# Patient Record
Sex: FEMALE | Race: BLACK OR AFRICAN AMERICAN | NOT HISPANIC OR LATINO | Employment: OTHER | URBAN - METROPOLITAN AREA
[De-identification: names, ages, dates, MRNs, and addresses within clinical notes are randomized per-mention and may not be internally consistent; named-entity substitution may affect disease eponyms.]

---

## 2024-02-04 ENCOUNTER — HOSPITAL ENCOUNTER (INPATIENT)
Facility: HOSPITAL | Age: 69
LOS: 8 days | Discharge: NURSING, REHAB OR LONG-TERM CARE | DRG: 329 | End: 2024-02-14
Attending: STUDENT IN AN ORGANIZED HEALTH CARE EDUCATION/TRAINING PROGRAM | Admitting: SURGERY
Payer: MEDICARE

## 2024-02-04 DIAGNOSIS — K35.80 ACUTE APPENDICITIS: ICD-10-CM

## 2024-02-04 DIAGNOSIS — K35.80 ACUTE APPENDICITIS, UNSPECIFIED ACUTE APPENDICITIS TYPE: Primary | ICD-10-CM

## 2024-02-04 DIAGNOSIS — N17.9 ACUTE KIDNEY INJURY: ICD-10-CM

## 2024-02-04 DIAGNOSIS — K35.32 PERFORATED APPENDICITIS: ICD-10-CM

## 2024-02-04 LAB
ABORH RETYPE: NORMAL
ABS NEUT CALC (OHS): 6.33 X10(3)/MCL (ref 2.1–9.2)
ALBUMIN SERPL-MCNC: 2 G/DL (ref 3.4–4.8)
ALBUMIN SERPL-MCNC: 2.6 G/DL (ref 3.4–4.8)
ALBUMIN/GLOB SERPL: 0.4 RATIO (ref 1.1–2)
ALBUMIN/GLOB SERPL: 0.4 RATIO (ref 1.1–2)
ALP SERPL-CCNC: 62 UNIT/L (ref 40–150)
ALP SERPL-CCNC: 76 UNIT/L (ref 40–150)
ALT SERPL-CCNC: 13 UNIT/L (ref 0–55)
ALT SERPL-CCNC: 14 UNIT/L (ref 0–55)
ANISOCYTOSIS BLD QL SMEAR: SLIGHT
APPEARANCE UR: ABNORMAL
APTT PPP: 37.4 SECONDS (ref 23.2–33.7)
AST SERPL-CCNC: 18 UNIT/L (ref 5–34)
AST SERPL-CCNC: 19 UNIT/L (ref 5–34)
BACTERIA #/AREA URNS AUTO: ABNORMAL /HPF
BILIRUB SERPL-MCNC: 1.9 MG/DL
BILIRUB SERPL-MCNC: 2 MG/DL
BILIRUB UR QL STRIP.AUTO: NEGATIVE
BNP BLD-MCNC: 167.4 PG/ML
BUN SERPL-MCNC: 37.8 MG/DL (ref 9.8–20.1)
BUN SERPL-MCNC: 37.9 MG/DL (ref 9.8–20.1)
CALCIUM SERPL-MCNC: 8.5 MG/DL (ref 8.4–10.2)
CALCIUM SERPL-MCNC: 9.7 MG/DL (ref 8.4–10.2)
CASTS URNS MICRO: ABNORMAL /LPF
CHLORIDE SERPL-SCNC: 106 MMOL/L (ref 98–107)
CHLORIDE SERPL-SCNC: 109 MMOL/L (ref 98–107)
CO2 SERPL-SCNC: 19 MMOL/L (ref 23–31)
CO2 SERPL-SCNC: 21 MMOL/L (ref 23–31)
COLOR UR AUTO: YELLOW
CREAT SERPL-MCNC: 2 MG/DL (ref 0.55–1.02)
CREAT SERPL-MCNC: 2.47 MG/DL (ref 0.55–1.02)
D DIMER PPP IA.FEU-MCNC: >=20 UG/ML FEU (ref 0–0.5)
ERYTHROCYTE [DISTWIDTH] IN BLOOD BY AUTOMATED COUNT: 14.6 % (ref 11.5–17)
ERYTHROCYTE [DISTWIDTH] IN BLOOD BY AUTOMATED COUNT: 14.7 % (ref 11.5–17)
GFR SERPLBLD CREATININE-BSD FMLA CKD-EPI: 21 MLS/MIN/1.73/M2
GFR SERPLBLD CREATININE-BSD FMLA CKD-EPI: 27 MLS/MIN/1.73/M2
GLOBULIN SER-MCNC: 4.9 GM/DL (ref 2.4–3.5)
GLOBULIN SER-MCNC: 5.8 GM/DL (ref 2.4–3.5)
GLUCOSE SERPL-MCNC: 116 MG/DL (ref 82–115)
GLUCOSE SERPL-MCNC: 96 MG/DL (ref 82–115)
GLUCOSE UR QL STRIP.AUTO: NORMAL
GROUP & RH: NORMAL
HCT VFR BLD AUTO: 35.7 % (ref 37–47)
HCT VFR BLD AUTO: 40.8 % (ref 37–47)
HGB BLD-MCNC: 11.6 G/DL (ref 12–16)
HGB BLD-MCNC: 13.3 G/DL (ref 12–16)
HOLD SPECIMEN: NORMAL
HOLD SPECIMEN: NORMAL
HYALINE CASTS #/AREA URNS LPF: ABNORMAL /LPF
HYPOCHROMIA BLD QL SMEAR: SLIGHT
INDIRECT COOMBS: NORMAL
INR PPP: 1.4
KETONES UR QL STRIP.AUTO: NEGATIVE
LACTATE SERPL-SCNC: 2.5 MMOL/L (ref 0.5–2.2)
LACTATE SERPL-SCNC: 3 MMOL/L (ref 0.5–2.2)
LACTATE SERPL-SCNC: 3.6 MMOL/L (ref 0.5–2.2)
LEUKOCYTE ESTERASE UR QL STRIP.AUTO: 500
LIPASE SERPL-CCNC: 6 U/L
LYMPHOCYTES NFR BLD MANUAL: 0.29 X10(3)/MCL
LYMPHOCYTES NFR BLD MANUAL: 4 % (ref 13–40)
MACROCYTES BLD QL SMEAR: SLIGHT
MCH RBC QN AUTO: 29.3 PG (ref 27–31)
MCH RBC QN AUTO: 29.4 PG (ref 27–31)
MCHC RBC AUTO-ENTMCNC: 32.5 G/DL (ref 33–36)
MCHC RBC AUTO-ENTMCNC: 32.6 G/DL (ref 33–36)
MCV RBC AUTO: 89.9 FL (ref 80–94)
MCV RBC AUTO: 90.4 FL (ref 80–94)
MONOCYTES NFR BLD MANUAL: 0.58 X10(3)/MCL (ref 0.1–1.3)
MONOCYTES NFR BLD MANUAL: 8 % (ref 2–11)
MUCOUS THREADS URNS QL MICRO: ABNORMAL /LPF
NEUTROPHILS NFR BLD MANUAL: 88 % (ref 47–80)
NITRITE UR QL STRIP.AUTO: NEGATIVE
NRBC BLD AUTO-RTO: 0 %
NRBC BLD AUTO-RTO: 0 %
NRBC BLD MANUAL-RTO: 0 %
PH UR STRIP.AUTO: 5.5 [PH]
PLATELET # BLD AUTO: 251 X10(3)/MCL (ref 130–400)
PLATELET # BLD AUTO: 320 X10(3)/MCL (ref 130–400)
PLATELET # BLD EST: ADEQUATE 10*3/UL
PMV BLD AUTO: 11 FL (ref 7.4–10.4)
PMV BLD AUTO: 11.6 FL (ref 7.4–10.4)
POLYCHROMASIA BLD QL SMEAR: SLIGHT
POTASSIUM SERPL-SCNC: 4.9 MMOL/L (ref 3.5–5.1)
POTASSIUM SERPL-SCNC: 5.1 MMOL/L (ref 3.5–5.1)
PROT SERPL-MCNC: 6.9 GM/DL (ref 5.8–7.6)
PROT SERPL-MCNC: 8.4 GM/DL (ref 5.8–7.6)
PROT UR QL STRIP.AUTO: ABNORMAL
PROTHROMBIN TIME: 17.2 SECONDS (ref 11.4–14)
RBC # BLD AUTO: 3.95 X10(6)/MCL (ref 4.2–5.4)
RBC # BLD AUTO: 4.54 X10(6)/MCL (ref 4.2–5.4)
RBC #/AREA URNS AUTO: ABNORMAL /HPF
RBC UR QL AUTO: ABNORMAL
SODIUM SERPL-SCNC: 142 MMOL/L (ref 136–145)
SODIUM SERPL-SCNC: 143 MMOL/L (ref 136–145)
SP GR UR STRIP.AUTO: 1.02 (ref 1–1.03)
SPECIMEN OUTDATE: NORMAL
SQUAMOUS #/AREA URNS LPF: ABNORMAL /HPF
TARGETS BLD QL SMEAR: SLIGHT
TROPONIN I SERPL-MCNC: 0.02 NG/ML (ref 0–0.04)
UNIDENT CRYS #/AREA URNS HPF: ABNORMAL /HPF
UROBILINOGEN UR STRIP-ACNC: NORMAL
WBC # SPEC AUTO: 2.56 X10(3)/MCL (ref 4.5–11.5)
WBC # SPEC AUTO: 7.19 X10(3)/MCL (ref 4.5–11.5)
WBC #/AREA URNS AUTO: ABNORMAL /HPF

## 2024-02-04 PROCEDURE — 83880 ASSAY OF NATRIURETIC PEPTIDE: CPT | Performed by: PHYSICIAN ASSISTANT

## 2024-02-04 PROCEDURE — 96361 HYDRATE IV INFUSION ADD-ON: CPT

## 2024-02-04 PROCEDURE — 86901 BLOOD TYPING SEROLOGIC RH(D): CPT | Performed by: STUDENT IN AN ORGANIZED HEALTH CARE EDUCATION/TRAINING PROGRAM

## 2024-02-04 PROCEDURE — 85027 COMPLETE CBC AUTOMATED: CPT | Mod: 91

## 2024-02-04 PROCEDURE — 63600175 PHARM REV CODE 636 W HCPCS

## 2024-02-04 PROCEDURE — 96372 THER/PROPH/DIAG INJ SC/IM: CPT

## 2024-02-04 PROCEDURE — G0378 HOSPITAL OBSERVATION PER HR: HCPCS

## 2024-02-04 PROCEDURE — 99285 EMERGENCY DEPT VISIT HI MDM: CPT | Mod: 25

## 2024-02-04 PROCEDURE — 83690 ASSAY OF LIPASE: CPT | Performed by: PHYSICIAN ASSISTANT

## 2024-02-04 PROCEDURE — 85379 FIBRIN DEGRADATION QUANT: CPT | Performed by: PHYSICIAN ASSISTANT

## 2024-02-04 PROCEDURE — 87040 BLOOD CULTURE FOR BACTERIA: CPT | Performed by: PHYSICIAN ASSISTANT

## 2024-02-04 PROCEDURE — 80053 COMPREHEN METABOLIC PANEL: CPT | Mod: 91

## 2024-02-04 PROCEDURE — 25000003 PHARM REV CODE 250

## 2024-02-04 PROCEDURE — 96375 TX/PRO/DX INJ NEW DRUG ADDON: CPT

## 2024-02-04 PROCEDURE — 96374 THER/PROPH/DIAG INJ IV PUSH: CPT

## 2024-02-04 PROCEDURE — 93005 ELECTROCARDIOGRAM TRACING: CPT

## 2024-02-04 PROCEDURE — 81001 URINALYSIS AUTO W/SCOPE: CPT | Performed by: PHYSICIAN ASSISTANT

## 2024-02-04 PROCEDURE — 85610 PROTHROMBIN TIME: CPT | Performed by: PHYSICIAN ASSISTANT

## 2024-02-04 PROCEDURE — 85027 COMPLETE CBC AUTOMATED: CPT | Performed by: PHYSICIAN ASSISTANT

## 2024-02-04 PROCEDURE — 96376 TX/PRO/DX INJ SAME DRUG ADON: CPT

## 2024-02-04 PROCEDURE — 87086 URINE CULTURE/COLONY COUNT: CPT | Performed by: PHYSICIAN ASSISTANT

## 2024-02-04 PROCEDURE — 63600175 PHARM REV CODE 636 W HCPCS: Performed by: PHYSICIAN ASSISTANT

## 2024-02-04 PROCEDURE — 25000003 PHARM REV CODE 250: Performed by: PHYSICIAN ASSISTANT

## 2024-02-04 PROCEDURE — 85730 THROMBOPLASTIN TIME PARTIAL: CPT | Performed by: PHYSICIAN ASSISTANT

## 2024-02-04 PROCEDURE — 83605 ASSAY OF LACTIC ACID: CPT | Mod: 91 | Performed by: STUDENT IN AN ORGANIZED HEALTH CARE EDUCATION/TRAINING PROGRAM

## 2024-02-04 PROCEDURE — 83605 ASSAY OF LACTIC ACID: CPT | Performed by: PHYSICIAN ASSISTANT

## 2024-02-04 PROCEDURE — 80053 COMPREHEN METABOLIC PANEL: CPT | Performed by: PHYSICIAN ASSISTANT

## 2024-02-04 PROCEDURE — 84484 ASSAY OF TROPONIN QUANT: CPT | Performed by: PHYSICIAN ASSISTANT

## 2024-02-04 RX ORDER — SODIUM CHLORIDE 9 MG/ML
1000 INJECTION, SOLUTION INTRAVENOUS
Status: COMPLETED | OUTPATIENT
Start: 2024-02-04 | End: 2024-02-04

## 2024-02-04 RX ORDER — MORPHINE SULFATE 2 MG/ML
2 INJECTION, SOLUTION INTRAMUSCULAR; INTRAVENOUS
Status: DISCONTINUED | OUTPATIENT
Start: 2024-02-04 | End: 2024-02-04

## 2024-02-04 RX ORDER — METHOCARBAMOL 500 MG/1
500 TABLET, FILM COATED ORAL 3 TIMES DAILY
Status: DISCONTINUED | OUTPATIENT
Start: 2024-02-04 | End: 2024-02-05

## 2024-02-04 RX ORDER — NALOXONE HCL 0.4 MG/ML
0.4 VIAL (ML) INJECTION
Status: DISCONTINUED | OUTPATIENT
Start: 2024-02-04 | End: 2024-02-14 | Stop reason: HOSPADM

## 2024-02-04 RX ORDER — HYDROMORPHONE HYDROCHLORIDE 1 MG/ML
0.2 INJECTION, SOLUTION INTRAMUSCULAR; INTRAVENOUS; SUBCUTANEOUS EVERY 6 HOURS PRN
Status: DISCONTINUED | OUTPATIENT
Start: 2024-02-04 | End: 2024-02-05

## 2024-02-04 RX ORDER — ACETAMINOPHEN 325 MG/1
650 TABLET ORAL EVERY 4 HOURS PRN
Status: DISCONTINUED | OUTPATIENT
Start: 2024-02-04 | End: 2024-02-05

## 2024-02-04 RX ORDER — ONDANSETRON HYDROCHLORIDE 2 MG/ML
4 INJECTION, SOLUTION INTRAVENOUS
Status: COMPLETED | OUTPATIENT
Start: 2024-02-04 | End: 2024-02-04

## 2024-02-04 RX ORDER — MORPHINE SULFATE 2 MG/ML
4 INJECTION, SOLUTION INTRAMUSCULAR; INTRAVENOUS
Status: COMPLETED | OUTPATIENT
Start: 2024-02-04 | End: 2024-02-04

## 2024-02-04 RX ORDER — TALC
6 POWDER (GRAM) TOPICAL NIGHTLY PRN
Status: DISCONTINUED | OUTPATIENT
Start: 2024-02-04 | End: 2024-02-05

## 2024-02-04 RX ORDER — LIDOCAINE HYDROCHLORIDE 10 MG/ML
1 INJECTION, SOLUTION EPIDURAL; INFILTRATION; INTRACAUDAL; PERINEURAL ONCE AS NEEDED
Status: DISCONTINUED | OUTPATIENT
Start: 2024-02-04 | End: 2024-02-14 | Stop reason: HOSPADM

## 2024-02-04 RX ORDER — ACETAMINOPHEN 325 MG/1
650 TABLET ORAL EVERY 8 HOURS PRN
Status: DISCONTINUED | OUTPATIENT
Start: 2024-02-04 | End: 2024-02-05

## 2024-02-04 RX ORDER — OXYCODONE HYDROCHLORIDE 5 MG/1
10 TABLET ORAL EVERY 4 HOURS PRN
Status: DISCONTINUED | OUTPATIENT
Start: 2024-02-04 | End: 2024-02-05

## 2024-02-04 RX ORDER — OXYCODONE HYDROCHLORIDE 5 MG/1
5 TABLET ORAL EVERY 4 HOURS PRN
Status: DISCONTINUED | OUTPATIENT
Start: 2024-02-04 | End: 2024-02-05

## 2024-02-04 RX ORDER — SODIUM CHLORIDE, SODIUM LACTATE, POTASSIUM CHLORIDE, CALCIUM CHLORIDE 600; 310; 30; 20 MG/100ML; MG/100ML; MG/100ML; MG/100ML
INJECTION, SOLUTION INTRAVENOUS CONTINUOUS
Status: DISCONTINUED | OUTPATIENT
Start: 2024-02-04 | End: 2024-02-06

## 2024-02-04 RX ORDER — SODIUM CHLORIDE 0.9 % (FLUSH) 0.9 %
10 SYRINGE (ML) INJECTION
Status: DISCONTINUED | OUTPATIENT
Start: 2024-02-04 | End: 2024-02-14 | Stop reason: HOSPADM

## 2024-02-04 RX ORDER — HEPARIN SODIUM 5000 [USP'U]/ML
5000 INJECTION, SOLUTION INTRAVENOUS; SUBCUTANEOUS EVERY 8 HOURS
Status: DISCONTINUED | OUTPATIENT
Start: 2024-02-04 | End: 2024-02-14 | Stop reason: HOSPADM

## 2024-02-04 RX ORDER — ONDANSETRON 4 MG/1
8 TABLET, ORALLY DISINTEGRATING ORAL EVERY 8 HOURS PRN
Status: DISCONTINUED | OUTPATIENT
Start: 2024-02-04 | End: 2024-02-08

## 2024-02-04 RX ORDER — HYDROMORPHONE HYDROCHLORIDE 1 MG/ML
0.5 INJECTION, SOLUTION INTRAMUSCULAR; INTRAVENOUS; SUBCUTANEOUS
Status: COMPLETED | OUTPATIENT
Start: 2024-02-04 | End: 2024-02-04

## 2024-02-04 RX ADMIN — HEPARIN SODIUM 5000 UNITS: 5000 INJECTION, SOLUTION INTRAVENOUS; SUBCUTANEOUS at 09:02

## 2024-02-04 RX ADMIN — HYDROMORPHONE HYDROCHLORIDE 0.5 MG: 1 INJECTION, SOLUTION INTRAMUSCULAR; INTRAVENOUS; SUBCUTANEOUS at 01:02

## 2024-02-04 RX ADMIN — ONDANSETRON 4 MG: 2 INJECTION INTRAMUSCULAR; INTRAVENOUS at 11:02

## 2024-02-04 RX ADMIN — SODIUM CHLORIDE, POTASSIUM CHLORIDE, SODIUM LACTATE AND CALCIUM CHLORIDE: 600; 310; 30; 20 INJECTION, SOLUTION INTRAVENOUS at 04:02

## 2024-02-04 RX ADMIN — HYDROMORPHONE HYDROCHLORIDE 0.2 MG: 1 INJECTION, SOLUTION INTRAMUSCULAR; INTRAVENOUS; SUBCUTANEOUS at 05:02

## 2024-02-04 RX ADMIN — OXYCODONE HYDROCHLORIDE 10 MG: 5 TABLET ORAL at 04:02

## 2024-02-04 RX ADMIN — SODIUM CHLORIDE 1000 ML: 9 INJECTION, SOLUTION INTRAVENOUS at 01:02

## 2024-02-04 RX ADMIN — SODIUM CHLORIDE, POTASSIUM CHLORIDE, SODIUM LACTATE AND CALCIUM CHLORIDE 500 ML: 600; 310; 30; 20 INJECTION, SOLUTION INTRAVENOUS at 05:02

## 2024-02-04 RX ADMIN — METHOCARBAMOL 500 MG: 500 TABLET ORAL at 09:02

## 2024-02-04 RX ADMIN — PIPERACILLIN AND TAZOBACTAM 4.5 G: 4; .5 INJECTION, POWDER, LYOPHILIZED, FOR SOLUTION INTRAVENOUS; PARENTERAL at 02:02

## 2024-02-04 RX ADMIN — MORPHINE SULFATE 4 MG: 2 INJECTION, SOLUTION INTRAMUSCULAR; INTRAVENOUS at 12:02

## 2024-02-04 RX ADMIN — SODIUM CHLORIDE 1000 ML: 9 INJECTION, SOLUTION INTRAVENOUS at 11:02

## 2024-02-04 RX ADMIN — METHOCARBAMOL 500 MG: 500 TABLET ORAL at 04:02

## 2024-02-04 RX ADMIN — MORPHINE SULFATE 4 MG: 2 INJECTION, SOLUTION INTRAMUSCULAR; INTRAVENOUS at 11:02

## 2024-02-04 RX ADMIN — HYDROMORPHONE HYDROCHLORIDE 0.2 MG: 1 INJECTION, SOLUTION INTRAMUSCULAR; INTRAVENOUS; SUBCUTANEOUS at 11:02

## 2024-02-04 RX ADMIN — PIPERACILLIN AND TAZOBACTAM 4.5 G: 4; .5 INJECTION, POWDER, LYOPHILIZED, FOR SOLUTION INTRAVENOUS; PARENTERAL at 11:02

## 2024-02-04 NOTE — ED PROVIDER NOTES
Encounter Date: 2/4/2024     History     Chief Complaint   Patient presents with    Abdominal Pain     Upper abd pain that started on Saturday. Also reports generalized weakness and dizziness that started this morning. Denies n/v/d. Does reports a foul odor to urine.      Patient with pmhx of HTN presents today with daughter c/o generalized abdominal pain that started yesterday morning. Pain is constant and non-radiating. She also reports malodorous urine and shortness of breath. She says she feels short of breath due to the pain. Daughter says this morning she started to complain of generalized weakness as well. Denies fever, chills, n/v/d, constipation, dysuria, hematuria, chest pain, syncope.     The history is provided by the patient and a relative. No  was used.     Review of patient's allergies indicates:  No Known Allergies  No past medical history on file.  No past surgical history on file.  No family history on file.     Review of Systems   Constitutional:  Negative for chills and fever.        Generalized weakness    Respiratory:  Positive for shortness of breath. Negative for cough and chest tightness.    Cardiovascular:  Negative for chest pain.   Gastrointestinal:  Positive for abdominal pain. Negative for constipation, diarrhea, nausea and vomiting.   Genitourinary:  Negative for dysuria, flank pain and hematuria.   Musculoskeletal:  Negative for arthralgias and myalgias.   Skin:  Negative for rash.   Neurological:  Negative for syncope, light-headedness and headaches.   All other systems reviewed and are negative.      Physical Exam     Initial Vitals [02/04/24 1033]   BP Pulse Resp Temp SpO2   114/79 (!) 121 20 98 °F (36.7 °C) 99 %      MAP       --         Physical Exam    Nursing note and vitals reviewed.  Constitutional: She appears well-nourished. She is not diaphoretic. No distress.   HENT:   Head: Normocephalic and atraumatic.   Mouth/Throat: Oropharynx is clear and moist.  No oropharyngeal exudate.   Eyes: Conjunctivae and EOM are normal.   Neck: Neck supple.   Normal range of motion.  Cardiovascular:  Normal rate, regular rhythm, normal heart sounds and intact distal pulses.           Pulmonary/Chest: Breath sounds normal. No respiratory distress. She has no wheezes. She has no rhonchi. She has no rales.   Abdominal: Abdomen is soft and flat. Bowel sounds are normal. There is generalized abdominal tenderness.   No right CVA tenderness.  No left CVA tenderness. There is guarding. There is no rebound.   Musculoskeletal:         General: No edema.      Cervical back: Normal range of motion and neck supple.     Neurological: She is alert and oriented to person, place, and time. GCS score is 15. GCS eye subscore is 4. GCS verbal subscore is 5. GCS motor subscore is 6.   Skin: Skin is warm and dry. Capillary refill takes less than 2 seconds. No rash noted.   Psychiatric: She has a normal mood and affect.         ED Course   Procedures  Labs Reviewed   COMPREHENSIVE METABOLIC PANEL - Abnormal; Notable for the following components:       Result Value    Carbon Dioxide 19 (*)     Glucose Level 116 (*)     Blood Urea Nitrogen 37.9 (*)     Creatinine 2.47 (*)     Protein Total 8.4 (*)     Albumin Level 2.6 (*)     Globulin 5.8 (*)     Albumin/Globulin Ratio 0.4 (*)     Bilirubin Total 2.0 (*)     All other components within normal limits   D DIMER, QUANTITATIVE - Abnormal; Notable for the following components:    D-Dimer >=20.00 (*)     All other components within normal limits   B-TYPE NATRIURETIC PEPTIDE - Abnormal; Notable for the following components:    Natriuretic Peptide 167.4 (*)     All other components within normal limits   CBC WITH DIFFERENTIAL - Abnormal; Notable for the following components:    MCHC 32.6 (*)     MPV 11.6 (*)     All other components within normal limits   MANUAL DIFFERENTIAL - Abnormal; Notable for the following components:    Neutrophils % 88 (*)     Lymphs % 4  (*)     Lymphs Abs 0.2876 (*)     Anisocytosis Slight (*)     Macrocytosis Slight (*)     Polychromasia Slight (*)     Hypochromasia Slight (*)     Target Cells Slight (*)     All other components within normal limits   PROTIME-INR - Abnormal; Notable for the following components:    PT 17.2 (*)     INR 1.4 (*)     All other components within normal limits   APTT - Abnormal; Notable for the following components:    PTT 37.4 (*)     All other components within normal limits   LACTIC ACID, PLASMA - Abnormal; Notable for the following components:    Lactic Acid Level 3.6 (*)     All other components within normal limits   LIPASE - Normal   TROPONIN I - Normal   BLOOD CULTURE OLG   BLOOD CULTURE OLG   CBC W/ AUTO DIFFERENTIAL    Narrative:     The following orders were created for panel order CBC auto differential.  Procedure                               Abnormality         Status                     ---------                               -----------         ------                     CBC with Differential[[id number]]       Abnormal            Final result               Manual Differential[[id number]]         Abnormal            Edited Result - FINAL        Please view results for these tests on the individual orders.   EXTRA TUBES    Narrative:     The following orders were created for panel order EXTRA TUBES.  Procedure                               Abnormality         Status                     ---------                               -----------         ------                     Gold Top Hold[[id number]]                                   Final result               Pink Top Hold[[id number]]                                   Final result                 Please view results for these tests on the individual orders.   GOLD TOP HOLD   PINK TOP HOLD   URINALYSIS, REFLEX TO URINE CULTURE   LACTIC ACID, PLASMA   TYPE & SCREEN   ABORH RETYPE     EKG Readings: (Independently Interpreted)   Initial Reading: No STEMI. Rhythm:  Sinus Tachycardia. Heart Rate: 125. Ectopy: No Ectopy. Conduction: Normal. ST Segments: Normal ST Segments. T Waves: Normal. Axis: Normal.       Imaging Results              NM Lung Scan Ventilation Perfusion (Final result)  Result time 02/04/24 16:10:09      Final result by Jesu Guadarrama MD (02/04/24 16:10:09)                   Impression:      Very low probability for pulmonary embolus.      Electronically signed by: Jesu Guadarrama MD  Date:    02/04/2024  Time:    16:10               Narrative:    EXAMINATION:  NM LUNG VENTILATION AND PERFUSION IMAGING    CLINICAL HISTORY:  Pulmonary embolism (PE) suspected, positive D-dimer;    TECHNIQUE:  33.7 mCi of Tc-99m-DTPA were placed in the nebulizer. Following the inhalation Tc-99m-DTPA in aerosol and the subsequent IV administration of 5.5 mCi of Tc-99m-MAA, multiple images of the thorax were obtained in various projections.    COMPARISON:  None.    FINDINGS:  On the perfusion study, normal slightly heterogeneous perfusion without evidence for mismatched defects..  The ventilation study reveals normal appearance.  The CXR demonstrate mild patchy ground-glass opacities in the lung bases.                                       CT Abdomen Pelvis  Without Contrast (Final result)  Result time 02/04/24 13:57:12      Final result by Jesu Guadarrama MD (02/04/24 13:57:12)                   Impression:      Dilated appendix with periappendiceal inflammatory changes,, compatible with non perforated acute appendicitis.    No fluid collection or abscess.  No free air.    Small amount of free fluid the pelvis.    Findings were communicated to Dr. Chavez at 13:56 02/04/2024    Mild atelectatic changes and ground-glass opacities in the lower lungs suspicious for infectious/inflammatory process.      Electronically signed by: Jesu Guadarrama MD  Date:    02/04/2024  Time:    13:57               Narrative:    EXAMINATION:  CT abdomen pelvis without contrast    CLINICAL  HISTORY:  Abdominal pain, acute, nonlocalized    TECHNIQUE:  Routine CT of the abdomen pelvis performed without intravenous contrast    Total DLP: 275 mGy.cm    Automatic exposure control was utilized to reduce the patient's dose    COMPARISON:  None    FINDINGS:  There are linear and patchy ground-glass opacities in the lung bases suggesting atelectasis and infectious/inflammatory process.    There is incompletely characterize hypodense lesion in the anterior right hepatic lobe measuring 1.3 x 1.2 cm    The spleen, pancreas, and gallbladder are unremarkable.  Bilateral adrenal adenomas noted.    There are nonobstructing calculi in the inferior pole of both kidneys.  Calculus in the right kidney measures 6 mm.  Calculus on the left kidney measures 3 mm.  No hydronephrosis.  No ureteral or bladder calculus.    There are atherosclerotic calcifications of the abdominal aorta is branches without aneurysmal dilatation.    No abdominopelvic adenopathy.    There is a moderate amount of retained stool noted in the right colon and portion of the transverse colon.  The descending and sigmoid colon appears decompressed.    The appendix is dilated with periappendiceal inflammatory changes compatible with acute appendicitis.  There is a small amount of free fluid in the pelvis.  No fluid collection or abscess.                                       X-Ray Chest 1 View (Final result)  Result time 02/04/24 11:46:21      Final result by Tata Houston MD (02/04/24 11:46:21)                   Impression:      Small patchy bibasilar airspace opacities, may be infectious or inflammatory      Electronically signed by: Tata Houston  Date:    02/04/2024  Time:    11:46               Narrative:    EXAMINATION:  XR CHEST 1 VIEW    CLINICAL HISTORY:  shortness of breath;    TECHNIQUE:  AP chest    COMPARISON:  None.    FINDINGS:  The heart is normal in size.  There are small patchy bibasilar airspace opacities.  There is no pleural  effusion or visible pneumothorax.                                       Medications   LIDOcaine (PF) 10 mg/ml (1%) injection 10 mg (has no administration in time range)   sodium chloride 0.9% flush 10 mL (has no administration in time range)   ondansetron disintegrating tablet 8 mg (has no administration in time range)   melatonin tablet 6 mg (has no administration in time range)   acetaminophen tablet 650 mg (has no administration in time range)   heparin (porcine) injection 5,000 Units (has no administration in time range)   acetaminophen tablet 650 mg (has no administration in time range)   oxyCODONE immediate release tablet 5 mg (has no administration in time range)   oxyCODONE immediate release tablet 10 mg (10 mg Oral Given 2/4/24 1607)   methocarbamoL tablet 500 mg (500 mg Oral Given 2/4/24 1608)   HYDROmorphone injection 0.2 mg (has no administration in time range)   naloxone 0.4 mg/mL injection 0.4 mg (has no administration in time range)   lactated ringers infusion (has no administration in time range)   piperacillin-tazobactam (ZOSYN) 4.5 g in dextrose 5 % in water (D5W) 100 mL IVPB (MB+) (has no administration in time range)   0.9%  NaCl infusion (0 mLs Intravenous Stopped 2/4/24 1443)   morphine injection 4 mg (4 mg Intravenous Given 2/4/24 1120)   ondansetron injection 4 mg (4 mg Intravenous Given 2/4/24 1120)   morphine injection 4 mg (4 mg Intravenous Given 2/4/24 1250)   0.9%  NaCl infusion (1,000 mLs Intravenous New Bag 2/4/24 1355)   HYDROmorphone injection 0.5 mg (0.5 mg Intravenous Given 2/4/24 1356)   piperacillin-tazobactam (ZOSYN) 4.5 g in dextrose 5 % in water (D5W) 100 mL IVPB (MB+) (4.5 g Intravenous New Bag 2/4/24 1452)     Medical Decision Making  Patient presents today c/o generalized abdominal pain, sob, and malodorous urine. Tachycardia. Generalized abd tenderness with guarding.    Ddx: bowel perforation, bowel obstruction, acute cholecystitis, acute appendicitis, ureteral stone,  pyelonephritis, acute coronary syndrome, pulmonary embolism, amongst others     Amount and/or Complexity of Data Reviewed  Labs: ordered. Decision-making details documented in ED Course.  Radiology: ordered. Decision-making details documented in ED Course.  ECG/medicine tests: ordered. Decision-making details documented in ED Course.               ED Course as of 02/04/24 1621   Sun Feb 04, 2024   1148 BUN(!): 37.9 [SA]   1149 Creatinine(!): 2.47 [SA]   1149 BILIRUBIN TOTAL(!): 2.0 [SA]   1149 ALP: 76 [SA]   1149 ALT: 14 [SA]   1149 AST: 18 [SA]   1149 eGFR: 21 [SA]   1149 Potassium: 5.1 [SA]   1149 Chloride: 106 [SA]   1149 CO2(!): 19 [SA]   1150 Sodium: 143 [SA]   1150 Lipase: 6 [SA]   1155 Lipase: 6 [SA]   1155 Troponin I: 0.018 [SA]   1155 BNP(!): 167.4 [SA]   1155 WBC: 7.19 [SA]   1155 Hemoglobin: 13.3 [SA]   1155 Hematocrit: 40.8 [SA]   1232 D-Dimer(!): >=20.00 [SA]   1325 INR(!): 1.4 [SA]   1325 PTT(!): 37.4 [SA]   1400 CT Abdomen Pelvis  Without Contrast [SA]   1521 Lactic Acid Level(!!): 3.6 [SA]   1620 NM Lung Scan Ventilation Perfusion [SA]      ED Course User Index  [SA] Zina Collier PA                       Clinical Impression:  Final diagnoses:  [N17.9] Acute kidney injury  [K35.80] Acute appendicitis, unspecified acute appendicitis type (Primary)        ED Disposition Condition    Observation                 Zina Collier PA  02/04/24 1621

## 2024-02-04 NOTE — H&P
[facility]   Consult Note  General Surgery    Patient Name: Selene Smallwood  YOB: 1955  Date of Admission: 2/4/2024  Date: 02/04/2024 2:24 PM  Reason for Consult: acute appendicitis   HD#0  POD#* No surgery found *    PRESENTING HISTORY     Chief Complaint/Reason for Admission: Acute appendicitis    History of Present Illness:  Selene Smallwood is a 68 y.o. female that presents with 1 day diffuse abdominal pain worse in the RLQ, associated nausea and 1 x non-bilious vomiting this morning. She states that she is constipated with her last bowel movement being on Saturday. She denies fever, chills, shortness of breath, chest pain, hematochezia, dysuria, headache or diarrhea.     She states that she has no past medical history but does have a family history of heart disease and a brother on dialysis. She is not on any medications at this time. No history of blood thinner use. She denies any personal or family history of ulcerative colitis or Chron's disease. She does not have a primary care provider and has never had a colonoscopy.     Her past surgical history includes a D&C and tubal ligation. She denies any c-sections or abdominal surgeries of any kind.       Review of Systems:  12 point ROS negative except as stated in HPI    PAST HISTORY:   Past medical history:  No past medical history on file.    Past surgical history:  No past surgical history on file.    Family history:  No family history on file.    Social history:  Social History     Socioeconomic History    Marital status:      Social History     Tobacco Use   Smoking Status Not on file   Smokeless Tobacco Not on file         MEDICATIONS & ALLERGIES:   Allergies: Review of patient's allergies indicates:  No Known Allergies    No current facility-administered medications on file prior to encounter.     No current outpatient medications on file prior to encounter.       Scheduled Meds:   heparin (porcine)  5,000 Units Subcutaneous Q8H     methocarbamoL  500 mg Oral TID    piperacillin-tazobactam (Zosyn) IV (PEDS and ADULTS) (extended infusion is not appropriate)  4.5 g Intravenous ED 1 Time    piperacillin-tazobactam (Zosyn) IV (PEDS and ADULTS) (extended infusion is not appropriate)  4.5 g Intravenous Q12H       Continuous Infusions:   lactated ringers         PRN Meds:    OBJECTIVE:     Vital Signs:  Temp:  [98 °F (36.7 °C)] 98 °F (36.7 °C)  Pulse:  [107-121] 110  Resp:  [18-20] 18  SpO2:  [95 %-99 %] 98 %  BP: (114-163)/() 163/73  Body mass index is 25.06 kg/m².     No intake/output data recorded.  No intake/output data recorded.    Physical Exam:  General:  Well developed, well nourished, moderately uncomfortable  HEENT:  Normocephalic, atraumatic, PERRL, EOMI, clear sclera, ears normal, neck supple, throat clear without erythema or exudates  CVS:  RRR  Resp:  Normal work of breathing on RA, equal rise and fall of the chest  GI: Abdomen soft, tender to palpation in umbilical/RLQ, negative psoas's sign  :  Deferred  MSK:  No muscle atrophy, cyanosis, peripheral edema, moving all extremities spontaneously  Vascular: *2+ radial pulses bilaterally*. All extremities WWP  Skin:  No rashes, ulcers, erythema  Neuro:  CNII-XII grossly intact, alert and oriented to person, place, and time    Laboratory:  Recent Labs     02/04/24  1117 02/04/24  1255   WBC 7.19  --    HGB 13.3  --    HCT 40.8  --      --    PTT  --  37.4*   INR  --  1.4*       Recent Labs     02/04/24  1117      K 5.1   CO2 19*   BUN 37.9*   CREATININE 2.47*   CALCIUM 9.7   ALBUMIN 2.6*   BILITOT 2.0*   AST 18   ALKPHOS 76   ALT 14       Troponin:  Recent Labs     02/04/24  1117   TROPONINI 0.018       CBC:  Recent Labs     02/04/24  1117   WBC 7.19   RBC 4.54   HGB 13.3   HCT 40.8      MCV 89.9   MCH 29.3   MCHC 32.6*       CMP:  Recent Labs     02/04/24  1117   CALCIUM 9.7   ALBUMIN 2.6*      K 5.1   CO2 19*   BUN 37.9*   CREATININE 2.47*   ALKPHOS 76  "  ALT 14   AST 18   BILITOT 2.0*       Lactic Acid:  No results for input(s): "LACTATE" in the last 72 hours.  Etoh:  No results for input(s): "ALCOHOLMEDIC" in the last 72 hours.  Drug Screen:  No results for input(s): "PCDSCOMETHA", "COCAINEMETAB", "OPIATESCREEN", "BARBITURATES", "AMPHETAMINES", "MARIJUANATHC", "PCDSOPHENCYN", "CREATRANDUR", "TOXINFO" in the last 72 hours.    ABG:  No results for input(s): "PH", "PCO2", "PO2", "HCO3", "BE", "POCSATURATED" in the last 72 hours.    Diagnostic Results:  CT Abdomen Pelvis  Without Contrast    Result Date: 2/4/2024  Dilated appendix with periappendiceal inflammatory changes,, compatible with non perforated acute appendicitis. No fluid collection or abscess.  No free air. Small amount of free fluid the pelvis. Findings were communicated to Dr. Chavez at 13:56 02/04/2024 Mild atelectatic changes and ground-glass opacities in the lower lungs suspicious for infectious/inflammatory process. Electronically signed by: Jesu Guadarrama MD Date:    02/04/2024 Time:    13:57    X-Ray Chest 1 View    Result Date: 2/4/2024  Small patchy bibasilar airspace opacities, may be infectious or inflammatory Electronically signed by: Tata Houston Date:    02/04/2024 Time:    11:46    CT Abdomen Pelvis  Without Contrast   Final Result      Dilated appendix with periappendiceal inflammatory changes,, compatible with non perforated acute appendicitis.      No fluid collection or abscess.  No free air.      Small amount of free fluid the pelvis.      Findings were communicated to Dr. Chavez at 13:56 02/04/2024      Mild atelectatic changes and ground-glass opacities in the lower lungs suspicious for infectious/inflammatory process.         Electronically signed by: Jesu Guadarrama MD   Date:    02/04/2024   Time:    13:57      X-Ray Chest 1 View   Final Result      Small patchy bibasilar airspace opacities, may be infectious or inflammatory         Electronically signed by: Tata Houston "   Date:    02/04/2024   Time:    11:46      NM Lung Scan Ventilation Perfusion    (Results Pending)       Microbiology:  Microbiology Results (last 7 days)       Procedure Component Value Units Date/Time    Blood Culture #1 **CANNOT BE ORDERED STAT** [[id number]] Collected: 02/04/24 1426    Order Status: Sent Specimen: Blood from Hand, Right Updated: 02/04/24 1427    Blood Culture #1 **CANNOT BE ORDERED STAT** [[id number]] Collected: 02/04/24 1426    Order Status: Sent Specimen: Blood from Antecubital, Left Updated: 02/04/24 1427             ASSESSMENT & PLAN:   Selene Smallwood is a 68 y.o. F with 1 day of abdominal pain, N/V, without fever or leukocytosis. CT shows dilated appendix with periappendiceal inflammatory changes compatible with non perforated acute appendicitis.    Plan:    -OR tomorrow for appendectomy, Patient consented and explained the nature of the surgery, indications, risks and complications  -repeat CBC, lactate this evening  -IV fluids  -PRN pain medication  -heparin drip  -NPO at midnight     Yenny Garcia MS3  [employer] [employer] [facility] [employer]     2/4/2024  2:24 PM      I have personally seen and examined the patient with the medical student, and made changes to the note as appropriate.    68F who presented with abdominal pain and imaging consistent with acute appendicitis. Will plan for laparoscopic appendectomy tomorrow, 2/5. Also has elevated Cr. Pt does not mention personal history of kidney disease, though she has a few relatives on dialysis. She also had some SOB associated with her pain. Currently low suspicion for PE as she is satting well on RA, and not tachypnic or in any respiratory distress.    -OR tomorrow for appendectomy  -follow up labs; trend Cr, lactate  -mIVF  -NPO at midnight    Ppx: subQ hep    Dispo: pending surgery    Roderick La MD  [facility] General Surgery, PGY-1  02/04/2024

## 2024-02-04 NOTE — MEDICAL/APP STUDENT
[facility]   Consult Note  General Surgery    Patient Name: Selene Smallwood  YOB: 1955  Date of Admission: 2/4/2024  Date: 02/04/2024 2:24 PM  Reason for Consult: acute appendicitis   HD#0  POD#* No surgery found *    PRESENTING HISTORY     Chief Complaint/Reason for Admission: <principal problem not specified>    History of Present Illness:  Selene Smallwood is a 68 y.o. female that presents with 1 day diffuse abdominal pain worse in the RLQ, associated nausea and 1 x non-bilious vomiting this morning. She states that she is constipated with her last bowel movement being on Saturday. She denies fever, chills, shortness of breath, chest pain, hematochezia, dysuria, headache or diarrhea.     She states that she has no past medical history but does have a family history of heart disease and a brother on dialysis. She is not on any medications at this time. No history of blood thinner use. She denies any personal or family history of ulcerative colitis or Chron's disease. She does not have a primary care provider and has never had a colonoscopy.     Her past surgical history includes a D&C and tubal ligation. She denies any c-sections or abdominal surgeries of any kind.       Review of Systems:  12 point ROS negative except as stated in HPI    PAST HISTORY:   Past medical history:  No past medical history on file.    Past surgical history:  No past surgical history on file.    Family history:  No family history on file.    Social history:  Social History     Socioeconomic History    Marital status:      Social History     Tobacco Use   Smoking Status Not on file   Smokeless Tobacco Not on file         MEDICATIONS & ALLERGIES:   Allergies: Review of patient's allergies indicates:  No Known Allergies    No current facility-administered medications on file prior to encounter.     No current outpatient medications on file prior to encounter.       Scheduled Meds:   piperacillin-tazobactam (Zosyn)  "IV (PEDS and ADULTS) (extended infusion is not appropriate)  4.5 g Intravenous ED 1 Time       Continuous Infusions:    PRN Meds:    OBJECTIVE:     Vital Signs:  Temp:  [98 °F (36.7 °C)] 98 °F (36.7 °C)  Pulse:  [107-121] 110  Resp:  [18-20] 18  SpO2:  [95 %-99 %] 98 %  BP: (114-163)/() 163/73  Body mass index is 25.06 kg/m².     No intake/output data recorded.  No intake/output data recorded.    Physical Exam:  General:  Well developed, well nourished, no acute distress  HEENT:  Normocephalic, atraumatic, PERRL, EOMI, clear sclera, ears normal, neck supple, throat clear without erythema or exudates  CVS:  RRR  Resp:  Normal work of breathing on RA, equal rise and fall of the chest  GI: Abdomen soft, tender to palpation in the RLQ, negative psoas's sign  :  Deferred  MSK:  No muscle atrophy, cyanosis, peripheral edema, moving all extremities spontaneously  Vascular: *2+ radial pulses bilaterally*. All extremities WWP  Skin:  No rashes, ulcers, erythema  Neuro:  CNII-XII grossly intact, alert and oriented to person, place, and time    Laboratory:  Recent Labs     02/04/24  1117 02/04/24  1255   WBC 7.19  --    HGB 13.3  --    HCT 40.8  --      --    PTT  --  37.4*   INR  --  1.4*     Recent Labs     02/04/24  1117      K 5.1   CO2 19*   BUN 37.9*   CREATININE 2.47*   CALCIUM 9.7   ALBUMIN 2.6*   BILITOT 2.0*   AST 18   ALKPHOS 76   ALT 14     Troponin:  Recent Labs     02/04/24  1117   TROPONINI 0.018     CBC:  Recent Labs     02/04/24  1117   WBC 7.19   RBC 4.54   HGB 13.3   HCT 40.8      MCV 89.9   MCH 29.3   MCHC 32.6*     CMP:  Recent Labs     02/04/24  1117   CALCIUM 9.7   ALBUMIN 2.6*      K 5.1   CO2 19*   BUN 37.9*   CREATININE 2.47*   ALKPHOS 76   ALT 14   AST 18   BILITOT 2.0*     Lactic Acid:  No results for input(s): "LACTATE" in the last 72 hours.  Etoh:  No results for input(s): "ALCOHOLMEDIC" in the last 72 hours.  Drug Screen:  No results for input(s): "PCDSCOMETHA", " ""COCAINEMETAB", "OPIATESCREEN", "BARBITURATES", "AMPHETAMINES", "MARIJUANATHC", "PCDSOPHENCYN", "CREATRANDUR", "TOXINFO" in the last 72 hours.    ABG:  No results for input(s): "PH", "PCO2", "PO2", "HCO3", "BE", "POCSATURATED" in the last 72 hours.    Diagnostic Results:  CT Abdomen Pelvis  Without Contrast    Result Date: 2/4/2024  Dilated appendix with periappendiceal inflammatory changes,, compatible with non perforated acute appendicitis. No fluid collection or abscess.  No free air. Small amount of free fluid the pelvis. Findings were communicated to Dr. Chavez at 13:56 02/04/2024 Mild atelectatic changes and ground-glass opacities in the lower lungs suspicious for infectious/inflammatory process. Electronically signed by: Jesu Guadarrama MD Date:    02/04/2024 Time:    13:57    X-Ray Chest 1 View    Result Date: 2/4/2024  Small patchy bibasilar airspace opacities, may be infectious or inflammatory Electronically signed by: Tata Houston Date:    02/04/2024 Time:    11:46    CT Abdomen Pelvis  Without Contrast   Final Result      Dilated appendix with periappendiceal inflammatory changes,, compatible with non perforated acute appendicitis.      No fluid collection or abscess.  No free air.      Small amount of free fluid the pelvis.      Findings were communicated to Dr. Chavez at 13:56 02/04/2024      Mild atelectatic changes and ground-glass opacities in the lower lungs suspicious for infectious/inflammatory process.         Electronically signed by: Jesu Guadarrama MD   Date:    02/04/2024   Time:    13:57      X-Ray Chest 1 View   Final Result      Small patchy bibasilar airspace opacities, may be infectious or inflammatory         Electronically signed by: Tata Houston   Date:    02/04/2024   Time:    11:46      NM Lung Scan Ventilation Perfusion    (Results Pending)       Microbiology:  Microbiology Results (last 7 days)       Procedure Component Value Units Date/Time    Blood Culture #1 **CANNOT BE " ORDERED STAT** [[id number]]     Order Status: Sent Specimen: Blood     Blood Culture #1 **CANNOT BE ORDERED STAT** [[id number]]     Order Status: Sent Specimen: Blood              ASSESSMENT & PLAN:   Selene Smallwood is a 68 y.o. F with 1 day of abdominal pain, N/V, without fever or leukocytosis. CT shows dilated appendix with periappendiceal inflammatory changes compatible with non perforated acute appendicitis.    Plan:    -OR tomorrow for appendectomy, Patient consented and explained the nature of the surgery, indications, risks and complications  -repeat CBC, lactate this evening  -IV fluids  -PRN pain medication  -heparin drip  -NPO at midnight     Yenny Garcia MS3  [facility] [employer]     2/4/2024  2:24 PM

## 2024-02-04 NOTE — Clinical Note
A pre-sedation assessment was completed by the physician immediately prior to sedation start. Dr. Morenos at bedside.

## 2024-02-05 ENCOUNTER — ANESTHESIA (OUTPATIENT)
Dept: SURGERY | Facility: HOSPITAL | Age: 69
DRG: 329 | End: 2024-02-05
Payer: MEDICARE

## 2024-02-05 ENCOUNTER — ANESTHESIA EVENT (OUTPATIENT)
Dept: SURGERY | Facility: HOSPITAL | Age: 69
DRG: 329 | End: 2024-02-05
Payer: MEDICARE

## 2024-02-05 LAB
ABS NEUT CALC (OHS): 2.72 X10(3)/MCL (ref 2.1–9.2)
ALBUMIN SERPL-MCNC: 1.7 G/DL (ref 3.4–4.8)
ALBUMIN SERPL-MCNC: 1.8 G/DL (ref 3.4–4.8)
ALBUMIN/GLOB SERPL: 0.4 RATIO (ref 1.1–2)
ALBUMIN/GLOB SERPL: 0.4 RATIO (ref 1.1–2)
ALP SERPL-CCNC: 48 UNIT/L (ref 40–150)
ALP SERPL-CCNC: 54 UNIT/L (ref 40–150)
ALT SERPL-CCNC: 18 UNIT/L (ref 0–55)
ALT SERPL-CCNC: 26 UNIT/L (ref 0–55)
AST SERPL-CCNC: 30 UNIT/L (ref 5–34)
AST SERPL-CCNC: 48 UNIT/L (ref 5–34)
BASOPHILS # BLD AUTO: 0.06 X10(3)/MCL
BASOPHILS NFR BLD AUTO: 1.3 %
BILIRUB SERPL-MCNC: 2 MG/DL
BILIRUB SERPL-MCNC: 2.2 MG/DL
BILIRUBIN DIRECT+TOT PNL SERPL-MCNC: 1.8 MG/DL (ref 0–?)
BUN SERPL-MCNC: 31.4 MG/DL (ref 9.8–20.1)
BUN SERPL-MCNC: 37.1 MG/DL (ref 9.8–20.1)
CALCIUM SERPL-MCNC: 7.9 MG/DL (ref 8.4–10.2)
CALCIUM SERPL-MCNC: 8.5 MG/DL (ref 8.4–10.2)
CHLORIDE SERPL-SCNC: 110 MMOL/L (ref 98–107)
CHLORIDE SERPL-SCNC: 112 MMOL/L (ref 98–107)
CO2 SERPL-SCNC: 19 MMOL/L (ref 23–31)
CO2 SERPL-SCNC: 23 MMOL/L (ref 23–31)
CREAT SERPL-MCNC: 1.35 MG/DL (ref 0.55–1.02)
CREAT SERPL-MCNC: 1.52 MG/DL (ref 0.55–1.02)
EOSINOPHIL # BLD AUTO: 0.07 X10(3)/MCL (ref 0–0.9)
EOSINOPHIL NFR BLD AUTO: 1.5 %
ERYTHROCYTE [DISTWIDTH] IN BLOOD BY AUTOMATED COUNT: 14.7 % (ref 11.5–17)
ERYTHROCYTE [DISTWIDTH] IN BLOOD BY AUTOMATED COUNT: 15.1 % (ref 11.5–17)
GFR SERPLBLD CREATININE-BSD FMLA CKD-EPI: 37 MLS/MIN/1.73/M2
GFR SERPLBLD CREATININE-BSD FMLA CKD-EPI: 43 MLS/MIN/1.73/M2
GLOBULIN SER-MCNC: 3.8 GM/DL (ref 2.4–3.5)
GLOBULIN SER-MCNC: 4.5 GM/DL (ref 2.4–3.5)
GLUCOSE SERPL-MCNC: 75 MG/DL (ref 82–115)
GLUCOSE SERPL-MCNC: 95 MG/DL (ref 82–115)
HCT VFR BLD AUTO: 33.9 % (ref 37–47)
HCT VFR BLD AUTO: 34.7 % (ref 37–47)
HGB BLD-MCNC: 11.1 G/DL (ref 12–16)
HGB BLD-MCNC: 11.2 G/DL (ref 12–16)
IMM GRANULOCYTES # BLD AUTO: 0.02 X10(3)/MCL (ref 0–0.04)
IMM GRANULOCYTES NFR BLD AUTO: 0.4 %
INR PPP: 1.6
LACTATE SERPL-SCNC: 2.1 MMOL/L (ref 0.5–2.2)
LYMPHOCYTES # BLD AUTO: 0.38 X10(3)/MCL (ref 0.6–4.6)
LYMPHOCYTES NFR BLD AUTO: 8.4 %
LYMPHOCYTES NFR BLD MANUAL: 0.98 X10(3)/MCL
LYMPHOCYTES NFR BLD MANUAL: 22 % (ref 13–40)
MAGNESIUM SERPL-MCNC: 1.6 MG/DL (ref 1.6–2.6)
MAGNESIUM SERPL-MCNC: 1.7 MG/DL (ref 1.6–2.6)
MCH RBC QN AUTO: 28.8 PG (ref 27–31)
MCH RBC QN AUTO: 29 PG (ref 27–31)
MCHC RBC AUTO-ENTMCNC: 32.3 G/DL (ref 33–36)
MCHC RBC AUTO-ENTMCNC: 32.7 G/DL (ref 33–36)
MCV RBC AUTO: 88.1 FL (ref 80–94)
MCV RBC AUTO: 89.9 FL (ref 80–94)
METAMYELOCYTES NFR BLD MANUAL: 12 %
MONOCYTES # BLD AUTO: 0.21 X10(3)/MCL (ref 0.1–1.3)
MONOCYTES NFR BLD AUTO: 4.6 %
MONOCYTES NFR BLD MANUAL: 0.09 X10(3)/MCL (ref 0.1–1.3)
MONOCYTES NFR BLD MANUAL: 2 % (ref 2–11)
MYELOCYTES NFR BLD MANUAL: 4 %
NEUTROPHILS # BLD AUTO: 3.79 X10(3)/MCL (ref 2.1–9.2)
NEUTROPHILS NFR BLD AUTO: 83.8 %
NEUTROPHILS NFR BLD MANUAL: 61 % (ref 47–80)
NRBC BLD AUTO-RTO: 0 %
NRBC BLD AUTO-RTO: 0 %
PHOSPHATE SERPL-MCNC: 4.1 MG/DL (ref 2.3–4.7)
PHOSPHATE SERPL-MCNC: 4.8 MG/DL (ref 2.3–4.7)
PLATELET # BLD AUTO: 207 X10(3)/MCL (ref 130–400)
PLATELET # BLD AUTO: 230 X10(3)/MCL (ref 130–400)
PMV BLD AUTO: 11.1 FL (ref 7.4–10.4)
PMV BLD AUTO: 11.5 FL (ref 7.4–10.4)
POTASSIUM SERPL-SCNC: 4 MMOL/L (ref 3.5–5.1)
POTASSIUM SERPL-SCNC: 4.3 MMOL/L (ref 3.5–5.1)
PROT SERPL-MCNC: 5.5 GM/DL (ref 5.8–7.6)
PROT SERPL-MCNC: 6.3 GM/DL (ref 5.8–7.6)
PROTHROMBIN TIME: 18.6 SECONDS (ref 11.4–14)
RBC # BLD AUTO: 3.85 X10(6)/MCL (ref 4.2–5.4)
RBC # BLD AUTO: 3.86 X10(6)/MCL (ref 4.2–5.4)
SODIUM SERPL-SCNC: 142 MMOL/L (ref 136–145)
SODIUM SERPL-SCNC: 143 MMOL/L (ref 136–145)
WBC # SPEC AUTO: 4.46 X10(3)/MCL (ref 4.5–11.5)
WBC # SPEC AUTO: 4.53 X10(3)/MCL (ref 4.5–11.5)

## 2024-02-05 PROCEDURE — 63600175 PHARM REV CODE 636 W HCPCS: Performed by: NURSE ANESTHETIST, CERTIFIED REGISTERED

## 2024-02-05 PROCEDURE — 63600175 PHARM REV CODE 636 W HCPCS

## 2024-02-05 PROCEDURE — 25000003 PHARM REV CODE 250

## 2024-02-05 PROCEDURE — P9045 ALBUMIN (HUMAN), 5%, 250 ML: HCPCS | Mod: JZ,JG | Performed by: NURSE ANESTHETIST, CERTIFIED REGISTERED

## 2024-02-05 PROCEDURE — 25000003 PHARM REV CODE 250: Performed by: SPECIALIST

## 2024-02-05 PROCEDURE — G0378 HOSPITAL OBSERVATION PER HR: HCPCS

## 2024-02-05 PROCEDURE — 96372 THER/PROPH/DIAG INJ SC/IM: CPT | Performed by: STUDENT IN AN ORGANIZED HEALTH CARE EDUCATION/TRAINING PROGRAM

## 2024-02-05 PROCEDURE — 83605 ASSAY OF LACTIC ACID: CPT | Performed by: STUDENT IN AN ORGANIZED HEALTH CARE EDUCATION/TRAINING PROGRAM

## 2024-02-05 PROCEDURE — 80053 COMPREHEN METABOLIC PANEL: CPT

## 2024-02-05 PROCEDURE — 0DTJ0ZZ RESECTION OF APPENDIX, OPEN APPROACH: ICD-10-PCS | Performed by: SURGERY

## 2024-02-05 PROCEDURE — 63600175 PHARM REV CODE 636 W HCPCS: Mod: JZ,JG | Performed by: SURGERY

## 2024-02-05 PROCEDURE — 25000003 PHARM REV CODE 250: Performed by: STUDENT IN AN ORGANIZED HEALTH CARE EDUCATION/TRAINING PROGRAM

## 2024-02-05 PROCEDURE — 94761 N-INVAS EAR/PLS OXIMETRY MLT: CPT

## 2024-02-05 PROCEDURE — 27201423 OPTIME MED/SURG SUP & DEVICES STERILE SUPPLY: Performed by: SURGERY

## 2024-02-05 PROCEDURE — 71000033 HC RECOVERY, INTIAL HOUR: Performed by: SURGERY

## 2024-02-05 PROCEDURE — 37000009 HC ANESTHESIA EA ADD 15 MINS: Performed by: SURGERY

## 2024-02-05 PROCEDURE — D9220A PRA ANESTHESIA: Mod: ,,, | Performed by: SPECIALIST

## 2024-02-05 PROCEDURE — 0DNW4ZZ RELEASE PERITONEUM, PERCUTANEOUS ENDOSCOPIC APPROACH: ICD-10-PCS | Performed by: SURGERY

## 2024-02-05 PROCEDURE — 88341 IMHCHEM/IMCYTCHM EA ADD ANTB: CPT

## 2024-02-05 PROCEDURE — 96372 THER/PROPH/DIAG INJ SC/IM: CPT

## 2024-02-05 PROCEDURE — 83735 ASSAY OF MAGNESIUM: CPT | Mod: 91 | Performed by: STUDENT IN AN ORGANIZED HEALTH CARE EDUCATION/TRAINING PROGRAM

## 2024-02-05 PROCEDURE — 63600175 PHARM REV CODE 636 W HCPCS: Performed by: STUDENT IN AN ORGANIZED HEALTH CARE EDUCATION/TRAINING PROGRAM

## 2024-02-05 PROCEDURE — 82248 BILIRUBIN DIRECT: CPT | Performed by: STUDENT IN AN ORGANIZED HEALTH CARE EDUCATION/TRAINING PROGRAM

## 2024-02-05 PROCEDURE — 36000708 HC OR TIME LEV III 1ST 15 MIN: Performed by: SURGERY

## 2024-02-05 PROCEDURE — 88305 TISSUE EXAM BY PATHOLOGIST: CPT | Mod: TC | Performed by: SURGERY

## 2024-02-05 PROCEDURE — 25000242 PHARM REV CODE 250 ALT 637 W/ HCPCS: Performed by: NURSE ANESTHETIST, CERTIFIED REGISTERED

## 2024-02-05 PROCEDURE — 85610 PROTHROMBIN TIME: CPT | Performed by: STUDENT IN AN ORGANIZED HEALTH CARE EDUCATION/TRAINING PROGRAM

## 2024-02-05 PROCEDURE — 63600175 PHARM REV CODE 636 W HCPCS: Performed by: SPECIALIST

## 2024-02-05 PROCEDURE — 0DN84ZZ RELEASE SMALL INTESTINE, PERCUTANEOUS ENDOSCOPIC APPROACH: ICD-10-PCS | Performed by: SURGERY

## 2024-02-05 PROCEDURE — 25000003 PHARM REV CODE 250: Performed by: NURSE ANESTHETIST, CERTIFIED REGISTERED

## 2024-02-05 PROCEDURE — 85027 COMPLETE CBC AUTOMATED: CPT

## 2024-02-05 PROCEDURE — 80053 COMPREHEN METABOLIC PANEL: CPT | Mod: 91 | Performed by: STUDENT IN AN ORGANIZED HEALTH CARE EDUCATION/TRAINING PROGRAM

## 2024-02-05 PROCEDURE — 37000008 HC ANESTHESIA 1ST 15 MINUTES: Performed by: SURGERY

## 2024-02-05 PROCEDURE — 0DTH0ZZ RESECTION OF CECUM, OPEN APPROACH: ICD-10-PCS | Performed by: SURGERY

## 2024-02-05 PROCEDURE — 84100 ASSAY OF PHOSPHORUS: CPT | Mod: 91 | Performed by: STUDENT IN AN ORGANIZED HEALTH CARE EDUCATION/TRAINING PROGRAM

## 2024-02-05 PROCEDURE — 0WJG4ZZ INSPECTION OF PERITONEAL CAVITY, PERCUTANEOUS ENDOSCOPIC APPROACH: ICD-10-PCS | Performed by: SURGERY

## 2024-02-05 PROCEDURE — 85025 COMPLETE CBC W/AUTO DIFF WBC: CPT | Performed by: STUDENT IN AN ORGANIZED HEALTH CARE EDUCATION/TRAINING PROGRAM

## 2024-02-05 PROCEDURE — 83735 ASSAY OF MAGNESIUM: CPT

## 2024-02-05 PROCEDURE — 88304 TISSUE EXAM BY PATHOLOGIST: CPT | Mod: TC | Performed by: SURGERY

## 2024-02-05 PROCEDURE — 84100 ASSAY OF PHOSPHORUS: CPT

## 2024-02-05 PROCEDURE — 88342 IMHCHEM/IMCYTCHM 1ST ANTB: CPT | Performed by: PATHOLOGY

## 2024-02-05 PROCEDURE — 36000709 HC OR TIME LEV III EA ADD 15 MIN: Performed by: SURGERY

## 2024-02-05 RX ORDER — PHENYLEPHRINE HYDROCHLORIDE 10 MG/ML
INJECTION INTRAVENOUS
Status: DISCONTINUED | OUTPATIENT
Start: 2024-02-05 | End: 2024-02-05

## 2024-02-05 RX ORDER — HYDROMORPHONE HYDROCHLORIDE 1 MG/ML
0.5 INJECTION, SOLUTION INTRAMUSCULAR; INTRAVENOUS; SUBCUTANEOUS EVERY 5 MIN PRN
Status: DISCONTINUED | OUTPATIENT
Start: 2024-02-05 | End: 2024-02-05

## 2024-02-05 RX ORDER — PROCHLORPERAZINE EDISYLATE 5 MG/ML
5 INJECTION INTRAMUSCULAR; INTRAVENOUS ONCE AS NEEDED
Status: DISCONTINUED | OUTPATIENT
Start: 2024-02-05 | End: 2024-02-05

## 2024-02-05 RX ORDER — HYDROMORPHONE HYDROCHLORIDE 1 MG/ML
0.2 INJECTION, SOLUTION INTRAMUSCULAR; INTRAVENOUS; SUBCUTANEOUS EVERY 5 MIN PRN
Status: DISCONTINUED | OUTPATIENT
Start: 2024-02-05 | End: 2024-02-05

## 2024-02-05 RX ORDER — ROCURONIUM BROMIDE 10 MG/ML
INJECTION, SOLUTION INTRAVENOUS
Status: DISCONTINUED | OUTPATIENT
Start: 2024-02-05 | End: 2024-02-05

## 2024-02-05 RX ORDER — ALBUMIN HUMAN 50 G/1000ML
SOLUTION INTRAVENOUS
Status: DISCONTINUED | OUTPATIENT
Start: 2024-02-05 | End: 2024-02-05

## 2024-02-05 RX ORDER — ESMOLOL HYDROCHLORIDE 10 MG/ML
INJECTION INTRAVENOUS
Status: DISCONTINUED | OUTPATIENT
Start: 2024-02-05 | End: 2024-02-05

## 2024-02-05 RX ORDER — MAGNESIUM SULFATE HEPTAHYDRATE 40 MG/ML
2 INJECTION, SOLUTION INTRAVENOUS ONCE
Status: COMPLETED | OUTPATIENT
Start: 2024-02-05 | End: 2024-02-05

## 2024-02-05 RX ORDER — FAMOTIDINE 10 MG/ML
20 INJECTION INTRAVENOUS ONCE
Status: COMPLETED | OUTPATIENT
Start: 2024-02-05 | End: 2024-02-05

## 2024-02-05 RX ORDER — SODIUM CHLORIDE, SODIUM LACTATE, POTASSIUM CHLORIDE, CALCIUM CHLORIDE 600; 310; 30; 20 MG/100ML; MG/100ML; MG/100ML; MG/100ML
INJECTION, SOLUTION INTRAVENOUS CONTINUOUS
Status: DISCONTINUED | OUTPATIENT
Start: 2024-02-05 | End: 2024-02-05

## 2024-02-05 RX ORDER — BUPIVACAINE HYDROCHLORIDE 2.5 MG/ML
INJECTION, SOLUTION EPIDURAL; INFILTRATION; INTRACAUDAL
Status: DISCONTINUED | OUTPATIENT
Start: 2024-02-05 | End: 2024-02-05 | Stop reason: HOSPADM

## 2024-02-05 RX ORDER — HYDROMORPHONE HYDROCHLORIDE 1 MG/ML
0.2 INJECTION, SOLUTION INTRAMUSCULAR; INTRAVENOUS; SUBCUTANEOUS
Status: DISCONTINUED | OUTPATIENT
Start: 2024-02-05 | End: 2024-02-06

## 2024-02-05 RX ORDER — FAMOTIDINE 10 MG/ML
20 INJECTION INTRAVENOUS DAILY
Status: DISCONTINUED | OUTPATIENT
Start: 2024-02-05 | End: 2024-02-08

## 2024-02-05 RX ORDER — IPRATROPIUM BROMIDE AND ALBUTEROL SULFATE 2.5; .5 MG/3ML; MG/3ML
3 SOLUTION RESPIRATORY (INHALATION) ONCE AS NEEDED
Status: DISCONTINUED | OUTPATIENT
Start: 2024-02-05 | End: 2024-02-05

## 2024-02-05 RX ORDER — OXYCODONE HYDROCHLORIDE 5 MG/1
5 TABLET ORAL EVERY 4 HOURS PRN
Status: DISCONTINUED | OUTPATIENT
Start: 2024-02-05 | End: 2024-02-14 | Stop reason: HOSPADM

## 2024-02-05 RX ORDER — OXYCODONE HYDROCHLORIDE 5 MG/1
10 TABLET ORAL EVERY 4 HOURS PRN
Status: DISCONTINUED | OUTPATIENT
Start: 2024-02-05 | End: 2024-02-14 | Stop reason: HOSPADM

## 2024-02-05 RX ORDER — VASOPRESSIN 20 [USP'U]/ML
INJECTION, SOLUTION INTRAMUSCULAR; SUBCUTANEOUS
Status: DISCONTINUED | OUTPATIENT
Start: 2024-02-05 | End: 2024-02-05

## 2024-02-05 RX ORDER — ONDANSETRON HYDROCHLORIDE 2 MG/ML
4 INJECTION, SOLUTION INTRAVENOUS ONCE
Status: DISCONTINUED | OUTPATIENT
Start: 2024-02-05 | End: 2024-02-05

## 2024-02-05 RX ORDER — SODIUM CHLORIDE, SODIUM LACTATE, POTASSIUM CHLORIDE, CALCIUM CHLORIDE 600; 310; 30; 20 MG/100ML; MG/100ML; MG/100ML; MG/100ML
INJECTION, SOLUTION INTRAVENOUS CONTINUOUS
Status: DISCONTINUED | OUTPATIENT
Start: 2024-02-05 | End: 2024-02-06

## 2024-02-05 RX ORDER — SUCCINYLCHOLINE CHLORIDE 20 MG/ML
INJECTION INTRAMUSCULAR; INTRAVENOUS
Status: DISCONTINUED | OUTPATIENT
Start: 2024-02-05 | End: 2024-02-05

## 2024-02-05 RX ORDER — LIDOCAINE HYDROCHLORIDE 20 MG/ML
INJECTION INTRAVENOUS
Status: DISCONTINUED | OUTPATIENT
Start: 2024-02-05 | End: 2024-02-05

## 2024-02-05 RX ORDER — ALBUTEROL SULFATE 90 UG/1
AEROSOL, METERED RESPIRATORY (INHALATION)
Status: DISCONTINUED | OUTPATIENT
Start: 2024-02-05 | End: 2024-02-05

## 2024-02-05 RX ORDER — OXYCODONE AND ACETAMINOPHEN 5; 325 MG/1; MG/1
2 TABLET ORAL ONCE
Status: DISCONTINUED | OUTPATIENT
Start: 2024-02-05 | End: 2024-02-05

## 2024-02-05 RX ORDER — MEPERIDINE HYDROCHLORIDE 25 MG/ML
12.5 INJECTION INTRAMUSCULAR; INTRAVENOUS; SUBCUTANEOUS ONCE
Status: DISCONTINUED | OUTPATIENT
Start: 2024-02-05 | End: 2024-02-05

## 2024-02-05 RX ORDER — MIDAZOLAM HYDROCHLORIDE 1 MG/ML
2 INJECTION INTRAMUSCULAR; INTRAVENOUS ONCE AS NEEDED
Status: CANCELLED | OUTPATIENT
Start: 2024-02-05 | End: 2035-07-04

## 2024-02-05 RX ORDER — FENTANYL CITRATE 50 UG/ML
INJECTION, SOLUTION INTRAMUSCULAR; INTRAVENOUS
Status: DISCONTINUED | OUTPATIENT
Start: 2024-02-05 | End: 2024-02-05

## 2024-02-05 RX ORDER — DIPHENHYDRAMINE HYDROCHLORIDE 50 MG/ML
25 INJECTION INTRAMUSCULAR; INTRAVENOUS ONCE AS NEEDED
Status: DISCONTINUED | OUTPATIENT
Start: 2024-02-05 | End: 2024-02-05

## 2024-02-05 RX ORDER — PROPOFOL 10 MG/ML
VIAL (ML) INTRAVENOUS
Status: DISCONTINUED | OUTPATIENT
Start: 2024-02-05 | End: 2024-02-05

## 2024-02-05 RX ORDER — LABETALOL HYDROCHLORIDE 5 MG/ML
INJECTION, SOLUTION INTRAVENOUS
Status: DISCONTINUED | OUTPATIENT
Start: 2024-02-05 | End: 2024-02-05

## 2024-02-05 RX ADMIN — LABETALOL HYDROCHLORIDE 2.5 MG: 5 INJECTION INTRAVENOUS at 01:02

## 2024-02-05 RX ADMIN — VASOPRESSIN 2 UNITS: 20 INJECTION INTRAVENOUS at 09:02

## 2024-02-05 RX ADMIN — PIPERACILLIN SODIUM AND TAZOBACTAM SODIUM 4.5 G: 4; .5 INJECTION, POWDER, LYOPHILIZED, FOR SOLUTION INTRAVENOUS at 10:02

## 2024-02-05 RX ADMIN — ESMOLOL HYDROCHLORIDE 20 MG: 100 INJECTION, SOLUTION INTRAVENOUS at 10:02

## 2024-02-05 RX ADMIN — FAMOTIDINE 20 MG: 10 INJECTION, SOLUTION INTRAVENOUS at 08:02

## 2024-02-05 RX ADMIN — PHENYLEPHRINE HYDROCHLORIDE 1.65 MCG/KG/MIN: 10 INJECTION INTRAVENOUS at 11:02

## 2024-02-05 RX ADMIN — FENTANYL CITRATE 25 MCG: 50 INJECTION INTRAMUSCULAR; INTRAVENOUS at 09:02

## 2024-02-05 RX ADMIN — SUGAMMADEX 200 MG: 100 INJECTION, SOLUTION INTRAVENOUS at 01:02

## 2024-02-05 RX ADMIN — SODIUM CHLORIDE, POTASSIUM CHLORIDE, SODIUM LACTATE AND CALCIUM CHLORIDE 1000 ML: 600; 310; 30; 20 INJECTION, SOLUTION INTRAVENOUS at 06:02

## 2024-02-05 RX ADMIN — FENTANYL CITRATE 25 MCG: 50 INJECTION INTRAMUSCULAR; INTRAVENOUS at 11:02

## 2024-02-05 RX ADMIN — HYDROMORPHONE HYDROCHLORIDE 0.2 MG: 0.5 INJECTION, SOLUTION INTRAMUSCULAR; INTRAVENOUS; SUBCUTANEOUS at 11:02

## 2024-02-05 RX ADMIN — FAMOTIDINE 20 MG: 10 INJECTION, SOLUTION INTRAVENOUS at 04:02

## 2024-02-05 RX ADMIN — FENTANYL CITRATE 50 MCG: 50 INJECTION INTRAMUSCULAR; INTRAVENOUS at 09:02

## 2024-02-05 RX ADMIN — MAGNESIUM SULFATE HEPTAHYDRATE 2 G: 40 INJECTION, SOLUTION INTRAVENOUS at 06:02

## 2024-02-05 RX ADMIN — ROCURONIUM BROMIDE 45 MG: 10 INJECTION INTRAVENOUS at 09:02

## 2024-02-05 RX ADMIN — PROPOFOL 200 MG: 10 INJECTION, EMULSION INTRAVENOUS at 09:02

## 2024-02-05 RX ADMIN — ROCURONIUM BROMIDE 20 MG: 10 INJECTION INTRAVENOUS at 10:02

## 2024-02-05 RX ADMIN — SODIUM CHLORIDE, POTASSIUM CHLORIDE, SODIUM LACTATE AND CALCIUM CHLORIDE: 600; 310; 30; 20 INJECTION, SOLUTION INTRAVENOUS at 08:02

## 2024-02-05 RX ADMIN — PHENYLEPHRINE HYDROCHLORIDE 300 MCG: 10 INJECTION INTRAVENOUS at 09:02

## 2024-02-05 RX ADMIN — SUGAMMADEX 200 MG: 100 INJECTION, SOLUTION INTRAVENOUS at 02:02

## 2024-02-05 RX ADMIN — PHENYLEPHRINE HYDROCHLORIDE 300 MCG: 10 INJECTION INTRAVENOUS at 11:02

## 2024-02-05 RX ADMIN — OXYCODONE HYDROCHLORIDE 5 MG: 5 TABLET ORAL at 04:02

## 2024-02-05 RX ADMIN — HEPARIN SODIUM 5000 UNITS: 5000 INJECTION, SOLUTION INTRAVENOUS; SUBCUTANEOUS at 06:02

## 2024-02-05 RX ADMIN — OXYCODONE HYDROCHLORIDE 10 MG: 5 TABLET ORAL at 09:02

## 2024-02-05 RX ADMIN — SUCCINYLCHOLINE CHLORIDE 100 MG: 20 INJECTION, SOLUTION INTRAMUSCULAR; INTRAVENOUS; PARENTERAL at 09:02

## 2024-02-05 RX ADMIN — ALBUMIN (HUMAN) 250 ML: 12.5 INJECTION, SOLUTION INTRAVENOUS at 09:02

## 2024-02-05 RX ADMIN — SODIUM CHLORIDE, POTASSIUM CHLORIDE, SODIUM LACTATE AND CALCIUM CHLORIDE: 600; 310; 30; 20 INJECTION, SOLUTION INTRAVENOUS at 02:02

## 2024-02-05 RX ADMIN — SODIUM CHLORIDE, POTASSIUM CHLORIDE, SODIUM LACTATE AND CALCIUM CHLORIDE: 600; 310; 30; 20 INJECTION, SOLUTION INTRAVENOUS at 03:02

## 2024-02-05 RX ADMIN — HYDROMORPHONE HYDROCHLORIDE 0.5 MG: 1 INJECTION, SOLUTION INTRAMUSCULAR; INTRAVENOUS; SUBCUTANEOUS at 02:02

## 2024-02-05 RX ADMIN — HEPARIN SODIUM 5000 UNITS: 5000 INJECTION, SOLUTION INTRAVENOUS; SUBCUTANEOUS at 09:02

## 2024-02-05 RX ADMIN — PHENYLEPHRINE HYDROCHLORIDE 200 MCG: 10 INJECTION INTRAVENOUS at 09:02

## 2024-02-05 RX ADMIN — PIPERACILLIN SODIUM AND TAZOBACTAM SODIUM 4.5 G: 4; .5 INJECTION, POWDER, LYOPHILIZED, FOR SOLUTION INTRAVENOUS at 07:02

## 2024-02-05 RX ADMIN — ALBUTEROL SULFATE 3 PUFF: 90 AEROSOL, METERED RESPIRATORY (INHALATION) at 09:02

## 2024-02-05 RX ADMIN — VASOPRESSIN 2 UNITS: 20 INJECTION INTRAVENOUS at 10:02

## 2024-02-05 RX ADMIN — LIDOCAINE HYDROCHLORIDE 80 MG: 20 INJECTION INTRAVENOUS at 09:02

## 2024-02-05 RX ADMIN — ROCURONIUM BROMIDE 5 MG: 10 INJECTION INTRAVENOUS at 09:02

## 2024-02-05 RX ADMIN — VASOPRESSIN 1 UNITS: 20 INJECTION INTRAVENOUS at 11:02

## 2024-02-05 NOTE — TRANSFER OF CARE
"Anesthesia Transfer of Care Note    Patient: Selene Smallwood    Procedure(s) Performed: Procedure(s) (LRB):  APPENDECTOMY (N/A)    Patient location: PACU    Anesthesia Type: general    Transport from OR: Transported from OR on 6-10 L/min O2 by face mask with adequate spontaneous ventilation    Post pain: adequate analgesia    Post assessment: no apparent anesthetic complications    Post vital signs: stable    Level of consciousness: awake    Nausea/Vomiting: no nausea/vomiting    Complications: none    Transfer of care protocol was followed      Last vitals: Visit Vitals  /79   Pulse 90   Temp 36 °C (96.8 °F) (Temporal)   Resp 17   Ht 5' 7" (1.702 m)   Wt 72.8 kg (160 lb 8 oz)   SpO2 97%   BMI 25.14 kg/m²     "

## 2024-02-05 NOTE — PLAN OF CARE
02/05/24 1525   Discharge Assessment   Assessment Type Discharge Planning Assessment   Confirmed/corrected address, phone number and insurance Yes   Confirmed Demographics Correct on Facesheet   Source of Information family   When was your last doctors appointment?   (Sammie Blum)   Reason For Admission Acute kidney injury, Acute appendicitis   People in Home child(tani), adult   Facility Arrived From: Home   Do you expect to return to your current living situation? Yes   Do you have help at home or someone to help you manage your care at home? Yes   Who are your caregiver(s) and their phone number(s)? Mario,April (Daughter) [phone]; Mary Hayes   Prior to hospitilization cognitive status: Alert/Oriented   Current cognitive status: Unable to Assess   Walking or Climbing Stairs Difficulty no   Dressing/Bathing Difficulty no   Equipment Currently Used at Home none   Readmission within 30 days? No   Patient currently being followed by outpatient case management? No   Do you currently have service(s) that help you manage your care at home? No   Do you take prescription medications? Yes  (Cate Matos)   Do you have prescription coverage? Yes   Coverage M/D   Do you have any problems affording any of your prescribed medications? No   Is the patient taking medications as prescribed? yes   Who is going to help you get home at discharge? Family   How do you get to doctors appointments? car, drives self   Are you on dialysis? No   Discharge Plan A Home with family   DME Needed Upon Discharge  none   Discharge Plan discussed with: Adult children   Transition of Care Barriers None   Physical Activity   On average, how many days per week do you engage in moderate to strenuous exercise (like a brisk walk)? Pt Unable   On average, how many minutes do you engage in exercise at this level? Pt Unable   Financial Resource Strain   How hard is it for you to pay for the very basics like food, housing,  medical care, and heating? Not hard   Housing Stability   In the last 12 months, was there a time when you were not able to pay the mortgage or rent on time? N   In the last 12 months, how many places have you lived? 1   In the last 12 months, was there a time when you did not have a steady place to sleep or slept in a shelter (including now)? N   Transportation Needs   In the past 12 months, has lack of transportation kept you from medical appointments or from getting medications? no   In the past 12 months, has lack of transportation kept you from meetings, work, or from getting things needed for daily living? No   Food Insecurity   Within the past 12 months, you worried that your food would run out before you got the money to buy more. Never true   Within the past 12 months, the food you bought just didn't last and you didn't have money to get more. Never true   Social Connections   In a typical week, how many times do you talk on the phone with family, friends, or neighbors? More than 3   How often do you get together with friends or relatives? More than 3   Are you , , , , never , or living with a partner?    Alcohol Use   Q1: How often do you have a drink containing alcohol? Never   Q2: How many drinks containing alcohol do you have on a typical day when you are drinking? None   Q3: How often do you have six or more drinks on one occasion? 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

## 2024-02-05 NOTE — ANESTHESIA POSTPROCEDURE EVALUATION
Anesthesia Post Evaluation    Patient: Selene Smallwood    Procedure(s) Performed: Procedure(s) (LRB):  APPENDECTOMY (N/A)    Final Anesthesia Type: general      Patient location during evaluation: ICU  Patient participation: Yes- Able to Participate  Level of consciousness: awake and responds to stimulation  Post-procedure vital signs: reviewed and stable  Pain management: adequate  Airway patency: patent    PONV status at discharge: No PONV  Anesthetic complications: no      Cardiovascular status: blood pressure returned to baseline  Respiratory status: unassisted  Hydration status: euvolemic  Follow-up not needed.              Vitals Value Taken Time   /81 02/05/24 1601   Temp 37 °C (98.6 °F) 02/05/24 1501   Pulse 102 02/05/24 1611   Resp 17 02/05/24 1611   SpO2 100 % 02/05/24 1600   Vitals shown include unvalidated device data.      Event Time   Out of Recovery 02/05/2024 14:43:00         Pain/Princess Score: Pain Rating Prior to Med Admin: 8 (2/5/2024  2:28 PM)  Pain Rating Post Med Admin: 2 (2/4/2024  5:48 PM)  Princess Score: 8 (2/5/2024  2:22 PM)

## 2024-02-05 NOTE — ANESTHESIA PROCEDURE NOTES
Intubation    Date/Time: 2/5/2024 9:18 AM    Performed by: Kassie Masters CRNA  Authorized by: Macy Simon MD    Intubation:     Induction:  Rapid sequence induction    Intubated:  Postinduction    Mask Ventilation:  Not attempted    Attempts:  1    Attempted By:  CRNA    Method of Intubation:  Direct    Blade:  Kelly 2    Laryngeal View Grade: Grade I - full view of cords      Difficult Airway Encountered?: No      Complications:  None    Airway Device:  Oral endotracheal tube    Airway Device Size:  7.5    Style/Cuff Inflation:  Cuffed (inflated to minimal occlusive pressure)    Inflation Amount (mL):  8    Tube secured:  22    Secured at:  The lips    Placement Verified By:  Capnometry    Complicating Factors:  None    Findings Post-Intubation:  BS equal bilateral and atraumatic/condition of teeth unchanged

## 2024-02-05 NOTE — MEDICAL/APP STUDENT
[facility] General Surgery   Progress Note  Admit Date: 2024  HD#0  POD#* No surgery date entered *    Subjective:   Interval history:  AF. Continued tachycardic to 122. High BP to , . This morning, low BP at 84/61  Patient reports her pain has improved from yesterday, currently at 6/10 and manageable on current regimen. She has had increased flatus.  She reports some SOB, which she attributes to her abdominal pain. She denies chest pain.  No other complaints this morning, she denies fever, chills, nausea, vomiting, or diarrhea.   Patient has been NPO since midnight.      Objective:     VITAL SIGNS: 24 HR MIN & MAX LAST   Temp  Min: 98 °F (36.7 °C)  Max: 98.6 °F (37 °C)  98.6 °F (37 °C)   BP  Min: 84/61  Max: 163/73  (!) 84/61    Pulse  Min: 107  Max: 122  (!) 114    Resp  Min: 18  Max: 20  18    SpO2  Min: 91 %  Max: 99 %  97 %      Intake/output:  IVF: 1748 mL over 24 hours  P.O. 360 mL  Urine output: 100 mL recorded    Lines/drains/airway:  PIV    Physical examination:  Gen: NAD, AAOx3, answering questions appropriately  HEENT: normocephalic, atraumatic  CV: RR  Resp: NWOB on room air, equal rise and fall of chest  Abd: Soft, non-distended, tenderness to palpation in umbilical/RLQ. No masses, guarding, or rebound tenderness.   Ext: moving all extremities spontaneously and purposefully  Neuro: CN II-XII grossly intact   Skin/wounds:     Labs:  WBC: 4.46 (2.56)  H/H: 11.1/33.9 (11.6/25.7)  BUN: 37.1 (37.8)  Creatinine: 1.52 (2.00)  T Bilirubin: 2.00 (1.9)  Lactic Acid: 2.1 (2.5)    Urinalysis (24):  Turbid  1+ Protein  1+ Blood  21-50 WBC  Occasional bacteria  Occasional Squamous epithelial cells  3-5 Hyaline casts  Negative nitrite  500 Leukocyte Esterase    24  PT: 37.4  INR: 1.4    Imagin24  CT Abd/Pelvis: Impression: Dilated appendix with periappendiceal inflammatory changes, compatible with non perforated acute appendicitis    NM LUNG VENTILATION AND PERFUSION IMAGING:  Impression: Very low probability for pulmonary embolus.    Assessment & Plan:   Selene Smallwood is a 68 y.o. female who presented with 1 day of abdominal pain, N/V, without fever or leukocytosis. CT showed dilated appendix with periappendiceal inflammatory changes compatible with non perforated acute appendicitis.     Plan:     -OR today for appendectomy,  -Written consent obtained yesterday  -NPO since midnight  -repeat CBC, CMP, and lactate this morning  -IV fluids  -PRN pain medication  -heparin drip  -F/u micro cultures    Suly Bloom, MS3  [facility] [place]    8:54 PM

## 2024-02-05 NOTE — PROGRESS NOTES
[facility] General Surgery   Progress Note  Admit Date: 2024  HD#0  POD#* No surgery date entered *    Subjective:   Interval history:  AF. Continued tachycardic to 122. High BP to , . This morning, low BP at 84/61  Patient reports her pain has improved from yesterday, currently at 6/10 and manageable on current regimen. She has had increased flatus.  She reports some SOB, which she attributes to her abdominal pain. She denies chest pain.  No other complaints this morning, she denies fever, chills, nausea, vomiting, or diarrhea.   Patient has been NPO since midnight.      Objective:     VITAL SIGNS: 24 HR MIN & MAX LAST   Temp  Min: 98 °F (36.7 °C)  Max: 98.6 °F (37 °C)  98.6 °F (37 °C)   BP  Min: 84/61  Max: 163/73  (!) 84/61    Pulse  Min: 107  Max: 122  (!) 114    Resp  Min: 18  Max: 20  18    SpO2  Min: 91 %  Max: 99 %  97 %      Intake/output:  IVF: 1748 mL over 24 hours  P.O. 360 mL  Urine output: 100 mL recorded    Lines/drains/airway:  PIV    Physical examination:  Gen: NAD, AAOx3, answering questions appropriately  HEENT: normocephalic, atraumatic  CV: RR  Resp: NWOB on room air, equal rise and fall of chest  Abd: Soft, non-distended, tenderness to palpation in umbilical/RLQ. No masses, guarding, or rebound tenderness.   Ext: moving all extremities spontaneously and purposefully  Neuro: CN II-XII grossly intact   Skin/wounds:     Labs:  WBC: 4.4(2.56)  H/H: 11.1 (11.6/25.7)  Creatinine: 1.52 (2.00)  T Bilirubin: 2.00 (1.9)  Lactic Acid: 2.1 (2.5)    Urinalysis (24):  Turbid  1+ Protein  1+ Blood  21-50 WBC  Occasional bacteria  Occasional Squamous epithelial cells  3-5 Hyaline casts  Negative nitrite  500 Leukocyte Esterase    24  PT: 37.4  INR: 1.4    Imagin24  CT Abd/Pelvis: Impression: Dilated appendix with periappendiceal inflammatory changes, compatible with non perforated acute appendicitis    NM LUNG VENTILATION AND PERFUSION IMAGING: Impression: Very low  probability for pulmonary embolus.    Assessment & Plan:   Selene Smallwood is a 68 y.o. female who presented with 1 day of abdominal pain, N/V, without fever or leukocytosis. CT showed dilated appendix with periappendiceal inflammatory changes compatible with non perforated acute appendicitis.     -OR today for diagnostic lap vs. appendectomy  -Written consent obtained yesterday  -NPO since midnight  -repeat CBC, CMP, and lactate this morning  -IV fluids  -PRN pain medication  -PPx: SQH   -F/u micro cultures    Suly Bloom, MS3  [facility] [place]      I have personally seen and examined the patient with the medical student, and made changes to the note as appropriate.  -OR today for appy  -RUQ ultrasound to rule out gallbladder etiology given R sided abdominal pain and elevated INR and bili    Roderick La MD  [facility] General Surgery, PGY-1  02/05/2024      PGY-5 Addendum  The patient has been seen an examined. 69yo F with unknown PMH admitted with R-sided abdominal pain and DIANA. Unknown if she has a component of CKD given little/no medical care in the past. Lactic acid has cleared and Creatinine has improve w/ overnight IVF resuscitation; abdominal exam stable this AM. Given elevated bilirubin, will eval with RUQ U/S and fractionate bilirubin to eval for biliary obstruction vs. Gilbert's. Plan to proceed with diagnostic laparoscopy with appendectomy today, possible cholecystectomy if indicated intra-operatively.     Rei Mendez MD  [facility] General Surgery PGY-5  02/05/2024, 8:24 AM

## 2024-02-05 NOTE — PROGRESS NOTES
Pharmacist Renal Dose Adjustment Note    Selene Smallwood is a 68 y.o. female being treated with the medication zosyn    Patient Data:    Vital Signs (Most Recent):  Temp: 96.8 °F (36 °C) (02/05/24 0845)  Pulse: (!) 118 (02/05/24 0845)  Resp: 20 (02/05/24 0845)  BP: (!) 172/87 (02/05/24 0845)  SpO2: 100 % (02/05/24 0845) Vital Signs (72h Range):  Temp:  [96.8 °F (36 °C)-98.6 °F (37 °C)]   Pulse:  [107-122]   Resp:  [18-20]   BP: ()/()   SpO2:  [91 %-100 %]      Recent Labs   Lab 02/04/24  1117 02/04/24  1757 02/05/24  0442   CREATININE 2.47* 2.00* 1.52*     Serum creatinine: 1.52 mg/dL (H) 02/05/24 0442  Estimated creatinine clearance: 34 mL/min (A)    Medication:zosyn dose: 4.5gm frequency q12h will be changed to medication:zosyn dose:4.5gm frequency:q8h    Pharmacist's Name: Teresa Mcgregor  Pharmacist's Extension: [phone]

## 2024-02-05 NOTE — ANESTHESIA PREPROCEDURE EVALUATION
02/05/2024  eSlene Smallwood is a 68 y.o., female. APPENDECTOMY, LAPAROSCOPIC, possible open (Abdomen)       Vitals:    02/04/24 2326 02/05/24 0000 02/05/24 0034 02/05/24 0342   BP:   (!) 149/84 (!) 84/61   Pulse:   (!) 120 (!) 114   Resp: 18      Temp:   36.7 °C (98 °F) 37 °C (98.6 °F)   TempSrc:   Oral Oral   SpO2:  95% (!) 91% 97%   Weight:       Height:           PMH of HTN, admitted on 2.4.24 with SOB & Abdominal pain     Active Ambulatory Problems     Diagnosis Date Noted    No Active Ambulatory Problems     Resolved Ambulatory Problems     Diagnosis Date Noted    No Resolved Ambulatory Problems     No Additional Past Medical History       No past surgical history on file.      CT showed dilated appendix with periappendiceal inflammatory changes compatible with non perforated acute appendicitis     RUQ ultrasound to rule out gallbladder etiology given R sided abdominal pain and elevated INR and bili       Lab Results   Component Value Date    WBC 4.46 (L) 02/05/2024    HGB 11.1 (L) 02/05/2024    HCT 33.9 (L) 02/05/2024     02/05/2024    ALT 18 02/05/2024    AST 30 02/05/2024     02/05/2024    K 4.3 02/05/2024    CREATININE 1.52 (H) 02/05/2024    BUN 37.1 (H) 02/05/2024    CO2 19 (L) 02/05/2024    INR 1.6 (H) 02/05/2024          Narrative:     EXAMINATION:  NM LUNG VENTILATION AND PERFUSION IMAGING     CLINICAL HISTORY:  Pulmonary embolism (PE) suspected, positive D-dimer;     TECHNIQUE:  33.7 mCi of Tc-99m-DTPA were placed in the nebulizer. Following the inhalation Tc-99m-DTPA in aerosol and the subsequent IV administration of 5.5 mCi of Tc-99m-MAA, multiple images of the thorax were obtained in various projections.     COMPARISON:  None.     FINDINGS:  On the perfusion study, normal slightly heterogeneous perfusion without evidence for mismatched defects..  The ventilation study reveals normal  appearance.  The CXR demonstrate mild patchy ground-glass opacities in the lung ba                Small patchy bibasilar airspace opacities, may be infectious or inflammatory        Electronically signed by:        Tata Houston  Date:                                        02/04/2024  Time:                                                11:46                     Narrative:     EXAMINATION:  XR CHEST 1 VIEW     CLINICAL HISTORY:  shortness of breath;     TECHNIQUE:  AP chest     COMPARISON:  None.     FINDINGS:  The heart is normal in size.  There are small patchy bibasilar airspace opacities.  There is no pleural effusion or visible pneumothorax.                        Narrative  Performed by: GEMSILVERIO  Test Reason : R10.9,    Vent. Rate : 125 BPM     Atrial Rate : 125 BPM     P-R Int : 116 ms          QRS Dur : 070 ms      QT Int : 312 ms       P-R-T Axes : 081 066 057 degrees     QTc Int : 450 ms    Sinus tachycardia  Otherwise normal ECG  No previous ECGs available    Referred By: [email or web address]   SELF           Confirmed By:      Specimen Collected: 02/04/24 11:03 CST             No current facility-administered medications on file prior to encounter.     No current outpatient medications on file prior to encounter.         Pre-op Assessment    I have reviewed the Patient Summary Reports.     I have reviewed the Nursing Notes. I have reviewed the NPO Status.   I have reviewed the Medications.     Review of Systems  Anesthesia Hx:  No problems with previous Anesthesia   History of prior surgery of interest to airway management or planning:          Denies Family Hx of Anesthesia complications.    Denies Personal Hx of Anesthesia complications.                    Hematology/Oncology:  Hematology Normal   Oncology Normal                                   EENT/Dental:  EENT/Dental Normal           Cardiovascular:  Cardiovascular Normal                                            Pulmonary:  Pulmonary Normal                        Renal/:  Renal/ Normal                 Hepatic/GI:  Hepatic/GI Normal                 Musculoskeletal:  Musculoskeletal Normal                Neurological:  Neurology Normal                                      Endocrine:  Endocrine Normal            Dermatological:  Skin Normal    Psych:  Psychiatric Normal                    Physical Exam  General: Well nourished, Cooperative, Alert and Oriented    Airway:  Mallampati: I / I  Mouth Opening: Normal  TM Distance: Normal  Tongue: Normal  Neck ROM: Normal ROM    Dental:  Intact        Anesthesia Plan  Type of Anesthesia, risks & benefits discussed:    Anesthesia Type: Gen ETT  Intra-op Monitoring Plan: Standard ASA Monitors  Post Op Pain Control Plan: multimodal analgesia and IV/PO Opioids PRN  Induction:  IV and rapid sequence  Airway Plan: Direct  Informed Consent: Informed consent signed with the Patient and all parties understand the risks and agree with anesthesia plan.  All questions answered. Patient consented to blood products? No  ASA Score: 3 Emergent  Day of Surgery Review of History & Physical: H&P Update referred to the surgeon/provider.    Ready For Surgery From Anesthesia Perspective.     .

## 2024-02-06 PROBLEM — N17.9 ACUTE KIDNEY INJURY: Status: ACTIVE | Noted: 2024-02-06

## 2024-02-06 PROBLEM — K35.32 PERFORATED APPENDICITIS: Status: ACTIVE | Noted: 2024-02-06

## 2024-02-06 LAB
ANION GAP SERPL CALC-SCNC: 9 MEQ/L
BUN SERPL-MCNC: 25.6 MG/DL (ref 9.8–20.1)
CALCIUM SERPL-MCNC: 8 MG/DL (ref 8.4–10.2)
CHLORIDE SERPL-SCNC: 111 MMOL/L (ref 98–107)
CO2 SERPL-SCNC: 22 MMOL/L (ref 23–31)
CREAT SERPL-MCNC: 1.06 MG/DL (ref 0.55–1.02)
CREAT/UREA NIT SERPL: 24
ERYTHROCYTE [DISTWIDTH] IN BLOOD BY AUTOMATED COUNT: 14.8 % (ref 11.5–17)
GFR SERPLBLD CREATININE-BSD FMLA CKD-EPI: 57 MLS/MIN/1.73/M2
GLUCOSE SERPL-MCNC: 78 MG/DL (ref 82–115)
HCT VFR BLD AUTO: 32.1 % (ref 37–47)
HGB BLD-MCNC: 10.5 G/DL (ref 12–16)
MAGNESIUM SERPL-MCNC: 2.3 MG/DL (ref 1.6–2.6)
MCH RBC QN AUTO: 28.6 PG (ref 27–31)
MCHC RBC AUTO-ENTMCNC: 32.7 G/DL (ref 33–36)
MCV RBC AUTO: 87.5 FL (ref 80–94)
NRBC BLD AUTO-RTO: 0.3 %
OHS QRS DURATION: 70 MS
OHS QTC CALCULATION: 450 MS
PHOSPHATE SERPL-MCNC: 3.3 MG/DL (ref 2.3–4.7)
PLATELET # BLD AUTO: 213 X10(3)/MCL (ref 130–400)
PMV BLD AUTO: 11.8 FL (ref 7.4–10.4)
POCT GLUCOSE: 97 MG/DL (ref 70–110)
POTASSIUM SERPL-SCNC: 3.8 MMOL/L (ref 3.5–5.1)
RBC # BLD AUTO: 3.67 X10(6)/MCL (ref 4.2–5.4)
SODIUM SERPL-SCNC: 142 MMOL/L (ref 136–145)
WBC # SPEC AUTO: 6.41 X10(3)/MCL (ref 4.5–11.5)

## 2024-02-06 PROCEDURE — 85027 COMPLETE CBC AUTOMATED: CPT | Performed by: STUDENT IN AN ORGANIZED HEALTH CARE EDUCATION/TRAINING PROGRAM

## 2024-02-06 PROCEDURE — 97167 OT EVAL HIGH COMPLEX 60 MIN: CPT

## 2024-02-06 PROCEDURE — 0D9670Z DRAINAGE OF STOMACH WITH DRAINAGE DEVICE, VIA NATURAL OR ARTIFICIAL OPENING: ICD-10-PCS | Performed by: SURGERY

## 2024-02-06 PROCEDURE — 27000221 HC OXYGEN, UP TO 24 HOURS

## 2024-02-06 PROCEDURE — 63600175 PHARM REV CODE 636 W HCPCS: Performed by: STUDENT IN AN ORGANIZED HEALTH CARE EDUCATION/TRAINING PROGRAM

## 2024-02-06 PROCEDURE — 25000003 PHARM REV CODE 250: Performed by: STUDENT IN AN ORGANIZED HEALTH CARE EDUCATION/TRAINING PROGRAM

## 2024-02-06 PROCEDURE — 84100 ASSAY OF PHOSPHORUS: CPT | Performed by: STUDENT IN AN ORGANIZED HEALTH CARE EDUCATION/TRAINING PROGRAM

## 2024-02-06 PROCEDURE — 80048 BASIC METABOLIC PNL TOTAL CA: CPT | Performed by: STUDENT IN AN ORGANIZED HEALTH CARE EDUCATION/TRAINING PROGRAM

## 2024-02-06 PROCEDURE — 97162 PT EVAL MOD COMPLEX 30 MIN: CPT

## 2024-02-06 PROCEDURE — 83735 ASSAY OF MAGNESIUM: CPT | Performed by: STUDENT IN AN ORGANIZED HEALTH CARE EDUCATION/TRAINING PROGRAM

## 2024-02-06 PROCEDURE — 99900035 HC TECH TIME PER 15 MIN (STAT)

## 2024-02-06 PROCEDURE — 21400001 HC TELEMETRY ROOM

## 2024-02-06 PROCEDURE — 94761 N-INVAS EAR/PLS OXIMETRY MLT: CPT

## 2024-02-06 PROCEDURE — 25000003 PHARM REV CODE 250: Performed by: SURGERY

## 2024-02-06 RX ORDER — DEXTROSE MONOHYDRATE, SODIUM CHLORIDE, AND POTASSIUM CHLORIDE 50; 1.49; 9 G/1000ML; G/1000ML; G/1000ML
INJECTION, SOLUTION INTRAVENOUS CONTINUOUS
Status: DISCONTINUED | OUTPATIENT
Start: 2024-02-06 | End: 2024-02-07

## 2024-02-06 RX ORDER — MUPIROCIN 20 MG/G
OINTMENT TOPICAL 2 TIMES DAILY
Status: COMPLETED | OUTPATIENT
Start: 2024-02-06 | End: 2024-02-10

## 2024-02-06 RX ORDER — ACETAMINOPHEN 10 MG/ML
1000 INJECTION, SOLUTION INTRAVENOUS ONCE
Status: COMPLETED | OUTPATIENT
Start: 2024-02-06 | End: 2024-02-06

## 2024-02-06 RX ORDER — HYDROMORPHONE HYDROCHLORIDE 1 MG/ML
0.5 INJECTION, SOLUTION INTRAMUSCULAR; INTRAVENOUS; SUBCUTANEOUS
Status: DISCONTINUED | OUTPATIENT
Start: 2024-02-06 | End: 2024-02-14 | Stop reason: HOSPADM

## 2024-02-06 RX ADMIN — HEPARIN SODIUM 5000 UNITS: 5000 INJECTION, SOLUTION INTRAVENOUS; SUBCUTANEOUS at 09:02

## 2024-02-06 RX ADMIN — SODIUM CHLORIDE, POTASSIUM CHLORIDE, SODIUM LACTATE AND CALCIUM CHLORIDE: 600; 310; 30; 20 INJECTION, SOLUTION INTRAVENOUS at 02:02

## 2024-02-06 RX ADMIN — MUPIROCIN: 20 OINTMENT TOPICAL at 08:02

## 2024-02-06 RX ADMIN — PIPERACILLIN SODIUM AND TAZOBACTAM SODIUM 4.5 G: 4; .5 INJECTION, POWDER, LYOPHILIZED, FOR SOLUTION INTRAVENOUS at 02:02

## 2024-02-06 RX ADMIN — POTASSIUM CHLORIDE, DEXTROSE MONOHYDRATE AND SODIUM CHLORIDE: 150; 5; 900 INJECTION, SOLUTION INTRAVENOUS at 06:02

## 2024-02-06 RX ADMIN — OXYCODONE HYDROCHLORIDE 10 MG: 5 TABLET ORAL at 02:02

## 2024-02-06 RX ADMIN — HEPARIN SODIUM 5000 UNITS: 5000 INJECTION, SOLUTION INTRAVENOUS; SUBCUTANEOUS at 05:02

## 2024-02-06 RX ADMIN — OXYCODONE HYDROCHLORIDE 10 MG: 5 TABLET ORAL at 08:02

## 2024-02-06 RX ADMIN — HYDROMORPHONE HYDROCHLORIDE 0.5 MG: 1 INJECTION, SOLUTION INTRAMUSCULAR; INTRAVENOUS; SUBCUTANEOUS at 09:02

## 2024-02-06 RX ADMIN — ACETAMINOPHEN 1000 MG: 10 INJECTION, SOLUTION INTRAVENOUS at 08:02

## 2024-02-06 RX ADMIN — PIPERACILLIN SODIUM AND TAZOBACTAM SODIUM 4.5 G: 4; .5 INJECTION, POWDER, LYOPHILIZED, FOR SOLUTION INTRAVENOUS at 07:02

## 2024-02-06 RX ADMIN — HEPARIN SODIUM 5000 UNITS: 5000 INJECTION, SOLUTION INTRAVENOUS; SUBCUTANEOUS at 02:02

## 2024-02-06 RX ADMIN — PIPERACILLIN SODIUM AND TAZOBACTAM SODIUM 4.5 G: 4; .5 INJECTION, POWDER, LYOPHILIZED, FOR SOLUTION INTRAVENOUS at 10:02

## 2024-02-06 RX ADMIN — POTASSIUM CHLORIDE, DEXTROSE MONOHYDRATE AND SODIUM CHLORIDE: 150; 5; 900 INJECTION, SOLUTION INTRAVENOUS at 09:02

## 2024-02-06 RX ADMIN — FAMOTIDINE 20 MG: 10 INJECTION, SOLUTION INTRAVENOUS at 08:02

## 2024-02-06 RX ADMIN — HYDROMORPHONE HYDROCHLORIDE 0.5 MG: 1 INJECTION, SOLUTION INTRAMUSCULAR; INTRAVENOUS; SUBCUTANEOUS at 10:02

## 2024-02-06 RX ADMIN — OXYCODONE HYDROCHLORIDE 10 MG: 5 TABLET ORAL at 03:02

## 2024-02-06 NOTE — PT/OT/SLP EVAL
Physical Therapy Evaluation    Patient Name:  Selene Smallwood   MRN:  [id number]    Recommendations:     Therapy Intensity Recommendations at Discharge: Low Intensity Therapy  Discharge Equipment Recommendations: walker, rolling   Equipment to be obtained for discharge: rolling walker.  Barriers to discharge: level of skilled assistance required and decreased endurance    Assessment:     Selene Smallwood is a 68 y.o. female admitted with a medical diagnosis of Acute appendicitis.  She presents with the following impairments/functional limitations:  weakness, impaired endurance, impaired functional mobility, gait instability, impaired balance, pain.    Rehab Prognosis: Good.    Patient would benefit from continued skilled acute PT services to: address above listed impairments/functional limitations; receive patient/caregiver education; reduce fall risk; and maximize independency/safety with functional mobility.    Recent Surgery: Procedure(s) (LRB):  APPENDECTOMY (N/A)  APPENDECTOMY, LAPAROSCOPIC (N/A)  EXCISION, CECUM WITH ILEUM (N/A)  WASHOUT (N/A) 1 Day Post-Op    Plan:     During this hospitalization, patient to be seen 3 x/week to address the identified impairments/functional limitations via gait training, therapeutic activities, therapeutic exercises and progress toward the established goals.    Plan of Care Expires:  03/07/24    Subjective     Communicated with patient's nurse  prior to session.    Patient agreeable to participate in evaluation.     Chief Complaint: pain  Patient/Family Comments/goals: to walk  Pain/Comfort:  Pain Rating 1: 9/10  Location 1: abdomen  Pain Addressed 1: Nurse notified  Pain Rating Post-Intervention 1: 9/10    Patients cultural, spiritual, Latter day conflicts given the current situation: no    Social History  Living Environment: Patient lives with their daughter in a mobile home, with 4 steps steps outside, with bilat handrails.  Functional Level: Prior to admission patient ambulated  without assistive device and was independent in ADL's.  Equipment Used at Home: none  Equipment owned (not currently used): none.  Assistance Upon Discharge: family.    Objective:     Patient found supine in bed and with HOB elevated with peripheral IV, oxygen, telemetry, pulse ox (continuous)  upon PT entry to room.    General Precautions: Standard,     Orthopedic Precautions:    Braces: N/A  Respiratory Status: room air    Vitals     BP  127/86   HR  98   O2 Sat %  95          Exams:  Orientation: Patient is oriented to person, place, time, situation  Commands: Patient follows commands consistently  BILAT UE ROM/strength - defer to OT - see OT note for details  RLE ROM: WFL  RLE Strength: WFL  LLE ROM: WFL  LLE Strength: WFL    Functional Mobility:    Bed Mobility:  Supine to Sit: moderate assistance  Sit to Supine: moderate assistance    Transfers:  Sit to Stand: minimum assistance and of 2 persons with hand-held assist    Gait:  Patient ambulated 5ft with hand-held assist and minimum assistance.  Patient demonstrates :       Side steps toward HOB. Limited by pt grogginess .    Other Mobility:  not assessed    Balance:  Sit  Static: NORMAL: No deviations seen in posture held statically  Dynamic: FAIR+: Maintains balance through MINIMAL excursions of active trunk motion  Stand  Static: FAIR: Maintains without assist but unable to take challenges  Dynamic: POOR+: Needs MIN (minimal ) assist during gait    Additional Treatment Session  n/a    Patient left supine in bed and with HOB elevated with all lines intact, call button in reach, tray table at bedside, and patient' nurse notified.    Education     Patient was instructed to utilize staff assistance for mobility/transfers.  White board updated regarding patient's safest level of mobility with staff assistance.    Goals     Multidisciplinary Problems       Physical Therapy Goals          Problem: Physical Therapy    Goal Priority Disciplines Outcome Goal Variances  Interventions   Physical Therapy Goal     PT, PT/OT Ongoing, Progressing     Description: Goals to be met by: 2024     Patient will increase functional independence with mobility by performin. Supine to sit with Stand-by Assistance  2. Sit to supine with Stand-by Assistance  3. Sit to stand transfer with Stand-by Assistance  4. Gait  x 130 feet with Stand-by Assistance using Rolling Walker.   5. Ascend/descend 4 stair with bilateral Handrails Stand-by Assistance using No Assistive Device.                        History:   History reviewed. No pertinent past medical history.  Past Surgical History:   Procedure Laterality Date    APPENDECTOMY N/A 2024    Procedure: APPENDECTOMY;  Surgeon: Anna Murillo MD;  Location: [facility];  Service: General;  Laterality: N/A;    LAPAROSCOPIC APPENDECTOMY N/A 2024    Procedure: APPENDECTOMY, LAPAROSCOPIC;  Surgeon: Anna Murillo MD;  Location: [facility];  Service: General;  Laterality: N/A;    SURGICAL REMOVAL OF ILEUM WITH CECUM N/A 2024    Procedure: EXCISION, CECUM WITH ILEUM;  Surgeon: Anna Murillo MD;  Location: [facility];  Service: General;  Laterality: N/A;    WASHOUT N/A 2024    Procedure: WASHOUT;  Surgeon: Anna Murillo MD;  Location: [facility];  Service: General;  Laterality: N/A;     Time Tracking:     PT Received On: 24  PT Start Time: 1027     PT Stop Time: 1050  PT Total Time (min): 23 min     Billable Minutes: Evaluation , moderate complexity    2024

## 2024-02-06 NOTE — PLAN OF CARE
Problem: Occupational Therapy  Goal: Occupational Therapy Goal  Description: Pt will complete bed<>BSC t/f with RW mod I  Pt will complete grooming standing at sink SBA  Pt will complete toileting tasks SBA  Pt will complete LE dressing SBA  Pt will increase B UE AROM/strength to WNL  Outcome: Ongoing, Progressing

## 2024-02-06 NOTE — PLAN OF CARE
Problem: Physical Therapy  Goal: Physical Therapy Goal  Description: Goals to be met by: 2024     Patient will increase functional independence with mobility by performin. Supine to sit with Stand-by Assistance  2. Sit to supine with Stand-by Assistance  3. Sit to stand transfer with Stand-by Assistance  4. Gait  x 130 feet with Stand-by Assistance using Rolling Walker.   5. Ascend/descend 4 stair with bilateral Handrails Stand-by Assistance using No Assistive Device.     Outcome: Ongoing, Progressing

## 2024-02-06 NOTE — PLAN OF CARE
Problem: Adult Inpatient Plan of Care  Goal: Plan of Care Review  Outcome: Ongoing, Progressing  Goal: Patient-Specific Goal (Individualized)  Outcome: Ongoing, Progressing  Goal: Absence of Hospital-Acquired Illness or Injury  Outcome: Ongoing, Progressing  Goal: Optimal Comfort and Wellbeing  Outcome: Ongoing, Progressing  Goal: Readiness for Transition of Care  Outcome: Ongoing, Progressing     Problem: Pain Acute  Goal: Acceptable Pain Control and Functional Ability  Outcome: Ongoing, Progressing     Problem: Fatigue  Goal: Improved Activity Tolerance  Outcome: Ongoing, Progressing     Problem: Infection  Goal: Absence of Infection Signs and Symptoms  Outcome: Ongoing, Progressing     Problem: Fall Injury Risk  Goal: Absence of Fall and Fall-Related Injury  Outcome: Ongoing, Progressing

## 2024-02-06 NOTE — PROGRESS NOTES
[facility] General Surgery   Progress Note  Admit Date: 2/4/2024  HD#0  POD#1 Day Post-Op     Subjective:   Interval history:  NAEON. AF. Some high BP to 172/87 yesterday, improved this morning.   Patient less interactive today. Answering in 1-2 word sentences. She reports pain and fatigue. She denies nausea, only irritation with NG tube.      Objective:      VITAL SIGNS: 24 HR MIN & MAX LAST   Temp  Min: 96.6 °F (35.9 °C)  Max: 98.6 °F (37 °C)  98 °F (36.7 °C)   BP  Min: 123/79  Max: 172/87  (!) 151/78    Pulse  Min: 90  Max: 118  98    Resp  Min: 13  Max: 23  (!) 22    SpO2  Min: 93 %  Max: 100 %  (!) 94 %       Intake/output:  IVF: 7744 mL in 24 hours  Urine Output: 1185 mL in 24 hours     Lines/drains/airway:  PIV  NG Tube  Benitez     Physical examination:  Gen: Patient answering 1-2 word sentences, in obvious discomfort.  HEENT: normocephalic, atraumatic   CV: RR  Resp: NWOB  Abd: Soft, non-distended, tender to palpation predominantly on right side. Midline incision and laparoscopy incision covered with dressings. Clean and in tact, some strikethrough on dressing of medline incision.  Ext: moving all extremities spontaneously and purposefully  Neuro: CN II-XII grossly intact      Labs:  WBC: 6.41 (4.53)  H/H: 10.5/32.1 (11.2/34.7)  BUN: 25.6 (31.4)  Creatinine: 1.06 (1.35)  Phosphorus: 3.3 (4.8)     Imaging:  Abdominal US 02/05/24  Impression:  -Cyst in the right lobe of the liver.  -Elongated gallbladder with no definite gallstones minimal amount of fluid seen adjacent to the fundus.  -Nephrolithiasis of the right kidney with a cyst of the right kidney minimal fullness of the collecting system.     Assessment & Plan:   Selene Smallwood is a 68 y.o. female who presented with abdominal pain and CT indicated appendicitis. Currently s/p laparoscopic converted to open appendectomy with cecectomy and ileum/colon anastomosis.      Plan:     Neuro:   -PRN Hydromorphone and Oxycodone for pain  -Naloxone PRN for Opioid  reversal     CV:   -currently hemodynamically stable  -monitoring for high BP and tachycardia     Pulm:   -Incentive Spirometry      GI:   -NPO for anastomosis healing   -IV famotidine  Ondansetron PRN for nausea  -strict I/O     Heme:   -H/H at 11.2/34.7  -DVT PPX subcutaneous heparin     ID:  - afebrile, no leukocytosis   - early morning CBC  - Zosyn     Renal:   -Monitor urine output   -Early morning CMP to monitor electrolytes and creatinine      F/U pathology      Suly Bloom, MS3  [facility]      PGY-5 Addendum  I have seen/evaluated the patient. POD1 s/p ex-lap with ileocecectomy for perforated appendicitis with purulent and feculent peritonitis. Admitted to the ICU for close monitoring post-op. Continue NGT decompression. Will increase pain medication regimen. PT/OT consults placed. Anticipate post-op ileus. Pathology pending, will follow-up.     Patient re-examined this afternoon; stable for transfer to the floor.    Rei Mendez MD  [facility] General Surgery PGY-5  02/06/2024

## 2024-02-06 NOTE — PT/OT/SLP EVAL
"Occupational Therapy   Evaluation    Name: Selene Smallwood  MRN: [id number]  ED due to: R side abd pain, n/v  Admitting Diagnosis: Acute appendicitis s/p appendectomy with abdominal washout, open ileocectomy, cecal mass  Recent Surgery: Procedure(s) (LRB):  APPENDECTOMY (N/A)  APPENDECTOMY, LAPAROSCOPIC (N/A)  EXCISION, CECUM WITH ILEUM (N/A)  WASHOUT (N/A) 1 Day Post-Op    Recommendations:     Discharge Recommendations:  (pending progress possible low intensity therapy)  Discharge Equipment Recommendations:   (pending progress)  Barriers to discharge:   (acuity of condition)    Assessment:     Selene Smallwood is a 68 y.o. female with a medical diagnosis of Acute appendicitis.  She presents with decreased activity tolerance due to pain and c/o of fatigue. Performance deficits affecting function: weakness, impaired endurance, impaired self care skills, impaired functional mobility, gait instability, impaired balance, pain, decreased ROM.      Rehab Prognosis: Good; patient would benefit from acute skilled OT services to address these deficits and reach maximum level of function.       Plan:     Patient to be seen 3 x/week to address the above listed problems via self-care/home management, therapeutic exercises, neuromuscular re-education, wheelchair management/training  Plan of Care Expires:    Plan of Care Reviewed with: patient    Subjective     Chief Complaint: "I'm not a slacker, I'm just exhausted"  Patient/Family Comments/goals: to return home PLOF    Occupational Profile:  Living Environment: resides at home, daughter resides with pt, mobile home, 4 steps to enter B railings, walk in shower with seat  Previous level of function: pt was I with ADL's and mobility without AD prior to admit  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  Patient found HOB elevated with NG tube, oxygen, telemetry, pulse ox (continuous), blood pressure cuff upon OT entry to room.    General Precautions: Standard,    Orthopedic Precautions:    Braces:    Respiratory Status: Nasal cannula, flow 3 L/min  Start of session 146/73, HR 99, O2 sat 94%  End of session 168/85, HR 99, O2 sat 95%    Occupational Performance:    Bed Mobility:    Patient completed Rolling/Turning to Right with moderate assistance  Patient completed Scooting/Bridging with moderate assistance  Patient completed Supine to Sit with moderate assistance, with side rail, and HOB elevated  Patient completed Sit to Supine with moderate assistance, with side rail, and HOB elevated    Functional Mobility/Transfers:  Patient completed Sit <> Stand Transfer with minimum assistance and of 2 persons  with  hand-held assist   Functional Mobility: pt able to ambulated x 5 ft side steps along EOB min A with hand held assistance, unable to tolerate OOB t/f due to c/o fatigue    Activities of Daily Living:  Feeding:  NPO NG tube  Upper Body Dressing: maximal assistance hospital gown  Lower Body Dressing: total assistance .  Toileting: total assistance bed level    Cognitive/Visual Perceptual:  Cognitive/Psychosocial Skills:     -       Oriented to: Person, Place, Situation, and mod cueing for time   -       Follows Commands/attention:Follows two-step commands  -       Mood/Affect/Coping skills/emotional control: Cooperative, Withdrawn, and decreased alertness    Physical Exam:  Balance: -       static sitting balance SBA, dynamic sitting balance min A, static standing balance CGA, dynamic standing balance min A  Upper Extremity Range of Motion:     -       Right Upper Extremity/Left Upper Extremity: pt limited effort  due to c/o abdomen pain and c/o fatigue, mod/AROM deficits in shld flexion due to abdominal pain, distal WNL   Upper Extremity Strength: grossly 3+/5 due to c/o pain in abdomen, limited effort    AMPAC 6 Click ADL:  AMPAC Total Score:      Treatment & Education:  Education on OT POC, increasing EOB/OOB activity    Patient left HOB elevated with all lines intact, call button in reach, and nurse notified    GOALS:   Multidisciplinary Problems       Occupational Therapy Goals          Problem: Occupational Therapy    Goal Priority Disciplines Outcome Interventions   Occupational Therapy Goal     OT, PT/OT Ongoing, Progressing    Description: Pt will complete bed<>BSC t/f with RW mod I  Pt will complete grooming standing at sink SBA  Pt will complete toileting tasks SBA  Pt will complete LE dressing SBA  Pt will increase B UE AROM/strength to WNL                       History:     History reviewed. No pertinent past medical history.      Past Surgical History:   Procedure Laterality Date    APPENDECTOMY N/A 2/5/2024    Procedure: APPENDECTOMY;  Surgeon: Anna Murillo MD;  Location: [facility];  Service: General;  Laterality: N/A;    LAPAROSCOPIC APPENDECTOMY N/A 2/5/2024    Procedure: APPENDECTOMY, LAPAROSCOPIC;  Surgeon: Anna Murillo MD;  Location: [facility] OR;  Service: General;  Laterality: N/A;    SURGICAL REMOVAL OF ILEUM WITH CECUM N/A 2/5/2024    Procedure: EXCISION, CECUM WITH ILEUM;  Surgeon: Anna Murillo MD;  Location: [facility] OR;  Service: General;  Laterality: N/A;    WASHOUT N/A 2/5/2024    Procedure: WASHOUT;  Surgeon: Anna Murillo MD;  Location: [facility];  Service: General;  Laterality: N/A;       Time Tracking:     OT Date of Treatment:    OT Start Time: 1028  OT Stop Time: 1050  OT Total Time (min): 22 min    Billable Minutes:Evaluation high    2/6/2024

## 2024-02-06 NOTE — MEDICAL/APP STUDENT
[facility] General Surgery   Progress Note  Admit Date: 2/4/2024  HD#0  POD#1 Day Post-Op    Subjective:   Interval history:  NAEON. AF. Some high BP to 172/87 yesterday, improved this morning.   Patient less interactive today. Answering in 1-2 word sentences. She reports pain and fatigue. She denies nausea, only irritation with NG tube.      Objective:     VITAL SIGNS: 24 HR MIN & MAX LAST   Temp  Min: 96.6 °F (35.9 °C)  Max: 98.6 °F (37 °C)  98 °F (36.7 °C)   BP  Min: 123/79  Max: 172/87  (!) 151/78    Pulse  Min: 90  Max: 118  98    Resp  Min: 13  Max: 23  (!) 22    SpO2  Min: 93 %  Max: 100 %  (!) 94 %      Intake/output:  IVF: 7744 mL in 24 hours  Urine Output: 1185 mL in 24 hours    Lines/drains/airway:  PIV  NG Tube  Benitez    Physical examination:  Gen: Patient answering 1-2 word sentences, in obvious discomfort.  HEENT: normocephalic, atraumatic   CV: RR  Resp: NWOB  Abd: Soft, non-distended, tender to palpation predominantly on right side. Midline incision and laparoscopy incision covered with dressings. Clean and in tact, some strikethrough on dressing of medline incision.  Ext: moving all extremities spontaneously and purposefully  Neuro: CN II-XII grossly intact     Labs:  WBC: 6.41 (4.53)  H/H: 10.5/32.1 (11.2/34.7)  BUN: 25.6 (31.4)  Creatinine: 1.06 (1.35)  Phosphorus: 3.3 (4.8)    Imaging:  Abdominal US 02/05/24  Impression:  -Cyst in the right lobe of the liver.  -Elongated gallbladder with no definite gallstones minimal amount of fluid seen adjacent to the fundus.  -Nephrolithiasis of the right kidney with a cyst of the right kidney minimal fullness of the collecting system.    Assessment & Plan:   Selene Smallwood is a 68 y.o. female who presented with abdominal pain and CT indicated appendicitis. Currently s/p laparoscopic converted to open appendectomy with cecectomy and ileum/colon anastomosis.      Plan:     Neuro:   -PRN Hydromorphone and Oxycodone for pain  -Naloxone PRN for Opioid reversal      CV:   -currently hemodynamically stable  -monitoring for high BP and tachycardia     Pulm:   -Incentive Spirometry      GI:   -NPO for anastomosis healing   -IV famotidine  Ondansetron PRN for nausea  -strict I/O     Heme:   -H/H at 11.2/34.7  -DVT PPX subcutaneous heparin     ID:  - afebrile, no leukocytosis   - early morning CBC  - Zosyn     Renal:   -Monitor urine output   -Early morning CMP to monitor electrolytes and creatinine     F/U pathology      Suly Bloom, MS3  [facility]    10:11 PM

## 2024-02-06 NOTE — OP NOTE
APPENDECTOMY Op Note    Date: 2/4/2024 - 2/5/2024    Surgeon(s) and Role:     * Anna Murillo MD - Primary    Assistant: Chandrika Benitez MD PGY-3; Rei Mendez MD PGY-5    Anesthesia: General    Procedure performed:   Laparoscopic appendectomy  Open ileocectomy  Abdominal washout    Pre-operative Diagnosis:   Acute appendicitis    Post-operative Diagnosis:   Acute appendicitis, perforated with gangrene  Purulent peritonitis  Cecal mass    Indication for Procedure:   This is a 68 y.o. female patient who presented with right-sided abdominal pain, DIANA and lactic acidosis.  A CT revealed findings of acute appendicitis.  The risks and benefits of laparoscopic appendectomy vs open were discussed.  The patient now presents for the aforementioned procedure after discussing therapeutic alternatives.       Findings: purulent peritonitis, perforation with gangrenous appendix, mucinous drainage, cecal mass     Procedure Details   After proper consent was obtained the patient was administered preoperative antibiotics and heparin and bilateral SCDs were placed and functioning. The patient was brought into the operating room and positioned supine on the operating table.  General endotracheal anesthesia was administered per anesthesia team.  The patient was prepped and draped in the standard sterile fashion. A time out was called ensuring correct patient, procedure, and other pertinent information.    Access to peritoneal cavity established with 5mm optiview trochar and the abdomen was noted to be frankly contaminated with pus and extensive fibrinous exudate. Abdomen was insufflated without issue and fibrinous exudate was noted across the visible small bowel. A 12mm trochar was inserted in the left lower quadrant under direct visualization and a grasper was inserted to free up the pelvic adhesions between small bowel and anterior abdominal wall. Next a 5mm trochar was inserted in the suprapubic midline under direct  visualization the cecum was identified and the appendix was traced to the tip which appeared perforated with large mucinous drainage. A window as made and blue load stapler was inserted and fired across the base of the appendix which appeared healthy. A white load was then fired across the mesoappendix. Staple lines appeared intact and hemostatic. We then turned our attention to washing out the abdomen particularly the pelvis where we freed up interloop small bowel adhesions and irrigated as best we could around and underneath the uterus. Prior to closing we turned our attention to take antoher look at the staple line which at this point looked extremely friable with serosal tear and poorly intact staple line, gentle manipulation of which led to purulent drainage expressed from staple line. At this point mobilizaiton laparoscopically was unsafe and we elected to proceed with open procedure.     We made a lower midline incision with a #10 blade scalpel and dissected down through subcutaneous tissue using bovie electrocautery until we entered the peritoneal cavity. Jairo wound protector was inserted. Cecum was easily identified and luminal mass was palpated with full-thickness tear at appendiceal stump. We mobilize the cecum using bovie electrocauery at and noted that the terminal ileum did not appear inflamed and was freely mobile. At this point we elected to perform ileocectomy in order to quickly get the patient out of the operating room due to respiratory and hemodynamic lability. We fired a blue staple load across the TI at a point of good mobility. Next we fired an identical staple load across the proximal cecum. We created an enterotomy in the TI on the antimesenteric side as well as the cecum. We inserted the stapler and fired across the enterotomy without issue. Next we closed the common enterotomy with 2-0 prolene in a cannel fashion and placed lembert sutures with 2-0 vicryl over this layer. The  anastomosis was not on tension. We closed the mesenteric defect using 2-0 vicryl runner.     Abdomen was copiously irrigated using warm saline. Liver was palpated with no obvious masses. There were palpable nodules on the omentum, a large one of which was sent for pathology. Fascia was closed with 1-0 looped PDS and skin at midline and previous port sites were closed with staples. Sterile dressing was applied.    The patient tolerated the procedure well without any immediate complication. The patient was extubated in the operating room and taken to the post anesthesia care unit for further recovery. All lap, sponge, needle, instrument counts were correct x2 at the end of the procedure.    Attending surgeon Dr. Murillo was present and scrubbed for the entirety of the procedure.    Estimated Blood Loss:  50cc      Drains: None           Specimens:   ID Type Source Tests Collected by Time Destination   A : appendix Tissue Appendix SPECIMEN TO PATHOLOGY Anna Murillo MD 2/5/2024 1027    B : cecum Tissue Intestine Large, Cecum SPECIMEN TO Anna Villela MD 2/5/2024 1149    C : omental mass Tissue Omentum SPECIMEN TO Anna Villela MD 2/5/2024 1200    D : small bowel Tissue Intestine SPECIMEN TO Anna Villela MD 2/5/2024 1229        Implants: None    Complications:  None           Disposition: PACU - hemodynamically stable. Will transfer to ICU due to hypotension and desaturation during operation           Condition: stable    Chandrika Benitez MD  [facility]    2/5/2024  10:05 PM

## 2024-02-06 NOTE — PROGRESS NOTES
Inpatient Nutrition Assessment    Admit Date: 2/4/2024   Total duration of encounter: 2 days   Patient Age: 68 y.o.    Nutrition Recommendation/Prescription     Pt has NGT suction; NPO status; if NPO > 1-2 additional days; consider use PPN: Clinimix 4.25/5 @ 90 ml/hr with lipids 20% 250 ml daily to provide 1234 calories, 92 gm protein.   When appropriate--ADAT to Low Residue with boost tid; Boost (provides 240 kcal, 10 g protein per serving)   MVI/fe  Biweekly wt  Will monitor nutrition status     Communication of Recommendations: reviewed with patient    Nutrition Assessment     Malnutrition Assessment/Nutrition-Focused Physical Exam    Malnutrition Context: acute illness or injury (02/06/24 1445)  Malnutrition Level: other (see comments) (pt does not meet malnutrition criteria) (02/06/24 1445)           Orbital Region (Subcutaneous Fat Loss): well nourished           Christian Region (Muscle Loss): well nourished                                A minimum of two characteristics is recommended for diagnosis of either severe or non-severe malnutrition.    Chart Review    Reason Seen: continuous nutrition monitoring    Malnutrition Screening Tool Results   Have you recently lost weight without trying?: No  Have you been eating poorly because of a decreased appetite?: No   MST Score: 0   Diagnosis:  DIANA, acute appendicitis, perforated /gangrene, purulent peritonitis, cecal mass, S/P Lap appendectomy, open ileocectomy, abdominal washout    Relevant Medical History: HTN    Nutrition-Related Medications: IVF dextrose 5%/ 0.9% NaCl/Kcl @ 100ml/hr, famotidine, zosyn   Calorie Containing IV Medications: no significant kcals from medications at this time    Nutrition-Related Labs:(2-5) Alb 1.7(L) Tbili 2.2(H)  (2-6) H/H 10.5/32.1(L) Gluc 78 Bun 25.6(H) Cr 1.0(H) GFR 57 K 3.8     Nutrition Orders:   Diet NPO      Appetite/Oral Intake: NPO/NPO    Factors Affecting Nutritional Intake: abdominal pain and  "NPO    Food/Moravian/Cultural Preferences: none reported    Food Allergies: none reported    Wound(s):   surgical abdominal wound     Last Bowel Movement: 24    Comments    (2/6) Pt NPO/NGT suction; S/p appendectomy/open ileocectomy; pt reported abdominal pain/decreased oral intake 1-2 days PTA; usually eats well; no wt loss. Suggest oral supplement when diet progressed; if pt to remain NPO--suggest PPN.     Anthropometrics    Height: 5' 7" (170.2 cm) Height Method: Stated  Last Weight: 72.8 kg (160 lb 8 oz) (24 1609) Weight Method: Bed Scale  BMI (Calculated): 25.1  BMI Classification: overweight (BMI 25-29.9)     Ideal Body Weight (IBW), Female: 135 lb     % Ideal Body Weight, Female (lb): 118.89 %                    Usual Body Weight (UBW), k.8 kg  % Usual Body Weight: 100.21     Usual Weight Provided By: patient and EMR weight history    Wt Readings from Last 5 Encounters:   24 72.8 kg (160 lb 8 oz)     Weight Change(s) Since Admission:   No wt loss   Admit Weight: 72.6 kg (160 lb) (24 1033), Weight Method: Estimated    Estimated Needs    Weight Used For Calorie Calculations: 72.8 kg (160 lb 7.9 oz)  Energy Calorie Requirements (kcal): 1820 kcal/d; 25 jenni/kg  Energy Need Method: Kcal/kg  Weight Used For Protein Calculations: 72.8 kg (160 lb 7.9 oz)  Protein Requirements: 95 gm protein/d; 1.3 gm/kg  Fluid Requirements (mL): 1820 ml/d; 1ml/jenni  Temp (24hrs), Av °F (36.7 °C), Min:96.6 °F (35.9 °C), Max:98.6 °F (37 °C)       Enteral Nutrition    Patient not receiving enteral nutrition at this time.    Parenteral Nutrition    Patient not receiving parenteral nutrition support at this time.    Evaluation of Received Nutrient Intake    Calories: not meeting estimated needs  Protein: not meeting estimated needs    Patient Education    Not applicable.    Nutrition Diagnosis     PES: Inadequate oral intake related to acute illness as evidenced by npo. (new)    Interventions/Goals "     Intervention(s): general/healthful diet, modified composition of parenteral nutrition, modified rate of parenteral nutrition, commercial beverage, multivitamin/mineral supplement therapy, and collaboration with other providers    Goal: Meet greater than 80% of nutritional needs by follow-up. (new)    Monitoring & Evaluation     Dietitian will monitor food and beverage intake, parenteral nutrition intake, weight, and glucose/endocrine profile.    Nutrition Risk/Follow-Up: moderate (follow-up in 3-5 days)   Please consult if re-assessment needed sooner.

## 2024-02-07 LAB
ALBUMIN SERPL-MCNC: 1.4 G/DL (ref 3.4–4.8)
ALBUMIN/GLOB SERPL: 0.3 RATIO (ref 1.1–2)
ALP SERPL-CCNC: 53 UNIT/L (ref 40–150)
ALT SERPL-CCNC: 26 UNIT/L (ref 0–55)
AST SERPL-CCNC: 41 UNIT/L (ref 5–34)
BILIRUB SERPL-MCNC: 1.9 MG/DL
BUN SERPL-MCNC: 17.7 MG/DL (ref 9.8–20.1)
CALCIUM SERPL-MCNC: 8 MG/DL (ref 8.4–10.2)
CHLORIDE SERPL-SCNC: 118 MMOL/L (ref 98–107)
CO2 SERPL-SCNC: 23 MMOL/L (ref 23–31)
CREAT SERPL-MCNC: 0.88 MG/DL (ref 0.55–1.02)
ERYTHROCYTE [DISTWIDTH] IN BLOOD BY AUTOMATED COUNT: 14.9 % (ref 11.5–17)
GFR SERPLBLD CREATININE-BSD FMLA CKD-EPI: >60 MLS/MIN/1.73/M2
GLOBULIN SER-MCNC: 4.3 GM/DL (ref 2.4–3.5)
GLUCOSE SERPL-MCNC: 116 MG/DL (ref 82–115)
HCT VFR BLD AUTO: 28.8 % (ref 37–47)
HGB BLD-MCNC: 9.7 G/DL (ref 12–16)
MAGNESIUM SERPL-MCNC: 2.3 MG/DL (ref 1.6–2.6)
MCH RBC QN AUTO: 29 PG (ref 27–31)
MCHC RBC AUTO-ENTMCNC: 33.7 G/DL (ref 33–36)
MCV RBC AUTO: 86.2 FL (ref 80–94)
NRBC BLD AUTO-RTO: 0.3 %
PHOSPHATE SERPL-MCNC: 2.4 MG/DL (ref 2.3–4.7)
PLATELET # BLD AUTO: 195 X10(3)/MCL (ref 130–400)
PMV BLD AUTO: 11 FL (ref 7.4–10.4)
POTASSIUM SERPL-SCNC: 3.8 MMOL/L (ref 3.5–5.1)
PROT SERPL-MCNC: 5.7 GM/DL (ref 5.8–7.6)
RBC # BLD AUTO: 3.34 X10(6)/MCL (ref 4.2–5.4)
SODIUM SERPL-SCNC: 146 MMOL/L (ref 136–145)
WBC # SPEC AUTO: 7.62 X10(3)/MCL (ref 4.5–11.5)

## 2024-02-07 PROCEDURE — 25000003 PHARM REV CODE 250: Performed by: STUDENT IN AN ORGANIZED HEALTH CARE EDUCATION/TRAINING PROGRAM

## 2024-02-07 PROCEDURE — 84100 ASSAY OF PHOSPHORUS: CPT

## 2024-02-07 PROCEDURE — 83735 ASSAY OF MAGNESIUM: CPT

## 2024-02-07 PROCEDURE — 63600175 PHARM REV CODE 636 W HCPCS: Performed by: STUDENT IN AN ORGANIZED HEALTH CARE EDUCATION/TRAINING PROGRAM

## 2024-02-07 PROCEDURE — 25000003 PHARM REV CODE 250

## 2024-02-07 PROCEDURE — 94799 UNLISTED PULMONARY SVC/PX: CPT | Mod: XB

## 2024-02-07 PROCEDURE — 27000221 HC OXYGEN, UP TO 24 HOURS

## 2024-02-07 PROCEDURE — 97535 SELF CARE MNGMENT TRAINING: CPT

## 2024-02-07 PROCEDURE — 11000001 HC ACUTE MED/SURG PRIVATE ROOM

## 2024-02-07 PROCEDURE — 97530 THERAPEUTIC ACTIVITIES: CPT

## 2024-02-07 PROCEDURE — 94761 N-INVAS EAR/PLS OXIMETRY MLT: CPT

## 2024-02-07 PROCEDURE — 25000003 PHARM REV CODE 250: Performed by: SURGERY

## 2024-02-07 PROCEDURE — 21400001 HC TELEMETRY ROOM

## 2024-02-07 PROCEDURE — 80053 COMPREHEN METABOLIC PANEL: CPT

## 2024-02-07 PROCEDURE — 85027 COMPLETE CBC AUTOMATED: CPT

## 2024-02-07 RX ORDER — METHOCARBAMOL 500 MG/1
500 TABLET, FILM COATED ORAL 3 TIMES DAILY
Status: DISCONTINUED | OUTPATIENT
Start: 2024-02-07 | End: 2024-02-08

## 2024-02-07 RX ORDER — ACETAMINOPHEN 325 MG/1
650 TABLET ORAL EVERY 6 HOURS
Status: DISCONTINUED | OUTPATIENT
Start: 2024-02-07 | End: 2024-02-14 | Stop reason: HOSPADM

## 2024-02-07 RX ORDER — DEXTROSE MONOHYDRATE, SODIUM CHLORIDE, AND POTASSIUM CHLORIDE 50; 1.49; 4.5 G/1000ML; G/1000ML; G/1000ML
INJECTION, SOLUTION INTRAVENOUS CONTINUOUS
Status: DISCONTINUED | OUTPATIENT
Start: 2024-02-07 | End: 2024-02-10

## 2024-02-07 RX ADMIN — FAMOTIDINE 20 MG: 10 INJECTION, SOLUTION INTRAVENOUS at 08:02

## 2024-02-07 RX ADMIN — PIPERACILLIN SODIUM AND TAZOBACTAM SODIUM 4.5 G: 4; .5 INJECTION, POWDER, LYOPHILIZED, FOR SOLUTION INTRAVENOUS at 02:02

## 2024-02-07 RX ADMIN — ACETAMINOPHEN 650 MG: 325 TABLET, FILM COATED ORAL at 11:02

## 2024-02-07 RX ADMIN — OXYCODONE HYDROCHLORIDE 10 MG: 5 TABLET ORAL at 11:02

## 2024-02-07 RX ADMIN — PIPERACILLIN SODIUM AND TAZOBACTAM SODIUM 4.5 G: 4; .5 INJECTION, POWDER, LYOPHILIZED, FOR SOLUTION INTRAVENOUS at 10:02

## 2024-02-07 RX ADMIN — PIPERACILLIN SODIUM AND TAZOBACTAM SODIUM 4.5 G: 4; .5 INJECTION, POWDER, LYOPHILIZED, FOR SOLUTION INTRAVENOUS at 07:02

## 2024-02-07 RX ADMIN — HEPARIN SODIUM 5000 UNITS: 5000 INJECTION, SOLUTION INTRAVENOUS; SUBCUTANEOUS at 05:02

## 2024-02-07 RX ADMIN — POTASSIUM PHOSPHATE, MONOBASIC POTASSIUM PHOSPHATE, DIBASIC 30 MMOL: 224; 236 INJECTION, SOLUTION, CONCENTRATE INTRAVENOUS at 09:02

## 2024-02-07 RX ADMIN — MUPIROCIN: 20 OINTMENT TOPICAL at 08:02

## 2024-02-07 RX ADMIN — METHOCARBAMOL 500 MG: 500 TABLET ORAL at 09:02

## 2024-02-07 RX ADMIN — HEPARIN SODIUM 5000 UNITS: 5000 INJECTION, SOLUTION INTRAVENOUS; SUBCUTANEOUS at 03:02

## 2024-02-07 RX ADMIN — HEPARIN SODIUM 5000 UNITS: 5000 INJECTION, SOLUTION INTRAVENOUS; SUBCUTANEOUS at 09:02

## 2024-02-07 RX ADMIN — OXYCODONE HYDROCHLORIDE 5 MG: 5 TABLET ORAL at 04:02

## 2024-02-07 RX ADMIN — HYDROMORPHONE HYDROCHLORIDE 0.5 MG: 1 INJECTION, SOLUTION INTRAMUSCULAR; INTRAVENOUS; SUBCUTANEOUS at 10:02

## 2024-02-07 RX ADMIN — POTASSIUM CHLORIDE, DEXTROSE MONOHYDRATE AND SODIUM CHLORIDE: 150; 5; 900 INJECTION, SOLUTION INTRAVENOUS at 05:02

## 2024-02-07 RX ADMIN — POTASSIUM CHLORIDE, DEXTROSE MONOHYDRATE AND SODIUM CHLORIDE: 150; 5; 450 INJECTION, SOLUTION INTRAVENOUS at 05:02

## 2024-02-07 RX ADMIN — METHOCARBAMOL 500 MG: 500 TABLET ORAL at 03:02

## 2024-02-07 RX ADMIN — POTASSIUM CHLORIDE, DEXTROSE MONOHYDRATE AND SODIUM CHLORIDE: 150; 5; 450 INJECTION, SOLUTION INTRAVENOUS at 06:02

## 2024-02-07 RX ADMIN — ACETAMINOPHEN 650 MG: 325 TABLET, FILM COATED ORAL at 06:02

## 2024-02-07 RX ADMIN — MUPIROCIN: 20 OINTMENT TOPICAL at 09:02

## 2024-02-07 NOTE — PT/OT/SLP PROGRESS
"Occupational Therapy   Treatment    Name: Selene Smallwood  MRN: [id number]  Admitting Diagnosis:  Acute appendicitis  2 Days Post-Op s/p appendectomy with abdominal washout, open ileocectomy, cecal mass     Recommendations:     Discharge Recommendations: Moderate Intensity Therapy (due to lingering decreased alertness/mentation)  Discharge Equipment Recommendations:  walker, rolling  Barriers to discharge:  Other (Comment) (acuity of condition, level of assist req'd)    Assessment:     Selene Smallwood is a 68 y.o. female with a medical diagnosis of Acute appendicitis.  She presents with decreased alertness, decreased activity tolerance, decreased safety awareness, generalized weakness. Performance deficits affecting function are weakness, impaired endurance, impaired self care skills, impaired functional mobility, gait instability, impaired balance, pain, decreased ROM.     Rehab Prognosis:  Good; patient would benefit from acute skilled OT services to address these deficits and reach maximum level of function.       Plan:     Patient to be seen 3 x/week to address the above listed problems via self-care/home management, therapeutic exercises, neuromuscular re-education, wheelchair management/training  Plan of Care Expires:    Plan of Care Reviewed with: patient    Subjective     Chief Complaint: "It wasn't supposed to be this hard." Crying during session  Patient/Family Comments/goals: return home PLOF  Pain/Comfort:  Pain Rating 1:  (did not report pain)  Pain Addressed 1: Pre-medicate for activity, Nurse notified    Objective:     Communicated with: nurse prior to session.  Patient found  standing at EOB   with NG tube, oxygen, telemetry, pulse ox (continuous), blood pressure cuff , IV upon OT entry to room.  Upon OT arrival pt standing at EOB, was sitting uic , voided in chair, onto floor, pt standing attempting to return to bed although unsafely with bed railing up, medical lines/wires not properly positioned. " "    General Precautions: Standard,      Orthopedic Precautions:   Braces:       Occupational Performance:     Bed Mobility:    Patient completed Scooting/Bridging with minimum assistance  Patient completed Sit to Supine with moderate assistance     Functional Mobility/Transfers:  Patient completed Sit <> Stand Transfer with contact guard assistance  with  hand-held assist and from EOB   Pt req'd max vc's for motor planning/proper body positioning in relation to transfer surface and attention to medical lines  Standing balance CGA    Activities of Daily Living:  Feeding:  NPO, except ice chips, with SBA pt able to bring utensil to mouth with R UE, vc's to localize cup on table and decrease bit size of ice chips due to spillage     Upper Body Dressing: max A doff/aicha hospital gown due to multiple lines    Lower Body Dressing: total assistance total A doff B socks  Toileting: pt +void while sitting uic; pt reports "pressed nurse button and nobody came", urine on floor; pt crying re: situation, req'd assist to return to bed , completed anterior pericleaning supine set up    Treatment & Education:  Education on use of call bell prior to mobilizing    Patient left supine with all lines intact, call button in reach, and nurse notified of situation with voiding on floor, expressed OT concerns with pt's mentation with confusion and decreased alertness ? Medication induced received IV Dilaudid     GOALS:   Multidisciplinary Problems       Occupational Therapy Goals          Problem: Occupational Therapy    Goal Priority Disciplines Outcome Interventions   Occupational Therapy Goal     OT, PT/OT Ongoing, Progressing    Description: Pt will complete bed<>BSC t/f with RW mod I  Pt will complete grooming standing at sink SBA  Pt will complete toileting tasks SBA  Pt will complete LE dressing SBA  Pt will increase B UE AROM/strength to WNL                       Time Tracking:     OT Date of Treatment: 02/07/24  OT Start Time: " 1146  OT Stop Time: 1210  OT Total Time (min): 24 min    Billable Minutes:Self Care/Home Management 2               2/7/2024

## 2024-02-07 NOTE — PT/OT/SLP PROGRESS
Physical Therapy Treatment    Patient Name:  Selene Smallwood   MRN:  [id number]    Recommendations     Therapy Intensity Recommendations at Discharge: Moderate Intensity Therapy  Discharge Equipment Recommendations: walker, rolling   Barriers to discharge: level of skilled assistance required and decreased endurance    Assessment     Selene Smallwood is a 68 y.o. female admitted with a medical diagnosis of Acute appendicitis.    She presents with the following impairments/functional limitations:  weakness, impaired endurance, impaired functional mobility, gait instability, impaired balance, pain.    Rehab Prognosis: Good.    Patient would benefit from continued skilled acute PT services to: address above listed impairments/functional limitations; receive patient/caregiver education; reduce fall risk; and maximize independency/safety with functional mobility.    Recent Surgery: Procedure(s) (LRB):  APPENDECTOMY (N/A)  APPENDECTOMY, LAPAROSCOPIC (N/A)  EXCISION, CECUM WITH ILEUM (N/A)  WASHOUT (N/A) 2 Days Post-Op    Plan     During this hospitalization, patient to be seen 3 x/week to address the identified impairments/functional limitations via gait training, therapeutic activities, therapeutic exercises and progress toward the established goals.    Plan of Care Expires:  03/07/24    Subjective     Communicated with patient's nurse prior to session.    Patient agreeable to participate in treatment session.    Chief Complaint: pain  Patient/Family Comments/goals: to get stronger  Pain/Comfort:  Pain Rating 1: 7/10  Location 1: abdomen  Pain Addressed 1: Nurse notified  Pain Rating Post-Intervention 1: 7/10    Objective     Patient found supine in bed and with HOB elevated with NG tube, oxygen, peripheral IV, telemetry  upon PT entry to room.    General Precautions: Standard,     Orthopedic Precautions:    Braces: N/A  Respiratory Status: 3 liters/min O2 via nasal cannula    Functional Mobility:    Bed Mobility:  Supine to Sit:  moderate assistance    Transfers:  Sit to Stand: minimum assistance with rolling walker    Gait:  Patient ambulated 15ft with rolling walker and contact guard assistance.  Patient demonstrates :       decreased abigail       decreased step length.    Other Mobility:  Therapeutic Activities performed:        -sitting balance activities:              scooting:                   -forward            repositioning at edge of bed            knee extension (LAQ's)    Patient left supine in bed and with HOB elevated with all lines intact, call button in reach, tray table at bedside, and patient' nurse notified.    Goals     Multidisciplinary Problems       Physical Therapy Goals          Problem: Physical Therapy    Goal Priority Disciplines Outcome Goal Variances Interventions   Physical Therapy Goal     PT, PT/OT Ongoing, Progressing     Description: Goals to be met by: 2024     Patient will increase functional independence with mobility by performin. Supine to sit with Stand-by Assistance  2. Sit to supine with Stand-by Assistance  3. Sit to stand transfer with Stand-by Assistance  4. Gait  x 130 feet with Stand-by Assistance using Rolling Walker.   5. Ascend/descend 4 stair with bilateral Handrails Stand-by Assistance using No Assistive Device.                        Time Tracking     PT Received On: 24  PT Start Time: 0950     PT Stop Time: 1022  PT Total Time (min): 32 min     Billable Minutes: Therapeutic Activity 32    2024

## 2024-02-07 NOTE — PROGRESS NOTES
[facility] General Surgery   Progress Note  Admit Date: 2024  HD#1  POD#2 Days Post-Op    Subjective:   Interval history:  NAEON. AF. Continued high blood pressure, up to 174/99 yesterday. Intermittent tachypnea.    Patient states her pain is unchanged from yesterday. She did not wish for much interaction, as she reports continued exhaustion.  Getting up to urinate. No BM or flatus as of this morning.   She denies nausea/chills.     Objective:     VITAL SIGNS: 24 HR MIN & MAX LAST   Temp  Min: 97.6 °F (36.4 °C)  Max: 98.4 °F (36.9 °C)  98 °F (36.7 °C)   BP  Min: 127/86  Max: 174/99  (!) 159/96    Pulse  Min: 86  Max: 100  90    Resp  Min: 0  Max: 24  18    SpO2  Min: 95 %  Max: 97 %  97 %      Intake/output:  IVF: 2244 mL  Urine Output:  200 mL recorded   NG Tube: 100 mL    Lines/drains/airway:  PIV  NG Tube    Physical examination:  Gen: NAD, AAOx3, answering questions appropriately  HEENT: atraumatic  CV: RR  Resp: NWOB  Abd: Soft, non-distended. Diffusely tender to palpation, predominantly on right side. Incision sites with dressings, clean, dry, and in tact.   Ext: moving all extremities spontaneously and purposefully  Neuro: CN II-XII grossly intact    Labs:  WBC: 7.62 (6.41)  H/H: 9.7/28.8 (10.5/32.1)  Na: 146 (142)  K: 3.8 (3.8)  BUN: 17.7 (25.6)  Creatinine: 0.88 (1.06)  Phosphorus: 2.4 (3.3)  M.3 (2.3)  Bilirubin: 1.9 (2.2)  AST: 41 (48)  ALT: 26 (26)    Imaging:  None New    Assessment & Plan:   Selene Smallwood is a 68 y.o. female with who presented with abdominal pain and CT indicated appendicitis. Currently s/p laparoscopic converted to open appendectomy with cecectomy and ileum/colon anastomosis.      Plan:     Neuro:   -PRN Hydromorphone and Oxycodone for pain  -Naloxone PRN for Opioid reversal     CV:   -continuous telemetry monitoring     Pulm:   -Incentive Spirometry  -Continuous low flow oxygen, SpO2 goal >90.      GI:   -Continue NPO with NG suctioning, monitor for return of bowel  function  -IV famotidine  -Ondansetron PRN for nausea  -strict I/O  -Plan to dress down midline incision today     Heme:   -DVT PPX subcutaneous heparin     ID:  - afebrile, no leukocytosis   - Continue Zosyn     Renal:   -Monitor urine output   -Early morning CMP to monitor electrolytes and creatinine      F/U pathology      Suly Bloom, MS3  [facility]    I have personally seen and examined the patient with the medical student, and made changes to the note as appropriate.    -Continue to monitor NG output, will consider pulling once return of bowel function  -Monitor WBC, cont abx  -fu path    Roderick La MD  [facility] General Surgery, PGY-1  02/07/2024

## 2024-02-08 LAB
ALBUMIN SERPL-MCNC: 1.3 G/DL (ref 3.4–4.8)
ALBUMIN/GLOB SERPL: 0.3 RATIO (ref 1.1–2)
ALP SERPL-CCNC: 70 UNIT/L (ref 40–150)
ALT SERPL-CCNC: 25 UNIT/L (ref 0–55)
AST SERPL-CCNC: 35 UNIT/L (ref 5–34)
BACTERIA UR CULT: ABNORMAL
BACTERIA UR CULT: ABNORMAL
BILIRUB SERPL-MCNC: 1.6 MG/DL
BUN SERPL-MCNC: 14.2 MG/DL (ref 9.8–20.1)
CALCIUM SERPL-MCNC: 8 MG/DL (ref 8.4–10.2)
CHLORIDE SERPL-SCNC: 117 MMOL/L (ref 98–107)
CO2 SERPL-SCNC: 23 MMOL/L (ref 23–31)
CREAT SERPL-MCNC: 0.77 MG/DL (ref 0.55–1.02)
ERYTHROCYTE [DISTWIDTH] IN BLOOD BY AUTOMATED COUNT: 15.7 % (ref 11.5–17)
GFR SERPLBLD CREATININE-BSD FMLA CKD-EPI: >60 MLS/MIN/1.73/M2
GLOBULIN SER-MCNC: 4.4 GM/DL (ref 2.4–3.5)
GLUCOSE SERPL-MCNC: 98 MG/DL (ref 82–115)
HCT VFR BLD AUTO: 27.9 % (ref 37–47)
HGB BLD-MCNC: 9.4 G/DL (ref 12–16)
MCH RBC QN AUTO: 29.3 PG (ref 27–31)
MCHC RBC AUTO-ENTMCNC: 33.7 G/DL (ref 33–36)
MCV RBC AUTO: 86.9 FL (ref 80–94)
NRBC BLD AUTO-RTO: 0 %
PHOSPHATE SERPL-MCNC: 3.3 MG/DL (ref 2.3–4.7)
PLATELET # BLD AUTO: 191 X10(3)/MCL (ref 130–400)
PMV BLD AUTO: 11.8 FL (ref 7.4–10.4)
POTASSIUM SERPL-SCNC: 4.1 MMOL/L (ref 3.5–5.1)
PROT SERPL-MCNC: 5.7 GM/DL (ref 5.8–7.6)
RBC # BLD AUTO: 3.21 X10(6)/MCL (ref 4.2–5.4)
SODIUM SERPL-SCNC: 145 MMOL/L (ref 136–145)
WBC # SPEC AUTO: 8.12 X10(3)/MCL (ref 4.5–11.5)

## 2024-02-08 PROCEDURE — 63600175 PHARM REV CODE 636 W HCPCS: Performed by: STUDENT IN AN ORGANIZED HEALTH CARE EDUCATION/TRAINING PROGRAM

## 2024-02-08 PROCEDURE — 97535 SELF CARE MNGMENT TRAINING: CPT

## 2024-02-08 PROCEDURE — 63600175 PHARM REV CODE 636 W HCPCS

## 2024-02-08 PROCEDURE — 80053 COMPREHEN METABOLIC PANEL: CPT | Performed by: STUDENT IN AN ORGANIZED HEALTH CARE EDUCATION/TRAINING PROGRAM

## 2024-02-08 PROCEDURE — 85027 COMPLETE CBC AUTOMATED: CPT | Performed by: STUDENT IN AN ORGANIZED HEALTH CARE EDUCATION/TRAINING PROGRAM

## 2024-02-08 PROCEDURE — 27000221 HC OXYGEN, UP TO 24 HOURS

## 2024-02-08 PROCEDURE — 25000003 PHARM REV CODE 250: Performed by: SURGERY

## 2024-02-08 PROCEDURE — 94761 N-INVAS EAR/PLS OXIMETRY MLT: CPT

## 2024-02-08 PROCEDURE — 21400001 HC TELEMETRY ROOM

## 2024-02-08 PROCEDURE — 97530 THERAPEUTIC ACTIVITIES: CPT

## 2024-02-08 PROCEDURE — 25000003 PHARM REV CODE 250: Performed by: STUDENT IN AN ORGANIZED HEALTH CARE EDUCATION/TRAINING PROGRAM

## 2024-02-08 PROCEDURE — 25000003 PHARM REV CODE 250

## 2024-02-08 PROCEDURE — 84100 ASSAY OF PHOSPHORUS: CPT | Performed by: STUDENT IN AN ORGANIZED HEALTH CARE EDUCATION/TRAINING PROGRAM

## 2024-02-08 RX ORDER — ONDANSETRON 4 MG/1
4 TABLET, ORALLY DISINTEGRATING ORAL EVERY 8 HOURS PRN
Status: DISCONTINUED | OUTPATIENT
Start: 2024-02-08 | End: 2024-02-14 | Stop reason: HOSPADM

## 2024-02-08 RX ORDER — PANTOPRAZOLE SODIUM 40 MG/10ML
40 INJECTION, POWDER, LYOPHILIZED, FOR SOLUTION INTRAVENOUS DAILY
Status: DISCONTINUED | OUTPATIENT
Start: 2024-02-09 | End: 2024-02-13

## 2024-02-08 RX ADMIN — HEPARIN SODIUM 5000 UNITS: 5000 INJECTION, SOLUTION INTRAVENOUS; SUBCUTANEOUS at 05:02

## 2024-02-08 RX ADMIN — MUPIROCIN: 20 OINTMENT TOPICAL at 08:02

## 2024-02-08 RX ADMIN — PIPERACILLIN SODIUM AND TAZOBACTAM SODIUM 4.5 G: 4; .5 INJECTION, POWDER, LYOPHILIZED, FOR SOLUTION INTRAVENOUS at 06:02

## 2024-02-08 RX ADMIN — METHOCARBAMOL 500 MG: 500 TABLET ORAL at 09:02

## 2024-02-08 RX ADMIN — HEPARIN SODIUM 5000 UNITS: 5000 INJECTION, SOLUTION INTRAVENOUS; SUBCUTANEOUS at 09:02

## 2024-02-08 RX ADMIN — OXYCODONE HYDROCHLORIDE 5 MG: 5 TABLET ORAL at 12:02

## 2024-02-08 RX ADMIN — ACETAMINOPHEN 650 MG: 325 TABLET, FILM COATED ORAL at 05:02

## 2024-02-08 RX ADMIN — HYDROMORPHONE HYDROCHLORIDE 0.5 MG: 1 INJECTION, SOLUTION INTRAMUSCULAR; INTRAVENOUS; SUBCUTANEOUS at 08:02

## 2024-02-08 RX ADMIN — POTASSIUM CHLORIDE, DEXTROSE MONOHYDRATE AND SODIUM CHLORIDE: 150; 5; 450 INJECTION, SOLUTION INTRAVENOUS at 02:02

## 2024-02-08 RX ADMIN — MUPIROCIN: 20 OINTMENT TOPICAL at 09:02

## 2024-02-08 RX ADMIN — PIPERACILLIN SODIUM AND TAZOBACTAM SODIUM 4.5 G: 4; .5 INJECTION, POWDER, LYOPHILIZED, FOR SOLUTION INTRAVENOUS at 12:02

## 2024-02-08 RX ADMIN — METHOCARBAMOL 500 MG: 500 TABLET ORAL at 02:02

## 2024-02-08 RX ADMIN — FAMOTIDINE 20 MG: 10 INJECTION, SOLUTION INTRAVENOUS at 09:02

## 2024-02-08 RX ADMIN — POTASSIUM CHLORIDE, DEXTROSE MONOHYDRATE AND SODIUM CHLORIDE: 150; 5; 450 INJECTION, SOLUTION INTRAVENOUS at 04:02

## 2024-02-08 RX ADMIN — ACETAMINOPHEN 650 MG: 325 TABLET, FILM COATED ORAL at 12:02

## 2024-02-08 RX ADMIN — HEPARIN SODIUM 5000 UNITS: 5000 INJECTION, SOLUTION INTRAVENOUS; SUBCUTANEOUS at 02:02

## 2024-02-08 RX ADMIN — PIPERACILLIN SODIUM AND TAZOBACTAM SODIUM 4.5 G: 4; .5 INJECTION, POWDER, LYOPHILIZED, FOR SOLUTION INTRAVENOUS at 03:02

## 2024-02-08 NOTE — PT/OT/SLP PROGRESS
Physical Therapy Treatment    Patient Name:  Selene Smallwood   MRN:  [id number]    Recommendations     Therapy Intensity Recommendations at Discharge: Moderate Intensity Therapy  Discharge Equipment Recommendations:  (TBD - currently - rolling walker)   Barriers to discharge: fall risk, severity of deficits, level of skilled assistance required, decreased endurance, impaired cognitive status, decreased safety awareness, insight into deficits, and decreased communication    Assessment     Selene Smallwood is a 68 y.o. female admitted with a medical diagnosis of Acute appendicitis.  1. Acute appendicitis, unspecified acute appendicitis type    2. Acute appendicitis    3. Acute kidney injury       Patient Active Problem List   Diagnosis    Acute appendicitis    Acute kidney injury    Perforated appendicitis      She presents with the following impairments/functional limitations:  weakness, impaired endurance, impaired functional mobility, impaired self care skills, decreased safety awareness, pain, impaired balance.    Rehab Prognosis: Good.    Patient would benefit from continued skilled acute PT services to: address above listed impairments/functional limitations; receive patient/caregiver education; reduce fall risk; and maximize independency/safety with functional mobility.    -continued: cardiac chair positioning in bed, sitting edge of bed, up-to-chair, standing edge of bed, side steps at edge of bed, ambulation, as tolerated/appropriate, with assistance and supervision    Recent Surgery: Procedure(s) (LRB):  APPENDECTOMY (N/A)  APPENDECTOMY, LAPAROSCOPIC (N/A)  EXCISION, CECUM WITH ILEUM (N/A)  WASHOUT (N/A) 3 Days Post-Op    Plan     During this hospitalization, patient to be seen 5 x/week to address the identified impairments/functional limitations via gait training, therapeutic activities, therapeutic exercises and progress toward the established goals.    Plan of Care Expires:  03/07/24    Subjective     Communicated  with patient's nurse Sabina prior to session.    Patient agreeable to participate in treatment session.    Chief Complaint: I'm comfortable sitting, being out of bed  Patient/Family Comments/goals: none verbalized  Pain/Comfort:  Pain Rating 1:  (patient would not rate as pain - per patient - 'discomfort')  Location 1:  (surgical site)  Pain Addressed 1: Nurse notified  Pain Rating Post-Intervention 1:  (patient would not rate as pain - per patient - 'discomfort')    Objective     Patient found sitting in chair with oxygen, peripheral IV, telemetry  upon PT entry to room.    General Precautions: Standard, fall (post-op)   Orthopedic Precautions:N/A   Braces: N/A  Respiratory Status: 2 liters/min O2 via nasal cannula    Functional Mobility:    Bed Mobility:  Seated in chair at start of session and returned to chair at end of session    Transfers:  N/A    Gait:  N/A    Other Mobility:  Therapeutic Exercises performed:   1 set times 5 repetitions   active range of motion  bilat lower extremity       -seated exercises:              heel slides  Therapeutic Activities performed:        -sitting balance activities:    *patient donned 2nd 'back' gown  *bilat  socks adjusted            weight shifting:                   -forward                 -backward                 -laterally (left)                 -laterally (right)            scooting:                   -forward                 -backward            repositioning at edge of bed    Balance:  Sit  Patient demonstrated static balance on level surface with modified independence with no verbal cues.  Patient demonstrated dynamic balance on level surface with supervision with no verbal cues during minimal excursions.  Stand  static balance - N/A    *after agreeing to participate in therapy session and after rolling walker placed in front of patient, patient declined:  -sit to stand  -ambulation to door side of bed for transition to 'high back chair'  -and transitioning  from chair to bed  -cues/coaxing from therapist unsuccessful  -at this point - patient leaned to Rt side of bedside chair and placed Rt side of head into palm of Rt hand w/ elbow resting on chair arm rest AND declined any activities  -per patient - I'm comfortable being out of bed    Patient left sitting in chair with oxygen, peripheral IV, telemetry with all lines intact, call button in reach, tray table at bedside, and patient' nurse notified.    Goals     Multidisciplinary Problems       Physical Therapy Goals       Problem: Physical Therapy    Goal Priority Disciplines Outcome Goal Variances Interventions   Physical Therapy Goal     PT, PT/OT Ongoing, Not Progressing     Description: REVIEWED 2024    Goals to be met by: DISCHARGE     Patient will increase functional independence with mobility by performin. Supine to sit with Stand-by Assistance - ONGOING  2. Sit to supine with Stand-by Assistance - ONGOING  3. Sit to stand transfer with Stand-by Assistance - ONGOING  4. Gait  x 130 feet with Stand-by Assistance using Rolling Walker - ONGOING  5. Ascend/descend 4 stair with bilateral Handrails Stand-by Assistance using No Assistive Device - ONGOING           Time Tracking     PT Received On: 24  PT Start Time: 1220     PT Stop Time: 1240  PT Total Time (min): 20 min     Billable Minutes: Therapeutic Activity 20  Non-Billable Minutes: n/a  2024

## 2024-02-08 NOTE — PROGRESS NOTES
[facility] General Surgery   Progress Note  Admit Date: 2/4/2024  HD#2  POD#3 Days Post-Op    Subjective:   Interval history:  NAEON. AF. Continued high blood pressure, up to 157/110 yesterday.    Patient states her pain is unchanged from yesterday. She did not wish for much interaction, as she reports continued exhaustion.  Getting up to urinate. No BM or flatus as of this morning.   She denies nausea/chills.  Stepped down to floor yesterday.     Objective:     VITAL SIGNS: 24 HR MIN & MAX LAST   Temp  Min: 97.5 °F (36.4 °C)  Max: 99.6 °F (37.6 °C)  97.9 °F (36.6 °C)   BP  Min: 112/76  Max: 157/110  (!) 137/93    Pulse  Min: 46  Max: 93  (!) 46    Resp  Min: 17  Max: 31  18    SpO2  Min: 98 %  Max: 100 %  98 %      Intake/output:  IVF: 1590.7 mL  Urine output: 400 mL recorded  NG Tube: 100 mL    Lines/drains/airway:  PIV  NG Tube    Physical examination:  Gen: NAD, AAOx3, answering questions appropriately  HEENT: atraumatic  CV: RR  Resp: NWOB  Abd: Soft, non-distended. Diffusely tender to palpation, predominantly on right side. Incision sites with dressings, clean, dry, and in tact.  Ext: moving all extremities spontaneously and purposefully  Neuro: CN II-XII grossly intact     Labs:  WBC: 8.18 (7.62)  H/H: 9.4/27.9 (9.7/28.8)  Na: 145 (146)  K: 4.1 (3.8)  BUN: 14.2 (17.7)  Creatinine: 0.77 (0.88 )  Phosphorus: 3.3 (2.4 )  Bilirubin: 1.6 (1.9)   AST: 35 (41)  ALT: 25 (26)    Imaging:  None New    Pathology:  1. Appendix, appendectomy:  - Acute appendicitis with perforation.  - Adenocarcinoma involves lymphatic spaces of the appendiceal wall and peritoneal surface.     2. Cecum, ileocecectomy:  - Colonic adenocarcinoma.  - See synoptic checklist for CAP cancer protocol.     3. Omental mass, biopsy:  - Metastatic adenocarcinoma, consistent with a colon primary.     4. Small bowel, segmental resection:  - Acute peritonitis.  - No evidence of malignancy.      Assessment & Plan:   Selene Smallwood is a 68 y.o. female with  who presented with abdominal pain and CT indicated appendicitis. Currently s/p laparoscopic converted to open appendectomy with cecectomy and ileum/colon anastomosis. Pathology report reveals metastatic adenocarcinoma of the colon.       Plan:     Neuro:   -MMPC  -PRN Hydromorphone and Oxycodone for pain  -Naloxone PRN for Opioid reversal     CV:   -continuous telemetry monitoring     Pulm:   -Incentive Spirometry  -Continuous low flow oxygen, SpO2 goal >90.      GI:   -Continue NPO with NG suctioning, monitor for return of bowel function  -IV famotidine  -Ondansetron PRN for nausea  -strict I/O       Heme:   -DVT PPX subcutaneous heparin     ID:  - afebrile, no leukocytosis   - Continue Zosyn     Renal:   -Monitor urine output   -Early morning CMP to monitor electrolytes and creatinine     PT/OT following    Plan to discuss results of pathology with patient at most appropriate time.     Suly Bloom, MS3  [facility]      9:39 PM       I have personally seen and examined the patient with the medical student, and made changes to the note as appropriate.    Pt currently doing well, though feeling depressed due to feeling isolated. Unfortunately, pathology reports colon cancer. Will schedule appropriate referrals. Otherwise, NG output decreasing, passing flatus. Will plan to pull NG today and trial clears.     Roderick La MD  [facility] General Surgery, PGY-1  02/08/2024

## 2024-02-08 NOTE — PT/OT/SLP PROGRESS
"Occupational Therapy   Treatment    Name: Selene Smallwood  MRN: [id number]  Admitting Diagnosis:  Acute appendicitis  3 Days Post-Op s/p appendectomy with abdominal washout, open ileocectomy, cecal mass      Recommendations:     Discharge Recommendations: Moderate Intensity Therapy  Discharge Equipment Recommendations:  walker, rolling  Barriers to discharge:  Other (Comment) (severity of impairment)    Assessment:     Selene Smallwood is a 68 y.o. female with a medical diagnosis of Acute appendicitis.  She presents with increased alertness, continued pain hindering mobility and ADL tasks. Performance deficits affecting function are weakness, impaired endurance, impaired self care skills, impaired functional mobility, gait instability, impaired balance, pain, decreased ROM.     Rehab Prognosis:  Good; patient would benefit from acute skilled OT services to address these deficits and reach maximum level of function.       Plan:     Patient to be seen 3 x/week to address the above listed problems via self-care/home management, therapeutic exercises, neuromuscular re-education, wheelchair management/training  Plan of Care Expires:    Plan of Care Reviewed with: patient    Subjective     Chief Complaint: "I've never been through something like this before. It's hard"  Patient/Family Comments/goals: return to PLOF  Pain/Comfort:  Pain Rating 1:  ("I just feel discomfort")  Pain Rating Post-Intervention 1:  (pt c/o pain in abdomen with mobility, requested pain meds, nursing notified)  Pain Addressed 2: Nurse notified    Objective:     Communicated with: nurse prior to session.  Patient found HOB elevated with NG tube, oxygen, telemetry, upon OT entry to room.    General Precautions: Standard,      Orthopedic Precautions:   Braces:    Respiratory Status: Nasal cannula, flow 2 L/min     Occupational Performance:     Bed Mobility:    Patient completed Rolling/Turning to Left with  minimum assistance and with side rail  Patient " completed Scooting/Bridging with minimum assistance  Patient completed Supine to Sit with moderate assistance, with side rail, and HOB elevated      Functional Mobility/Transfers:  Patient completed Sit <> Stand Transfer with minimum assistance  with  hand-held assist   Patient completed Bed <> Chair Transfer using Step Transfer technique with minimum assistance with rolling walker  Patient completed Toilet Transfer Step Transfer technique with minimum assistance with  rolling walker and bedside commode  Functional Mobility: pt declined to ambulate due to c/o pain and fatigue  Extended restbreak req'd between trials of mobility  Tolerated sitting EOB x 30 minutes    Activities of Daily Living:  Feeding:  able to consume ice chips from cup with spoon mod I  Grooming: brushed teeth and mouth wash in sitting EOB with set up supplies, min A for NG tube management    Lower Body Dressing: max A aicha brief    Toileting: + void on BSC, anterior pericleaning SBA clothing management max A      AMPAC 6 Click ADL:      Treatment & Education:  Education throughout session on importance of OOB activity for proper healing and strengthening, pt req'd encouragement and motivation to participate    Patient left up in chair with all lines intact, call button in reach, and nurse notified    GOALS:   Multidisciplinary Problems       Occupational Therapy Goals          Problem: Occupational Therapy    Goal Priority Disciplines Outcome Interventions   Occupational Therapy Goal     OT, PT/OT Ongoing, Progressing    Description: Pt will complete bed<>BSC t/f with RW mod I  Pt will complete grooming standing at sink SBA  Pt will complete toileting tasks SBA  Pt will complete LE dressing SBA  Pt will increase B UE AROM/strength to WNL                       Time Tracking:     OT Date of Treatment: 02/08/24  OT Start Time: 1105  OT Stop Time: 1205  OT Total Time (min): 60 min    Billable Minutes:Self Care/Home Management 2  Therapeutic Activity  2               2/8/2024

## 2024-02-08 NOTE — PLAN OF CARE
Problem: Fall Injury Risk  Goal: Absence of Fall and Fall-Related Injury  Outcome: Ongoing, Progressing     Problem: Fluid and Electrolyte Imbalance (Acute Kidney Injury/Impairment)  Goal: Fluid and Electrolyte Balance  Outcome: Ongoing, Progressing

## 2024-02-08 NOTE — MEDICAL/APP STUDENT
[facility] General Surgery   Progress Note  Admit Date: 2/4/2024  HD#2  POD#3 Days Post-Op    Subjective:   Interval history:  NAEON. AF. Continued high blood pressure, up to 157/110 yesterday.    Patient more interactive today, reporting improved pain. However, she reports feeling depressed and isolated. She also endorses continued exhaustion.  Getting up to urinate. No BM, but has passed some flatus.   She denies nausea/fever/chills.  Stepped down to floor yesterday.     Objective:     VITAL SIGNS: 24 HR MIN & MAX LAST   Temp  Min: 97.5 °F (36.4 °C)  Max: 99.6 °F (37.6 °C)  97.9 °F (36.6 °C)   BP  Min: 112/76  Max: 157/110  (!) 137/93    Pulse  Min: 46  Max: 93  (!) 46    Resp  Min: 17  Max: 31  18    SpO2  Min: 98 %  Max: 100 %  98 %      Intake/output:  IVF: 1590.7 mL  Urine output: 400 mL recorded  NG Tube: 100 mL    Lines/drains/airway:  PIV  NG Tube    Physical examination:  Gen: NAD, AAOx3, answering questions appropriately  HEENT: atraumatic  CV: RR  Resp: NWOB  Abd: Soft, non-distended. Less tender to palpation compared to yesterday. Incision site open, staples clean and in tact, no signs of swelling or erythema.   Ext: moving all extremities spontaneously and purposefully  Neuro: CN II-XII grossly intact     Labs:  WBC: 8.18 (7.62)  H/H: 9.4/27.9 (9.7/28.8)  Na: 145 (146)  K: 4.1 (3.8)  BUN: 14.2 (17.7)  Creatinine: 0.77 (0.88 )  Phosphorus: 3.3 (2.4 )  Bilirubin: 1.6 (1.9)   AST: 35 (41)  ALT: 25 (26)    Imaging:  None New    Pathology:  1. Appendix, appendectomy:  - Acute appendicitis with perforation.  - Adenocarcinoma involves lymphatic spaces of the appendiceal wall and peritoneal surface.     2. Cecum, ileocecectomy:  - Colonic adenocarcinoma.  - See synoptic checklist for CAP cancer protocol.     3. Omental mass, biopsy:  - Metastatic adenocarcinoma, consistent with a colon primary.     4. Small bowel, segmental resection:  - Acute peritonitis.  - No evidence of malignancy.      Assessment & Plan:    Selene Smallwood is a 68 y.o. female with who presented with abdominal pain and CT indicated appendicitis. Currently s/p laparoscopic converted to open appendectomy with cecectomy and ileum/colon anastomosis. Pathology report reveals metastatic adenocarcinoma of the colon.       Plan:     Neuro:   -MMPC  -PRN Hydromorphone and Oxycodone for pain  -Naloxone PRN for Opioid reversal     CV:   -continuous telemetry monitoring     Pulm:   -Incentive Spirometry  -Continuous low flow oxygen, SpO2 goal >90.      GI:   -Consider removing NG tube today  -IV famotidine  -Ondansetron PRN for nausea  -strict I/O     Heme:   -DVT PPX subcutaneous heparin     ID:  - afebrile, no leukocytosis   - Continue Zosyn     Renal:   -Monitor urine output   -Early morning CMP to monitor electrolytes and creatinine     PT/OT following    Plan to discuss results of pathology with patient at most appropriate time.     Suly Bloom, MS3  [facility]      9:39 PM

## 2024-02-09 LAB
ALBUMIN SERPL-MCNC: 1.5 G/DL (ref 3.4–4.8)
ALBUMIN/GLOB SERPL: 0.3 RATIO (ref 1.1–2)
ALP SERPL-CCNC: 107 UNIT/L (ref 40–150)
ALT SERPL-CCNC: 25 UNIT/L (ref 0–55)
AST SERPL-CCNC: 34 UNIT/L (ref 5–34)
BACTERIA BLD CULT: NORMAL
BACTERIA BLD CULT: NORMAL
BILIRUB SERPL-MCNC: 1.5 MG/DL
BUN SERPL-MCNC: 10.5 MG/DL (ref 9.8–20.1)
CALCIUM SERPL-MCNC: 8.1 MG/DL (ref 8.4–10.2)
CANCER AG19-9 SERPL-ACNC: <2.06 UNIT/ML (ref 0–37)
CEA SERPL-MCNC: 44.99 NG/ML (ref 0–3)
CHLORIDE SERPL-SCNC: 115 MMOL/L (ref 98–107)
CO2 SERPL-SCNC: 24 MMOL/L (ref 23–31)
CREAT SERPL-MCNC: 0.8 MG/DL (ref 0.55–1.02)
ERYTHROCYTE [DISTWIDTH] IN BLOOD BY AUTOMATED COUNT: 15.8 % (ref 11.5–17)
GFR SERPLBLD CREATININE-BSD FMLA CKD-EPI: >60 MLS/MIN/1.73/M2
GLOBULIN SER-MCNC: 4.6 GM/DL (ref 2.4–3.5)
GLUCOSE SERPL-MCNC: 77 MG/DL (ref 82–115)
HCT VFR BLD AUTO: 29.1 % (ref 37–47)
HGB BLD-MCNC: 9.7 G/DL (ref 12–16)
MAGNESIUM SERPL-MCNC: 1.7 MG/DL (ref 1.6–2.6)
MCH RBC QN AUTO: 28.9 PG (ref 27–31)
MCHC RBC AUTO-ENTMCNC: 33.3 G/DL (ref 33–36)
MCV RBC AUTO: 86.6 FL (ref 80–94)
NRBC BLD AUTO-RTO: 0 %
PHOSPHATE SERPL-MCNC: 3 MG/DL (ref 2.3–4.7)
PLATELET # BLD AUTO: 204 X10(3)/MCL (ref 130–400)
PMV BLD AUTO: 12.2 FL (ref 7.4–10.4)
POTASSIUM SERPL-SCNC: 4 MMOL/L (ref 3.5–5.1)
PROT SERPL-MCNC: 6.1 GM/DL (ref 5.8–7.6)
RBC # BLD AUTO: 3.36 X10(6)/MCL (ref 4.2–5.4)
SODIUM SERPL-SCNC: 143 MMOL/L (ref 136–145)
WBC # SPEC AUTO: 11.15 X10(3)/MCL (ref 4.5–11.5)

## 2024-02-09 PROCEDURE — 25000003 PHARM REV CODE 250

## 2024-02-09 PROCEDURE — 84100 ASSAY OF PHOSPHORUS: CPT | Performed by: STUDENT IN AN ORGANIZED HEALTH CARE EDUCATION/TRAINING PROGRAM

## 2024-02-09 PROCEDURE — 80053 COMPREHEN METABOLIC PANEL: CPT | Performed by: STUDENT IN AN ORGANIZED HEALTH CARE EDUCATION/TRAINING PROGRAM

## 2024-02-09 PROCEDURE — 94640 AIRWAY INHALATION TREATMENT: CPT

## 2024-02-09 PROCEDURE — C9113 INJ PANTOPRAZOLE SODIUM, VIA: HCPCS | Performed by: STUDENT IN AN ORGANIZED HEALTH CARE EDUCATION/TRAINING PROGRAM

## 2024-02-09 PROCEDURE — 82378 CARCINOEMBRYONIC ANTIGEN: CPT

## 2024-02-09 PROCEDURE — 97535 SELF CARE MNGMENT TRAINING: CPT

## 2024-02-09 PROCEDURE — 63600175 PHARM REV CODE 636 W HCPCS: Performed by: STUDENT IN AN ORGANIZED HEALTH CARE EDUCATION/TRAINING PROGRAM

## 2024-02-09 PROCEDURE — 63600175 PHARM REV CODE 636 W HCPCS

## 2024-02-09 PROCEDURE — 97116 GAIT TRAINING THERAPY: CPT

## 2024-02-09 PROCEDURE — 21400001 HC TELEMETRY ROOM

## 2024-02-09 PROCEDURE — 25000003 PHARM REV CODE 250: Performed by: STUDENT IN AN ORGANIZED HEALTH CARE EDUCATION/TRAINING PROGRAM

## 2024-02-09 PROCEDURE — 94761 N-INVAS EAR/PLS OXIMETRY MLT: CPT

## 2024-02-09 PROCEDURE — 25000242 PHARM REV CODE 250 ALT 637 W/ HCPCS: Performed by: STUDENT IN AN ORGANIZED HEALTH CARE EDUCATION/TRAINING PROGRAM

## 2024-02-09 PROCEDURE — 83735 ASSAY OF MAGNESIUM: CPT | Performed by: STUDENT IN AN ORGANIZED HEALTH CARE EDUCATION/TRAINING PROGRAM

## 2024-02-09 PROCEDURE — 86301 IMMUNOASSAY TUMOR CA 19-9: CPT

## 2024-02-09 PROCEDURE — 85027 COMPLETE CBC AUTOMATED: CPT | Performed by: STUDENT IN AN ORGANIZED HEALTH CARE EDUCATION/TRAINING PROGRAM

## 2024-02-09 PROCEDURE — 27000221 HC OXYGEN, UP TO 24 HOURS

## 2024-02-09 RX ORDER — SODIUM CHLORIDE FOR INHALATION 3 %
4 VIAL, NEBULIZER (ML) INHALATION EVERY 6 HOURS
Status: DISCONTINUED | OUTPATIENT
Start: 2024-02-09 | End: 2024-02-14 | Stop reason: HOSPADM

## 2024-02-09 RX ORDER — MAGNESIUM SULFATE HEPTAHYDRATE 40 MG/ML
4 INJECTION, SOLUTION INTRAVENOUS ONCE
Status: COMPLETED | OUTPATIENT
Start: 2024-02-09 | End: 2024-02-09

## 2024-02-09 RX ORDER — VALSARTAN 320 MG/1
300 TABLET ORAL DAILY
Status: ON HOLD | COMMUNITY
End: 2024-02-27 | Stop reason: HOSPADM

## 2024-02-09 RX ADMIN — MUPIROCIN: 20 OINTMENT TOPICAL at 09:02

## 2024-02-09 RX ADMIN — MAGNESIUM SULFATE HEPTAHYDRATE 4 G: 40 INJECTION, SOLUTION INTRAVENOUS at 11:02

## 2024-02-09 RX ADMIN — HEPARIN SODIUM 5000 UNITS: 5000 INJECTION, SOLUTION INTRAVENOUS; SUBCUTANEOUS at 08:02

## 2024-02-09 RX ADMIN — MUPIROCIN: 20 OINTMENT TOPICAL at 08:02

## 2024-02-09 RX ADMIN — OXYCODONE HYDROCHLORIDE 10 MG: 5 TABLET ORAL at 02:02

## 2024-02-09 RX ADMIN — SODIUM CHLORIDE 30 MG/ML INHALATION SOLUTION 4 ML: 30 SOLUTION INHALANT at 07:02

## 2024-02-09 RX ADMIN — ACETAMINOPHEN 650 MG: 325 TABLET, FILM COATED ORAL at 05:02

## 2024-02-09 RX ADMIN — PIPERACILLIN SODIUM AND TAZOBACTAM SODIUM 4.5 G: 4; .5 INJECTION, POWDER, LYOPHILIZED, FOR SOLUTION INTRAVENOUS at 11:02

## 2024-02-09 RX ADMIN — POTASSIUM CHLORIDE, DEXTROSE MONOHYDRATE AND SODIUM CHLORIDE: 150; 5; 450 INJECTION, SOLUTION INTRAVENOUS at 10:02

## 2024-02-09 RX ADMIN — SODIUM CHLORIDE 30 MG/ML INHALATION SOLUTION 4 ML: 30 SOLUTION INHALANT at 12:02

## 2024-02-09 RX ADMIN — PANTOPRAZOLE SODIUM 40 MG: 40 INJECTION, POWDER, FOR SOLUTION INTRAVENOUS at 10:02

## 2024-02-09 RX ADMIN — HEPARIN SODIUM 5000 UNITS: 5000 INJECTION, SOLUTION INTRAVENOUS; SUBCUTANEOUS at 01:02

## 2024-02-09 RX ADMIN — OXYCODONE HYDROCHLORIDE 10 MG: 5 TABLET ORAL at 11:02

## 2024-02-09 RX ADMIN — HEPARIN SODIUM 5000 UNITS: 5000 INJECTION, SOLUTION INTRAVENOUS; SUBCUTANEOUS at 05:02

## 2024-02-09 RX ADMIN — ACETAMINOPHEN 650 MG: 325 TABLET, FILM COATED ORAL at 11:02

## 2024-02-09 RX ADMIN — PIPERACILLIN SODIUM AND TAZOBACTAM SODIUM 4.5 G: 4; .5 INJECTION, POWDER, LYOPHILIZED, FOR SOLUTION INTRAVENOUS at 03:02

## 2024-02-09 RX ADMIN — PIPERACILLIN SODIUM AND TAZOBACTAM SODIUM 4.5 G: 4; .5 INJECTION, POWDER, LYOPHILIZED, FOR SOLUTION INTRAVENOUS at 06:02

## 2024-02-09 NOTE — MEDICAL/APP STUDENT
[facility] General Surgery   Progress Note  Admit Date: 2/4/2024  HD#2  POD#3 Days Post-Op    Subjective:   Interval history:  ***     Objective:     VITAL SIGNS: 24 HR MIN & MAX LAST   Temp  Min: 97.5 °F (36.4 °C)  Max: 98.3 °F (36.8 °C)  98.1 °F (36.7 °C)   BP  Min: 117/81  Max: 145/95  124/81    Pulse  Min: 46  Max: 96  96    Resp  Min: 17  Max: 18  18    SpO2  Min: 98 %  Max: 100 %  98 %      Intake/output:  IVF:     Lines/drains/airway:  PIV    Physical examination:  Gen: NAD, AAOx3, answering questions appropriately  HEENT: atraumatic  CV: RR  Resp: NWOB  Abd: Soft, non-distended. Less tender to palpation compared to yesterday. Incision site open, staples clean and in tact, no signs of swelling or erythema.  ***  Ext: moving all extremities spontaneously and purposefully  Neuro: CN II-XII grossly intact     Labs:  WBC: (8.12)  H/H: (9.4/27.9)  T Bilirubin: (1.6)  AST: (35)  ALT: (25)    Imaging:  None New    Pathology:  1. Appendix, appendectomy:  - Acute appendicitis with perforation.  - Adenocarcinoma involves lymphatic spaces of the appendiceal wall and peritoneal surface.     2. Cecum, ileocecectomy:  - Colonic adenocarcinoma.  - See synoptic checklist for CAP cancer protocol.     3. Omental mass, biopsy:  - Metastatic adenocarcinoma, consistent with a colon primary.     4. Small bowel, segmental resection:  - Acute peritonitis.  - No evidence of malignancy.      Assessment & Plan:   Selene Smallwood is a 68 y.o. female  who presented with abdominal pain and CT indicated appendicitis. Currently s/p laparoscopic converted to open appendectomy with cecectomy and ileum/colon anastomosis. Pathology report reveals metastatic adenocarcinoma of the colon.       Plan:     -Continue clear liquid diet, monitor for BM  -MMPC  -Incentive Spirometry, continuous low flow oxygen, SpO2 goal >90.   -DVT PPX subcutaneous heparin  -Discontinue Zosyn after today, POD#4  -PT/OT following     Suly Bloom, MS3  [facility]  [place]    8:14 PM

## 2024-02-09 NOTE — PROGRESS NOTES
[facility] General Surgery   Progress Note  Admit Date: 2/4/2024  HD#3  POD#4 Days Post-Op    Subjective:   Interval history:  Discussed pathology with the patient yesterday afternoon  Discussion with family member over the phone yesterday evening  NAEON per patient; AF HDS  Denies nausea/vomiting, but doesn't have a big appetite  Passing copious flatus, no BMs yet  Main complaint this AM is mild SOB on 2L O2 NC  Overall more talkative this morning      Objective:     VITAL SIGNS: 24 HR MIN & MAX LAST   Temp  Min: 97.4 °F (36.3 °C)  Max: 99.9 °F (37.7 °C)  99.9 °F (37.7 °C)   BP  Min: 121/77  Max: 137/93  134/84    Pulse  Min: 46  Max: 98  96    Resp  Min: 17  Max: 18  18    SpO2  Min: 97 %  Max: 100 %  100 %      Intake/output:  Voiding spontaneously  No BMs yet     Lines/drains/airway:  PIV, O2 NC    Physical examination:  Gen: NAD, AAOx3, answering questions appropriately  HEENT: atraumatic, EOMI, MMM   CV: RR  Resp: mildly increased WOB on 2L NC O2  Abd: soft,  ND, no rebound, appropriately TTP in the R devon-abdomen; incision C/D/I  Ext: moving all extremities spontaneously and purposefully  Neuro: CN II-XII grossly intact     Labs:  WBC 11.15  Hgb 9.7  Plt 204  Lytes WNL     Pathology:  1. Appendix, appendectomy:  - Acute appendicitis with perforation.  - Adenocarcinoma involves lymphatic spaces of the appendiceal wall and peritoneal surface.     2. Cecum, ileocecectomy:  - Colonic adenocarcinoma.  - See synoptic checklist for CAP cancer protocol.     3. Omental mass, biopsy:  - Metastatic adenocarcinoma, consistent with a colon primary.     4. Small bowel, segmental resection:  - Acute peritonitis.  - No evidence of malignancy.      pT4a pN2 M1 colon adenocarcinoma    Assessment & Plan:   Selene Smallwood is a 68 y.o. female  who presented with abdominal pain and CT indicated appendicitis. Currently s/p laparoscopic converted to open appendectomy with cecectomy and ileum/colon anastomosis. Pathology report reveals  metastatic adenocarcinoma of the colon.       Plan:  -advance to soft diet as tolerated  -wean NC O2 as tolerated, will add breathing treatments today  -encourage IS use, ambulation  -MMPC regimen; mental clarity improved since stopping robaxin  -4d zosyn regimen ends today, 2/9  -PT/OT consults, appreciate assistance  -DVT PPx: SQH   -will refer to oncology on an outpatient basis    This note was written with the assistance of Suly Bloom, MS3.    Rei Mendez MD  [facility] General Surgery PGY-5  02/09/2024, 6:36 AM

## 2024-02-09 NOTE — PT/OT/SLP PROGRESS
Physical Therapy Treatment    Patient Name:  Selene Smallwood   MRN:  [id number]    Recommendations     Therapy Intensity Recommendations at Discharge: Moderate Intensity Therapy  Discharge Equipment Recommendations:  (TBD - currently - rolling walker)   Barriers to discharge: level of skilled assistance required and decreased endurance    Assessment     Selene Smallwood is a 68 y.o. female admitted with a medical diagnosis of Acute appendicitis.    She presents with the following impairments/functional limitations:  weakness, impaired endurance, impaired functional mobility, gait instability, impaired balance, pain.    Rehab Prognosis: Good.    Patient would benefit from continued skilled acute PT services to: address above listed impairments/functional limitations; receive patient/caregiver education; reduce fall risk; and maximize independency/safety with functional mobility.    Recent Surgery: Procedure(s) (LRB):  APPENDECTOMY (N/A)  APPENDECTOMY, LAPAROSCOPIC (N/A)  EXCISION, CECUM WITH ILEUM (N/A)  WASHOUT (N/A) 4 Days Post-Op    Plan     During this hospitalization, patient to be seen 5 x/week to address the identified impairments/functional limitations via gait training, therapeutic activities, therapeutic exercises and progress toward the established goals.    Plan of Care Expires:  03/07/24    Subjective     Communicated with patient's nurse  prior to session.    Patient agreeable to participate in treatment session.    Chief Complaint: pain  Patient/Family Comments/goals: to be independent  Pain/Comfort:  Pain Rating 1: 7/10  Location 1: abdomen  Pain Addressed 1: Nurse notified  Pain Rating Post-Intervention 1: 5/10    Objective     Patient found supine in bed and with HOB elevated with peripheral IV, oxygen  upon PT entry to room.    General Precautions: Standard, fall (post-op)   Orthopedic Precautions:N/A   Braces: N/A  Respiratory Status: 2 liters/min O2 via nasal cannula; SpO2 of 97-99%   BP of 116/78 and  105/63mmHg    Functional Mobility:    Bed Mobility:  Supine to Sit: contact guard assistance    Transfers:  Sit to Stand: contact guard assistance with rolling walker    Gait:  Patient ambulated 130ft with rolling walker and contact guard assistance.  Patient demonstrates :       steady gait       decreased abigail       decreased step length       flexed posture.    Other Mobility:  not assessed    Patient left sitting in chair with all lines intact, call button in reach, tray table at bedside, patient' nurse notified, and family present.    Goals     Multidisciplinary Problems       Physical Therapy Goals          Problem: Physical Therapy    Goal Priority Disciplines Outcome Goal Variances Interventions   Physical Therapy Goal     PT, PT/OT Ongoing, Not Progressing     Description: REVIEWED 2024    Goals to be met by: DISCHARGE     Patient will increase functional independence with mobility by performin. Supine to sit with Stand-by Assistance - ONGOING  2. Sit to supine with Stand-by Assistance - goal met 24  3. Sit to stand transfer with Stand-by Assistance - ONGOING  4. Gait  x 260 feet with Stand-by Assistance using Rolling Walker - updated 24  5. Ascend/descend 4 stair with bilateral Handrails Stand-by Assistance using No Assistive Device - ONGOING                     Time Tracking     PT Received On: 24  PT Start Time: 912     PT Stop Time: 945  PT Total Time (min): 33 min     Billable Minutes: Gait Training -1, OT assist- 1    2024

## 2024-02-09 NOTE — MEDICAL/APP STUDENT
[facility] General Surgery   Progress Note  Admit Date: 2/4/2024  HD#3  POD#4 Days Post-Op    Subjective:   Interval history:  Discussed pathology with the patient yesterday afternoon  Discussion with family member over the phone yesterday evening  NAEON per patient; AF HDS  Denies nausea/vomiting, but doesn't have a big appetite  Passing copious flatus, no BMs yet  Main complaint this AM is mild SOB on 2L O2 NC  Overall more talkative this morning      Objective:     VITAL SIGNS: 24 HR MIN & MAX LAST   Temp  Min: 97.4 °F (36.3 °C)  Max: 99.9 °F (37.7 °C)  99.9 °F (37.7 °C)   BP  Min: 121/77  Max: 137/93  134/84    Pulse  Min: 46  Max: 98  96    Resp  Min: 17  Max: 18  18    SpO2  Min: 97 %  Max: 100 %  100 %      Intake/output:  Voiding spontaneously  No BMs yet     Lines/drains/airway:  PIV, O2 NC    Physical examination:  Gen: NAD, AAOx3, answering questions appropriately  HEENT: atraumatic, EOMI, MMM   CV: RR  Resp: mildly increased WOB on 2L NC O2  Abd: soft,  ND, no rebound, appropriately TTP in the R devon-abdomen; incision C/D/I  Ext: moving all extremities spontaneously and purposefully  Neuro: CN II-XII grossly intact     Labs:       Pathology:  1. Appendix, appendectomy:  - Acute appendicitis with perforation.  - Adenocarcinoma involves lymphatic spaces of the appendiceal wall and peritoneal surface.     2. Cecum, ileocecectomy:  - Colonic adenocarcinoma.  - See synoptic checklist for CAP cancer protocol.     3. Omental mass, biopsy:  - Metastatic adenocarcinoma, consistent with a colon primary.     4. Small bowel, segmental resection:  - Acute peritonitis.  - No evidence of malignancy.      pT4a pN2 M1 colon adenocarcinoma    Assessment & Plan:   Selene Smallwood is a 68 y.o. female  who presented with abdominal pain and CT indicated appendicitis. Currently s/p laparoscopic converted to open appendectomy with cecectomy and ileum/colon anastomosis. Pathology report reveals metastatic adenocarcinoma of the  colon.       Plan:  -advance to soft diet as tolerated  -wean NC O2 as tolerated, will add breathing treatments today  -encourage IS use, ambulation  -MMPC regimen; mental clarity improved since stopping robaxin  -4d zosyn regimen ends today, 2/9  -PT/OT consults, appreciate assistance  -DVT PPx: SQH   -will refer to oncology on an outpatient basis    This note was written with the assistance of Suly Bloom, MS3.    Rei Mendze MD  [facility] Surgery PGY-5  02/09/2024, 6:36 AM

## 2024-02-09 NOTE — PT/OT/SLP PROGRESS
Occupational Therapy   Treatment    Name: Selene Smallwood  MRN: [id number]  Admitting Diagnosis:  Acute appendicitis  4 Days Post-Op s/p appendectomy with abdominal washout, open ileocectomy, cecal mass       Recommendations:     Discharge Recommendations: Moderate Intensity Therapy, if pt continues to progress may be able to dc low intensity with family assist  Discharge Equipment Recommendations:  walker, rolling  Barriers to discharge:  Other (Comment) (severity of impairment)    Assessment:     Selene Smallwood is a 68 y.o. female with a medical diagnosis of Acute appendicitis.  She presents with improved alertness and activity tolerance today. Performance deficits affecting function are weakness, impaired endurance, impaired self care skills, impaired functional mobility, gait instability, impaired balance, pain, decreased ROM.     Rehab Prognosis:  Good; patient would benefit from acute skilled OT services to address these deficits and reach maximum level of function.       Plan:     Patient to be seen 3 x/week to address the above listed problems via self-care/home management, therapeutic exercises, neuromuscular re-education, wheelchair management/training  Plan of Care Expires:    Plan of Care Reviewed with: patient    Subjective     Chief Complaint: no new c/o    Patient/Family Comments/goals: return home PLOF  Pain/Comfort:  Pain Rating 1: 7/10  Location 1:  (stomach)    Objective:     Communicated with: nurse prior to session.  Patient found HOB elevated with oxygen upon OT entry to room.    General Precautions: Standard,      Orthopedic Precautions:   Braces:    Respiratory Status: Nasal cannula, flow 2 L/min   /78, 99%,   Occupational Performance:     Bed Mobility:    Patient completed Rolling/Turning to Right with stand by assistance and with side rail  Patient completed Scooting/Bridging with stand by assistance  Patient completed Supine to Sit with stand by assistance and with side rail     Functional  Mobility/Transfers:  Patient completed Sit <> Stand Transfer with contact guard assistance  with  rolling walker   Patient completed Bed <> Chair Transfer using Step Transfer technique with contact guard assistance with rolling walker  Functional Mobility: pt able to ambulate x 130ft with RW CGA    Activities of Daily Living:  Grooming: pt completed grooming: brushed teeth in standing at sink CGA for balance .  Upper Body Dressing: minimum assistance doff/aicha hospital gown in standing   Lower Body Dressing: moderate assistance aicha brief, doffed CGA  Toileting: +void in brief, able to complete pericleaning in standing CGA    Standing endurance for ADL tasks x 10 minutes without seated restbreak, intermittent UE support.      [facility] 6 Click ADL:      Treatment & Education:  Education on OT POC, increasing OOB activity   OT assisted PT with gait with medical line management and for chair in tow    Patient left up in chair with all lines intact, call button in reach, and nurse notified    GOALS:   Multidisciplinary Problems       Occupational Therapy Goals          Problem: Occupational Therapy    Goal Priority Disciplines Outcome Interventions   Occupational Therapy Goal     OT, PT/OT Ongoing, Progressing    Description: Pt will complete bed<>BSC t/f with RW mod I  Pt will complete grooming standing at sink SBA  Pt will complete toileting tasks SBA  Pt will complete LE dressing SBA  Pt will increase B UE AROM/strength to WNL                       Time Tracking:     OT Date of Treatment: 02/09/24  OT Start Time: 0913  OT Stop Time: 0945  OT Total Time (min): 32 min    Billable Minutes:Self Care/Home Management 1, PT assist x 1               2/9/2024

## 2024-02-09 NOTE — PROGRESS NOTES
Inpatient Nutrition Assessment    Admit Date: 2/4/2024   Total duration of encounter: 5 days   Patient Age: 68 y.o.    Nutrition Recommendation/Prescription     Low Residue with Boost (provides 240 kcal, 10 g protein per serving) TID  Suggest MVI/fe  Biweekly wt    Communication of Recommendations: reviewed with patient    Nutrition Assessment     Malnutrition Assessment/Nutrition-Focused Physical Exam    Malnutrition Context: acute illness or injury (02/06/24 1445)  Malnutrition Level: other (see comments) (pt does not meet malnutrition criteria) (02/06/24 1445)  Energy Intake (Malnutrition): less than or equal to 50% for greater than or equal to 5 days (02/09/24 1007)        Orbital Region (Subcutaneous Fat Loss): well nourished           Six[place] Region (Muscle Loss): well nourished                                A minimum of two characteristics is recommended for diagnosis of either severe or non-severe malnutrition.    Chart Review    Reason Seen: follow-up    Malnutrition Screening Tool Results   Have you recently lost weight without trying?: No  Have you been eating poorly because of a decreased appetite?: No   MST Score: 0   Diagnosis:  DIANA, acute appendicitis, perforated /gangrene, purulent peritonitis, cecal mass, S/P Lap appendectomy, open ileocectomy, abdominal washout    Relevant Medical History: HTN    Nutrition-Related Medications:    Scheduled Medications:  acetaminophen, 650 mg, Q6H  heparin (porcine), 5,000 Units, Q8H  mupirocin, , BID  pantoprazole, 40 mg, Daily  piperacillin-tazobactam (Zosyn) IV (PEDS and ADULTS) (extended infusion is not appropriate), 4.5 g, Q8H  sodium chloride 3%, 4 mL, Q6H    Continuous Infusions:  dextrose 5 % and 0.45 % NaCl with KCl 20 mEq, Last Rate: 50 mL/hr at 02/08/24 1742    PRN Medications: HYDROmorphone, LIDOcaine (PF) 10 mg/ml (1%), naloxone, ondansetron, oxyCODONE, oxyCODONE, sodium chloride 0.9%   Calorie Containing IV Medications: no significant kcals from  "medications at this time    Nutrition-Related Labs:  Recent Labs   Lab 02/04/24  1117 02/04/24  1757 02/05/24  0442 02/05/24  1642 02/06/24  0325 02/07/24  0538 02/08/24  0504 02/09/24  0351    142 143 142 142 146* 145 143   K 5.1 4.9 4.3 4.0 3.8 3.8 4.1 4.0   CALCIUM 9.7 8.5 8.5 7.9* 8.0* 8.0* 8.0* 8.1*   PHOS  --   --  4.1 4.8* 3.3 2.4 3.3 3.0   MG  --   --  1.60 1.70 2.30 2.30  --  1.70   CHLORIDE 106 109* 112* 110* 111* 118* 117* 115*   CO2 19* 21* 19* 23 22* 23 23 24   BUN 37.9* 37.8* 37.1* 31.4* 25.6* 17.7 14.2 10.5   CREATININE 2.47* 2.00* 1.52* 1.35* 1.06* 0.88 0.77 0.80   EGFRNORACEVR 21 27 37 43 57 >60 >60 >60   GLUCOSE 116* 96 75* 95 78* 116* 98 77*   BILITOT 2.0* 1.9* 2.0* 2.2*  --  1.9* 1.6* 1.5   ALKPHOS 76 62 54 48  --  53 70 107   ALT 14 13 18 26  --  26 25 25   AST 18 19 30 48*  --  41* 35* 34   ALBUMIN 2.6* 2.0* 1.8* 1.7*  --  1.4* 1.3* 1.5*   LIPASE 6  --   --   --   --   --   --   --    WBC 7.19 2.56* 4.46* 4.53 6.41 7.62 8.12 11.15   HGB 13.3 11.6* 11.1* 11.2* 10.5* 9.7* 9.4* 9.7*   HCT 40.8 35.7* 33.9* 34.7* 32.1* 28.8* 27.9* 29.1*        Nutrition Orders:   Diet low fiber/residue  Dietary nutrition supplements Boost Original; TID     Appetite/Oral Intake: poor/0-25% of meals    Factors Affecting Nutritional Intake: abdominal pain and decreased appetite    Food/Church/Cultural Preferences: none reported    Food Allergies: none reported    Wound(s):   surgical abdominal wound     Last Bowel Movement: 02/01/24    Comments    2/9/24 -- Diet initiated this am, reports poor appetite, 0% of breakfast consumed, pt denies n/v reporting no appetite; ONS offered, pt states "Boost sounds good"; New weight obtained via bed 197 lb elevated, ? Accuracy, will continue to monitor    (2/6) Pt NPO/NGT suction; S/p appendectomy/open ileocectomy; pt reported abdominal pain/decreased oral intake 1-2 days PTA; usually eats well; no wt loss. Suggest oral supplement when diet progressed; if pt to remain " "NPO--suggest PPN.     Anthropometrics    Height: 5' 7" (170.2 cm) Height Method: Stated  Last Weight: 81.6 kg (180 lb) (24 1931) Weight Method: Bed Scale  BMI (Calculated): 28.2  BMI Classification: overweight (BMI 25-29.9)     Ideal Body Weight (IBW), Female: 135 lb     % Ideal Body Weight, Female (lb): 118.89 %                    Usual Body Weight (UBW), k.8 kg  % Usual Body Weight: 100.21     Usual Weight Provided By: patient and EMR weight history    Wt Readings from Last 5 Encounters:   24 81.6 kg (180 lb)     Weight Change(s) Since Admission:   No wt loss   Admit Weight: 72.6 kg (160 lb) (24 1033), Weight Method: Estimated    Estimated Needs    Weight Used For Calorie Calculations: 72.8 kg (160 lb 7.9 oz)  Energy Calorie Requirements (kcal): 1820 kcal/d; 25 jenni/kg  Energy Need Method: Kcal/kg  Weight Used For Protein Calculations: 72.8 kg (160 lb 7.9 oz)  Protein Requirements: 95 gm protein/d; 1.3 gm/kg  Fluid Requirements (mL): 1820 ml/d; 1ml/jenni  Temp (24hrs), Av.2 °F (36.8 °C), Min:97.4 °F (36.3 °C), Max:99.9 °F (37.7 °C)       Enteral Nutrition    Patient not receiving enteral nutrition at this time.    Parenteral Nutrition    Patient not receiving parenteral nutrition support at this time.    Evaluation of Received Nutrient Intake    Calories: not meeting estimated needs  Protein: not meeting estimated needs    Patient Education    Not applicable.    Nutrition Diagnosis     PES: Inadequate oral intake related to acute illness as evidenced by npo. (active)    Interventions/Goals     Intervention(s): general/healthful diet, commercial beverage, multivitamin/mineral supplement therapy, and collaboration with other providers    Goal: Meet greater than 80% of nutritional needs by follow-up. (goal progressing)    Monitoring & Evaluation     Dietitian will monitor food and beverage intake, weight, and gastrointestinal profile.    Nutrition Risk/Follow-Up: moderate (follow-up in 3-5 " days)   Please consult if re-assessment needed sooner.

## 2024-02-10 LAB
ALBUMIN SERPL-MCNC: 1.4 G/DL (ref 3.4–4.8)
ALBUMIN/GLOB SERPL: 0.3 RATIO (ref 1.1–2)
ALP SERPL-CCNC: 102 UNIT/L (ref 40–150)
ALT SERPL-CCNC: 22 UNIT/L (ref 0–55)
AST SERPL-CCNC: 28 UNIT/L (ref 5–34)
BILIRUB SERPL-MCNC: 1.1 MG/DL
BUN SERPL-MCNC: 9.2 MG/DL (ref 9.8–20.1)
CALCIUM SERPL-MCNC: 7.8 MG/DL (ref 8.4–10.2)
CHLORIDE SERPL-SCNC: 111 MMOL/L (ref 98–107)
CO2 SERPL-SCNC: 24 MMOL/L (ref 23–31)
CREAT SERPL-MCNC: 0.8 MG/DL (ref 0.55–1.02)
ERYTHROCYTE [DISTWIDTH] IN BLOOD BY AUTOMATED COUNT: 15.4 % (ref 11.5–17)
GFR SERPLBLD CREATININE-BSD FMLA CKD-EPI: >60 MLS/MIN/1.73/M2
GLOBULIN SER-MCNC: 4.4 GM/DL (ref 2.4–3.5)
GLUCOSE SERPL-MCNC: 84 MG/DL (ref 82–115)
HCT VFR BLD AUTO: 25.3 % (ref 37–47)
HGB BLD-MCNC: 8.5 G/DL (ref 12–16)
MAGNESIUM SERPL-MCNC: 1.8 MG/DL (ref 1.6–2.6)
MCH RBC QN AUTO: 29 PG (ref 27–31)
MCHC RBC AUTO-ENTMCNC: 33.6 G/DL (ref 33–36)
MCV RBC AUTO: 86.3 FL (ref 80–94)
NRBC BLD AUTO-RTO: 0 %
PHOSPHATE SERPL-MCNC: 3.4 MG/DL (ref 2.3–4.7)
PLATELET # BLD AUTO: 221 X10(3)/MCL (ref 130–400)
PMV BLD AUTO: 11.9 FL (ref 7.4–10.4)
POTASSIUM SERPL-SCNC: 3.6 MMOL/L (ref 3.5–5.1)
PROT SERPL-MCNC: 5.8 GM/DL (ref 5.8–7.6)
RBC # BLD AUTO: 2.93 X10(6)/MCL (ref 4.2–5.4)
SODIUM SERPL-SCNC: 141 MMOL/L (ref 136–145)
WBC # SPEC AUTO: 12.96 X10(3)/MCL (ref 4.5–11.5)

## 2024-02-10 PROCEDURE — 80053 COMPREHEN METABOLIC PANEL: CPT

## 2024-02-10 PROCEDURE — 63600175 PHARM REV CODE 636 W HCPCS

## 2024-02-10 PROCEDURE — 63600175 PHARM REV CODE 636 W HCPCS: Performed by: STUDENT IN AN ORGANIZED HEALTH CARE EDUCATION/TRAINING PROGRAM

## 2024-02-10 PROCEDURE — 25000003 PHARM REV CODE 250: Performed by: STUDENT IN AN ORGANIZED HEALTH CARE EDUCATION/TRAINING PROGRAM

## 2024-02-10 PROCEDURE — 84100 ASSAY OF PHOSPHORUS: CPT

## 2024-02-10 PROCEDURE — 94640 AIRWAY INHALATION TREATMENT: CPT

## 2024-02-10 PROCEDURE — 94761 N-INVAS EAR/PLS OXIMETRY MLT: CPT

## 2024-02-10 PROCEDURE — 25000003 PHARM REV CODE 250

## 2024-02-10 PROCEDURE — 85027 COMPLETE CBC AUTOMATED: CPT

## 2024-02-10 PROCEDURE — 83735 ASSAY OF MAGNESIUM: CPT

## 2024-02-10 PROCEDURE — 25000242 PHARM REV CODE 250 ALT 637 W/ HCPCS: Performed by: STUDENT IN AN ORGANIZED HEALTH CARE EDUCATION/TRAINING PROGRAM

## 2024-02-10 PROCEDURE — C9113 INJ PANTOPRAZOLE SODIUM, VIA: HCPCS | Performed by: STUDENT IN AN ORGANIZED HEALTH CARE EDUCATION/TRAINING PROGRAM

## 2024-02-10 PROCEDURE — 21400001 HC TELEMETRY ROOM

## 2024-02-10 RX ADMIN — OXYCODONE HYDROCHLORIDE 10 MG: 5 TABLET ORAL at 05:02

## 2024-02-10 RX ADMIN — HEPARIN SODIUM 5000 UNITS: 5000 INJECTION, SOLUTION INTRAVENOUS; SUBCUTANEOUS at 02:02

## 2024-02-10 RX ADMIN — HEPARIN SODIUM 5000 UNITS: 5000 INJECTION, SOLUTION INTRAVENOUS; SUBCUTANEOUS at 05:02

## 2024-02-10 RX ADMIN — ACETAMINOPHEN 650 MG: 325 TABLET, FILM COATED ORAL at 01:02

## 2024-02-10 RX ADMIN — SODIUM CHLORIDE 30 MG/ML INHALATION SOLUTION 4 ML: 30 SOLUTION INHALANT at 01:02

## 2024-02-10 RX ADMIN — PANTOPRAZOLE SODIUM 40 MG: 40 INJECTION, POWDER, FOR SOLUTION INTRAVENOUS at 08:02

## 2024-02-10 RX ADMIN — PIPERACILLIN SODIUM AND TAZOBACTAM SODIUM 4.5 G: 4; .5 INJECTION, POWDER, LYOPHILIZED, FOR SOLUTION INTRAVENOUS at 07:02

## 2024-02-10 RX ADMIN — POTASSIUM CHLORIDE, DEXTROSE MONOHYDRATE AND SODIUM CHLORIDE: 150; 5; 450 INJECTION, SOLUTION INTRAVENOUS at 05:02

## 2024-02-10 RX ADMIN — PIPERACILLIN SODIUM AND TAZOBACTAM SODIUM 4.5 G: 4; .5 INJECTION, POWDER, LYOPHILIZED, FOR SOLUTION INTRAVENOUS at 03:02

## 2024-02-10 RX ADMIN — ACETAMINOPHEN 650 MG: 325 TABLET, FILM COATED ORAL at 05:02

## 2024-02-10 RX ADMIN — OXYCODONE HYDROCHLORIDE 10 MG: 5 TABLET ORAL at 10:02

## 2024-02-10 RX ADMIN — HEPARIN SODIUM 5000 UNITS: 5000 INJECTION, SOLUTION INTRAVENOUS; SUBCUTANEOUS at 09:02

## 2024-02-10 RX ADMIN — MUPIROCIN: 20 OINTMENT TOPICAL at 08:02

## 2024-02-10 RX ADMIN — OXYCODONE HYDROCHLORIDE 10 MG: 5 TABLET ORAL at 09:02

## 2024-02-10 RX ADMIN — HYDROMORPHONE HYDROCHLORIDE 0.5 MG: 1 INJECTION, SOLUTION INTRAMUSCULAR; INTRAVENOUS; SUBCUTANEOUS at 07:02

## 2024-02-10 RX ADMIN — SODIUM CHLORIDE 30 MG/ML INHALATION SOLUTION 4 ML: 30 SOLUTION INHALANT at 07:02

## 2024-02-10 RX ADMIN — PIPERACILLIN SODIUM AND TAZOBACTAM SODIUM 4.5 G: 4; .5 INJECTION, POWDER, LYOPHILIZED, FOR SOLUTION INTRAVENOUS at 11:02

## 2024-02-10 RX ADMIN — MUPIROCIN: 20 OINTMENT TOPICAL at 09:02

## 2024-02-10 RX ADMIN — OXYCODONE HYDROCHLORIDE 10 MG: 5 TABLET ORAL at 03:02

## 2024-02-10 NOTE — PLAN OF CARE
Problem: Adult Inpatient Plan of Care  Goal: Plan of Care Review  Outcome: Ongoing, Progressing  Goal: Patient-Specific Goal (Individualized)  Outcome: Ongoing, Progressing  Goal: Absence of Hospital-Acquired Illness or Injury  Outcome: Ongoing, Progressing  Goal: Optimal Comfort and Wellbeing  Outcome: Ongoing, Progressing     Problem: Pain Acute  Goal: Acceptable Pain Control and Functional Ability  Outcome: Ongoing, Progressing     Problem: Fatigue  Goal: Improved Activity Tolerance  Outcome: Ongoing, Progressing     Problem: Infection  Goal: Absence of Infection Signs and Symptoms  Outcome: Ongoing, Progressing     Problem: Fall Injury Risk  Goal: Absence of Fall and Fall-Related Injury  Outcome: Ongoing, Progressing     Problem: Fluid and Electrolyte Imbalance (Acute Kidney Injury/Impairment)  Goal: Fluid and Electrolyte Balance  Outcome: Ongoing, Progressing     Problem: Oral Intake Inadequate (Acute Kidney Injury/Impairment)  Goal: Optimal Nutrition Intake  Outcome: Ongoing, Progressing     Problem: Renal Function Impairment (Acute Kidney Injury/Impairment)  Goal: Effective Renal Function  Outcome: Ongoing, Progressing     Problem: Skin Injury Risk Increased  Goal: Skin Health and Integrity  Outcome: Ongoing, Progressing

## 2024-02-10 NOTE — PROGRESS NOTES
GENERAL SURGERY  PROGRESS NOTE    Admit Date: 2/4/2024  Hospital Day: 4  Procedure: 5 Days Post-Op     Subjective:  Patient is doing well this morning and she denies any abdominal pain.  She has been passing gas but has not yet had a bowel movement since surgery.  She has tolerating a diet without nausea or vomiting. She is comfortable on room air.        Objective:  Inpatient Data      Vitals:   Temp:  [98.2 °F (36.8 °C)-99.4 °F (37.4 °C)]   Pulse:  [89-96]   Resp:  [16-20]   BP: (126-145)/(74-85)   SpO2:  [92 %-100 %]     Diet low fiber/residue   Intake/Output Summary (Last 24 hours) at 2/10/2024 0852  Last data filed at 2/10/2024 0633  Gross per 24 hour   Intake 744.5 ml   Output --   Net 744.5 ml          Recent Labs     02/08/24  0504 02/09/24  0351 02/10/24  0438   WBC 8.12 11.15 12.96*   HGB 9.4* 9.7* 8.5*   HCT 27.9* 29.1* 25.3*    204 221    143 141   K 4.1 4.0 3.6   CO2 23 24 24   BUN 14.2 10.5 9.2*   CREATININE 0.77 0.80 0.80   BILITOT 1.6* 1.5 1.1   AST 35* 34 28   ALT 25 25 22   ALKPHOS 70 107 102   CALCIUM 8.0* 8.1* 7.8*   ALBUMIN 1.3* 1.5* 1.4*   MG  --  1.70 1.80   PHOS 3.3 3.0 3.4     Scheduled Meds: acetaminophen, 650 mg, Q6H  heparin (porcine), 5,000 Units, Q8H  mupirocin, , BID  pantoprazole, 40 mg, Daily  piperacillin-tazobactam (Zosyn) IV (PEDS and ADULTS) (extended infusion is not appropriate), 4.5 g, Q8H  sodium chloride 3%, 4 mL, Q6H     dextrose 5 % and 0.45 % NaCl with KCl 20 mEq 50 mL/hr at 02/10/24 0633         Physical Exam:  General: no acute distress, resting in bed  CV: RRR  Pulm: normal wob on room air  Abd: abdomen soft, nd, nttp, midline incision clean dry and intact  Ext: moves all spontaneously  Skin: warm and dry      Assessment:  Selene Smallwood is a 68 y.o. female  who presented with appendicitis s/p laparoscopic converted to open ileocecectomy. Pathology report reveals metastatic adenocarcinoma of the colon.      Plan:  - continue diet as tolerated  - ARBF  -  PT/OT  - DVT ppx, likely needs to continue in the outpatient setting for 3 weeks  - will need outpatient follow up with oncology upon discharge    Rajni Phan MD  [employer] General Surgery, PGY6

## 2024-02-11 LAB
ALBUMIN SERPL-MCNC: 1.4 G/DL (ref 3.4–4.8)
ALBUMIN/GLOB SERPL: 0.3 RATIO (ref 1.1–2)
ALP SERPL-CCNC: 102 UNIT/L (ref 40–150)
ALT SERPL-CCNC: 19 UNIT/L (ref 0–55)
AST SERPL-CCNC: 24 UNIT/L (ref 5–34)
BILIRUB SERPL-MCNC: 0.9 MG/DL
BUN SERPL-MCNC: 9.2 MG/DL (ref 9.8–20.1)
CALCIUM SERPL-MCNC: 7.7 MG/DL (ref 8.4–10.2)
CHLORIDE SERPL-SCNC: 110 MMOL/L (ref 98–107)
CO2 SERPL-SCNC: 24 MMOL/L (ref 23–31)
CREAT SERPL-MCNC: 0.73 MG/DL (ref 0.55–1.02)
ERYTHROCYTE [DISTWIDTH] IN BLOOD BY AUTOMATED COUNT: 15.4 % (ref 11.5–17)
GFR SERPLBLD CREATININE-BSD FMLA CKD-EPI: >60 MLS/MIN/1.73/M2
GLOBULIN SER-MCNC: 4.6 GM/DL (ref 2.4–3.5)
GLUCOSE SERPL-MCNC: 56 MG/DL (ref 82–115)
HCT VFR BLD AUTO: 26.1 % (ref 37–47)
HGB BLD-MCNC: 8.6 G/DL (ref 12–16)
MAGNESIUM SERPL-MCNC: 1.7 MG/DL (ref 1.6–2.6)
MCH RBC QN AUTO: 28.6 PG (ref 27–31)
MCHC RBC AUTO-ENTMCNC: 33 G/DL (ref 33–36)
MCV RBC AUTO: 86.7 FL (ref 80–94)
NRBC BLD AUTO-RTO: 0 %
PHOSPHATE SERPL-MCNC: 3.5 MG/DL (ref 2.3–4.7)
PLATELET # BLD AUTO: 252 X10(3)/MCL (ref 130–400)
PMV BLD AUTO: 11.7 FL (ref 7.4–10.4)
POTASSIUM SERPL-SCNC: 3.7 MMOL/L (ref 3.5–5.1)
PROT SERPL-MCNC: 6 GM/DL (ref 5.8–7.6)
RBC # BLD AUTO: 3.01 X10(6)/MCL (ref 4.2–5.4)
SODIUM SERPL-SCNC: 140 MMOL/L (ref 136–145)
WBC # SPEC AUTO: 14.69 X10(3)/MCL (ref 4.5–11.5)

## 2024-02-11 PROCEDURE — 63600175 PHARM REV CODE 636 W HCPCS: Performed by: STUDENT IN AN ORGANIZED HEALTH CARE EDUCATION/TRAINING PROGRAM

## 2024-02-11 PROCEDURE — 25000003 PHARM REV CODE 250: Performed by: STUDENT IN AN ORGANIZED HEALTH CARE EDUCATION/TRAINING PROGRAM

## 2024-02-11 PROCEDURE — 80053 COMPREHEN METABOLIC PANEL: CPT

## 2024-02-11 PROCEDURE — S5010 5% DEXTROSE AND 0.45% SALINE: HCPCS | Performed by: STUDENT IN AN ORGANIZED HEALTH CARE EDUCATION/TRAINING PROGRAM

## 2024-02-11 PROCEDURE — 25000242 PHARM REV CODE 250 ALT 637 W/ HCPCS: Performed by: STUDENT IN AN ORGANIZED HEALTH CARE EDUCATION/TRAINING PROGRAM

## 2024-02-11 PROCEDURE — 94761 N-INVAS EAR/PLS OXIMETRY MLT: CPT

## 2024-02-11 PROCEDURE — C9113 INJ PANTOPRAZOLE SODIUM, VIA: HCPCS | Performed by: STUDENT IN AN ORGANIZED HEALTH CARE EDUCATION/TRAINING PROGRAM

## 2024-02-11 PROCEDURE — 99900035 HC TECH TIME PER 15 MIN (STAT)

## 2024-02-11 PROCEDURE — 85027 COMPLETE CBC AUTOMATED: CPT

## 2024-02-11 PROCEDURE — 84100 ASSAY OF PHOSPHORUS: CPT

## 2024-02-11 PROCEDURE — 94640 AIRWAY INHALATION TREATMENT: CPT

## 2024-02-11 PROCEDURE — 27000221 HC OXYGEN, UP TO 24 HOURS

## 2024-02-11 PROCEDURE — 25000003 PHARM REV CODE 250

## 2024-02-11 PROCEDURE — 83735 ASSAY OF MAGNESIUM: CPT

## 2024-02-11 PROCEDURE — 21400001 HC TELEMETRY ROOM

## 2024-02-11 PROCEDURE — 25500020 PHARM REV CODE 255

## 2024-02-11 RX ORDER — DEXTROSE MONOHYDRATE AND SODIUM CHLORIDE 5; .45 G/100ML; G/100ML
INJECTION, SOLUTION INTRAVENOUS CONTINUOUS
Status: DISCONTINUED | OUTPATIENT
Start: 2024-02-11 | End: 2024-02-13

## 2024-02-11 RX ADMIN — OXYCODONE HYDROCHLORIDE 10 MG: 5 TABLET ORAL at 07:02

## 2024-02-11 RX ADMIN — OXYCODONE HYDROCHLORIDE 10 MG: 5 TABLET ORAL at 06:02

## 2024-02-11 RX ADMIN — ACETAMINOPHEN 650 MG: 325 TABLET, FILM COATED ORAL at 01:02

## 2024-02-11 RX ADMIN — HEPARIN SODIUM 5000 UNITS: 5000 INJECTION, SOLUTION INTRAVENOUS; SUBCUTANEOUS at 05:02

## 2024-02-11 RX ADMIN — IOHEXOL 100 ML: 350 INJECTION, SOLUTION INTRAVENOUS at 08:02

## 2024-02-11 RX ADMIN — PIPERACILLIN SODIUM AND TAZOBACTAM SODIUM 4.5 G: 4; .5 INJECTION, POWDER, LYOPHILIZED, FOR SOLUTION INTRAVENOUS at 12:02

## 2024-02-11 RX ADMIN — PIPERACILLIN SODIUM AND TAZOBACTAM SODIUM 4.5 G: 4; .5 INJECTION, POWDER, LYOPHILIZED, FOR SOLUTION INTRAVENOUS at 07:02

## 2024-02-11 RX ADMIN — PANTOPRAZOLE SODIUM 40 MG: 40 INJECTION, POWDER, FOR SOLUTION INTRAVENOUS at 09:02

## 2024-02-11 RX ADMIN — PIPERACILLIN SODIUM AND TAZOBACTAM SODIUM 4.5 G: 4; .5 INJECTION, POWDER, LYOPHILIZED, FOR SOLUTION INTRAVENOUS at 02:02

## 2024-02-11 RX ADMIN — OXYCODONE HYDROCHLORIDE 10 MG: 5 TABLET ORAL at 01:02

## 2024-02-11 RX ADMIN — SODIUM CHLORIDE 30 MG/ML INHALATION SOLUTION 4 ML: 30 SOLUTION INHALANT at 07:02

## 2024-02-11 RX ADMIN — ACETAMINOPHEN 650 MG: 325 TABLET, FILM COATED ORAL at 12:02

## 2024-02-11 RX ADMIN — SODIUM CHLORIDE 30 MG/ML INHALATION SOLUTION 4 ML: 30 SOLUTION INHALANT at 01:02

## 2024-02-11 RX ADMIN — ACETAMINOPHEN 650 MG: 325 TABLET, FILM COATED ORAL at 05:02

## 2024-02-11 RX ADMIN — DEXTROSE AND SODIUM CHLORIDE: 5; 450 INJECTION, SOLUTION INTRAVENOUS at 09:02

## 2024-02-11 RX ADMIN — HEPARIN SODIUM 5000 UNITS: 5000 INJECTION, SOLUTION INTRAVENOUS; SUBCUTANEOUS at 02:02

## 2024-02-11 RX ADMIN — HEPARIN SODIUM 5000 UNITS: 5000 INJECTION, SOLUTION INTRAVENOUS; SUBCUTANEOUS at 09:02

## 2024-02-11 NOTE — PT/OT/SLP PROGRESS
Physical Therapy    Missed Treatment Session    Patient Name:  Selene Smallwood   MRN:  [id number]      -patients nurse Ibis stated patient will likely decline 2/2 having fingernails painted  -per Ibis - patient ambulated 3 laps this a.m. w/ family present  -patient not seen at this time secondary to patient not available to participate  -patient in room, in bed w/ family assisting w/ painting patients fingernails  -patient agreed to participate tomorrow  -will follow-up as patient is appropriate/available/agreeable to participate and as therapists' schedule allows.

## 2024-02-11 NOTE — PROGRESS NOTES
GENERAL SURGERY  PROGRESS NOTE    Admit Date: 2/4/2024  Hospital Day: 5  Procedure: 6 Days Post-Op     Subjective:  Patient reports abdominal and back pain this morning  Her worst pain is in the RLQ  She denies nausea or vomiting  She continues to pass flatus but no bowel movement        Objective:  Inpatient Data      Vitals:   Temp:  [97.6 °F (36.4 °C)-99 °F (37.2 °C)]   Pulse:  [89-99]   Resp:  [16-18]   BP: (101-173)/(70-85)   SpO2:  [91 %-100 %]     Diet low fiber/residue   Intake/Output Summary (Last 24 hours) at 2/11/2024 0747  Last data filed at 2/10/2024 1904  Gross per 24 hour   Intake 796.14 ml   Output --   Net 796.14 ml          Recent Labs     02/09/24  0351 02/10/24  0438 02/11/24  0426   WBC 11.15 12.96* 14.69*   HGB 9.7* 8.5* 8.6*   HCT 29.1* 25.3* 26.1*    221 252    141 140   K 4.0 3.6 3.7   CO2 24 24 24   BUN 10.5 9.2* 9.2*   CREATININE 0.80 0.80 0.73   BILITOT 1.5 1.1 0.9   AST 34 28 24   ALT 25 22 19   ALKPHOS 107 102 102   CALCIUM 8.1* 7.8* 7.7*   ALBUMIN 1.5* 1.4* 1.4*   MG 1.70 1.80 1.70   PHOS 3.0 3.4 3.5     Scheduled Meds: acetaminophen, 650 mg, Q6H  heparin (porcine), 5,000 Units, Q8H  pantoprazole, 40 mg, Daily  piperacillin-tazobactam (Zosyn) IV (PEDS and ADULTS) (extended infusion is not appropriate), 4.5 g, Q8H  sodium chloride 3%, 4 mL, Q6H          Physical Exam:  General: no acute distress but appears fatigued, resting in chair  CV: RRR  Pulm: normal wob on room air  Abd: abdomen soft, mildly distended, non focal mild ttp, no rebound or guarding  Ext: moves all spontaneously  Skin: warm and dry      Assessment:  Selene Smallwood is a 68 y.o. female  who presented with appendicitis s/p laparoscopic converted to open ileocecectomy on 2/5/24. Pathology report reveals metastatic adenocarcinoma of the colon.       Plan:  - given increasing leukocytosis and abdominal pain will obtain CT A/P with IV and PO contrast today to assess for abscess vs anastomotic leak. Will get CT  chest as well for staging  - NPO pending results of scan  - MMPC  - continue to mobilize  - DVT ppx  - monitor abdominal exam, ARBF       Rajni Phan MD  [facility] General Surgery, PGY6

## 2024-02-12 LAB
ALBUMIN SERPL-MCNC: 1.4 G/DL (ref 3.4–4.8)
ALBUMIN/GLOB SERPL: 0.3 RATIO (ref 1.1–2)
ALP SERPL-CCNC: 103 UNIT/L (ref 40–150)
ALT SERPL-CCNC: 17 UNIT/L (ref 0–55)
AST SERPL-CCNC: 19 UNIT/L (ref 5–34)
BILIRUB SERPL-MCNC: 0.7 MG/DL
BUN SERPL-MCNC: 6.1 MG/DL (ref 9.8–20.1)
CALCIUM SERPL-MCNC: 7.7 MG/DL (ref 8.4–10.2)
CHLORIDE SERPL-SCNC: 108 MMOL/L (ref 98–107)
CO2 SERPL-SCNC: 25 MMOL/L (ref 23–31)
CREAT SERPL-MCNC: 0.67 MG/DL (ref 0.55–1.02)
ERYTHROCYTE [DISTWIDTH] IN BLOOD BY AUTOMATED COUNT: 15.2 % (ref 11.5–17)
GFR SERPLBLD CREATININE-BSD FMLA CKD-EPI: >60 MLS/MIN/1.73/M2
GLOBULIN SER-MCNC: 4.6 GM/DL (ref 2.4–3.5)
GLUCOSE SERPL-MCNC: 79 MG/DL (ref 82–115)
GRAM STN SPEC: NORMAL
GRAM STN SPEC: NORMAL
HCT VFR BLD AUTO: 25.2 % (ref 37–47)
HGB BLD-MCNC: 8.5 G/DL (ref 12–16)
HOLD SPECIMEN: NORMAL
INR PPP: 1.1
KOH PREP SPEC: NORMAL
MAGNESIUM SERPL-MCNC: 1.7 MG/DL (ref 1.6–2.6)
MCH RBC QN AUTO: 29.3 PG (ref 27–31)
MCHC RBC AUTO-ENTMCNC: 33.7 G/DL (ref 33–36)
MCV RBC AUTO: 86.9 FL (ref 80–94)
NRBC BLD AUTO-RTO: 0 %
PHOSPHATE SERPL-MCNC: 3.1 MG/DL (ref 2.3–4.7)
PLATELET # BLD AUTO: 296 X10(3)/MCL (ref 130–400)
PMV BLD AUTO: 11.4 FL (ref 7.4–10.4)
POTASSIUM SERPL-SCNC: 3.5 MMOL/L (ref 3.5–5.1)
PROT SERPL-MCNC: 6 GM/DL (ref 5.8–7.6)
PROTHROMBIN TIME: 14.2 SECONDS (ref 11.4–14)
RBC # BLD AUTO: 2.9 X10(6)/MCL (ref 4.2–5.4)
SODIUM SERPL-SCNC: 139 MMOL/L (ref 136–145)
WBC # SPEC AUTO: 11.57 X10(3)/MCL (ref 4.5–11.5)

## 2024-02-12 PROCEDURE — C9113 INJ PANTOPRAZOLE SODIUM, VIA: HCPCS | Performed by: STUDENT IN AN ORGANIZED HEALTH CARE EDUCATION/TRAINING PROGRAM

## 2024-02-12 PROCEDURE — 99900035 HC TECH TIME PER 15 MIN (STAT)

## 2024-02-12 PROCEDURE — 21400001 HC TELEMETRY ROOM

## 2024-02-12 PROCEDURE — 88108 CYTOPATH CONCENTRATE TECH: CPT | Mod: TC

## 2024-02-12 PROCEDURE — 25000003 PHARM REV CODE 250: Performed by: PREVENTIVE MEDICINE

## 2024-02-12 PROCEDURE — 85610 PROTHROMBIN TIME: CPT | Performed by: STUDENT IN AN ORGANIZED HEALTH CARE EDUCATION/TRAINING PROGRAM

## 2024-02-12 PROCEDURE — 25000242 PHARM REV CODE 250 ALT 637 W/ HCPCS: Performed by: STUDENT IN AN ORGANIZED HEALTH CARE EDUCATION/TRAINING PROGRAM

## 2024-02-12 PROCEDURE — 63600175 PHARM REV CODE 636 W HCPCS: Performed by: STUDENT IN AN ORGANIZED HEALTH CARE EDUCATION/TRAINING PROGRAM

## 2024-02-12 PROCEDURE — 27000221 HC OXYGEN, UP TO 24 HOURS

## 2024-02-12 PROCEDURE — 25000003 PHARM REV CODE 250: Performed by: STUDENT IN AN ORGANIZED HEALTH CARE EDUCATION/TRAINING PROGRAM

## 2024-02-12 PROCEDURE — 80053 COMPREHEN METABOLIC PANEL: CPT

## 2024-02-12 PROCEDURE — 87220 TISSUE EXAM FOR FUNGI: CPT

## 2024-02-12 PROCEDURE — 85027 COMPLETE CBC AUTOMATED: CPT

## 2024-02-12 PROCEDURE — 25500020 PHARM REV CODE 255

## 2024-02-12 PROCEDURE — 87102 FUNGUS ISOLATION CULTURE: CPT

## 2024-02-12 PROCEDURE — 87205 SMEAR GRAM STAIN: CPT

## 2024-02-12 PROCEDURE — 63600175 PHARM REV CODE 636 W HCPCS: Performed by: PREVENTIVE MEDICINE

## 2024-02-12 PROCEDURE — 94640 AIRWAY INHALATION TREATMENT: CPT

## 2024-02-12 PROCEDURE — 97110 THERAPEUTIC EXERCISES: CPT

## 2024-02-12 PROCEDURE — 0W9G30Z DRAINAGE OF PERITONEAL CAVITY WITH DRAINAGE DEVICE, PERCUTANEOUS APPROACH: ICD-10-PCS | Performed by: PREVENTIVE MEDICINE

## 2024-02-12 PROCEDURE — 25000003 PHARM REV CODE 250

## 2024-02-12 PROCEDURE — 87116 MYCOBACTERIA CULTURE: CPT

## 2024-02-12 PROCEDURE — 94761 N-INVAS EAR/PLS OXIMETRY MLT: CPT

## 2024-02-12 PROCEDURE — 84100 ASSAY OF PHOSPHORUS: CPT

## 2024-02-12 PROCEDURE — 83735 ASSAY OF MAGNESIUM: CPT

## 2024-02-12 PROCEDURE — 87070 CULTURE OTHR SPECIMN AEROBIC: CPT

## 2024-02-12 RX ORDER — MIDAZOLAM HYDROCHLORIDE 1 MG/ML
INJECTION INTRAMUSCULAR; INTRAVENOUS
Status: COMPLETED | OUTPATIENT
Start: 2024-02-12 | End: 2024-02-12

## 2024-02-12 RX ORDER — FENTANYL CITRATE 50 UG/ML
INJECTION, SOLUTION INTRAMUSCULAR; INTRAVENOUS
Status: COMPLETED | OUTPATIENT
Start: 2024-02-12 | End: 2024-02-12

## 2024-02-12 RX ORDER — LIDOCAINE HYDROCHLORIDE 10 MG/ML
INJECTION INFILTRATION; PERINEURAL
Status: COMPLETED | OUTPATIENT
Start: 2024-02-12 | End: 2024-02-12

## 2024-02-12 RX ADMIN — POTASSIUM PHOSPHATE, MONOBASIC POTASSIUM PHOSPHATE, DIBASIC 30 MMOL: 224; 236 INJECTION, SOLUTION, CONCENTRATE INTRAVENOUS at 08:02

## 2024-02-12 RX ADMIN — LIDOCAINE HYDROCHLORIDE 10 ML: 10 INJECTION, SOLUTION INFILTRATION; PERINEURAL at 02:02

## 2024-02-12 RX ADMIN — HEPARIN SODIUM 5000 UNITS: 5000 INJECTION, SOLUTION INTRAVENOUS; SUBCUTANEOUS at 05:02

## 2024-02-12 RX ADMIN — ACETAMINOPHEN 650 MG: 325 TABLET, FILM COATED ORAL at 05:02

## 2024-02-12 RX ADMIN — OXYCODONE HYDROCHLORIDE 10 MG: 5 TABLET ORAL at 08:02

## 2024-02-12 RX ADMIN — MIDAZOLAM HYDROCHLORIDE 1 MG: 1 INJECTION, SOLUTION INTRAMUSCULAR; INTRAVENOUS at 02:02

## 2024-02-12 RX ADMIN — HEPARIN SODIUM 5000 UNITS: 5000 INJECTION, SOLUTION INTRAVENOUS; SUBCUTANEOUS at 10:02

## 2024-02-12 RX ADMIN — OXYCODONE HYDROCHLORIDE 10 MG: 5 TABLET ORAL at 03:02

## 2024-02-12 RX ADMIN — FENTANYL CITRATE 25 MCG: 50 INJECTION INTRAMUSCULAR; INTRAVENOUS at 02:02

## 2024-02-12 RX ADMIN — IOHEXOL 100 ML: 350 INJECTION, SOLUTION INTRAVENOUS at 02:02

## 2024-02-12 RX ADMIN — PANTOPRAZOLE SODIUM 40 MG: 40 INJECTION, POWDER, FOR SOLUTION INTRAVENOUS at 08:02

## 2024-02-12 RX ADMIN — ACETAMINOPHEN 650 MG: 325 TABLET, FILM COATED ORAL at 06:02

## 2024-02-12 RX ADMIN — FENTANYL CITRATE 50 MCG: 50 INJECTION INTRAMUSCULAR; INTRAVENOUS at 02:02

## 2024-02-12 RX ADMIN — PIPERACILLIN SODIUM AND TAZOBACTAM SODIUM 4.5 G: 4; .5 INJECTION, POWDER, LYOPHILIZED, FOR SOLUTION INTRAVENOUS at 11:02

## 2024-02-12 RX ADMIN — SODIUM CHLORIDE 30 MG/ML INHALATION SOLUTION 4 ML: 30 SOLUTION INHALANT at 07:02

## 2024-02-12 RX ADMIN — SODIUM CHLORIDE 30 MG/ML INHALATION SOLUTION 4 ML: 30 SOLUTION INHALANT at 12:02

## 2024-02-12 RX ADMIN — ACETAMINOPHEN 650 MG: 325 TABLET, FILM COATED ORAL at 11:02

## 2024-02-12 RX ADMIN — PIPERACILLIN SODIUM AND TAZOBACTAM SODIUM 4.5 G: 4; .5 INJECTION, POWDER, LYOPHILIZED, FOR SOLUTION INTRAVENOUS at 03:02

## 2024-02-12 RX ADMIN — PIPERACILLIN SODIUM AND TAZOBACTAM SODIUM 4.5 G: 4; .5 INJECTION, POWDER, LYOPHILIZED, FOR SOLUTION INTRAVENOUS at 08:02

## 2024-02-12 RX ADMIN — SODIUM CHLORIDE 30 MG/ML INHALATION SOLUTION 4 ML: 30 SOLUTION INHALANT at 01:02

## 2024-02-12 NOTE — BRIEF OP NOTE
[facility] Telemetry  Radiology - Brief Procedure Note    Procedure Performed by: Chris Mcbride MD  Date: 02/12/2024 Time: 3:46 PM    Procedure: CT-guided drainage    Pre-Op Diagnosis: post appendectomy collection  Post-Op Diagnosis: post appendectomy collection    Procedure Findings: Right abdominal wall prepped/draped in usual sterile technique. CT then utilized in typical fashion to gain needle access to fluid collection along right gutter, followed by wire placement, serial dilation, and placement of 10F locking pigtail catheter.    Estimated Blood Loss: Minimal    Specimen/Tissue Removed: Yes - approx 12mL purulent, blood-tinged fluid    Complications: No immediate post-procedure complications identified.    Please refer to dedicated guidance imaging report for additional details.      CHRIS MCBRIDE MD  Radiology  [phone] // .[phone] // .[phone]

## 2024-02-12 NOTE — PLAN OF CARE
Problem: Adult Inpatient Plan of Care  Goal: Patient-Specific Goal (Individualized)  Outcome: Ongoing, Progressing  Goal: Absence of Hospital-Acquired Illness or Injury  Outcome: Ongoing, Progressing  Goal: Optimal Comfort and Wellbeing  Outcome: Ongoing, Progressing     Problem: Fatigue  Goal: Improved Activity Tolerance  Outcome: Ongoing, Progressing     Problem: Infection  Goal: Absence of Infection Signs and Symptoms  Outcome: Ongoing, Progressing     Problem: Fall Injury Risk  Goal: Absence of Fall and Fall-Related Injury  Outcome: Ongoing, Progressing

## 2024-02-12 NOTE — CONSULTS
Consult for SNF noted. Spoke to patient's daughter, April Mario, P: [phone], who is in agreement and prefers [facility]. Referral has been sent to [facility] via [facility]. Will follow.

## 2024-02-12 NOTE — PLAN OF CARE
Problem: Adult Inpatient Plan of Care  Goal: Plan of Care Review  Outcome: Ongoing, Progressing  Flowsheets (Taken 2/12/2024 1755)  Plan of Care Reviewed With:   patient   family  Goal: Absence of Hospital-Acquired Illness or Injury  Outcome: Ongoing, Progressing  Intervention: Identify and Manage Fall Risk  Flowsheets (Taken 2/12/2024 1755)  Safety Promotion/Fall Prevention:   assistive device/personal item within reach   Fall Risk signage in place   high risk medications identified   lighting adjusted   medications reviewed   side rails raised x 2   instructed to call staff for mobility  Intervention: Prevent Skin Injury  Flowsheets (Taken 2/12/2024 1755)  Body Position: position changed independently  Skin Protection:   adhesive use limited   tubing/devices free from skin contact  Intervention: Prevent and Manage VTE (Venous Thromboembolism) Risk  Flowsheets (Taken 2/12/2024 1755)  Activity Management: Up to bedside commode - L3  VTE Prevention/Management:   remove, assess skin, and reapply sequential compression device   ambulation promoted   bleeding risk assessed  Range of Motion: active ROM (range of motion) encouraged  Intervention: Prevent Infection  Flowsheets (Taken 2/12/2024 1755)  Infection Prevention: hand hygiene promoted

## 2024-02-12 NOTE — CONSULTS
[facility] - [facility] Surg Telemetry  Interventional Radiology - Consultation Note    SUBJECTIVE   History of Present Illness: Selene Smlalwood is a 68 y.o. female with recent appendectomy, found to have post op RLQ fluid collection.    IR service consulted for consideration of image-guided drainage.    Scheduled Meds:    acetaminophen  650 mg Oral Q6H    heparin (porcine)  5,000 Units Subcutaneous Q8H    pantoprazole  40 mg Intravenous Daily    piperacillin-tazobactam (Zosyn) IV (PEDS and ADULTS) (extended infusion is not appropriate)  4.5 g Intravenous Q8H    sodium chloride 3%  4 mL Nebulization Q6H       Continuous Infusions:    dextrose 5 % and 0.45 % NaCl Stopped (24 1203)       PRN Meds:  HYDROmorphone, LIDOcaine (PF) 10 mg/ml (1%), naloxone, ondansetron, oxyCODONE, oxyCODONE, sodium chloride 0.9%    Review of patient's allergies indicates:  No Known Allergies    History reviewed. No pertinent past medical history.    Review of Systems: Intake ROS reviewed; no significant interval changes.    OBJECTIVE   Vital Signs (Most Recent):  Temp: 98.8 °F (37.1 °C) (24 1523)  Pulse: 91 (24 1523)  Resp: 20 (24 1530)  BP: (!) 179/75 (24 1523)  SpO2: 96 % (24 1523)    Laboratory:  Lab Results   Component Value Date/Time    WBC 11.57 (H) 2024 05:12 AM    HGB 8.5 (L) 2024 05:12 AM    HCT 25.2 (L) 2024 05:12 AM     Lab Results   Component Value Date/Time     2024 05:12 AM    INR 1.1 2024 09:07 AM       Imagin abd/pelv CT reviewed - developing right abdominal collection with peripheral enhancement adjacent to cecum and extending cranially along gutter    ASSESSMENT/PLAN   68 y.o. female with post appendectomy collection, amenable to drainage by CT appearance.      IR plan/recs:  Confirmed pt is NPO and no recent anticoagulation that would preclude drainage.  Will plan for CT-guided drain placement later today when sedation nurse  available.    Pre-sedation Assessment:    1. ASA Score: ASA 2 - Patient with mild systemic disease with no functional limitations  2. Mallampati Class: II (hard and soft palate, upper portion of tonsils anduvula visible)  3. Patient or family history of any reaction to anesthesia or sedation: No  4. Plan for Sedation: Local and Moderate  5. H&P within 30 days of the procedure and updated within 24 hrs of admission or registration: Yes        Thank you for including us in the care of this patient. Please call with any questions.    Chris Mcbride MD  Radiology  [phone] // [phone] // [phone]

## 2024-02-12 NOTE — PROGRESS NOTES
GENERAL SURGERY  PROGRESS NOTE    Admit Date: 2/4/2024  Hospital Day: 6  Procedure: 7 Days Post-Op     Subjective:  Af, vss  No N/V  Having diarrhea x3 overnight  Abdominal pain well controlled  +ambulating  -eating well  -no f/c    Objective:  Inpatient Data      Vitals:   Temp:  [98 °F (36.7 °C)-99.6 °F (37.6 °C)]   Pulse:  [85-94]   Resp:  [16-20]   BP: (114-173)/(74-84)   SpO2:  [95 %-100 %]     Diet NPO Except for: Sips with Medication, Ice Chips, Medication   Intake/Output Summary (Last 24 hours) at 2/12/2024 0646  Last data filed at 2/11/2024 1855  Gross per 24 hour   Intake 1317.03 ml   Output --   Net 1317.03 ml            Recent Labs     02/10/24  0438 02/11/24  0426 02/12/24  0512   WBC 12.96* 14.69* 11.57*   HGB 8.5* 8.6* 8.5*   HCT 25.3* 26.1* 25.2*    252 296    140 139   K 3.6 3.7 3.5   CO2 24 24 25   BUN 9.2* 9.2* 6.1*   CREATININE 0.80 0.73 0.67   BILITOT 1.1 0.9 0.7   AST 28 24 19   ALT 22 19 17   ALKPHOS 102 102 103   CALCIUM 7.8* 7.7* 7.7*   ALBUMIN 1.4* 1.4* 1.4*   MG 1.80 1.70 1.70   PHOS 3.4 3.5 3.1       Scheduled Meds: acetaminophen, 650 mg, Q6H  heparin (porcine), 5,000 Units, Q8H  pantoprazole, 40 mg, Daily  piperacillin-tazobactam (Zosyn) IV (PEDS and ADULTS) (extended infusion is not appropriate), 4.5 g, Q8H  sodium chloride 3%, 4 mL, Q6H     dextrose 5 % and 0.45 % NaCl Stopped (02/11/24 1203)         Physical Exam:  General: no acute distress but appears fatigued  CV: RRR  Pulm: normal wob on room air  Abd: abdomen soft, mildly distended, non focal mild ttp, no rebound or guarding. Incision healing well with minimal drainage.  Ext: moves all spontaneously  Skin: warm and dry      CT C/A/P 2/11:    1. Small to moderate bilateral pleural effusions.  2. Right upper lobe ground-glass could be infectious/inflammatory or relate to asymmetric edema.  3. Interval appendectomy with fluid collections along the surgical bed suspicious for developing abscesses.        Assessment:  Selene Smallwood is a 68 y.o. female  who presented with appendicitis s/p laparoscopic converted to open ileocecectomy on 2/5/24. Pathology report reveals metastatic adenocarcinoma of the colon.       Plan:    -MMPC  -IS  -NPO  -bowel reg  -CT A/P showing abscess in RLQ, will consult IR for possible drainage  -PT/OT    Ppx: subq hep    Dispo: pending clinical improvement          Roderick La MD  [employer] General Surgery, PGY-1  02/12/2024

## 2024-02-12 NOTE — PT/OT/SLP PROGRESS
"Physical Therapy Treatment    Patient Name:  Selene Smallwood   MRN:  [id number]    Recommendations     Therapy Intensity Recommendations at Discharge: Moderate Intensity Therapy  Discharge Equipment Recommendations:  (TBD - currently - rolling walker)   Barriers to discharge: decreased endurance    Assessment     Selene Smallwood is a 68 y.o. female admitted with a medical diagnosis of Acute appendicitis.    She presents with the following impairments/functional limitations:  weakness, impaired endurance, impaired functional mobility, gait instability, impaired balance, pain.    Rehab Prognosis: Good.    Patient would benefit from continued skilled acute PT services to: address above listed impairments/functional limitations; receive patient/caregiver education; reduce fall risk; and maximize independency/safety with functional mobility.    Recent Surgery: Procedure(s) (LRB):  APPENDECTOMY (N/A)  APPENDECTOMY, LAPAROSCOPIC (N/A)  EXCISION, CECUM WITH ILEUM (N/A)  WASHOUT (N/A) 7 Days Post-Op    Plan     During this hospitalization, patient to be seen 5 x/week to address the identified impairments/functional limitations via gait training, therapeutic activities, therapeutic exercises and progress toward the established goals.    Plan of Care Expires:  03/07/24    Subjective     Communicated with patient's nurse  prior to session.    Patient agreeable to participate in treatment session.    Chief Complaint: pain; "just so much going on"  Patient/Family Comments/goals: to get stronger  Pain/Comfort:  Pain Rating 1: 7/10  Location 1: abdomen  Pain Addressed 1: Nurse notified, Cessation of Activity, Reposition  Pain Rating Post-Intervention 1: 7/10    Objective     Patient found supine in bed and with HOB elevated with peripheral IV  upon PT entry to room.    General Precautions: Standard, fall (post-op)   Orthopedic Precautions:N/A   Braces:    Respiratory Status: room air    Functional Mobility:    Bed Mobility:  Pt attempted " to mobilize toward EOB; but in the process became very emotional and decided she did not want to get OOB.    Transfers:  NT    Gait:  NT    Other Mobility:  Therapeutic Exercises performed:   1 set times 10 repetitions  bilat lower extremity       -bed level exercises:              hip abduction            hip adduction            heel slides            ankle pumps            quad sets            glut sets    Patient left supine in bed and with HOB elevated with all lines intact, call button in reach, bedside commode at bedside, and patient' nurse notified.    Goals     Multidisciplinary Problems       Physical Therapy Goals          Problem: Physical Therapy    Goal Priority Disciplines Outcome Goal Variances Interventions   Physical Therapy Goal     PT, PT/OT Ongoing, Progressing     Description: REVIEWED 2024    Goals to be met by: DISCHARGE     Patient will increase functional independence with mobility by performin. Supine to sit with Stand-by Assistance - ONGOING  2. Sit to supine with Stand-by Assistance - goal met 24  3. Sit to stand transfer with Stand-by Assistance - ONGOING  4. Gait  x 260 feet with Stand-by Assistance using Rolling Walker - updated 24  5. Ascend/descend 4 stair with bilateral Handrails Stand-by Assistance using No Assistive Device - ONGOING                     Time Tracking     PT Received On: 24  PT Start Time: 820     PT Stop Time: 836  PT Total Time (min): 16 min     Billable Minutes: Therapeutic Exercise 16 mins    2024

## 2024-02-13 LAB
ALBUMIN SERPL-MCNC: 1.5 G/DL (ref 3.4–4.8)
ALBUMIN/GLOB SERPL: 0.3 RATIO (ref 1.1–2)
ALP SERPL-CCNC: 99 UNIT/L (ref 40–150)
ALT SERPL-CCNC: 18 UNIT/L (ref 0–55)
AST SERPL-CCNC: 23 UNIT/L (ref 5–34)
BILIRUB SERPL-MCNC: 0.6 MG/DL
BUN SERPL-MCNC: 5 MG/DL (ref 9.8–20.1)
CALCIUM SERPL-MCNC: 7.7 MG/DL (ref 8.4–10.2)
CHLORIDE SERPL-SCNC: 107 MMOL/L (ref 98–107)
CO2 SERPL-SCNC: 27 MMOL/L (ref 23–31)
CREAT SERPL-MCNC: 0.8 MG/DL (ref 0.55–1.02)
ERYTHROCYTE [DISTWIDTH] IN BLOOD BY AUTOMATED COUNT: 15.5 % (ref 11.5–17)
GFR SERPLBLD CREATININE-BSD FMLA CKD-EPI: >60 MLS/MIN/1.73/M2
GLOBULIN SER-MCNC: 4.9 GM/DL (ref 2.4–3.5)
GLUCOSE SERPL-MCNC: 86 MG/DL (ref 82–115)
HCT VFR BLD AUTO: 27.2 % (ref 37–47)
HGB BLD-MCNC: 8.8 G/DL (ref 12–16)
MAGNESIUM SERPL-MCNC: 1.8 MG/DL (ref 1.6–2.6)
MCH RBC QN AUTO: 28.3 PG (ref 27–31)
MCHC RBC AUTO-ENTMCNC: 32.4 G/DL (ref 33–36)
MCV RBC AUTO: 87.5 FL (ref 80–94)
NRBC BLD AUTO-RTO: 0 %
PHOSPHATE SERPL-MCNC: 3.4 MG/DL (ref 2.3–4.7)
PLATELET # BLD AUTO: 386 X10(3)/MCL (ref 130–400)
PMV BLD AUTO: 11.8 FL (ref 7.4–10.4)
POTASSIUM SERPL-SCNC: 3.8 MMOL/L (ref 3.5–5.1)
PROT SERPL-MCNC: 6.4 GM/DL (ref 5.8–7.6)
RBC # BLD AUTO: 3.11 X10(6)/MCL (ref 4.2–5.4)
SODIUM SERPL-SCNC: 141 MMOL/L (ref 136–145)
WBC # SPEC AUTO: 8.75 X10(3)/MCL (ref 4.5–11.5)

## 2024-02-13 PROCEDURE — 25000003 PHARM REV CODE 250: Performed by: STUDENT IN AN ORGANIZED HEALTH CARE EDUCATION/TRAINING PROGRAM

## 2024-02-13 PROCEDURE — 21400001 HC TELEMETRY ROOM

## 2024-02-13 PROCEDURE — 25000003 PHARM REV CODE 250: Performed by: SURGERY

## 2024-02-13 PROCEDURE — 25000003 PHARM REV CODE 250

## 2024-02-13 PROCEDURE — 99900035 HC TECH TIME PER 15 MIN (STAT)

## 2024-02-13 PROCEDURE — 85027 COMPLETE CBC AUTOMATED: CPT

## 2024-02-13 PROCEDURE — 94640 AIRWAY INHALATION TREATMENT: CPT

## 2024-02-13 PROCEDURE — 94761 N-INVAS EAR/PLS OXIMETRY MLT: CPT

## 2024-02-13 PROCEDURE — 63600175 PHARM REV CODE 636 W HCPCS

## 2024-02-13 PROCEDURE — 63600175 PHARM REV CODE 636 W HCPCS: Performed by: STUDENT IN AN ORGANIZED HEALTH CARE EDUCATION/TRAINING PROGRAM

## 2024-02-13 PROCEDURE — 83735 ASSAY OF MAGNESIUM: CPT

## 2024-02-13 PROCEDURE — 25000242 PHARM REV CODE 250 ALT 637 W/ HCPCS: Performed by: STUDENT IN AN ORGANIZED HEALTH CARE EDUCATION/TRAINING PROGRAM

## 2024-02-13 PROCEDURE — C9113 INJ PANTOPRAZOLE SODIUM, VIA: HCPCS | Performed by: STUDENT IN AN ORGANIZED HEALTH CARE EDUCATION/TRAINING PROGRAM

## 2024-02-13 PROCEDURE — 84100 ASSAY OF PHOSPHORUS: CPT

## 2024-02-13 PROCEDURE — 80053 COMPREHEN METABOLIC PANEL: CPT

## 2024-02-13 RX ORDER — POTASSIUM CHLORIDE 750 MG/1
30 CAPSULE, EXTENDED RELEASE ORAL ONCE
Status: COMPLETED | OUTPATIENT
Start: 2024-02-13 | End: 2024-02-13

## 2024-02-13 RX ORDER — SODIUM CHLORIDE 9 MG/ML
INJECTION, SOLUTION INTRAVENOUS
Status: DISCONTINUED | OUTPATIENT
Start: 2024-02-13 | End: 2024-02-14 | Stop reason: HOSPADM

## 2024-02-13 RX ORDER — HYDRALAZINE HYDROCHLORIDE 20 MG/ML
10 INJECTION INTRAMUSCULAR; INTRAVENOUS EVERY 6 HOURS PRN
Status: DISCONTINUED | OUTPATIENT
Start: 2024-02-13 | End: 2024-02-14 | Stop reason: HOSPADM

## 2024-02-13 RX ORDER — MAGNESIUM SULFATE HEPTAHYDRATE 40 MG/ML
2 INJECTION, SOLUTION INTRAVENOUS ONCE
Status: COMPLETED | OUTPATIENT
Start: 2024-02-13 | End: 2024-02-13

## 2024-02-13 RX ORDER — AMLODIPINE BESYLATE 10 MG/1
10 TABLET ORAL DAILY
Status: DISCONTINUED | OUTPATIENT
Start: 2024-02-13 | End: 2024-02-14 | Stop reason: HOSPADM

## 2024-02-13 RX ADMIN — PIPERACILLIN SODIUM AND TAZOBACTAM SODIUM 4.5 G: 4; .5 INJECTION, POWDER, LYOPHILIZED, FOR SOLUTION INTRAVENOUS at 08:02

## 2024-02-13 RX ADMIN — ACETAMINOPHEN 650 MG: 325 TABLET, FILM COATED ORAL at 06:02

## 2024-02-13 RX ADMIN — HEPARIN SODIUM 5000 UNITS: 5000 INJECTION, SOLUTION INTRAVENOUS; SUBCUTANEOUS at 02:02

## 2024-02-13 RX ADMIN — ACETAMINOPHEN 650 MG: 325 TABLET, FILM COATED ORAL at 12:02

## 2024-02-13 RX ADMIN — OXYCODONE HYDROCHLORIDE 10 MG: 5 TABLET ORAL at 05:02

## 2024-02-13 RX ADMIN — PANTOPRAZOLE SODIUM 40 MG: 40 INJECTION, POWDER, FOR SOLUTION INTRAVENOUS at 08:02

## 2024-02-13 RX ADMIN — POTASSIUM CHLORIDE 30 MEQ: 10 CAPSULE, COATED, EXTENDED RELEASE ORAL at 08:02

## 2024-02-13 RX ADMIN — ACETAMINOPHEN 650 MG: 325 TABLET, FILM COATED ORAL at 11:02

## 2024-02-13 RX ADMIN — SODIUM CHLORIDE 30 MG/ML INHALATION SOLUTION 4 ML: 30 SOLUTION INHALANT at 01:02

## 2024-02-13 RX ADMIN — SODIUM CHLORIDE 30 MG/ML INHALATION SOLUTION 4 ML: 30 SOLUTION INHALANT at 07:02

## 2024-02-13 RX ADMIN — HEPARIN SODIUM 5000 UNITS: 5000 INJECTION, SOLUTION INTRAVENOUS; SUBCUTANEOUS at 10:02

## 2024-02-13 RX ADMIN — PIPERACILLIN SODIUM AND TAZOBACTAM SODIUM 4.5 G: 4; .5 INJECTION, POWDER, LYOPHILIZED, FOR SOLUTION INTRAVENOUS at 02:02

## 2024-02-13 RX ADMIN — SODIUM CHLORIDE: 9 INJECTION, SOLUTION INTRAVENOUS at 08:02

## 2024-02-13 RX ADMIN — OXYCODONE HYDROCHLORIDE 5 MG: 5 TABLET ORAL at 11:02

## 2024-02-13 RX ADMIN — MAGNESIUM SULFATE HEPTAHYDRATE 2 G: 40 INJECTION, SOLUTION INTRAVENOUS at 08:02

## 2024-02-13 RX ADMIN — AMLODIPINE BESYLATE 10 MG: 10 TABLET ORAL at 12:02

## 2024-02-13 RX ADMIN — PIPERACILLIN SODIUM AND TAZOBACTAM SODIUM 4.5 G: 4; .5 INJECTION, POWDER, LYOPHILIZED, FOR SOLUTION INTRAVENOUS at 06:02

## 2024-02-13 RX ADMIN — HEPARIN SODIUM 5000 UNITS: 5000 INJECTION, SOLUTION INTRAVENOUS; SUBCUTANEOUS at 06:02

## 2024-02-13 NOTE — NURSING
Wound care done to midline incision , mod amt of serosanguinous  drainage noted staples intact seepage noted from lower portion of incision , patient is agitated wanting not to be disturb as much and wants to rest. Daughter at bedside

## 2024-02-13 NOTE — PROGRESS NOTES
GENERAL SURGERY  PROGRESS NOTE    Admit Date: 2/4/2024  Hospital Day: 7  Procedure: 8 Days Post-Op     Subjective:  Af, vss  IR placed drain yesterday; removed 12 cc seropurulent fluid  No N/V  Bowel movements more regular  Abdominal pain well controlled  +ambulating  eating well  no f/c  MANNY drain output: 20 cc serous fluid in 24 hr    Objective:  Inpatient Data      Vitals:   Temp:  [97.6 °F (36.4 °C)-98.8 °F (37.1 °C)]   Pulse:  [87-97]   Resp:  [16-22]   BP: (123-191)/()   SpO2:  [93 %-99 %]     Diet Adult Regular   Intake/Output Summary (Last 24 hours) at 2/13/2024 0655  Last data filed at 2/13/2024 0603  Gross per 24 hour   Intake 841.94 ml   Output 11 ml   Net 830.94 ml            Recent Labs     02/11/24  0426 02/12/24  0512 02/13/24  0432   WBC 14.69* 11.57* 8.75   HGB 8.6* 8.5* 8.8*   HCT 26.1* 25.2* 27.2*    296 386    139 141   K 3.7 3.5 3.8   CO2 24 25 27   BUN 9.2* 6.1* 5.0*   CREATININE 0.73 0.67 0.80   BILITOT 0.9 0.7 0.6   AST 24 19 23   ALT 19 17 18   ALKPHOS 102 103 99   CALCIUM 7.7* 7.7* 7.7*   ALBUMIN 1.4* 1.4* 1.5*   MG 1.70 1.70 1.80   PHOS 3.5 3.1 3.4       Scheduled Meds: acetaminophen, 650 mg, Q6H  heparin (porcine), 5,000 Units, Q8H  pantoprazole, 40 mg, Daily  piperacillin-tazobactam (Zosyn) IV (PEDS and ADULTS) (extended infusion is not appropriate), 4.5 g, Q8H  sodium chloride 3%, 4 mL, Q6H     dextrose 5 % and 0.45 % NaCl Stopped (02/11/24 1203)         Physical Exam:  General: no acute distress but appears fatigued  CV: RRR  Pulm: normal wob on room air  Abd: abdomen soft, mildly distended, non focal mild ttp, no rebound or guarding. Incision healing well with minimal drainage. MANNY in place with serous output  Ext: moves all spontaneously  Skin: warm and dry      CT C/A/P 2/11:    1. Small to moderate bilateral pleural effusions.  2. Right upper lobe ground-glass could be infectious/inflammatory or relate to asymmetric edema.  3. Interval appendectomy with fluid  collections along the surgical bed suspicious for developing abscesses.       Assessment:  Selene Smallwood is a 68 y.o. female  who presented with appendicitis s/p laparoscopic converted to open ileocecectomy on 2/5/24. Pathology report reveals metastatic adenocarcinoma of the colon.       Plan:    -MMPC  -IS  -regular diet  -bowel reg  -Continue to monitor MANNY drainage; follow up fluid studies  -zosyn (2/5-2/9); (2/11-)  -PT/OT    Ppx: subq hep    Dispo: pending clinical improvement          Roderick La MD  [employer] General Surgery, PGY-1  02/13/2024

## 2024-02-13 NOTE — PLAN OF CARE
Problem: Adult Inpatient Plan of Care  Goal: Plan of Care Review  Outcome: Ongoing, Progressing  Flowsheets (Taken 2/13/2024 1505)  Plan of Care Reviewed With: patient  Goal: Absence of Hospital-Acquired Illness or Injury  Outcome: Ongoing, Progressing  Intervention: Identify and Manage Fall Risk  Flowsheets (Taken 2/13/2024 1505)  Safety Promotion/Fall Prevention:   assistive device/personal item within reach   high risk medications identified   medications reviewed   nonskid shoes/socks when out of bed   lighting adjusted   side rails raised x 2   instructed to call staff for mobility  Intervention: Prevent Skin Injury  Flowsheets (Taken 2/13/2024 1505)  Body Position: position changed independently  Skin Protection:   adhesive use limited   tubing/devices free from skin contact  Intervention: Prevent and Manage VTE (Venous Thromboembolism) Risk  Flowsheets (Taken 2/13/2024 1505)  Activity Management: Ambulated -L4  VTE Prevention/Management:   remove, assess skin, and reapply sequential compression device   ambulation promoted   bleeding risk assessed  Range of Motion: active ROM (range of motion) encouraged  Intervention: Prevent Infection  Flowsheets (Taken 2/13/2024 1505)  Infection Prevention:   cohorting utilized   rest/sleep promoted   hand hygiene promoted

## 2024-02-14 VITALS
HEART RATE: 82 BPM | WEIGHT: 180 LBS | OXYGEN SATURATION: 96 % | RESPIRATION RATE: 17 BRPM | TEMPERATURE: 97 F | DIASTOLIC BLOOD PRESSURE: 85 MMHG | BODY MASS INDEX: 28.25 KG/M2 | SYSTOLIC BLOOD PRESSURE: 141 MMHG | HEIGHT: 67 IN

## 2024-02-14 PROBLEM — K35.33 ACUTE APPENDICITIS WITH PERFORATION, LOCALIZED PERITONITIS, ABSCESS, AND GANGRENE: Status: ACTIVE | Noted: 2024-02-04

## 2024-02-14 LAB
ALBUMIN SERPL-MCNC: 1.7 G/DL (ref 3.4–4.8)
ALBUMIN/GLOB SERPL: 0.3 RATIO (ref 1.1–2)
ALP SERPL-CCNC: 119 UNIT/L (ref 40–150)
ALT SERPL-CCNC: 18 UNIT/L (ref 0–55)
AST SERPL-CCNC: 22 UNIT/L (ref 5–34)
BILIRUB SERPL-MCNC: 0.6 MG/DL
BUN SERPL-MCNC: 5.4 MG/DL (ref 9.8–20.1)
CALCIUM SERPL-MCNC: 8 MG/DL (ref 8.4–10.2)
CHLORIDE SERPL-SCNC: 109 MMOL/L (ref 98–107)
CO2 SERPL-SCNC: 24 MMOL/L (ref 23–31)
CREAT SERPL-MCNC: 0.78 MG/DL (ref 0.55–1.02)
ERYTHROCYTE [DISTWIDTH] IN BLOOD BY AUTOMATED COUNT: 15.8 % (ref 11.5–17)
GFR SERPLBLD CREATININE-BSD FMLA CKD-EPI: >60 MLS/MIN/1.73/M2
GLOBULIN SER-MCNC: 5.1 GM/DL (ref 2.4–3.5)
GLUCOSE SERPL-MCNC: 86 MG/DL (ref 82–115)
HCT VFR BLD AUTO: 27 % (ref 37–47)
HGB BLD-MCNC: 8.7 G/DL (ref 12–16)
HOLD SPECIMEN: NORMAL
MAGNESIUM SERPL-MCNC: 2.2 MG/DL (ref 1.6–2.6)
MCH RBC QN AUTO: 28.4 PG (ref 27–31)
MCHC RBC AUTO-ENTMCNC: 32.2 G/DL (ref 33–36)
MCV RBC AUTO: 88.2 FL (ref 80–94)
NRBC BLD AUTO-RTO: 0 %
PHOSPHATE SERPL-MCNC: 3.2 MG/DL (ref 2.3–4.7)
PLATELET # BLD AUTO: 490 X10(3)/MCL (ref 130–400)
PMV BLD AUTO: 11.5 FL (ref 7.4–10.4)
POTASSIUM SERPL-SCNC: 3.9 MMOL/L (ref 3.5–5.1)
PROT SERPL-MCNC: 6.8 GM/DL (ref 5.8–7.6)
RBC # BLD AUTO: 3.06 X10(6)/MCL (ref 4.2–5.4)
SODIUM SERPL-SCNC: 139 MMOL/L (ref 136–145)
WBC # SPEC AUTO: 9.62 X10(3)/MCL (ref 4.5–11.5)

## 2024-02-14 PROCEDURE — 94761 N-INVAS EAR/PLS OXIMETRY MLT: CPT

## 2024-02-14 PROCEDURE — 25000242 PHARM REV CODE 250 ALT 637 W/ HCPCS: Performed by: STUDENT IN AN ORGANIZED HEALTH CARE EDUCATION/TRAINING PROGRAM

## 2024-02-14 PROCEDURE — 25000003 PHARM REV CODE 250

## 2024-02-14 PROCEDURE — 99900035 HC TECH TIME PER 15 MIN (STAT)

## 2024-02-14 PROCEDURE — 97110 THERAPEUTIC EXERCISES: CPT

## 2024-02-14 PROCEDURE — 97164 PT RE-EVAL EST PLAN CARE: CPT

## 2024-02-14 PROCEDURE — 63600175 PHARM REV CODE 636 W HCPCS: Performed by: STUDENT IN AN ORGANIZED HEALTH CARE EDUCATION/TRAINING PROGRAM

## 2024-02-14 PROCEDURE — 94640 AIRWAY INHALATION TREATMENT: CPT

## 2024-02-14 PROCEDURE — 85027 COMPLETE CBC AUTOMATED: CPT

## 2024-02-14 PROCEDURE — 25000003 PHARM REV CODE 250: Performed by: STUDENT IN AN ORGANIZED HEALTH CARE EDUCATION/TRAINING PROGRAM

## 2024-02-14 PROCEDURE — 83735 ASSAY OF MAGNESIUM: CPT

## 2024-02-14 PROCEDURE — 80053 COMPREHEN METABOLIC PANEL: CPT

## 2024-02-14 PROCEDURE — 84100 ASSAY OF PHOSPHORUS: CPT

## 2024-02-14 PROCEDURE — 63600175 PHARM REV CODE 636 W HCPCS

## 2024-02-14 RX ORDER — POLYETHYLENE GLYCOL 3350 17 G/17G
17 POWDER, FOR SOLUTION ORAL DAILY
Status: DISCONTINUED | OUTPATIENT
Start: 2024-02-14 | End: 2024-02-14 | Stop reason: HOSPADM

## 2024-02-14 RX ORDER — ONDANSETRON 4 MG/1
4 TABLET, FILM COATED ORAL EVERY 8 HOURS PRN
Qty: 10 TABLET | Refills: 0 | Status: ON HOLD | OUTPATIENT
Start: 2024-02-14 | End: 2024-02-27 | Stop reason: HOSPADM

## 2024-02-14 RX ORDER — OXYCODONE AND ACETAMINOPHEN 10; 325 MG/1; MG/1
1 TABLET ORAL EVERY 4 HOURS PRN
Qty: 15 TABLET | Refills: 0 | Status: ON HOLD | OUTPATIENT
Start: 2024-02-14 | End: 2024-02-27 | Stop reason: HOSPADM

## 2024-02-14 RX ORDER — CIPROFLOXACIN 500 MG/1
500 TABLET ORAL EVERY 12 HOURS
Qty: 6 TABLET | Refills: 0 | Status: SHIPPED | OUTPATIENT
Start: 2024-02-14 | End: 2024-02-14 | Stop reason: HOSPADM

## 2024-02-14 RX ORDER — AMOXICILLIN AND CLAVULANATE POTASSIUM 875; 125 MG/1; MG/1
1 TABLET, FILM COATED ORAL EVERY 12 HOURS
Qty: 4 TABLET | Refills: 0 | Status: ON HOLD | OUTPATIENT
Start: 2024-02-14 | End: 2024-02-27 | Stop reason: HOSPADM

## 2024-02-14 RX ORDER — METRONIDAZOLE 500 MG/1
500 TABLET ORAL EVERY 8 HOURS
Qty: 9 TABLET | Refills: 0 | Status: SHIPPED | OUTPATIENT
Start: 2024-02-14 | End: 2024-02-14 | Stop reason: HOSPADM

## 2024-02-14 RX ORDER — AMOXICILLIN AND CLAVULANATE POTASSIUM 875; 125 MG/1; MG/1
1 TABLET, FILM COATED ORAL EVERY 12 HOURS
Status: DISCONTINUED | OUTPATIENT
Start: 2024-02-14 | End: 2024-02-14 | Stop reason: HOSPADM

## 2024-02-14 RX ADMIN — AMOXICILLIN AND CLAVULANATE POTASSIUM 1 TABLET: 875; 125 TABLET, FILM COATED ORAL at 10:02

## 2024-02-14 RX ADMIN — OXYCODONE HYDROCHLORIDE 5 MG: 5 TABLET ORAL at 09:02

## 2024-02-14 RX ADMIN — SODIUM CHLORIDE 30 MG/ML INHALATION SOLUTION 4 ML: 30 SOLUTION INHALANT at 06:02

## 2024-02-14 RX ADMIN — POLYETHYLENE GLYCOL 3350 17 G: 17 POWDER, FOR SOLUTION ORAL at 10:02

## 2024-02-14 RX ADMIN — AMLODIPINE BESYLATE 10 MG: 10 TABLET ORAL at 08:02

## 2024-02-14 RX ADMIN — PIPERACILLIN SODIUM AND TAZOBACTAM SODIUM 4.5 G: 4; .5 INJECTION, POWDER, LYOPHILIZED, FOR SOLUTION INTRAVENOUS at 04:02

## 2024-02-14 RX ADMIN — HEPARIN SODIUM 5000 UNITS: 5000 INJECTION, SOLUTION INTRAVENOUS; SUBCUTANEOUS at 05:02

## 2024-02-14 RX ADMIN — ACETAMINOPHEN 650 MG: 325 TABLET, FILM COATED ORAL at 05:02

## 2024-02-14 NOTE — PT/OT/SLP PROGRESS
"Occupational Therapy   Treatment    Name: Selene Smallwood  MRN: [id number]  Admitting Diagnosis:  Acute appendicitis  9 Days Post-Op    Recommendations:     Discharge Recommendations: Moderate Intensity Therapy  Discharge Equipment Recommendations:  walker, rolling  Barriers to discharge:       Assessment:     Selene Smallwood is a 68 y.o. female with a medical diagnosis of Acute appendicitis.  She presents with decreased motivation to participate in therapy on this date. Performance deficits affecting function are weakness, impaired endurance, impaired self care skills, impaired functional mobility, gait instability, impaired balance, pain, decreased ROM.     Rehab Prognosis:  Good; patient would benefit from acute skilled OT services to address these deficits and reach maximum level of function.       Plan:     Patient to be seen 3 x/week to address the above listed problems via self-care/home management, therapeutic exercises, neuromuscular re-education, wheelchair management/training  Plan of Care Expires:    Plan of Care Reviewed with: patient    Subjective     Chief Complaint: "I don't feel like doing therapy today, but I'll try."  Patient/Family Comments/goals: "I want to go home."  Pain/Comfort:  Pain Rating 1: 5/10  Location - Side 1: Right  Location - Orientation 1: lower  Location 1: abdomen  Pain Addressed 1: Reposition, Distraction  Pain Rating Post-Intervention 1: 5/10    Objective:     Communicated with: RN prior to session.  Patient found HOB elevated with peripheral IV upon OT entry to room.    General Precautions: Standard, fall    Orthopedic Precautions:N/A  Braces: N/A  Respiratory Status: Room air     Treatment & Education:  Pt refused to attempt to stand d/t increased fatigue and feeling lethargic. Pt agreed to attempt therex while seated at edge of bed with trunk unsupported for improved UE and core strengthening. Pt was only able to tolerate 1x10 front raises with 2# dowel while seated at EOB prior to " c/o increased back pain and having to finish therex while seated in bed with HOB elevated. Pt completed 2x10 chest press with yellow theraband and int/ext rotation with yellow theraband with extra time to rest. Pt requested to be done with therapy after therex.     Patient left HOB elevated with all lines intact, call button in reach, and RN notified    GOALS:   Multidisciplinary Problems       Occupational Therapy Goals          Problem: Occupational Therapy    Goal Priority Disciplines Outcome Interventions   Occupational Therapy Goal     OT, PT/OT Ongoing, Progressing    Description: Pt will complete bed<>BSC t/f with RW mod I  Pt will complete grooming standing at sink SBA  Pt will complete toileting tasks SBA  Pt will complete LE dressing SBA  Pt will increase B UE AROM/strength to WNL                       Time Tracking:     OT Date of Treatment: 02/14/24  OT Start Time: 1024  OT Stop Time: 1048  OT Total Time (min): 24 min    Billable Minutes:Therapeutic Exercise 24    OT/ZEE: OT          2/14/2024

## 2024-02-14 NOTE — PLAN OF CARE
02/14/24 1251   Final Note   Assessment Type Final Discharge Note   Anticipated Discharge Disposition SNF   Post-Acute Status   Post-Acute Authorization Placement   Post-Acute Placement Status Set-up Complete/Auth obtained   Discharge Delays None known at this time

## 2024-02-14 NOTE — PROGRESS NOTES
GENERAL SURGERY  PROGRESS NOTE    Admit Date: 2/4/2024  Hospital Day: 8  Procedure: 9 Days Post-Op     Subjective:  Af, vss  No N/V  Having BM this morning  Abdominal pain well controlled  +ambulating  eating well  no f/c  MANNY drain output: 20 cc serous fluids in 24 hr  Fluid cultures: no growth at 24 hrs    Objective:  Inpatient Data      Vitals:   Temp:  [98.4 °F (36.9 °C)-99.4 °F (37.4 °C)]   Pulse:  [78-99]   Resp:  [18-20]   BP: (111-148)/(77-88)   SpO2:  [93 %-100 %]     Diet Adult Regular   Intake/Output Summary (Last 24 hours) at 2/14/2024 0637  Last data filed at 2/14/2024 0608  Gross per 24 hour   Intake 509.03 ml   Output 20 ml   Net 489.03 ml            Recent Labs     02/12/24  0512 02/13/24  0432 02/14/24  0431   WBC 11.57* 8.75 9.62   HGB 8.5* 8.8* 8.7*   HCT 25.2* 27.2* 27.0*    386 490*    141 139   K 3.5 3.8 3.9   CO2 25 27 24   BUN 6.1* 5.0* 5.4*   CREATININE 0.67 0.80 0.78   BILITOT 0.7 0.6 0.6   AST 19 23 22   ALT 17 18 18   ALKPHOS 103 99 119   CALCIUM 7.7* 7.7* 8.0*   ALBUMIN 1.4* 1.5* 1.7*   MG 1.70 1.80 2.20   PHOS 3.1 3.4 3.2       Scheduled Meds: acetaminophen, 650 mg, Q6H  amLODIPine, 10 mg, Daily  heparin (porcine), 5,000 Units, Q8H  piperacillin-tazobactam (Zosyn) IV (PEDS and ADULTS) (extended infusion is not appropriate), 4.5 g, Q8H  sodium chloride 3%, 4 mL, Q6H            Physical Exam:  General: no acute distress but appears fatigued  CV: RRR  Pulm: normal wob on room air  Abd: abdomen soft, mildly distended, non focal mild ttp, no rebound or guarding. Incision healing well with minimal drainage. MANNY in place with serous output  Ext: moves all spontaneously  Skin: warm and dry      CT C/A/P 2/11:    1. Small to moderate bilateral pleural effusions.  2. Right upper lobe ground-glass could be infectious/inflammatory or relate to asymmetric edema.  3. Interval appendectomy with fluid collections along the surgical bed suspicious for developing abscesses.        Assessment:  Selene Smallwood is a 68 y.o. female  who presented with appendicitis s/p laparoscopic converted to open ileocecectomy on 2/5/24. Pathology report reveals metastatic adenocarcinoma of the colon.       Plan:    -MMPC  -IS  -regular diet  -bowel reg  -Continue to monitor MANNY drainage; follow up fluid studies  -zosyn (2/5-2/9); (2/11-2/16)  -PT/OT  -case management for SNF placement    Ppx: subq hep    Dispo: pending clinical improvement, placement    Roderick La MD  [employer] General Surgery, PGY-1  02/14/2024

## 2024-02-14 NOTE — PLAN OF CARE
02/14/24 1251   Medicare Message   Important Message from Medicare regarding Discharge Appeal Rights Given to patient/caregiver;Explained to patient/caregiver;Signed/date by patient/caregiver   Date IMM was signed 02/14/24   Time IMM was signed 1338

## 2024-02-14 NOTE — NURSING
Spoke with LACHO Nguyen from [facility] about patient current care, meds, labs. [facility] called to transport patient to [facility].

## 2024-02-14 NOTE — PROGRESS NOTES
Inpatient Nutrition Assessment    Admit Date: 2/4/2024   Total duration of encounter: 10 days   Patient Age: 68 y.o.    Nutrition Recommendation/Prescription     Low Residue with Boost (provides 240 kcal, 10 g protein per serving) TID  Suggest MVI with Fe  Biweekly wt    Communication of Recommendations: reviewed with patient    Nutrition Assessment     Malnutrition Assessment/Nutrition-Focused Physical Exam    Malnutrition Context: acute illness or injury (02/06/24 1445)  Malnutrition Level: other (see comments) (pt does not meet malnutrition criteria) (02/06/24 1445)  Energy Intake (Malnutrition): less than or equal to 50% for greater than or equal to 5 days (02/09/24 1007)        Orbital Region (Subcutaneous Fat Loss): well nourished           [place] Region (Muscle Loss): well nourished                                A minimum of two characteristics is recommended for diagnosis of either severe or non-severe malnutrition.    Chart Review    Reason Seen: follow-up    Malnutrition Screening Tool Results   Have you recently lost weight without trying?: No  Have you been eating poorly because of a decreased appetite?: No   MST Score: 0   Diagnosis:  DIANA, acute appendicitis, perforated /gangrene, purulent peritonitis, cecal mass, S/P Lap appendectomy, open ileocectomy, abdominal washout    Relevant Medical History: HTN    Nutrition-Related Medications:    Scheduled Medications:  acetaminophen, 650 mg, Q6H  amLODIPine, 10 mg, Daily  amoxicillin-clavulanate 875-125mg, 1 tablet, Q12H  heparin (porcine), 5,000 Units, Q8H  polyethylene glycol, 17 g, Daily  sodium chloride 3%, 4 mL, Q6H    Continuous Infusions:     PRN Medications: sodium chloride 0.9%, hydrALAZINE, HYDROmorphone, LIDOcaine (PF) 10 mg/ml (1%), naloxone, ondansetron, oxyCODONE, oxyCODONE, sodium chloride 0.9%   Calorie Containing IV Medications: no significant kcals from medications at this time    Nutrition-Related Labs:  Recent Labs   Lab 02/08/24  0307  "02/09/24  0351 02/10/24  0438 02/11/24  0426 02/12/24  0512 02/13/24 0432 02/14/24 0431    143 141 140 139 141 139   K 4.1 4.0 3.6 3.7 3.5 3.8 3.9   CALCIUM 8.0* 8.1* 7.8* 7.7* 7.7* 7.7* 8.0*   PHOS 3.3 3.0 3.4 3.5 3.1 3.4 3.2   MG  --  1.70 1.80 1.70 1.70 1.80 2.20   CHLORIDE 117* 115* 111* 110* 108* 107 109*   CO2 23 24 24 24 25 27 24   BUN 14.2 10.5 9.2* 9.2* 6.1* 5.0* 5.4*   CREATININE 0.77 0.80 0.80 0.73 0.67 0.80 0.78   EGFRNORACEVR >60 >60 >60 >60 >60 >60 >60   GLUCOSE 98 77* 84 56* 79* 86 86   BILITOT 1.6* 1.5 1.1 0.9 0.7 0.6 0.6   ALKPHOS 70 107 102 102 103 99 119   ALT 25 25 22 19 17 18 18   AST 35* 34 28 24 19 23 22   ALBUMIN 1.3* 1.5* 1.4* 1.4* 1.4* 1.5* 1.7*   WBC 8.12 11.15 12.96* 14.69* 11.57* 8.75 9.62   HGB 9.4* 9.7* 8.5* 8.6* 8.5* 8.8* 8.7*   HCT 27.9* 29.1* 25.3* 26.1* 25.2* 27.2* 27.0*          Nutrition Orders:   Diet Adult Regular  Diet Adult Regular  Dietary nutrition supplements Boost Original; TID     Appetite/Oral Intake: fair/50-75% of meals    Factors Affecting Nutritional Intake: decreased appetite    Food/Zoroastrian/Cultural Preferences: none reported    Food Allergies: none reported    Wound(s):   surgical abdominal wound     Last Bowel Movement: 02/14/24    Comments    2/14/24 -- Pt with improved po intake, reports eating 75% of grits, few bites of egg & drinking Boost this am, 50-75% meal intake per EMR documented since 2/11; denies n/v; plans for SNF transfer today noted    2/9/24 -- Diet initiated this am, reports poor appetite, 0% of breakfast consumed, pt denies n/v reporting no appetite; ONS offered, pt states "Boost sounds good"; New weight obtained via bed 197 lb elevated, ? Accuracy, will continue to monitor    (2/6) Pt NPO/NGT suction; S/p appendectomy/open ileocectomy; pt reported abdominal pain/decreased oral intake 1-2 days PTA; usually eats well; no wt loss. Suggest oral supplement when diet progressed; if pt to remain NPO--suggest PPN. " "    Anthropometrics    Height: 5' 7" (170.2 cm) Height Method: Stated  Last Weight: 81.6 kg (180 lb) (24 1931) Weight Method: Bed Scale  BMI (Calculated): 28.2  BMI Classification: overweight (BMI 25-29.9)     Ideal Body Weight (IBW), Female: 135 lb     % Ideal Body Weight, Female (lb): 118.89 %                    Usual Body Weight (UBW), k.8 kg  % Usual Body Weight: 100.21     Usual Weight Provided By: patient and EMR weight history    Wt Readings from Last 5 Encounters:   24 81.6 kg (180 lb)     Weight Change(s) Since Admission:   No wt loss   Admit Weight: 72.6 kg (160 lb) (24 1033), Weight Method: Estimated    Estimated Needs    Weight Used For Calorie Calculations: 72.8 kg (160 lb 7.9 oz)  Energy Calorie Requirements (kcal): 1820 kcal/d; 25 jenni/kg  Energy Need Method: Kcal/kg  Weight Used For Protein Calculations: 72.8 kg (160 lb 7.9 oz)  Protein Requirements: 95 gm protein/d; 1.3 gm/kg  Fluid Requirements (mL): 1820 ml/d; 1ml/jenni  Temp (24hrs), Av.5 °F (36.9 °C), Min:97.4 °F (36.3 °C), Max:99.4 °F (37.4 °C)       Enteral Nutrition    Patient not receiving enteral nutrition at this time.    Parenteral Nutrition    Patient not receiving parenteral nutrition support at this time.    Evaluation of Received Nutrient Intake    Calories: not meeting estimated needs  Protein: not meeting estimated needs    Patient Education    Not applicable.    Nutrition Diagnosis     PES: Inadequate oral intake related to acute illness as evidenced by <75% nutrition intake. (active)    Interventions/Goals     Intervention(s): general/healthful diet, commercial beverage, multivitamin/mineral supplement therapy, and collaboration with other providers    Goal: Meet greater than 80% of nutritional needs by follow-up. (goal progressing)    Monitoring & Evaluation     Dietitian will monitor food and beverage intake, weight, and gastrointestinal profile.    Nutrition Risk/Follow-Up: moderate (follow-up in 3-5 " days)   Please consult if re-assessment needed sooner.

## 2024-02-14 NOTE — PT/OT/SLP DISCHARGE
POST DISCHARGE DOCUMENTATION - 2024 2:25 PM    Physical Therapy Discharge Summary    Name: Selene Smallwood  MRN: [id number]   Principal Problem: Acute appendicitis   1. Acute appendicitis, unspecified acute appendicitis type    2. Perforated appendicitis    3. Acute appendicitis    4. Acute kidney injury       Patient Active Problem List   Diagnosis    Acute appendicitis    Acute kidney injury    Perforated appendicitis      Recommendations - per last treatment session     Therapy Intensity Recommendations at Discharge: Moderate Intensity Therapy  Discharge Equipment Recommendations:  (TBD - currently - rolling walker)     Assessment:     Refer to prior Physical Therapy note for last known functional status of patient.    Patient was transferred to alternate level of care.    Objective     GOALS:  Multidisciplinary Problems       Physical Therapy Goals          Problem: Physical Therapy    Goal Priority Disciplines Outcome Goal Variances Interventions   Physical Therapy Goal     PT, PT/OT Ongoing, Progressing     Description: REVIEWED 2024    Goals to be met by: DISCHARGE     Patient will increase functional independence with mobility by performin. Supine to sit with Stand-by Assistance - ONGOING  2. Sit to supine with Stand-by Assistance - goal met 24  3. Sit to stand transfer with Stand-by Assistance - ONGOING  4. Gait  x 260 feet with Stand-by Assistance using Rolling Walker - updated 24  5. Ascend/descend 4 stair with bilateral Handrails Stand-by Assistance using No Assistive Device - ONGOING                     Plan     Patient Discharged to: skilled nursing facility per chart.    2024

## 2024-02-14 NOTE — PLAN OF CARE
Received message from Chelita with [facility] stating that patient has been accepted and they can take her today. Accepting MD is Dr. Bowen. [facility] nurse will call patient's nurse for report. Patient will transport via [employer] and has been placed in will call, P: [phone]. Dr. La has been updated via secure chat.

## 2024-02-14 NOTE — DISCHARGE SUMMARY
[facility] Telemetry  General Surgery  Discharge Summary      Patient Name: Selene Smallwood  MRN: [id number]  Admission Date: 2/4/2024  Hospital Length of Stay: 8 days  Discharge Date and Time:  02/14/2024 10:45 AM  Attending Physician: Anna Murillo MD   Discharging Provider: Roderick La MD  Primary Care Provider: Oly, Primary Doctor     HPI: Selene Smallwood is a 68 y.o. female that presents with 1 day diffuse abdominal pain worse in the RLQ, associated nausea and 1 x non-bilious vomiting this morning. She states that she is constipated with her last bowel movement being on Saturday. She denies fever, chills, shortness of breath, chest pain, hematochezia, dysuria, headache or diarrhea.      She states that she has no past medical history but does have a family history of heart disease and a brother on dialysis. She is not on any medications at this time. No history of blood thinner use. She denies any personal or family history of ulcerative colitis or Chron's disease. She does not have a primary care provider and has never had a colonoscopy.      Her past surgical history includes a D&C and tubal ligation. She denies any c-sections or abdominal surgeries of any kind.     Procedure(s) (LRB):  APPENDECTOMY (N/A)  APPENDECTOMY, LAPAROSCOPIC (N/A)  EXCISION, CECUM WITH ILEUM (N/A)  WASHOUT (N/A)     Hospital Course: Pt admitted for acute appendicitis. She underwent laparoscopic appendectomy which was converted to open ileocecectomy due to extensive friable tissue. A luminal mass was palpated during the procedure, which pathology returned as colonic adenocarcinoma. Post-op, she was recovering well. However, she developed an intra-abdominal abscess that required IR drainage and drain placement. She otherwise recovered well; she was eating, moving, and having regular bowel movements. Her pain was also well controlled. She was fit to be discharged to SNF by 2/14. She has a referral to oncology and  follow up in surgery clinic for drain removal.    Consults:   Consults (From admission, onward)          Status Ordering Provider     Inpatient consult to Social Work/Case Management  Once        Provider:  (Not yet assigned)    Completed RODERICK SHAH     Inpatient consult to Interventional Radiology  Once        Provider:  Ralph Kaufman MD    Completed FRANCINE BROWN     Inpatient consult to General surgery  Once        Provider:  Roderick Shah MD    Acknowledged MIREYA GUILLEN            Significant Diagnostic Studies: N/A  CT abdomen 2/4:    Dilated appendix with periappendiceal inflammatory changes,, compatible with non perforated acute appendicitis.     No fluid collection or abscess.  No free air.     Small amount of free fluid the pelvis.    Pending Diagnostic Studies:       Procedure Component Value Units Date/Time    IR Abscess Drainage Peritoneal with Imag [[id number]] Resulted: 02/12/24 1421    Order Status: Sent Lab Status: In process Updated: 02/12/24 1707          Final Active Diagnoses:    Diagnosis Date Noted POA    PRINCIPAL PROBLEM:  Acute appendicitis [K35.80] 02/04/2024 Yes    Acute kidney injury [N17.9] 02/06/2024 Yes    Perforated appendicitis [K35.32] 02/06/2024 Unknown      Problems Resolved During this Admission:      Discharged Condition: good    Disposition: Home or Self Care    Follow Up: Surgery clinic in 1-2 weeks    Patient Instructions:      Ambulatory referral/consult to Oncology   Standing Status: Future   Referral Priority: Routine Referral Type: Consultation   Referral Reason: Specialty Services Required   Requested Specialty: Oncology   Number of Visits Requested: 1     Diet Adult Regular     Lifting restrictions   Order Comments: No lifting more than 10 lbs for 6-8 weeks post op     Notify your health care provider if you experience any of the following:  temperature >100.4     Notify your health care provider if you experience any of the following:  persistent nausea  and vomiting or diarrhea     Notify your health care provider if you experience any of the following:  severe uncontrolled pain     Notify your health care provider if you experience any of the following:  redness, tenderness, or signs of infection (pain, swelling, redness, odor or green/yellow discharge around incision site)     Notify your health care provider if you experience any of the following:  difficulty breathing or increased cough     Notify your health care provider if you experience any of the following:  severe persistent headache     Notify your health care provider if you experience any of the following:  worsening rash     Notify your health care provider if you experience any of the following:  persistent dizziness, light-headedness, or visual disturbances     Notify your health care provider if you experience any of the following:  increased confusion or weakness     Other Wound Care Instructions   Order Comments: Please record output from the drain daily.     Medications:  Reconciled Home Medications:      Medication List        START taking these medications      amoxicillin-clavulanate 875-125mg 875-125 mg per tablet  Commonly known as: AUGMENTIN  Take 1 tablet by mouth every 12 (twelve) hours. for 2 days     ondansetron 4 MG tablet  Commonly known as: ZOFRAN  Take 1 tablet (4 mg total) by mouth every 8 (eight) hours as needed for Nausea.     oxyCODONE-acetaminophen  mg per tablet  Commonly known as: PERCOCET  Take 1 tablet by mouth every 4 (four) hours as needed for Pain.            CONTINUE taking these medications      valsartan 320 MG tablet  Commonly known as: DIOVAN  Take 300 mg by mouth once daily.              Roderick La MD  General Surgery  [facility] Surg Telemetry

## 2024-02-14 NOTE — PT/OT/SLP RE-EVAL
Physical Therapy Re-Evaluation    Patient Name:  Selene Smallwood   MRN:  [id number]    Recommendations     Therapy Intensity Recommendations at Discharge: Moderate Intensity Therapy  Discharge Equipment Recommendations:  (TBD - currently - rolling walker)   Equipment to be obtained for discharge: rolling walker.  Barriers to discharge: level of skilled assistance required and decreased endurance    Assessment     Selene Smallwood is a 68 y.o. female admitted with a medical diagnosis of Acute appendicitis.  She presents with the following impairments/functional limitations:  weakness, impaired endurance, impaired functional mobility, gait instability, impaired balance, pain.    Rehab Prognosis: Good.    Patient would benefit from continued skilled acute PT services to: address above listed impairments/functional limitations; receive patient/caregiver education; reduce fall risk; and maximize independency/safety with functional mobility.    Recent Surgery: Procedure(s) (LRB):  APPENDECTOMY (N/A)  APPENDECTOMY, LAPAROSCOPIC (N/A)  EXCISION, CECUM WITH ILEUM (N/A)  WASHOUT (N/A) 9 Days Post-Op    Plan     During this hospitalization, patient to be seen 5 x/week to address the identified impairments/functional limitations via gait training, therapeutic activities, therapeutic exercises and progress toward the established goals.    Plan of Care Expires:  03/07/24    Plan of Care reviewed with: patient    Subjective     Communicated with patient's nurse  prior to session.    Patient agreeable to participate in re-evaluation.    Chief Complaint: pain  Patient/Family Comments/goals: to walk more  Pain/Comfort:  Pain Rating 1: 2/10  Location - Side 1: Right  Location - Orientation 1: lower  Location 1: abdomen  Pain Addressed 1: Nurse notified, Cessation of Activity  Pain Rating Post-Intervention 1: 6/10    Patients cultural, spiritual, Spiritism conflicts given the current situation: no    Objective     Patient found supine in bed  and with HOB elevated with MANNY drain, peripheral IV  upon PT entry to room.    General Precautions: Standard, fall (post-op)   Orthopedic Precautions:N/A   Braces:    Respiratory Status: room air    Exams:  Orientation: Patient is oriented to person, place, time, situation  Commands: Patient follows commands consistently  BILAT UE ROM/strength - defer to OT - see OT note for details  RLE ROM: WFL  RLE Strength: WFL  LLE ROM: WFL  LLE Strength: WFL    Functional Mobility:    Bed Mobility:  Supine to Sit: stand by assistance  Sit to Supine: minimum assistance    Transfers:  Sit to Stand: contact guard assistance with rolling walker  Toilet Transfer: contact guard assistance with rolling walker using Step Transfer    Gait:  Patient ambulated 8ft, x 15 feet with rolling walker and contact guard assistance.  Patient demonstrates :       Limited ambulation 2/2 pain       decreased abigail       decreased step length.    Other Mobility:  not assessed    Balance:  Sit  Static: NORMAL: No deviations seen in posture held statically  Dynamic: FAIR+: Maintains balance through MINIMAL excursions of active trunk motion  Stand  Static: NORMAL: No deviations seen in posture held statically  Dynamic: FAIR: Needs CONTACT GUARD during gait    Additional Treatment Session  n/a    Patient left supine in bed and with HOB elevated with all lines intact, call button in reach, tray table at bedside, and patient' nurse notified.    Goals     Multidisciplinary Problems       Physical Therapy Goals          Problem: Physical Therapy    Goal Priority Disciplines Outcome Goal Variances Interventions   Physical Therapy Goal     PT, PT/OT Ongoing, Progressing     Description: REVIEWED 2024    Goals to be met by: DISCHARGE     Patient will increase functional independence with mobility by performin. Supine to sit with Stand-by Assistance - ONGOING  2. Sit to supine with Stand-by Assistance - goal met 24  3. Sit to stand transfer  with Stand-by Assistance - ONGOING  4. Gait  x 260 feet with Stand-by Assistance using Rolling Walker - updated 2/9/24  5. Ascend/descend 4 stair with bilateral Handrails Stand-by Assistance using No Assistive Device - ONGOING                     History   History reviewed. No pertinent past medical history.  Past Surgical History:   Procedure Laterality Date    APPENDECTOMY N/A 2/5/2024    Procedure: APPENDECTOMY;  Surgeon: Anna Murillo MD;  Location: [facility];  Service: General;  Laterality: N/A;    LAPAROSCOPIC APPENDECTOMY N/A 2/5/2024    Procedure: APPENDECTOMY, LAPAROSCOPIC;  Surgeon: Anna Murillo MD;  Location: [facility] OR;  Service: General;  Laterality: N/A;    SURGICAL REMOVAL OF ILEUM WITH CECUM N/A 2/5/2024    Procedure: EXCISION, CECUM WITH ILEUM;  Surgeon: Anna Murillo MD;  Location: [facility] OR;  Service: General;  Laterality: N/A;    WASHOUT N/A 2/5/2024    Procedure: WASHOUT;  Surgeon: Anna Murillo MD;  Location: [facility];  Service: General;  Laterality: N/A;     Time Tracking     PT Received On: 02/14/24  PT Start Time: 0935     PT Stop Time: 0953  PT Total Time (min): 18 min     Billable Minutes: Re-eval 18 mins      02/14/2024

## 2024-02-14 NOTE — PRE ADMISSION SCREENING
[facility]    Pre-Admission Patient Screening                    Pre-Screen type:  SNF:  Reason for Admission:    IV abx  PT/OT    SNF Admission Criteria:    Primary: IV Therapy   And rehab    Actively treated hospital diagnosis/diagnoses: Selene Smallwood is a 68 y.o. female  who presented with appendicitis s/p laparoscopic converted to open ileocecectomy on 2/5/24. Pathology report reveals metastatic adenocarcinoma of the colon.         Facility Status: Accept     Referring Physician:  Lidia    Admitting Physician:  Anna Murillo MD    Primary Care Physician:  No, Primary Doctor    History         Patient Active Problem List    Diagnosis Date Noted    Acute kidney injury 02/06/2024    Perforated appendicitis 02/06/2024    Acute appendicitis 02/04/2024         Previous Specialties/Consulted physicians:      N/A:       Past and Current Medical History    History reviewed. No pertinent past medical history.        History of Present Illness     Assessment:  Selene Smallwood is a 68 y.o. female  who presented with appendicitis s/p laparoscopic converted to open ileocecectomy on 2/5/24. Pathology report reveals metastatic adenocarcinoma of the colon.       Plan:     -MMPC  -IS  -regular diet  -bowel reg  -Continue to monitor MANNY drainage; follow up fluid studies  -zosyn (2/5-2/9); (2/11-2/16)  -PT/OT  -case management for SNF placement     Ppx: subq hep     Dispo: pending clinical improvement, placement         Patient Traveled outside of the [place] in the last 3 months? not applicable     Patient discharged from this LTAC to SNF within the last 45 days? no    Patient discharged from this LTAC to Rehab within the last 27 days? no    Prior residence: home    Prior Post-Acute Services: N/A     Allergies: Review of patient's allergies indicates:  No Known Allergies    Has patient received the current influenza vaccine (Oct 1 - March 31)? Unknown     Has patient received PPD skin test prior to admit? N/A      Code Status: Full Code    Orientation: Time, Place, Person, and Events    Speech: normal     Vital Signs:     Vitals:   Temp:  [98.4 °F (36.9 °C)-99.4 °F (37.4 °C)]   Pulse:  [78-99]   Resp:  [18-20]   BP: (111-148)/(77-88)   SpO2:  [93 %-100 %]      Diet Adult Regular    Intake/Output Summary (Last 24 hours) at 2/14/2024 0637  Last data filed at 2/14/2024 0608      Gross per 24 hour   Intake 509.03 ml   Output 20 ml   Net 489.03 ml                      Recent Labs     02/12/24  0512 02/13/24  0432 02/14/24  0431   WBC 11.57* 8.75 9.62   HGB 8.5* 8.8* 8.7*   HCT 25.2* 27.2* 27.0*    386 490*    141 139   K 3.5 3.8 3.9   CO2 25 27 24   BUN 6.1* 5.0* 5.4*   CREATININE 0.67 0.80 0.78   BILITOT 0.7 0.6 0.6   AST 19 23 22   ALT 17 18 18   ALKPHOS 103 99 119   CALCIUM 7.7* 7.7* 8.0*   ALBUMIN 1.4* 1.5* 1.7*   MG 1.70 1.80 2.20   PHOS 3.1 3.4 3.2         Scheduled Meds: acetaminophen, 650 mg, Q6H  amLODIPine, 10 mg, Daily  heparin (porcine), 5,000 Units, Q8H  piperacillin-tazobactam (Zosyn) IV (PEDS and ADULTS) (extended infusion is not appropriate), 4.5 g, Q8H  sodium chloride 3%, 4 mL, Q6H                 Physical Exam:  General: no acute distress but appears fatigued  CV: RRR  Pulm: normal wob on room air  Abd: abdomen soft, mildly distended, non focal mild ttp, no rebound or guarding. Incision healing well with minimal drainage. MANNY in place with serous output  Ext: moves all spontaneously  Skin: warm and dry        CT C/A/P 2/11:     1. Small to moderate bilateral pleural effusions.  2. Right upper lobe ground-glass could be infectious/inflammatory or relate to asymmetric edema.  3. Interval appendectomy with fluid collections along the surgical bed suspicious for developing abscesses.        Assessment:  Selene Smallwood is a 68 y.o. female  who presented with appendicitis s/p laparoscopic converted to open ileocecectomy on 2/5/24. Pathology report reveals metastatic adenocarcinoma of the colon.        Plan:     -MMPC  -IS  -regular diet  -bowel reg  -Continue to monitor MANNY drainage; follow up fluid studies  -zosyn (2/5-2/9); (2/11-2/16)  -PT/OT  -case management for SNF placement     Ppx: subq hep     Dispo: pending clinical improvement, placement     Roderick La MD  [employer] General Surgery, PGY-1  02/14/2024             Date     Blood Pressure     Pulse     Respiratory Rate     O2 Saturation     Temperature         Bowel/Bladder: continent of bladder and continent of bowel  Bowel/Bladder Appliance: N/A    Dialysis: N/A         Peripheral IV - Single Lumen 02/06/24 2230 20 G Left;Posterior Forearm (Active)   Site Assessment Clean;Dry;Intact;No redness;No swelling;No warmth;No drainage 02/14/24 0400   Extremity Assessment Distal to IV Dry;Warm 02/13/24 1951   Line Status Infusing 02/14/24 0400   Dressing Status Clean;Dry;Intact 02/14/24 0400   Dressing Intervention Integrity maintained 02/14/24 0400   Number of days: 7            Closed/Suction Drain 02/12/24 1455 Right RLQ 10 Fr. (Active)   Dressing Type Transparent (Tegaderm) 02/13/24 1951   Dressing Status Clean;Dry;Intact 02/13/24 1951   Dressing Intervention Integrity maintained 02/13/24 1951   Drainage Serosanguineous 02/13/24 1951   Status To bulb suction 02/13/24 1951   Output (mL) 15 mL 02/14/24 0608   Number of days: 1       CBGs/Accuchecks: No     Precautions: Fall    Restraints: No     Isolation Precautions: N/A       Facility-Administered Medications as of 2/14/2024   Medication Dose Route Frequency Provider Last Rate Last Admin    [COMPLETED] 0.9%  NaCl infusion  1,000 mL Intravenous ED 1 Time Zina Collier PA   Stopped at 02/04/24 1443    [COMPLETED] 0.9%  NaCl infusion  1,000 mL Intravenous ED 1 Time Zina Collier PA 1,000 mL/hr at 02/04/24 1355 1,000 mL at 02/04/24 1355    0.9%  NaCl infusion   Intravenous PRN Anna Murillo MD 20 mL/hr at 02/14/24 0608 Rate Verify at 02/14/24 0608    [COMPLETED] acetaminophen 1,000 mg/100 mL (10  mg/mL) injection 1,000 mg  1,000 mg Intravenous Once Chandrika Benitez MD   Stopped at 24 0905    acetaminophen tablet 650 mg  650 mg Oral Q6H Roderick La MD   650 mg at 24 0526    amLODIPine tablet 10 mg  10 mg Oral Daily Chandrika Benitez MD   10 mg at 24 1222    [] diatrizoate meglumineand-diatrizoate sodium (GASTROVIEW) 66-10 % solution             [COMPLETED] famotidine (PF) injection 20 mg  20 mg Intravenous Once Macy Simon MD   20 mg at 24 0857    [COMPLETED] fentaNYL 50 mcg/mL injection   Intravenous PRN Chris Mcbride MD   50 mcg at 24 1450    heparin (porcine) injection 5,000 Units  5,000 Units Subcutaneous Q8H Chandrika Benitez MD   5,000 Units at 24 0527    hydrALAZINE injection 10 mg  10 mg Intravenous Q6H PRN Chandrika Benitez MD        [COMPLETED] HYDROmorphone injection 0.5 mg  0.5 mg Intravenous ED 1 Time Zina Collier PA   0.5 mg at 24 1356    HYDROmorphone injection 0.5 mg  0.5 mg Intravenous Q3H PRN Chandrika Benitez MD   0.5 mg at 02/10/24 1932    [COMPLETED] iohexoL (OMNIPAQUE 350) 350 mg iodine/mL injection        100 mL at 24 0830    [] iohexoL (OMNIPAQUE 350) 350 mg iodine/mL injection             [COMPLETED] iohexoL (OMNIPAQUE 350) 350 mg iodine/mL injection        100 mL at 24 1415    [COMPLETED] lactated ringers bolus 1,000 mL  1,000 mL Intravenous Once Chandrika Benitez MD   Stopped at 24 2034    [COMPLETED] lactated ringers bolus 500 mL  500 mL Intravenous Once Roderick La MD   Stopped at 24 1856    LIDOcaine (PF) 10 mg/ml (1%) injection 10 mg  1 mL Intradermal Once PRN Chandrika Benitez MD        [COMPLETED] LIDOcaine HCL 10 mg/ml (1%) injection   Other PRN Chris Mcbride MD   10 mL at 24 1441    [COMPLETED] magnesium sulfate 2g in water 50mL IVPB (premix)  2 g Intravenous Once Rei Mendez MD   Stopped at 24 1832    [COMPLETED] magnesium sulfate 2g in water 50mL IVPB  (premix)  4 g Intravenous Once Roderick La MD   Stopped at 02/09/24 1330    [COMPLETED] magnesium sulfate 2g in water 50mL IVPB (premix)  2 g Intravenous Once Rei Mendez MD   Stopped at 02/13/24 1018    [COMPLETED] midazolam (VERSED) 1 mg/mL injection    PRN Chris Mcbride MD   1 mg at 02/12/24 1433    [COMPLETED] morphine injection 4 mg  4 mg Intravenous ED 1 Time Zina Collier PA   4 mg at 02/04/24 1120    [COMPLETED] morphine injection 4 mg  4 mg Intravenous ED 1 Time Zina Collier PA   4 mg at 02/04/24 1250    [COMPLETED] mupirocin 2 % ointment   Nasal BID Anna Murillo MD   Given at 02/10/24 2057    naloxone 0.4 mg/mL injection 0.4 mg  0.4 mg Intravenous PRN Chandrika Benitez MD        ondansetron disintegrating tablet 4 mg  4 mg Oral Q8H PRN Rei Mendez MD        [COMPLETED] ondansetron injection 4 mg  4 mg Intravenous ED 1 Time Zina Collier PA   4 mg at 02/04/24 1120    oxyCODONE immediate release tablet 10 mg  10 mg Oral Q4H PRN Rei Mendez MD   10 mg at 02/13/24 1727    oxyCODONE immediate release tablet 5 mg  5 mg Oral Q4H PRN Rei Mendez MD   5 mg at 02/13/24 2317    [COMPLETED] piperacillin-tazobactam (ZOSYN) 4.5 g in dextrose 5 % in water (D5W) 100 mL IVPB (MB+)  4.5 g Intravenous ED 1 Time Zina Collier PA   Stopped at 02/04/24 1459    piperacillin-tazobactam (ZOSYN) 4.5 g in dextrose 5 % in water (D5W) 100 mL IVPB (MB+)  4.5 g Intravenous Q8H Roderick La MD   Stopped at 02/14/24 0804    [COMPLETED] potassium chloride CR capsule 30 mEq  30 mEq Oral Once Roderick La MD   30 mEq at 02/13/24 0816    [COMPLETED] potassium phosphate 30 mmol in dextrose 5 % (D5W) 500 mL infusion  30 mmol Intravenous Once Roderick La MD   Stopped at 02/07/24 1533    [COMPLETED] potassium phosphate 30 mmol in dextrose 5 % (D5W) 500 mL infusion  30 mmol Intravenous Once Roderick La MD   Stopped at 02/12/24 1401    sodium chloride 0.9% flush 10 mL  10 mL  "Intravenous PRN Chandrika Benitez MD        sodium chloride 3% nebulizer solution 4 mL  4 mL Nebulization Q6H Rei Mendez MD   4 mL at 02/14/24 0650     Outpatient Medications as of 2/14/2024   Medication Sig Dispense Refill    valsartan (DIOVAN) 320 MG tablet Take 300 mg by mouth once daily.          Cardiovascular:    Cardiovascular Review: Within definable limits (WDL)    Telemetry: Yes    Rhythm: N/A    AICD: No      Respiratory:    Oxygen:  RA    CPT/Frequency: N/A    Incentive Spirometer/Frequency:  Q1 w/a    CPAP/Settings: N/A    BiPAP/Settings: N/A    Oxygen Saturation:  90's    Suction Frequency: N/A      Vent Settings:   Mode:   Rate:   TV:   FIO2:   PEEP:   PS:   iTime:    Trial/HS Settings:   Mode:   Rate:   TV:   FIO2:   PEEP:   PS:       Nutrition:      Ht Readings from Last 3 Encounters:   02/04/24 5' 7" (1.702 m)     Wt Readings from Last 1 Encounters:   02/07/24 1931 81.6 kg (180 lb)   02/04/24 1609 72.8 kg (160 lb 8 oz)   02/04/24 1033 72.6 kg (160 lb)       Feeding Status:   Current Diet: regular   Supplements:N/A   Tube Feeding: N/A   Flushes: N/A      Integumentary:    Integumentary: Within definable limits (WDL)              Incision/Site 02/05/24 1031 Abdomen midline vertical;Laparoscopic Puncture (Active)   02/05/24 1031   Present Prior to Hospital Arrival?: No   Side:    Location: Abdomen   Orientation: midline   Incision Type: vertical;Laparoscopic Puncture   Closure Method: Staples   Additional Comments: telfa, medipore tape to midline incision and 1 trocar site   Removal Indication and Assessment:    Wound Outcome:    Removal Indications:    Incision WDL WDL 02/13/24 0800   Dressing Appearance Dry;Intact;Clean 02/13/24 1951   Drainage Amount Small 02/13/24 1951   Drainage Characteristics/Odor Serosanguineous 02/13/24 1951   Appearance Staples intact 02/13/24 1951   Periwound Area Intact;Dry 02/08/24 1950   Wound Edges Approximated 02/13/24 0330   Care Cleansed with:;Sterile normal " saline 02/13/24 0800   Dressing Changed;Absorptive Pad 02/14/24 0410   Number of days: 9         Musculoskeletal:    Transfer assist: Contact Guard Assistance    Weight Bearing Status: Full    Comments: Charo Martinez, PT   Physical Therapist  Specialty: Physical Therapy     PT/OT/SLP Progress     Signed     Creation Time: 2/9/2024  2:40 PM       Physical Therapy Treatment     Patient Name:  Selene Smallwood   MRN:  [id number]     Recommendations      Therapy Intensity Recommendations at Discharge: Moderate Intensity Therapy  Discharge Equipment Recommendations:  (TBD - currently - rolling walker)   Barriers to discharge: level of skilled assistance required and decreased endurance     Assessment      Selene Smallwood is a 68 y.o. female admitted with a medical diagnosis of Acute appendicitis.     She presents with the following impairments/functional limitations:  weakness, impaired endurance, impaired functional mobility, gait instability, impaired balance, pain.     Rehab Prognosis: Good.     Patient would benefit from continued skilled acute PT services to: address above listed impairments/functional limitations; receive patient/caregiver education; reduce fall risk; and maximize independency/safety with functional mobility.     Recent Surgery: Procedure(s) (LRB):  APPENDECTOMY (N/A)  APPENDECTOMY, LAPAROSCOPIC (N/A)  EXCISION, CECUM WITH ILEUM (N/A)  WASHOUT (N/A) 4 Days Post-Op     Plan      During this hospitalization, patient to be seen 5 x/week to address the identified impairments/functional limitations via gait training, therapeutic activities, therapeutic exercises and progress toward the established goals.     Plan of Care Expires:  03/07/24     Subjective      Communicated with patient's nurse  prior to session.     Patient agreeable to participate in treatment session.     Chief Complaint: pain  Patient/Family Comments/goals: to be independent  Pain/Comfort:  Pain Rating 1: 7/10  Location 1: abdomen  Pain  Addressed 1: Nurse notified  Pain Rating Post-Intervention 1: 5/10     Objective      Patient found supine in bed and with HOB elevated with peripheral IV, oxygen  upon PT entry to room.     General Precautions: Standard, fall (post-op)   Orthopedic Precautions:N/A   Braces: N/A  Respiratory Status: 2 liters/min O2 via nasal cannula; SpO2 of 97-99%   BP of 116/78 and 105/63mmHg     Functional Mobility:     Bed Mobility:  Supine to Sit: contact guard assistance     Transfers:  Sit to Stand: contact guard assistance with rolling walker     Gait:  Patient ambulated 130ft with rolling walker and contact guard assistance.  Patient demonstrates :       steady gait       decreased abigail       decreased step length       flexed posture.     Other Mobility:  not assessed     Patient left sitting in chair with all lines intact, call button in reach, tray table at bedside, patient' nurse notified, and family present.             ADL Assist: Savanna Arthur OT   Occupational Therapist  Occupational Therapy     PT/OT/SLP Progress     Addendum     Creation Time: 2/9/2024  1:31 PM       Occupational Therapy   Treatment     Name: Seleen Smallwood  MRN: [id number]  Admitting Diagnosis:  Acute appendicitis  4 Days Post-Op s/p appendectomy with abdominal washout, open ileocectomy, cecal mass        Recommendations:      Discharge Recommendations: Moderate Intensity Therapy, if pt continues to progress may be able to dc low intensity with family assist  Discharge Equipment Recommendations:  walker, rolling  Barriers to discharge:  Other (Comment) (severity of impairment)     Assessment:      Selene Smallwood is a 68 y.o. female with a medical diagnosis of Acute appendicitis.  She presents with improved alertness and activity tolerance today. Performance deficits affecting function are weakness, impaired endurance, impaired self care skills, impaired functional mobility, gait instability, impaired balance, pain, decreased ROM.      Rehab  Prognosis:  Good; patient would benefit from acute skilled OT services to address these deficits and reach maximum level of function.        Plan:      Patient to be seen 3 x/week to address the above listed problems via self-care/home management, therapeutic exercises, neuromuscular re-education, wheelchair management/training  Plan of Care Expires:    Plan of Care Reviewed with: patient     Subjective      Chief Complaint: no new c/o                          Patient/Family Comments/goals: return home PLOF  Pain/Comfort:  Pain Rating 1: 7/10  Location 1:  (stomach)     Objective:      Communicated with: nurse prior to session.  Patient found HOB elevated with oxygen upon OT entry to room.     General Precautions: Standard,      Orthopedic Precautions:   Braces:    Respiratory Status: Nasal cannula, flow 2 L/min   /78, 99%,   Occupational Performance:      Bed Mobility:    Patient completed Rolling/Turning to Right with stand by assistance and with side rail  Patient completed Scooting/Bridging with stand by assistance  Patient completed Supine to Sit with stand by assistance and with side rail      Functional Mobility/Transfers:  Patient completed Sit <> Stand Transfer with contact guard assistance  with  rolling walker   Patient completed Bed <> Chair Transfer using Step Transfer technique with contact guard assistance with rolling walker  Functional Mobility: pt able to ambulate x 130ft with RW CGA     Activities of Daily Living:  Grooming: pt completed grooming: brushed teeth in standing at sink CGA for balance .  Upper Body Dressing: minimum assistance doff/aicha [facility] gown in standing   Lower Body Dressing: moderate assistance aicha brief, doffed CGA  Toileting: +void in brief, able to complete pericleaning in standing CGA    Standing endurance for ADL tasks x 10 minutes without seated restbreak, intermittent UE support.          Special Equipment: walker    Radiology:    Radiology (Last 168 hours)                02/12 1706 IR Abscess Drainage Peritoneal with Imag       02/11 1417 CT Chest Abdomen Pelvis With IV Contrast (XPD) Routine Oral Contrast       02/04 0000 CARDIAC MONITORING STRIPS              CT Chest Abdomen Pelvis With IV Contrast (XPD) Routine Oral Contrast  Narrative: EXAMINATION:  CT CHEST ABDOMEN PELVIS WITH IV CONTRAST (XPD)    CLINICAL HISTORY:  Unspecified acute appendicitisassess for anastomotic leak;    TECHNIQUE:  Helical acquisition from the thoracic inlet through the ischia with  IV contrast. Three plane reconstructions made available for review.  mGycm. Automatic exposure control, adjustment of mA/kV or iterative reconstruction technique was used to reduce radiation.    COMPARISON:  4 February 2024    FINDINGS:  Chest.    Heart is not enlarged.  There are coronary artery calcifications.    No significantly enlarged thoracic lymph nodes.    There are small to moderate bilateral pleural effusions with compressive atelectasis.  There is an area of ground-glass in the right upper lobe.  There is also septal thickening.    Abdomen and pelvis.    A hepatic hypodensity is stable.  No significant abnormality of the gallbladder, spleen, pancreas.  Similar adrenal thickening.  No hydronephrosis.  Small nonobstructing calculi in the kidneys.    No bowel obstruction.  Interval appendectomy with a fluid collection posterior to the cecum on image 84 series 2 measuring approximately 7 x 2 cm.  There is a smaller fluid collection anteriorly on image 93 series 2.  These may communicate.  No enteric contrast is seen within these collections.  No free air.    Urinary bladder unremarkable.  Abdominal aorta normal in caliber.  Mild atherosclerotic disease.    No acute osseous findings.  Impression: 1. Small to moderate bilateral pleural effusions.  2. Right upper lobe ground-glass could be infectious/inflammatory or relate to asymmetric edema.  3. Interval appendectomy with fluid collections along the  "surgical bed suspicious for developing abscesses.    Electronically signed by: Vahid Romero  Date:    02/11/2024  Time:    16:39      Lab/Cultures:    Blood Urea Nitrogen   Date Value Ref Range Status   02/14/2024 5.4 (L) 9.8 - 20.1 mg/dL Final   02/13/2024 5.0 (L) 9.8 - 20.1 mg/dL Final     Creatinine   Date Value Ref Range Status   02/14/2024 0.78 0.55 - 1.02 mg/dL Final   02/13/2024 0.80 0.55 - 1.02 mg/dL Final     WBC   Date Value Ref Range Status   02/14/2024 9.62 4.50 - 11.50 x10(3)/mcL Final   02/13/2024 8.75 4.50 - 11.50 x10(3)/mcL Final      CULTURE, BLOOD ([facility])   Date Value Ref Range Status   02/04/2024 No Growth at 5 days  Final   02/04/2024 No Growth at 5 days  Final     Urine Culture   Date Value Ref Range Status   02/04/2024 >/= 100,000 colonies/ml Escherichia coli (A)  Final   02/04/2024 >/= 100,000 colonies/ml Proteus mirabilis (A)  Final     No results for input(s): "PH", "PCO2", "PO2", "HCO3", "POCSATURATED", "BE" in the last 72 hours.       "

## 2024-02-15 DIAGNOSIS — C18.9 MALIGNANT NEOPLASM OF COLON, UNSPECIFIED PART OF COLON: Primary | ICD-10-CM

## 2024-02-15 LAB
BEAKER SEE SCANNED REPORT: NORMAL
ESTRIOL SERPL-MCNC: NORMAL NG/ML
ESTROGEN SERPL-MCNC: NORMAL PG/ML
INSULIN SERPL-ACNC: NORMAL U[IU]/ML
LAB AP CLINICAL INFORMATION: NORMAL
LAB AP GROSS DESCRIPTION: NORMAL
LAB AP REPORT FOOTNOTES: NORMAL
LAB AP SYNOPTIC CHECKLIST: NORMAL
T3RU NFR SERPL: NORMAL %

## 2024-02-15 NOTE — PT/OT/SLP DISCHARGE
Occupational Therapy Discharge Summary    Selene Smallwood  MRN: [id number]   Principal Problem: Acute appendicitis with perforation, localized peritonitis, abscess, and gangrene      Patient Discharged from acute Occupational Therapy on 02/14/2024.  Please refer to prior OT note dated 02/07/2024 for functional status.    Assessment:      Goals partially met. Patient appropriate for care in another setting.    Objective:     GOALS:   Multidisciplinary Problems       Occupational Therapy Goals          Problem: Occupational Therapy    Goal Priority Disciplines Outcome Interventions   Occupational Therapy Goal     OT, PT/OT Ongoing, Progressing    Description: Pt will complete bed<>BSC t/f with RW mod I  Pt will complete grooming standing at sink SBA  Pt will complete toileting tasks SBA  Pt will complete LE dressing SBA  Pt will increase B UE AROM/strength to WNL                       Reasons for Discontinuation of Therapy Services  Transfer to alternate level of care.      Plan:     Patient Discharged to: Skilled Nursing Facility    2/15/2024

## 2024-02-16 ENCOUNTER — TELEPHONE (OUTPATIENT)
Dept: SURGERY | Facility: CLINIC | Age: 69
End: 2024-02-16

## 2024-02-16 LAB
ESTROGEN SERPL-MCNC: NORMAL PG/ML
INSULIN SERPL-ACNC: NORMAL U[IU]/ML
LAB AP CLINICAL INFORMATION: NORMAL
LAB AP NON-GYN INTERPRETATION SPECIMEN 1 (MULTI CAT): NORMAL
LAB AP NON-GYN SPECIMEN 1 ADEQUACY: NORMAL

## 2024-02-16 NOTE — TELEPHONE ENCOUNTER
Good morning,    I faxed the information you requested be sent to this [facility] and received fax confirmation.    Thanks and if I can be of further assistance, let me know!  Gricel            ----- Message from Rei Mendez MD sent at 2/15/2024  3:02 PM CST -----  Regarding: Referral to [facility]  Hello!    We have a referral that needs to be faxed to the [facility] in [place] for evaluation by Dr. Hayder Mclaughlin (reason: [facility] evaluation).    The fax number for their clinic is [phone]    Items to include:  - CT Abdomen/Pelvis Report 2/4/24  - Admission H&P Note 2/4/24  - Pathology Report 2/5/24  - Operative Note 2/5/24  - CT Chest/Abdomen/Pelvis Report 2/11/24    The patient is currently in outpatient rehab but hopefully will be released soon. Her daugher (April, who is a nurse practitioner), is her primary contact (her phone number is listed on the patient's chart).    Please let me know if you need any additional information -- and thank you for helping us make this happen!    Rei

## 2024-02-17 LAB — BACTERIA FLD CULT: ABNORMAL

## 2024-02-27 DIAGNOSIS — C18.9 ADENOCARCINOMA OF COLON: Primary | ICD-10-CM

## 2024-02-27 PROBLEM — I10 HTN (HYPERTENSION): Status: ACTIVE | Noted: 2024-02-27

## 2024-02-27 PROBLEM — N17.9 ACUTE KIDNEY INJURY: Status: RESOLVED | Noted: 2024-02-06 | Resolved: 2024-02-27

## 2024-02-27 PROBLEM — K35.33 ACUTE APPENDICITIS WITH PERFORATION, LOCALIZED PERITONITIS, ABSCESS, AND GANGRENE: Status: RESOLVED | Noted: 2024-02-04 | Resolved: 2024-02-27

## 2024-03-01 NOTE — PROGRESS NOTES
Subjective:       Patient ID: Selene Smallwood is a 68 y.o. female.    Chief Complaint: New Patient    Diagnosis: Adenocarcinoma of Colon    Current Treatment: None    Treatment History: N/A    Interval History:   67 yo F presents in March '24 for evaluation of metastatic colon cancer. She initially presented to OSH in early February '24 with 2 days of upper abdominal pain. Imaging on 2/4 in the ER revealed evidence of non perforated acute appendicitis. She underwent Ex lap where a cecal mass was incidentally discovered and resected, as well as omental studding was noted and 1 small mass was taken for biopsy. Her final pathology revealed a large exophytic mass in the cecum measuring 4 x 2 cm extending to the appendiceal orifice and extends into pericolonic adipose tissue. G2, LVI+, PNI negative. 14 LN were resected, with 10 being positive for metastatic adenocarcinoma. She also had an omental mass measuring 2.5 x 1.5cm which was positive for metastatic adenocarcinoma, consistent with a colon primary. Her final pathology staging is consistent with [id number]. Her postop course was complicated by abdominal abscess formation s/p IR drain placement and subsequent removal and antibiotic treatment. She presents to medical oncology for further evaluation.     She has been home for approximately 1 week after 2 weeks at SNF. She has little to no abdominal pain. Notes a decreased appetite that she is currently on megace for with improvement. She denies any fatigue, abdominal pain, bowel or appetite changes, unintentional weight loss, or any other symptoms leading up to her acute presentation.     I discussed her diagnosis, staging, prognosis, goals of therapy, and referenced NCCN guidelines when discussing her standard of care treatment plan moving forward. It was discussed that the goals of treatment are palliative to improve the quality and hopefully quantity of life.       History reviewed. No pertinent past medical history.    Past Surgical History:   Procedure Laterality Date    APPENDECTOMY N/A 2/5/2024    Procedure: APPENDECTOMY;  Surgeon: Anna Murillo MD;  Location: [facility] OR;  Service: General;  Laterality: N/A;    LAPAROSCOPIC APPENDECTOMY N/A 2/5/2024    Procedure: APPENDECTOMY, LAPAROSCOPIC;  Surgeon: Anna Murillo MD;  Location: [facility] OR;  Service: General;  Laterality: N/A;    SURGICAL REMOVAL OF ILEUM WITH CECUM N/A 2/5/2024    Procedure: EXCISION, CECUM WITH ILEUM;  Surgeon: Anna Murillo MD;  Location: [facility] OR;  Service: General;  Laterality: N/A;    WASHOUT N/A 2/5/2024    Procedure: WASHOUT;  Surgeon: Anna Murillo MD;  Location: [facility] OR;  Service: General;  Laterality: N/A;     Social History     Socioeconomic History    Marital status:    Tobacco Use    Smoking status: Former     Types: Cigarettes    Smokeless tobacco: Never     Social Determinants of Health     Financial Resource Strain: Low Risk  (2/15/2024)    Overall Financial Resource Strain (CARDIA)     Difficulty of Paying Living Expenses: Not hard at all   Food Insecurity: No Food Insecurity (2/15/2024)    Hunger Vital Sign     Worried About Running Out of Food in the Last Year: Never true     Ran Out of Food in the Last Year: Never true   Transportation Needs: No Transportation Needs (2/15/2024)    PRAPARE - Transportation     Lack of Transportation (Medical): No     Lack of Transportation (Non-Medical): No   Physical Activity: Unknown (2/15/2024)    Exercise Vital Sign     Days of Exercise per Week: 0 days     Minutes of Exercise per Session: Patient unable to answer   Stress: No Stress Concern Present (2/15/2024)    [employer] - Occupational Stress Questionnaire     Feeling of Stress : Not at all   Social Connections: Unknown (2/15/2024)    Social Connection and Isolation Panel [NHANES]     Frequency of Communication with Friends and Family: More than three times a week     Frequency of Social Gatherings with  Friends and Family: More than three times a week     Marital Status:    Housing Stability: Low Risk  (2/15/2024)    Housing Stability Vital Sign     Unable to Pay for Housing in the Last Year: No     Number of Places Lived in the Last Year: 1     Unstable Housing in the Last Year: No      History reviewed. No pertinent family history.   Review of patient's allergies indicates:   Allergen Reactions    Robaxin [methocarbamol] Other (See Comments)     Altered mental status       Review of Systems   Constitutional:  Negative for activity change, fever and unexpected weight change.   HENT:  Negative for sore throat.    Eyes:  Negative for visual disturbance.   Respiratory:  Negative for cough and shortness of breath.    Cardiovascular:  Negative for chest pain.   Gastrointestinal:  Negative for abdominal pain, diarrhea, nausea and vomiting.   Endocrine: Negative for polyuria.   Genitourinary:  Negative for dysuria and hot flashes.   Integumentary:  Negative for rash.   Neurological:  Negative for weakness and headaches.   Hematological:  Negative for adenopathy.   Psychiatric/Behavioral:  Negative for confusion.          Objective:      Vitals:    03/04/24 1340   BP: 137/84   Pulse: 102   Resp: 14   Temp: 97.8 °F (36.6 °C)       Physical Exam  Constitutional:       General: She is not in acute distress.     Appearance: Normal appearance. She is not ill-appearing.   HENT:      Head: Normocephalic and atraumatic.      Nose: Nose normal.      Mouth/Throat:      Mouth: Mucous membranes are moist.      Pharynx: Oropharynx is clear.   Eyes:      Extraocular Movements: Extraocular movements intact.      Conjunctiva/sclera: Conjunctivae normal.      Pupils: Pupils are equal, round, and reactive to light.   Cardiovascular:      Rate and Rhythm: Normal rate and regular rhythm.      Pulses: Normal pulses.      Heart sounds: Normal heart sounds. No murmur heard.  Pulmonary:      Effort: Pulmonary effort is normal. No  respiratory distress.      Breath sounds: Normal breath sounds.   Abdominal:      General: There is no distension.      Palpations: Abdomen is soft.      Tenderness: There is no abdominal tenderness.   Musculoskeletal:         General: Normal range of motion.      Cervical back: Normal range of motion and neck supple.      Right lower leg: No edema.      Left lower leg: No edema.   Lymphadenopathy:      Cervical: No cervical adenopathy.   Skin:     General: Skin is warm and dry.   Neurological:      General: No focal deficit present.      Mental Status: She is alert and oriented to person, place, and time.         LABS AND IMAGING REVIEWED IN EPIC    IMAGIN24 CT C/A/P:  Impression:  1. Small to moderate bilateral pleural effusions.  2. Right upper lobe ground-glass could be infectious/inflammatory or relate to asymmetric edema.  3. Interval appendectomy with fluid collections along the surgical bed suspicious for developing abscesses.    24 Abdominal US:  Impression:  Cyst in the right lobe of the liver.  Elongated gallbladder with no definite gallstones minimal amount of fluid seen adjacent to the fundus.  Nephrolithiasis of the right kidney with a cyst of the right kidney minimal fullness of the collecting system.  Free fluid as described above    24 CT Abd/Pelvis:  Impression:  Dilated appendix with periappendiceal inflammatory changes,, compatible with non perforated acute appendicitis.  No fluid collection or abscess.  No free air.  Small amount of free fluid the pelvis.  Findings were communicated to Dr. Chavez at 13:56 2024  Mild atelectatic changes and ground-glass opacities in the lower lungs suspicious for infectious/inflammatory process.       PATHOLOGY:  24 Biopsy:  Final Diagnosis  1. Appendix, appendectomy:  - Acute appendicitis with perforation.  - Adenocarcinoma involves lymphatic spaces of the appendiceal wall and peritoneal surface.  2. Cecum, ileocecectomy:  - Colonic  adenocarcinoma.  - See synoptic checklist for CAP cancer protocol.  3. Omental mass, biopsy:  - Metastatic adenocarcinoma, consistent with a colon primary.  4. Small bowel, segmental resection:  - Acute peritonitis.  - No evidence of malignancy.      Assessment:   Stage IV Metastatic Colon Adenocarcinoma with mets to peritoneum - Cecal mass, nonobstructive, no perforation. [id number]. Plan for 1st line chemotherapy with FOLFOX + 3rd agent pending NGS results.         Plan:   - PET/CT now for extent of disease assessment  - Request NGS testing on pathology today  - Referral sent to Vascular Surgery, Dr. Olegario Tran for mediport placement  - Schedule chemo education/nutrition prior to start  - Zofran 8mg, Compazine 10mg, zyprexa for nausea sent to pharmacy today  - RTC on 3/18 in [place] with NP for OV, labs, C1 Folfox same day with pump removal on 3/20    I spent a total of 60 minutes on the day of the visit.This includes face to face time and non-face to face time preparing to see the patient (eg, review of tests), obtaining and/or reviewing separately obtained history, documenting clinical information in the electronic or other health record, independently interpreting results and communicating results to the patient/family/caregiver, or care coordinator.    Elizabeth Kennedy LeJeune, MD  Hematology/Oncology   [facility]        Professional Services   I, Gail Boyce LPN, acted solely as a scribe for and in the presence of Dr. Elizabeth Kennedy LeJeune, who performed these services.

## 2024-03-04 ENCOUNTER — OFFICE VISIT (OUTPATIENT)
Dept: HEMATOLOGY/ONCOLOGY | Facility: CLINIC | Age: 69
End: 2024-03-04
Payer: MEDICARE

## 2024-03-04 VITALS
TEMPERATURE: 98 F | DIASTOLIC BLOOD PRESSURE: 84 MMHG | HEART RATE: 102 BPM | RESPIRATION RATE: 14 BRPM | HEIGHT: 67 IN | WEIGHT: 151.13 LBS | BODY MASS INDEX: 23.72 KG/M2 | OXYGEN SATURATION: 98 % | SYSTOLIC BLOOD PRESSURE: 137 MMHG

## 2024-03-04 DIAGNOSIS — C78.89 SECONDARY MALIGNANT NEOPLASM OF OTHER DIGESTIVE ORGANS: ICD-10-CM

## 2024-03-04 DIAGNOSIS — C18.2 MALIGNANT NEOPLASM OF ASCENDING COLON: ICD-10-CM

## 2024-03-04 DIAGNOSIS — T45.1X5A CHEMOTHERAPY INDUCED NAUSEA AND VOMITING: ICD-10-CM

## 2024-03-04 DIAGNOSIS — K35.80 ACUTE APPENDICITIS, UNSPECIFIED ACUTE APPENDICITIS TYPE: ICD-10-CM

## 2024-03-04 DIAGNOSIS — C18.9 ADENOCARCINOMA OF COLON: Primary | ICD-10-CM

## 2024-03-04 DIAGNOSIS — R11.2 CHEMOTHERAPY INDUCED NAUSEA AND VOMITING: ICD-10-CM

## 2024-03-04 LAB
ALBUMIN SERPL-MCNC: 3 G/DL (ref 3.4–4.8)
ALBUMIN/GLOB SERPL: 0.5 RATIO (ref 1.1–2)
ALP SERPL-CCNC: 97 UNIT/L (ref 40–150)
ALT SERPL-CCNC: 12 UNIT/L (ref 0–55)
AST SERPL-CCNC: 14 UNIT/L (ref 5–34)
BASOPHILS # BLD AUTO: 0.03 X10(3)/MCL
BASOPHILS NFR BLD AUTO: 0.4 %
BILIRUB SERPL-MCNC: 0.4 MG/DL
BUN SERPL-MCNC: 12.8 MG/DL (ref 9.8–20.1)
CALCIUM SERPL-MCNC: 9.2 MG/DL (ref 8.4–10.2)
CANCER AG19-9 SERPL-ACNC: <2.06 UNIT/ML (ref 0–37)
CEA SERPL-MCNC: 26.03 NG/ML (ref 0–3)
CHLORIDE SERPL-SCNC: 109 MMOL/L (ref 98–107)
CO2 SERPL-SCNC: 21 MMOL/L (ref 23–31)
CREAT SERPL-MCNC: 0.83 MG/DL (ref 0.55–1.02)
EOSINOPHIL # BLD AUTO: 0.16 X10(3)/MCL (ref 0–0.9)
EOSINOPHIL NFR BLD AUTO: 2.1 %
ERYTHROCYTE [DISTWIDTH] IN BLOOD BY AUTOMATED COUNT: 15.5 % (ref 11.5–17)
GFR SERPLBLD CREATININE-BSD FMLA CKD-EPI: >60 MLS/MIN/1.73/M2
GLOBULIN SER-MCNC: 5.7 GM/DL (ref 2.4–3.5)
GLUCOSE SERPL-MCNC: 81 MG/DL (ref 82–115)
HCT VFR BLD AUTO: 34.4 % (ref 37–47)
HGB BLD-MCNC: 10.9 G/DL (ref 12–16)
IMM GRANULOCYTES # BLD AUTO: 0.01 X10(3)/MCL (ref 0–0.04)
IMM GRANULOCYTES NFR BLD AUTO: 0.1 %
LYMPHOCYTES # BLD AUTO: 2.02 X10(3)/MCL (ref 0.6–4.6)
LYMPHOCYTES NFR BLD AUTO: 26.4 %
MCH RBC QN AUTO: 28.6 PG (ref 27–31)
MCHC RBC AUTO-ENTMCNC: 31.7 G/DL (ref 33–36)
MCV RBC AUTO: 90.3 FL (ref 80–94)
MONOCYTES # BLD AUTO: 0.71 X10(3)/MCL (ref 0.1–1.3)
MONOCYTES NFR BLD AUTO: 9.3 %
NEUTROPHILS # BLD AUTO: 4.71 X10(3)/MCL (ref 2.1–9.2)
NEUTROPHILS NFR BLD AUTO: 61.7 %
PLATELET # BLD AUTO: 261 X10(3)/MCL (ref 130–400)
PMV BLD AUTO: 10.7 FL (ref 7.4–10.4)
POTASSIUM SERPL-SCNC: 4 MMOL/L (ref 3.5–5.1)
PROT SERPL-MCNC: 8.7 GM/DL (ref 5.8–7.6)
RBC # BLD AUTO: 3.81 X10(6)/MCL (ref 4.2–5.4)
SODIUM SERPL-SCNC: 139 MMOL/L (ref 136–145)
WBC # SPEC AUTO: 7.64 X10(3)/MCL (ref 4.5–11.5)

## 2024-03-04 PROCEDURE — 36415 COLL VENOUS BLD VENIPUNCTURE: CPT | Performed by: STUDENT IN AN ORGANIZED HEALTH CARE EDUCATION/TRAINING PROGRAM

## 2024-03-04 PROCEDURE — 99999 PR PBB SHADOW E&M-EST. PATIENT-LVL V: CPT | Mod: PBBFAC,,, | Performed by: STUDENT IN AN ORGANIZED HEALTH CARE EDUCATION/TRAINING PROGRAM

## 2024-03-04 PROCEDURE — 99215 OFFICE O/P EST HI 40 MIN: CPT | Mod: PBBFAC | Performed by: STUDENT IN AN ORGANIZED HEALTH CARE EDUCATION/TRAINING PROGRAM

## 2024-03-04 PROCEDURE — 99205 OFFICE O/P NEW HI 60 MIN: CPT | Mod: S$PBB,,, | Performed by: STUDENT IN AN ORGANIZED HEALTH CARE EDUCATION/TRAINING PROGRAM

## 2024-03-04 PROCEDURE — 86301 IMMUNOASSAY TUMOR CA 19-9: CPT | Performed by: STUDENT IN AN ORGANIZED HEALTH CARE EDUCATION/TRAINING PROGRAM

## 2024-03-04 PROCEDURE — 80053 COMPREHEN METABOLIC PANEL: CPT | Performed by: STUDENT IN AN ORGANIZED HEALTH CARE EDUCATION/TRAINING PROGRAM

## 2024-03-04 PROCEDURE — 82378 CARCINOEMBRYONIC ANTIGEN: CPT | Performed by: STUDENT IN AN ORGANIZED HEALTH CARE EDUCATION/TRAINING PROGRAM

## 2024-03-04 PROCEDURE — 85025 COMPLETE CBC W/AUTO DIFF WBC: CPT | Performed by: STUDENT IN AN ORGANIZED HEALTH CARE EDUCATION/TRAINING PROGRAM

## 2024-03-04 RX ORDER — OLANZAPINE 5 MG/1
5 TABLET ORAL NIGHTLY
Qty: 6 TABLET | Refills: 11 | Status: SHIPPED | OUTPATIENT
Start: 2024-03-04

## 2024-03-04 RX ORDER — ONDANSETRON HYDROCHLORIDE 8 MG/1
8 TABLET, FILM COATED ORAL EVERY 8 HOURS PRN
Qty: 60 TABLET | Refills: 2 | Status: SHIPPED | OUTPATIENT
Start: 2024-03-04 | End: 2024-04-03 | Stop reason: SDUPTHER

## 2024-03-04 RX ORDER — PROCHLORPERAZINE MALEATE 10 MG
10 TABLET ORAL EVERY 6 HOURS PRN
Qty: 30 TABLET | Refills: 1 | Status: SHIPPED | OUTPATIENT
Start: 2024-03-04 | End: 2024-06-01 | Stop reason: SDUPTHER

## 2024-03-06 ENCOUNTER — PATIENT MESSAGE (OUTPATIENT)
Dept: HEMATOLOGY/ONCOLOGY | Facility: CLINIC | Age: 69
End: 2024-03-06
Payer: MEDICARE

## 2024-03-06 ENCOUNTER — TELEPHONE (OUTPATIENT)
Dept: VASCULAR SURGERY | Facility: CLINIC | Age: 69
End: 2024-03-06
Payer: MEDICARE

## 2024-03-06 NOTE — TELEPHONE ENCOUNTER
Attempted to schedule mediport insertion with Dr. Tran. Patients daughter April states that she will call our office back once ready to schedule procedure. Call back number provided.

## 2024-03-11 ENCOUNTER — PATIENT MESSAGE (OUTPATIENT)
Dept: HEMATOLOGY/ONCOLOGY | Facility: CLINIC | Age: 69
End: 2024-03-11
Payer: MEDICARE

## 2024-03-11 ENCOUNTER — PATIENT MESSAGE (OUTPATIENT)
Dept: SURGERY | Facility: CLINIC | Age: 69
End: 2024-03-11
Payer: MEDICARE

## 2024-03-15 ENCOUNTER — HOSPITAL ENCOUNTER (OUTPATIENT)
Dept: RADIOLOGY | Facility: HOSPITAL | Age: 69
Discharge: HOME | End: 2024-03-15
Attending: STUDENT IN AN ORGANIZED HEALTH CARE EDUCATION/TRAINING PROGRAM
Payer: MEDICARE

## 2024-03-15 DIAGNOSIS — C18.9 ADENOCARCINOMA OF COLON: ICD-10-CM

## 2024-03-15 DIAGNOSIS — C78.89 SECONDARY MALIGNANT NEOPLASM OF OTHER DIGESTIVE ORGANS: ICD-10-CM

## 2024-03-15 PROCEDURE — 78815 PET IMAGE W/CT SKULL-THIGH: CPT | Mod: TC,PS

## 2024-03-15 PROCEDURE — A9552 F18 FDG: HCPCS | Performed by: STUDENT IN AN ORGANIZED HEALTH CARE EDUCATION/TRAINING PROGRAM

## 2024-03-15 RX ORDER — FLUDEOXYGLUCOSE F18 500 MCI/ML
10 INJECTION INTRAVENOUS
Status: COMPLETED | OUTPATIENT
Start: 2024-03-15 | End: 2024-03-15

## 2024-03-15 RX ADMIN — FLUDEOXYGLUCOSE F-18 11 MILLICURIE: 500 INJECTION INTRAVENOUS at 01:03

## 2024-03-18 ENCOUNTER — PATIENT MESSAGE (OUTPATIENT)
Dept: HEMATOLOGY/ONCOLOGY | Facility: CLINIC | Age: 69
End: 2024-03-18
Payer: MEDICARE

## 2024-03-18 LAB — FUNGUS SPEC CULT: NORMAL

## 2024-03-19 ENCOUNTER — OFFICE VISIT (OUTPATIENT)
Dept: SURGERY | Facility: CLINIC | Age: 69
End: 2024-03-19
Payer: MEDICARE

## 2024-03-19 VITALS
HEART RATE: 110 BPM | TEMPERATURE: 98 F | SYSTOLIC BLOOD PRESSURE: 151 MMHG | DIASTOLIC BLOOD PRESSURE: 87 MMHG | OXYGEN SATURATION: 97 % | BODY MASS INDEX: 23.07 KG/M2 | HEIGHT: 67 IN | RESPIRATION RATE: 20 BRPM | WEIGHT: 147 LBS

## 2024-03-19 DIAGNOSIS — C18.9 MALIGNANT NEOPLASM OF COLON, UNSPECIFIED PART OF COLON: ICD-10-CM

## 2024-03-19 DIAGNOSIS — C18.2 MALIGNANT NEOPLASM OF ASCENDING COLON: ICD-10-CM

## 2024-03-19 DIAGNOSIS — C78.6 PERITONEAL CARCINOMATOSIS: Primary | ICD-10-CM

## 2024-03-19 DIAGNOSIS — C18.9 MALIGNANT NEOPLASM OF COLON, UNSPECIFIED PART OF COLON: Primary | ICD-10-CM

## 2024-03-19 DIAGNOSIS — Z01.818 PREOP EXAMINATION: ICD-10-CM

## 2024-03-19 PROCEDURE — 99213 OFFICE O/P EST LOW 20 MIN: CPT | Mod: PBBFAC

## 2024-03-19 RX ORDER — SODIUM CHLORIDE 9 MG/ML
INJECTION, SOLUTION INTRAVENOUS CONTINUOUS
Status: CANCELLED | OUTPATIENT
Start: 2024-03-19

## 2024-03-19 NOTE — PROGRESS NOTES
[facility] Follow-Up Note    Subjective:  69yo F s/p open ileocecectomy 2/14/24 for a non-perforating, non-obstructing ileocecal mass that had led to acute appendicitis. She recovered expectantly and was discharged to a rehab facility, where she continued to recover well. She is in great spirits today and reports feeling well in the interim. She denies abdominal pain, nausea, vomiting, weight loss, night sweats. She is scheduled to undergo Mediport placement 3/26 with Dr. Tran and will subsequently start chemotherapy in the beginning of April. She additional reports she had an e-visit with Dr. Mclaughlin (Surgical Oncology at [facility]) for HIPEC evaluation and she has an upcoming in-person appointment with him in the coming weeks.     Objective:  Vitals:    03/19/24 1233   BP: (!) 151/87   Pulse: 110   Resp: 20   Temp: 98.2 °F (36.8 °C)     Physical Exam:  GEN: NAD, awake/alert, interactive  HEENT: NCAT, EOMI, MMM   CV: regular rate  PULM: normal WOB on RA   ABD: soft, NT, ND  WOUNDS: lower midline abdominal incision well-healed without erythema, induration, fluctuance, drainage.   MSK/EXT: moves all ext spontaneously    Results:  SURGICAL PATHOLOGY 2/5/24  1. Appendix, appendectomy:  - Acute appendicitis with perforation.  - Adenocarcinoma involves lymphatic spaces of the appendiceal wall and peritoneal surface.  2. Cecum, ileocecectomy:  - Colonic adenocarcinoma.  - See synoptic checklist for CAP cancer protocol.  3. Omental mass, biopsy:  - Metastatic adenocarcinoma, consistent with a colon primary.  4. Small bowel, segmental resection:  - Acute peritonitis.  - No evidence of malignancy.    Pathologic Stage: pT4a pN2b pM1c     Assessment/Plan:   69yo F with pT4a pN2b pM1c metastatic colon adenocarcinoma s/p open ileocecectomy.  Patient has seen hematology/oncology; planning to proceed with chemotherapy in early April.  Encouraged her to continue with follow-up with Dr. Mclaughlin for HIPEC eval.    Return to [facility] General Surgery clinic ELIZABETH Mendez MD  [employer] General Surgery PGY-5  03/19/2024, 12:36 PM

## 2024-03-19 NOTE — PROGRESS NOTES
[place] scheduled [place] [facility] [place] to [place]frame of initiation of chemotherapy. Preoperative instructions given to patients daughter April. She verbalized understanding of all instructions.

## 2024-03-19 NOTE — PROGRESS NOTES
I have reviewed the notes, assessments, and/or procedures performed by the resident, I concur with her/his documentation of Selene Smallwood.     Anna Murillo MD

## 2024-03-20 ENCOUNTER — ANESTHESIA EVENT (OUTPATIENT)
Dept: SURGERY | Facility: HOSPITAL | Age: 69
End: 2024-03-20
Payer: MEDICARE

## 2024-03-22 ENCOUNTER — HOSPITAL ENCOUNTER (OUTPATIENT)
Dept: RADIOLOGY | Facility: HOSPITAL | Age: 69
Discharge: HOME | End: 2024-03-22
Attending: SURGERY
Payer: MEDICARE

## 2024-03-22 ENCOUNTER — CLINICAL SUPPORT (OUTPATIENT)
Dept: RESPIRATORY THERAPY | Facility: HOSPITAL | Age: 69
End: 2024-03-22
Attending: SURGERY
Payer: MEDICARE

## 2024-03-22 DIAGNOSIS — C18.9 MALIGNANT NEOPLASM OF COLON, UNSPECIFIED PART OF COLON: ICD-10-CM

## 2024-03-22 DIAGNOSIS — Z01.818 PREOP EXAMINATION: ICD-10-CM

## 2024-03-22 PROCEDURE — 71046 X-RAY EXAM CHEST 2 VIEWS: CPT | Mod: TC

## 2024-03-22 PROCEDURE — 93005 ELECTROCARDIOGRAM TRACING: CPT

## 2024-03-22 PROCEDURE — 93010 ELECTROCARDIOGRAM REPORT: CPT | Mod: ,,, | Performed by: STUDENT IN AN ORGANIZED HEALTH CARE EDUCATION/TRAINING PROGRAM

## 2024-03-25 ENCOUNTER — CLINICAL SUPPORT (OUTPATIENT)
Dept: HEMATOLOGY/ONCOLOGY | Facility: CLINIC | Age: 69
End: 2024-03-25
Payer: MEDICARE

## 2024-03-25 ENCOUNTER — OFFICE VISIT (OUTPATIENT)
Dept: HEMATOLOGY/ONCOLOGY | Facility: CLINIC | Age: 69
End: 2024-03-25
Payer: MEDICARE

## 2024-03-25 ENCOUNTER — TELEPHONE (OUTPATIENT)
Dept: VASCULAR SURGERY | Facility: CLINIC | Age: 69
End: 2024-03-25
Payer: MEDICARE

## 2024-03-25 VITALS
BODY MASS INDEX: 23.07 KG/M2 | TEMPERATURE: 98 F | DIASTOLIC BLOOD PRESSURE: 95 MMHG | WEIGHT: 147 LBS | HEART RATE: 85 BPM | OXYGEN SATURATION: 98 % | HEIGHT: 67 IN | SYSTOLIC BLOOD PRESSURE: 150 MMHG

## 2024-03-25 DIAGNOSIS — C18.9 ADENOCARCINOMA OF COLON: Primary | ICD-10-CM

## 2024-03-25 LAB
OHS QRS DURATION: 74 MS
OHS QTC CALCULATION: 424 MS

## 2024-03-25 PROCEDURE — 99213 OFFICE O/P EST LOW 20 MIN: CPT | Mod: PBBFAC

## 2024-03-25 PROCEDURE — 99999 PR PBB SHADOW E&M-EST. PATIENT-LVL III: CPT | Mod: PBBFAC,,,

## 2024-03-25 PROCEDURE — 99215 OFFICE O/P EST HI 40 MIN: CPT | Mod: S$PBB,,,

## 2024-03-25 RX ORDER — SERTRALINE HYDROCHLORIDE 25 MG/1
25 TABLET, FILM COATED ORAL DAILY
COMMUNITY

## 2024-03-25 NOTE — PROGRESS NOTES
Oncology Nutrition Assessment for Medical Nutrition Therapy      Selene Smallwood   1955    Oncology Provider:   Elizabeth Lejeune, MD    Reason for Visit:  New Treatment Education    Oncology/Hematology Diagnosis:    Adenocarcinoma of Colon s/p ileocecectomy 2/5/24    Treatment Plan:  mFOLFOX    Nutrition Recommendations:  1. Regular diet as tolerated. Avoid ingestion of cold foods/beverages during oxaliplatin infusion and for 4-5 days following.   2. Ensure Plus  prn  (350 kcal, 13 g protein/svg). Refer to [facility] 2 cases 2 months  3. Refer to Alex AC    Nutrition Assessment    3/25/24: This is a 68 y.o.female with a medical diagnosis of colon CA s/p ileocectomy in February. She reports fair appetite and po intake which is improving over the past few weeks. She is on Megace which is helping. Usual wt ~170 lb; she has lost ~ 25# int he past 2 months. No c/o n/v/c/d and she tolerates a regular diet, just smaller portions. She does drink Ensure on occasion at home and tolerates well.    Nutrition Factors Affecting Intake  decreased appetite and early satiety    PMHx: unremarkable    Allergies: Robaxin [methocarbamol]    Current Medications:    Current Outpatient Medications:     amLODIPine (NORVASC) 5 MG tablet, Take 1 tablet (5 mg total) by mouth once daily., Disp: 30 tablet, Rfl: 11    megestroL (MEGACE) 20 MG Tab, Take 1 tablet (20 mg total) by mouth 2 (two) times daily., Disp: 60 tablet, Rfl: 0    OLANZapine (ZYPREXA) 5 MG tablet, Take 1 tablet (5 mg total) by mouth every evening. Take nightly on days 1-3 of each chemotherapy cycle., Disp: 6 tablet, Rfl: 11    ondansetron (ZOFRAN) 8 MG tablet, Take 1 tablet (8 mg total) by mouth every 8 (eight) hours as needed for Nausea., Disp: 60 tablet, Rfl: 2    prochlorperazine (COMPAZINE) 10 MG tablet, Take 1 tablet (10 mg total) by mouth every 6 (six) hours as needed (nausea)., Disp: 30 tablet, Rfl: 1    sertraline (ZOLOFT) 25 MG tablet, Take 25 mg by mouth once  "daily., Disp: , Rfl:     valsartan (DIOVAN) 320 MG tablet, Take 1 tablet (320 mg total) by mouth once daily., Disp: 30 tablet, Rfl: 0    Labs: no new    Anthropometrics    Height:   Ht Readings from Last 1 Encounters:   03/25/24 5' 7" (1.702 m)      Weight:   Wt Readings from Last 5 Encounters:   03/25/24 66.7 kg (147 lb)   03/19/24 66.7 kg (147 lb)   03/04/24 68.5 kg (151 lb 1.6 oz)   02/25/24 70.1 kg (154 lb 9.6 oz)   02/07/24 81.6 kg (180 lb)        Usual Body Weight: 77.2 kg (170 lb)  % Weight Change: -13.5% in 2 months to lowest wt 147 lb    BMI: 23 (normal)    Ideal Weight: 61.3 kg (135 lb)  % Ideal Weight: 109%    Malnutrition in the context of chronic illness  Degree of Malnutrition: non-severe (moderate) malnutrition  Energy Intake: < 75% of estimated energy requirement for >/= 1 month  Interpretation of Weight Loss: >7.5% in 3 months  Body Fat: mild depletion  Area of Body Fat Loss: orbital region , upper arm region - triceps / biceps, and thoracic and lumbar region - ribs, lower back, midaxillary line  Muscle Mass Loss: mild depletion  Area of Muscle Mass Loss: temple region - temporalis muscle, clavicle bone region - pectoralis major, deltoid, trapezius muscles, clavicle and acromion bone region - deltoid muscle, scapular bone region - trapezius, supraspinus, infraspinus muscles, dorsal hand - interosseous musle, patellar region - quadricep muscle, and anterior thigh region - quadriceps muscles  Fluid Accumulation: does not meet criteria  Edema: no edema present  Reduced  Strength: unable to obtain  A minimum of two characteristics is recommended for diagnosis of either severe or non-severe malnutrition.    Estimated Needs (using CBW 66.7 kg)  1751 kcal/day  [place] St. Jeor x 1.1 activity factor x 1.3 stress factor  87 g protein/day 1.3 g/kg CBW  1751 mL fluid/day 1 mL/kcal    Nutrition Diagnosis    PES: Malnutrition related to chronic illness as evidenced by <75% intake est needs >/=1 month, >7.5% " wt loss 3 months, mild muscle/fat depletion. (new)     Nutrition Risk  moderate    Nutrition Intervention    Interventions(treatment strategy):  modified composition of meals/snacks, commercial beverage, and purpose of nutrition education      Nutrition Monitoring and Evaluation    Monitor: food and beverage intake, weight change, and electrolyte/renal panel    Follow up --will refer to Alex Workman, MS, RD, , LDN

## 2024-03-25 NOTE — PROGRESS NOTES
THERAPY EDUCATION: FOLFOX    Subjective:      Patient ID: Selene Smallwood is a 68 y.o. female.    Chief Complaint: Therapy Education     Diagnosis: Adenocarcinoma of Colon    Current Treatment: FOLFOX to start on 4/2/24    Treatment History: N/A    Interval History:     3/25/24: Mrs. Smallwood presents to the clinic accompanied by her daughter for therapy education. Patient reports no major complaints at today's visit. Denies fever, chills, SOB, N/V/D, constipation, recent infection, chest pain or unexplained bleeding or bruising.      3/19/24:67 yo F presents in March '24 for evaluation of metastatic colon cancer. She initially presented to OSH in early February '24 with 2 days of upper abdominal pain. Imaging on 2/4 in the ER revealed evidence of non perforated acute appendicitis. She underwent Ex lap where a cecal mass was incidentally discovered and resected, as well as omental studding was noted and 1 small mass was taken for biopsy. Her final pathology revealed a large exophytic mass in the cecum measuring 4 x 2 cm extending to the appendiceal orifice and extends into pericolonic adipose tissue. G2, LVI+, PNI negative. 14 LN were resected, with 10 being positive for metastatic adenocarcinoma. She also had an omental mass measuring 2.5 x 1.5cm which was positive for metastatic adenocarcinoma, consistent with a colon primary. Her final pathology staging is consistent with [id number]. Her postop course was complicated by abdominal abscess formation s/p IR drain placement and subsequent removal and antibiotic treatment. She presents to medical oncology for further evaluation.     She has been home for approximately 1 week after 2 weeks at SNF. She has little to no abdominal pain. Notes a decreased appetite that she is currently on megace for with improvement. She denies any fatigue, abdominal pain, bowel or appetite changes, unintentional weight loss, or any other symptoms leading up to her acute presentation.     I  discussed her diagnosis, staging, prognosis, goals of therapy, and referenced NCCN guidelines when discussing her standard of care treatment plan moving forward. It was discussed that the goals of treatment are palliative to improve the quality and hopefully quantity of life.       No past medical history on file.   Past Surgical History:   Procedure Laterality Date    APPENDECTOMY N/A 2/5/2024    Procedure: APPENDECTOMY;  Surgeon: Anna Murillo MD;  Location: [facility] OR;  Service: General;  Laterality: N/A;    LAPAROSCOPIC APPENDECTOMY N/A 2/5/2024    Procedure: APPENDECTOMY, LAPAROSCOPIC;  Surgeon: Anna Murillo MD;  Location: [facility] OR;  Service: General;  Laterality: N/A;    SURGICAL REMOVAL OF ILEUM WITH CECUM N/A 2/5/2024    Procedure: EXCISION, CECUM WITH ILEUM;  Surgeon: Anna Murillo MD;  Location: [facility] OR;  Service: General;  Laterality: N/A;    WASHOUT N/A 2/5/2024    Procedure: WASHOUT;  Surgeon: Anna Murillo MD;  Location: [facility] OR;  Service: General;  Laterality: N/A;     Social History     Socioeconomic History    Marital status:    Tobacco Use    Smoking status: Former     Types: Cigarettes    Smokeless tobacco: Never     Social Determinants of Health     Financial Resource Strain: Low Risk  (2/15/2024)    Overall Financial Resource Strain (CARDIA)     Difficulty of Paying Living Expenses: Not hard at all   Food Insecurity: No Food Insecurity (2/15/2024)    Hunger Vital Sign     Worried About Running Out of Food in the Last Year: Never true     Ran Out of Food in the Last Year: Never true   Transportation Needs: No Transportation Needs (2/15/2024)    PRAPARE - Transportation     Lack of Transportation (Medical): No     Lack of Transportation (Non-Medical): No   Physical Activity: Unknown (2/15/2024)    Exercise Vital Sign     Days of Exercise per Week: 0 days     Minutes of Exercise per Session: Patient unable to answer   Stress: No Stress Concern Present (2/15/2024)    [employer]  [place] [employer] - Occupational Stress Questionnaire     Feeling of Stress : Not at all   Social Connections: Unknown (2/15/2024)    Social Connection and Isolation Panel [NHANES]     Frequency of Communication with Friends and Family: More than three times a week     Frequency of Social Gatherings with Friends and Family: More than three times a week     Marital Status:    Housing Stability: Low Risk  (2/15/2024)    Housing Stability Vital Sign     Unable to Pay for Housing in the Last Year: No     Number of Places Lived in the Last Year: 1     Unstable Housing in the Last Year: No      No family history on file.   Review of patient's allergies indicates:   Allergen Reactions    Robaxin [methocarbamol] Other (See Comments)     Altered mental status       Review of Systems   Constitutional:  Negative for activity change, fever and unexpected weight change.   HENT:  Negative for sore throat.    Eyes:  Negative for visual disturbance.   Respiratory:  Negative for cough and shortness of breath.    Cardiovascular:  Negative for chest pain.   Gastrointestinal:  Negative for abdominal pain, diarrhea, nausea and vomiting.   Endocrine: Negative for polyuria.   Genitourinary:  Negative for dysuria and hot flashes.   Integumentary:  Negative for rash.   Neurological:  Negative for weakness and headaches.   Hematological:  Negative for adenopathy.   Psychiatric/Behavioral:  Negative for confusion.        Objective:     Vitals:    24 1019   BP: (!) 150/95   Pulse: 85   Temp: 98.1 °F (36.7 °C)       LABS AND IMAGING REVIEWED IN EPIC    IMAGIN24 CT C/A/P:  Impression:  1. Small to moderate bilateral pleural effusions.  2. Right upper lobe ground-glass could be infectious/inflammatory or relate to asymmetric edema.  3. Interval appendectomy with fluid collections along the surgical bed suspicious for developing abscesses.    24 Abdominal US:  Impression:  Cyst in the right lobe of the  liver.  Elongated gallbladder with no definite gallstones minimal amount of fluid seen adjacent to the fundus.  Nephrolithiasis of the right kidney with a cyst of the right kidney minimal fullness of the collecting system.  Free fluid as described above    2/4/24 CT Abd/Pelvis:  Impression:  Dilated appendix with periappendiceal inflammatory changes,, compatible with non perforated acute appendicitis.  No fluid collection or abscess.  No free air.  Small amount of free fluid the pelvis.  Findings were communicated to Dr. Chavez at 13:56 02/04/2024  Mild atelectatic changes and ground-glass opacities in the lower lungs suspicious for infectious/inflammatory process.       PATHOLOGY:  2/5/24 Biopsy:  Final Diagnosis  1. Appendix, appendectomy:  - Acute appendicitis with perforation.  - Adenocarcinoma involves lymphatic spaces of the appendiceal wall and peritoneal surface.  2. Cecum, ileocecectomy:  - Colonic adenocarcinoma.  - See synoptic checklist for CAP cancer protocol.  3. Omental mass, biopsy:  - Metastatic adenocarcinoma, consistent with a colon primary.  4. Small bowel, segmental resection:  - Acute peritonitis.  - No evidence of malignancy.      Assessment:   Stage IV Metastatic Colon Adenocarcinoma with mets to peritoneum  Plan:   -PET/CT completed on 3/15/24  -Therapy education completed on 3/25/24.  -Mediport placement scheduled for 3/26/24. Lidocaine cream given with instructions to apply on port 30 minutes before treatment.   -Plans to start FOLFOX tentatively on 4/2/24. Made patient aware that she will receive premedications given 30 minutes prior to each treatment to help prevent nausea. Patient understood that she will be going home with a 5FU pump for 2 days and will need to return to the clinic to have pump disconnected. Patient aware the importance of monitoring the pump 3 to 4 times a day to check for leaks or kinks. Patient also aware of the importance of avoiding cold drinks and cold food on the  day of and 2 days after treatment with Oxaliplatin.     -Antiemetics regimen schedule made and given with calendar for guidance     Olanzapine- Take 1 tablet by mouth on Days 1-3 of each chemo cycle   -Zofran PRN prescribed for at home with instructions to be taken by mouth every 8 hours as needed for nausea. If not working, Compazine cprescribed as 2nd choice.    -OTC Imodium AD recommended for diarrhea (4-6 BMs a day). Take 2 tablets after the first loose bowel movement, and 1 tablet after each loose bowel movement after the first dose has been taken. No more than 4 tablets should be taken in any 24-hour period. If not working, Lomotil can be prescribed as 2nd choice.    -Mouth sore prevention with 1-quart warm water with 1 tsp of baking soda and salt and alcohol-free mouthwash.   -Emphasized adequate hand-hygiene and limited contact with people who are sick.   -Monitor and notify any bleeding in urine, stool, or sputum.  As well as unusual bleeding or bruising and stomach pain.   - Emphasized hydration with 4 16 oz bottles of water a day.    -Importance of moisturizing with fragrance free lotion to prevent skin rash and/or hand-foot syndrome.  -Call clinic if fever >100.4, shakes or chills, shortness of breath, chest pain, uncontrolled vomiting or diarrhea, pain and tingling in the chest or arm, or just not feeling well.    -Plans to RTC on 3/28/24 for same day labs and TD with MD.    DISCUSSION:    1.  A total of 60 minutes were spent in counseling today, in which 100% were face-to-face.  At today's therapy teaching session, we discussed the patient's cancer diagnosis as well as planned therapy regimen, protocol, side effects and toxicities.  A handout of each therapeutic agent in the regimen was provided and reviewed in detail.    2.  The following side effects were discussed but not limited to:    Fluorouracil Side Effects:  Allergic Reactions  Breathing Problems  Bruising  Chest  Pain  Chills  Cough  Diarrhea  Fever  Hair Loss  Headache  Infection  Itching  Low Blood Counts  Mouth Sores  Nausea  Numbness  Pain  Rash  Stomach Upset  Swelling  Tingling  Vomiting    Leucovorin (Injection) Side Effects:  Allergic Reactions  Breathing Problems  Itching  Rash  Swelling    Oxaliplatin Side Effects:  Allergic Reactions  Anemia  Breathing Problems  Bruising  Chest Pain  Chills  Cough  Diarrhea  Dizziness  Feeling Faint  Fever  Gas  Hair Loss  Infection  Injury  Itching  Low Blood Counts  Mouth Sores  Muscle Pain  Nausea  Numbness  Pain  Rash  Swelling  Tingling  Vomiting                a.  Discussed the risk of infection while on therapy related to pancytopenia, specifically a decrease in their white blood cell count.  Instructed to contact our office for temperature >100.4 F, chills, sudden onset cough or shortness of breath, symptoms of a urinary tract infection.                b.  Discussed the risk of anemia. Instructed to contact our office for dizziness, heart palpitations, or extreme or sudden changes in weakness.                c.  Discussed the risk of thrombocytopenia, which increases the risk of bruising or bleeding.  Instructed the patient to contact our office for spontaneous signs of bleeding, including nose bleeds, bleeding from the gums or mouth, blood in sputum, urine or stool and unusual or excessive bruising or rash.                d.  Discussed GI side effects including weight changes, changes in appetite, altered sense of taste, stomatitis, nausea, vomiting, diarrhea, constipation, and heartburn.                e.  Discussed  side effects including painful urination, changes in the amount of urination, possible urine color changes.  Discussed fertility issues and to prevent  pregnancy if of child bearing age.                f.  Discussed neurological side effects including the risk of peripheral neuropathy, either temporary or permanent.                g.  Discussed the  potential for skin, hair, and nail changes.       3.  Instructed to contact our office for discussion of medication changes, the addition of vitamin and/or herbal supplementation as they may interact with some chemotherapy agents.    4.  Discussed dietary modifications and the need to maintain adequate caloric intake and proper oral hydration.  Recommended 64 ounces of fluid per day.    5.  Discussed anti-emetic protocol and bowel regimen protocol.    6.  Office contact information given including after hours number.  Discussed there is an oncologist on call 24/7, 365 days including weekends.  Provided primary nurse's information .    7.  In summary, the patient is in agreement with the plan of care.  Questions appeared to be answered to their satisfaction. Consented the patient to the treatment plan and the patient was educated on the planned duration of the treatment and schedule of the treatment administration. Copy to be scanned into the chart.    All questions answered to the satisfaction of the patient and family.     Follow up appointments given to patient.      ARASH HOLLEY  PATIENT EDUCATOR  [facility]

## 2024-03-25 NOTE — PRE-PROCEDURE INSTRUCTIONS
"[facility]: Outpatient Surgery  Preprocedure Instructions    Expectations: "Because of inconsistent procedure completion times, an unexpected wait may occur. The physicians would like you to be here to prepare in the event they run ahead of time. We will make you as comfortable as possible and keep you informed. We apologize in advance if this happens."     Your arrival time for your surgery or procedure is __0500____.  We ask patients to arrive about 2 hours before surgery to allow for enough time to review your health history & medications, start your IV, complete any outstanding labwork or tests, and meet your Anesthesiologist.    We are located at [facility], [address] [place] [address].    Enter through the [place] entrance next to the Emergency Room, and come to the 6th floor to the Outpatient Surgery Department.    If you are in need of a wheelchair the morning of surgery please call [phone] about 15 minutes before you arrive. Parking is available in our parking garage located off [facility], between the hospital and [facility].      Visitory Policy:  You are allowed 2 adult visitors to be with you in the hospital. Please, no switching visitors in pre-op area. All hospital visitors should be in good current health.  No small children.  We will update you and your family hourly on the progression of surgery and any unexpected delays.      What to Bring:  Please have your ID, insurance cards, and all home medication bottles with you at check in.  Bring your CPAP machine if one is used at home.     Fasting:  Nothing to eat or drink after midnight the night before your procedure. This includes no ice, gum, hard candies, and/or tobacco products.    Medications:  Follow your doctor's instructions for taking any medications on the morning of your procedure.  If no instructions for taking medications were given, do not take any medications but bring your " medications in their bottles to your procedure check in.     Follow your doctor's preoperative instructions regarding skin prep, bowel prep, bathing, or showering prior to your procedure.  If any special soaps were provided to you, please use according to your doctor's instructions. If no instructions were given from your doctor, take a good bath or shower with antibacterial soap the night before and the morning of your procedure.  On the morning of procedure, wear loose, comfortable clothing.  No lotions, makeup, perfumes, colognes, deodorant, or jewelry to your procedure.  Removable items (glasses, contact lenses, dentures, retainers, hearing aids) need to be removed for your procedure.  Bring your storage containers for these items if you wear them.     Artificial nails, body jewelry, eyelash extensions, and/or hair extensions with metal clips are not allowed during your surgery.  If you currently wear any of these items, please arrange for them to be removed prior to your arrival to the hospital.     Outpatient or Same Day Surgeries:  Any patients receiving sedation/anesthesia are advised not to drive for 24 hours after their procedure.  We do not allow patients to drive themselves home after discharge.  If you are going home after your procedure, please have someone available to drive you home from the hospital.     You may call the Outpatient Surgery Department at [phone] with any questions or concerns.  We are looking forward to meeting you and taking great care of you for your procedure.  Thank you for choosing [facility] for your surgical needs.

## 2024-03-26 ENCOUNTER — HOSPITAL ENCOUNTER (OUTPATIENT)
Facility: HOSPITAL | Age: 69
Discharge: HOME | End: 2024-03-26
Attending: SURGERY | Admitting: SURGERY
Payer: MEDICARE

## 2024-03-26 ENCOUNTER — ANESTHESIA (OUTPATIENT)
Dept: SURGERY | Facility: HOSPITAL | Age: 69
End: 2024-03-26
Payer: MEDICARE

## 2024-03-26 DIAGNOSIS — C18.9 MALIGNANT NEOPLASM OF COLON, UNSPECIFIED PART OF COLON: ICD-10-CM

## 2024-03-26 PROCEDURE — 63600175 PHARM REV CODE 636 W HCPCS: Performed by: SURGERY

## 2024-03-26 PROCEDURE — 71000033 HC RECOVERY, INTIAL HOUR: Performed by: SURGERY

## 2024-03-26 PROCEDURE — 63600175 PHARM REV CODE 636 W HCPCS: Performed by: NURSE ANESTHETIST, CERTIFIED REGISTERED

## 2024-03-26 PROCEDURE — 36000706: Performed by: SURGERY

## 2024-03-26 PROCEDURE — 25000003 PHARM REV CODE 250: Performed by: SURGERY

## 2024-03-26 PROCEDURE — 37000009 HC ANESTHESIA EA ADD 15 MINS: Performed by: SURGERY

## 2024-03-26 PROCEDURE — 36561 INSERT TUNNELED CV CATH: CPT | Mod: RT,,, | Performed by: SURGERY

## 2024-03-26 PROCEDURE — 77001 FLUOROGUIDE FOR VEIN DEVICE: CPT | Mod: 26,,, | Performed by: SURGERY

## 2024-03-26 PROCEDURE — D9220A PRA ANESTHESIA: Mod: ANES,,, | Performed by: ANESTHESIOLOGY

## 2024-03-26 PROCEDURE — D9220A PRA ANESTHESIA: Mod: CRNA,,, | Performed by: NURSE ANESTHETIST, CERTIFIED REGISTERED

## 2024-03-26 PROCEDURE — 71000016 HC POSTOP RECOV ADDL HR: Performed by: SURGERY

## 2024-03-26 PROCEDURE — 71000015 HC POSTOP RECOV 1ST HR: Performed by: SURGERY

## 2024-03-26 PROCEDURE — 37000008 HC ANESTHESIA 1ST 15 MINUTES: Performed by: SURGERY

## 2024-03-26 PROCEDURE — C1788 PORT, INDWELLING, IMP: HCPCS | Performed by: SURGERY

## 2024-03-26 PROCEDURE — 36000707: Performed by: SURGERY

## 2024-03-26 PROCEDURE — 25000003 PHARM REV CODE 250: Performed by: NURSE ANESTHETIST, CERTIFIED REGISTERED

## 2024-03-26 DEVICE — PORT POWER CLEAR VIEW: Type: IMPLANTABLE DEVICE | Site: CHEST | Status: FUNCTIONAL

## 2024-03-26 RX ORDER — SODIUM CHLORIDE 9 MG/ML
INJECTION, SOLUTION INTRAVENOUS CONTINUOUS
Status: DISCONTINUED | OUTPATIENT
Start: 2024-03-26 | End: 2024-03-26 | Stop reason: HOSPADM

## 2024-03-26 RX ORDER — SODIUM CHLORIDE 0.9 % (FLUSH) 0.9 %
10 SYRINGE (ML) INJECTION
Status: DISCONTINUED | OUTPATIENT
Start: 2024-03-26 | End: 2024-03-26 | Stop reason: HOSPADM

## 2024-03-26 RX ORDER — PHENYLEPHRINE HCL IN 0.9% NACL 1 MG/10 ML
SYRINGE (ML) INTRAVENOUS
Status: DISCONTINUED | OUTPATIENT
Start: 2024-03-26 | End: 2024-03-26

## 2024-03-26 RX ORDER — LIDOCAINE HYDROCHLORIDE 10 MG/ML
INJECTION INFILTRATION; PERINEURAL
Status: DISCONTINUED | OUTPATIENT
Start: 2024-03-26 | End: 2024-03-26 | Stop reason: HOSPADM

## 2024-03-26 RX ORDER — PROPOFOL 10 MG/ML
INJECTION, EMULSION INTRAVENOUS
Status: DISCONTINUED | OUTPATIENT
Start: 2024-03-26 | End: 2024-03-26

## 2024-03-26 RX ORDER — LIDOCAINE HYDROCHLORIDE 20 MG/ML
INJECTION INTRAVENOUS
Status: DISCONTINUED | OUTPATIENT
Start: 2024-03-26 | End: 2024-03-26

## 2024-03-26 RX ORDER — ONDANSETRON HYDROCHLORIDE 2 MG/ML
INJECTION, SOLUTION INTRAVENOUS
Status: DISCONTINUED | OUTPATIENT
Start: 2024-03-26 | End: 2024-03-26

## 2024-03-26 RX ORDER — FENTANYL CITRATE 50 UG/ML
INJECTION, SOLUTION INTRAMUSCULAR; INTRAVENOUS
Status: DISCONTINUED | OUTPATIENT
Start: 2024-03-26 | End: 2024-03-26

## 2024-03-26 RX ORDER — LABETALOL HYDROCHLORIDE 5 MG/ML
10 INJECTION, SOLUTION INTRAVENOUS ONCE
Status: DISCONTINUED | OUTPATIENT
Start: 2024-03-26 | End: 2024-03-26 | Stop reason: HOSPADM

## 2024-03-26 RX ORDER — CEFAZOLIN SODIUM 2 G/50ML
2 SOLUTION INTRAVENOUS
Status: COMPLETED | OUTPATIENT
Start: 2024-03-26 | End: 2024-03-26

## 2024-03-26 RX ORDER — BUPIVACAINE HYDROCHLORIDE 2.5 MG/ML
INJECTION, SOLUTION EPIDURAL; INFILTRATION; INTRACAUDAL
Status: DISCONTINUED | OUTPATIENT
Start: 2024-03-26 | End: 2024-03-26 | Stop reason: HOSPADM

## 2024-03-26 RX ORDER — HEPARIN SODIUM 5000 [USP'U]/ML
INJECTION, SOLUTION INTRAVENOUS; SUBCUTANEOUS
Status: DISCONTINUED | OUTPATIENT
Start: 2024-03-26 | End: 2024-03-26 | Stop reason: HOSPADM

## 2024-03-26 RX ORDER — DEXAMETHASONE SODIUM PHOSPHATE 4 MG/ML
INJECTION, SOLUTION INTRA-ARTICULAR; INTRALESIONAL; INTRAMUSCULAR; INTRAVENOUS; SOFT TISSUE
Status: DISCONTINUED | OUTPATIENT
Start: 2024-03-26 | End: 2024-03-26

## 2024-03-26 RX ADMIN — FENTANYL CITRATE 50 MCG: 50 INJECTION, SOLUTION INTRAMUSCULAR; INTRAVENOUS at 07:03

## 2024-03-26 RX ADMIN — PROPOFOL 150 MG: 10 INJECTION, EMULSION INTRAVENOUS at 07:03

## 2024-03-26 RX ADMIN — LIDOCAINE HYDROCHLORIDE 80 MG: 20 INJECTION INTRAVENOUS at 07:03

## 2024-03-26 RX ADMIN — PROPOFOL 50 MG: 10 INJECTION, EMULSION INTRAVENOUS at 07:03

## 2024-03-26 RX ADMIN — CEFAZOLIN SODIUM 2 G: 2 SOLUTION INTRAVENOUS at 07:03

## 2024-03-26 RX ADMIN — DEXAMETHASONE SODIUM PHOSPHATE 4 MG: 4 INJECTION, SOLUTION INTRA-ARTICULAR; INTRALESIONAL; INTRAMUSCULAR; INTRAVENOUS; SOFT TISSUE at 07:03

## 2024-03-26 RX ADMIN — ONDANSETRON 4 MG: 2 INJECTION INTRAMUSCULAR; INTRAVENOUS at 07:03

## 2024-03-26 RX ADMIN — Medication 100 MCG: at 07:03

## 2024-03-26 RX ADMIN — SODIUM CHLORIDE, SODIUM GLUCONATE, SODIUM ACETATE, POTASSIUM CHLORIDE AND MAGNESIUM CHLORIDE: 526; 502; 368; 37; 30 INJECTION, SOLUTION INTRAVENOUS at 07:03

## 2024-03-26 RX ADMIN — Medication 200 MCG: at 07:03

## 2024-03-26 NOTE — ANESTHESIA POSTPROCEDURE EVALUATION
Anesthesia Post Evaluation    Patient: Selene Smallwood    Procedure(s) Performed: Procedure(s) (LRB):  [id number] (N/A)    Final Anesthesia Type: general      Patient location during evaluation: PACU  Patient participation: Yes- Able to Participate  Level of consciousness: awake  Post-procedure vital signs: reviewed and stable  Pain management: adequate  Airway patency: patent  DEBI mitigation strategies: Postoperative administration of CPAP, nasopharyngeal airway, or oral appliance in the postanesthesia care unit (PACU)  PONV status at discharge: No PONV  Anesthetic complications: no      Cardiovascular status: hemodynamically stable  Respiratory status: unassisted and spontaneous ventilation  Hydration status: euvolemic  Follow-up not needed.          Vitals Value Taken Time   /92 03/26/24 0927   Temp 36.4 °C (97.6 °F) 03/26/24 0816   Pulse 88 03/26/24 0927   Resp 20 03/26/24 0927   SpO2 100 % 03/26/24 0927         Event Time   Out of Recovery 08:50:00         Pain/Princess Score: Princess Score: 9 (3/26/2024  9:27 AM)

## 2024-03-26 NOTE — PROGRESS NOTES
Labs reviewed. All within acceptable ranges for age and chronic conditions. No further action required.

## 2024-03-26 NOTE — DISCHARGE SUMMARY
[facility] - Periop Services  Discharge Note  Short Stay    Procedure(s) (LRB):  [id number] (N/A)      OUTCOME: Patient tolerated treatment/procedure well without complication and is now ready for discharge.    DISPOSITION: Home or Self Care    FINAL DIAGNOSIS:  <principal problem not specified>    FOLLOWUP: None    DISCHARGE INSTRUCTIONS:  No discharge procedures on file.     TIME SPENT ON DISCHARGE:  minutes

## 2024-03-26 NOTE — H&P
[facility] - Peri Services  History & Physical  Vascular Surgery    SUBJECTIVE:     Chief Complaint/Reason for Visit: No chief complaint on file.       History of Present Illness:  Selene Smallwood is a 68 y.o. female who presents MediPort placement.  She has a diagnosis of metastatic colon cancer and needs access for chemotherapy..    Review of patient's allergies indicates:   Allergen Reactions    Robaxin [methocarbamol] Other (See Comments)     Altered mental status        History reviewed. No pertinent past medical history.  Past Surgical History:   Procedure Laterality Date    APPENDECTOMY N/A 2/5/2024    Procedure: APPENDECTOMY;  Surgeon: Anna Murillo MD;  Location: [facility] OR;  Service: General;  Laterality: N/A;    LAPAROSCOPIC APPENDECTOMY N/A 2/5/2024    Procedure: APPENDECTOMY, LAPAROSCOPIC;  Surgeon: Anna Murillo MD;  Location: [facility] OR;  Service: General;  Laterality: N/A;    SURGICAL REMOVAL OF ILEUM WITH CECUM N/A 2/5/2024    Procedure: EXCISION, CECUM WITH ILEUM;  Surgeon: Anna Murillo MD;  Location: [facility] OR;  Service: General;  Laterality: N/A;    WASHOUT N/A 2/5/2024    Procedure: WASHOUT;  Surgeon: Anna Murillo MD;  Location: [facility] OR;  Service: General;  Laterality: N/A;     History reviewed. No pertinent family history.  Social History     Tobacco Use    Smoking status: Former     Types: Cigarettes    Smokeless tobacco: Never        Prior to Admission medications    Medication Sig Start Date End Date Taking? Authorizing Provider   amLODIPine (NORVASC) 5 MG tablet Take 1 tablet (5 mg total) by mouth once daily. 2/28/24 2/27/25 Yes Shyam Wilhelm MD   megestroL (MEGACE) 20 MG Tab Take 1 tablet (20 mg total) by mouth 2 (two) times daily. 2/27/24 3/28/24 Yes Shyam Wilhelm MD   OLANZapine (ZYPREXA) 5 MG tablet Take 1 tablet (5 mg total) by mouth every evening. Take nightly on days 1-3 of each chemotherapy cycle. 3/4/24  Yes Lejeune, Elizabeth Kennedy, MD   ondansetron  (ZOFRAN) 8 MG tablet Take 1 tablet (8 mg total) by mouth every 8 (eight) hours as needed for Nausea. 3/4/24  Yes Lejeune, Elizabeth Kennedy, MD   prochlorperazine (COMPAZINE) 10 MG tablet Take 1 tablet (10 mg total) by mouth every 6 (six) hours as needed (nausea). 3/4/24 3/4/25 Yes Lejeune, Elizabeth Kennedy, MD   sertraline (ZOLOFT) 25 MG tablet Take 25 mg by mouth once daily.   Yes Provider, Historical   valsartan (DIOVAN) 320 MG tablet Take 1 tablet (320 mg total) by mouth once daily. 2/28/24 3/29/24 Yes Shyam Wilhelm MD       Review of Systems:  Negative except as in HPI    OBJECTIVE:     Vital Signs (Most Recent):  Temp: 97.9 °F (36.6 °C) (03/26/24 0513)  Pulse: 100 (03/26/24 0513)  Resp: 19 (03/26/24 0513)  BP: 131/87 (03/26/24 0513)  SpO2: 97 % (03/26/24 0513)    Admission: Weight: 65.5 kg (144 lb 6.4 oz) (03/26/24 0700)   Most Recent: Weight: 65.5 kg (144 lb 6.4 oz) (03/26/24 0700)    Physical Exam:  Vascular Physical Exam  Vitals and nursing note reviewed.   Constitutional:       General: She is awake.   Cardiovascular:      Rate and Rhythm: Normal rate and regular rhythm.      Pulses:           Radial pulses are 2+ on the right side and 2+ on the left side.        Brachial pulses are 2+ on the right side and 2+ on the left side.  Pulmonary:      Effort: Pulmonary effort is normal.      Breath sounds: Normal breath sounds and air entry.   Musculoskeletal:      Neck: Neck supple.   Neurological:      Mental Status: She is alert and oriented to person, place, and time.      Cranial Nerves: No cranial nerve deficit.      Sensory: Sensation is intact. No sensory deficit.      Motor: Motor function is intact. No weakness.      Upper extremity:  score is 5/5 on the right side and 5/5 on the left side.              ASSESSMENT/PLAN:     Selene Smallwood is a 68 y.o. female with a diagnosis of Malignant neoplasm of colon, unspecified part of colon [C18.9]  Plan   I have discussed MediPort placement with the  patient.  Discussed the risks and benefits of the procedure including infection, bleeding, scar, port thrombosis, and port rotation.  She does agree that she would like to proceed.

## 2024-03-26 NOTE — TRANSFER OF CARE
"Anesthesia Transfer of Care Note    Patient: Selene Smallwood    Procedure(s) Performed: Procedure(s) (LRB):  [id number] (N/A)    Patient location: PACU    Anesthesia Type: general    Transport from OR: Transported from OR on room air with adequate spontaneous ventilation    Post pain: adequate analgesia    Post assessment: no apparent anesthetic complications    Post vital signs: stable    Level of consciousness: awake, alert, responds to stimulation and oriented    Nausea/Vomiting: no nausea/vomiting    Complications: none    Transfer of care protocol was followed    Last vitals: Visit Vitals  /87   Pulse 100   Temp 36.6 °C (97.9 °F) (Oral)   Resp 19   Ht 5' 7" (1.702 m)   Wt 65.5 kg (144 lb 6.4 oz)   SpO2 97%   Breastfeeding No   BMI 22.62 kg/m²     "

## 2024-03-26 NOTE — DISCHARGE INSTRUCTIONS
Do not drive or drink alcohol for 24 hours or while taking pain medications.    A chest Xray will be taken prior to discharge to confirm mediport placement.     Follow up with your oncologist or the physician overseeing your infusions as scheduled.     Report these Signs and Symptoms to your Surgeon or your oncologist if they occur.     Signs of infection. These include a fever of 100.4°F (38°C) or higher, chills.  Signs of wound infection. These include swelling, redness, warmth around the wound; too much pain when touched; yellowish, greenish, or bloody discharge; foul smell coming from the cut site; cut site opens up.     Report to ER with any excessive uncontrollable bleeding, heavy increase bruising at site (growing in size), or SEVERE/SUDDEN chest pain and/or shortness of breath.     Resume regular diet. Okay to shower, no tub baths or submerging in water for at least 7 days (about the time incision is fully healed).  Do not scrub the incision, pat dry after bathing. Dermabond glue will start to fall off naturally over the next 7 days. Do not peel it off as this may reopen the incision and delay heeling.

## 2024-03-26 NOTE — ANESTHESIA PROCEDURE NOTES
Intubation    Date/Time: 3/26/2024 7:34 AM    Performed by: Taj Max CRNA  Authorized by: Milo Haley MD    Intubation:     Induction:  Intravenous    Intubated:  Postinduction    Mask Ventilation:  Easy mask    Attempts:  1    Attempted By:  CRNA    Difficult Airway Encountered?: No      Complications:  None    Airway Device:  Supraglottic airway/LMA    Airway Device Size:  3.0    Style/Cuff Inflation:  Cuffed (inflated to minimal occlusive pressure)    Secured at:  The lips    Placement Verified By:  Capnometry and Colorimetric ETCO2 device    Complicating Factors:  None    Findings Post-Intubation:  BS equal bilateral and atraumatic/condition of teeth unchanged

## 2024-03-26 NOTE — OP NOTE
Surgery Date:  03/26/2024     Pre-op Diagnosis:    Malignant neoplasm of colon, unspecified part of colon [C18.9]     Post-op Diagnosis:  Same     Procedure(s): Right IJ MediPort placement using ultrasound guidance and fluoroscopy    Indication for procedure: Selene Smallwood is a 68 y.o. female who has a diagnosis of Malignant neoplasm of colon, unspecified part of colon [C18.9] and needs access for chemotherapy    Surgeon:  Armando Tran MD    Assistant:  Circulator: Bailee Weaver RN; Fide Keating RN  Scrub Person: Zehra Perez     Anesthesia:  Local MAC     Findings: Ultrasound evaluation of the right IJ noted it to be patent.  Fluoroscopy showed the catheter to be in place at the atriocaval junction with no kinking    Complications: None     Estimated Blood Loss: 5 mL         Specimens: None    Implants:  Implant Name Type Inv. Item Serial No.  Lot No. LRB No. Used Action   PORT POWER CLEAR VIEW - [id number]  PORT POWER CLEAR VIEW [id number] [employer] [id number] N/A 1 Implanted       Drains: None    Procedure in detail: After informed consent was obtained, and risks and benefits discussed with the patient, she was brought to the operating room and placed supine.  After adequate sedation was obtained, she was prepped and draped in a standard sterile fashion.  Local anesthetic was infused in the skin overlying the right IJ and it was accessed with a micropuncture needle under ultrasound guidance.  I advanced the wire down into the superior vena cava.  A tunnel was then marked on the skin and a pocket marked as well for the port.  Local anesthetic was infused in this area.  An incision was made and a pocket dissected free for the port.  I then tunneled between the port site and the vein access site and passed the catheter.  A microcatheter was then advanced over the wire.  The wire and dilator removed and an 035 wire advanced down into the SVC.  The dilator and peel-away sheath were advanced over the  wire under fluoroscopy.  The wire and dilator were then removed.  The catheter was advanced down into the right ventricle.  The peel-away sheath was cracked and removed.  The catheter was then withdrawn under fluoroscopy until it was at the atriocaval junction.  The catheter was cut to length and connected to the port mechanism.  The port was then sutured to the clavipectoral fascia using 3-0 Vicryl sutures in 2 locations.  The port was able to be aspirated and flushed well.  The incision was then closed using interrupted 3-0 Vicryl sutures for the deep layer and 4-0 Monocryl subcuticular for the skin.  The vein access site was closed with a 4-0 Monocryl subcuticular stitch as well.  Heparinized saline was infused into the port and catheter.  Dermabond was placed over the incisions.  The patient was brought to recovery in stable condition.    Condition: Good

## 2024-03-26 NOTE — ANESTHESIA PREPROCEDURE EVALUATION
03/26/2024  Selene Smallwood is a 68 y.o., female. APPENDECTOMY, LAPAROSCOPIC, possible open (Abdomen)       Vitals:    03/26/24 0513   BP: 131/87   Pulse: 100   Resp: 19   Temp: 36.6 °C (97.9 °F)   TempSrc: Oral   SpO2: 97%       PMH of HTN, admitted on 2.4.24 with SOB & Abdominal pain     Active Ambulatory Problems     Diagnosis Date Noted    Perforated appendicitis 02/06/2024    Colon cancer 02/27/2024    HTN (hypertension) 02/27/2024     Resolved Ambulatory Problems     Diagnosis Date Noted    Acute appendicitis with perforation, localized peritonitis, abscess, and gangrene 02/04/2024    Acute kidney injury 02/06/2024     No Additional Past Medical History       Past Surgical History:   Procedure Laterality Date    APPENDECTOMY N/A 2/5/2024    Procedure: APPENDECTOMY;  Surgeon: Anna Murillo MD;  Location: [facility] OR;  Service: General;  Laterality: N/A;    LAPAROSCOPIC APPENDECTOMY N/A 2/5/2024    Procedure: APPENDECTOMY, LAPAROSCOPIC;  Surgeon: Anna Murillo MD;  Location: [facility] OR;  Service: General;  Laterality: N/A;    SURGICAL REMOVAL OF ILEUM WITH CECUM N/A 2/5/2024    Procedure: EXCISION, CECUM WITH ILEUM;  Surgeon: Anna Murillo MD;  Location: [facility] OR;  Service: General;  Laterality: N/A;    WASHOUT N/A 2/5/2024    Procedure: WASHOUT;  Surgeon: Anna Murillo MD;  Location: [facility] OR;  Service: General;  Laterality: N/A;         CT showed dilated appendix with periappendiceal inflammatory changes compatible with non perforated acute appendicitis     RUQ ultrasound to rule out gallbladder etiology given R sided abdominal pain and elevated INR and bili       Lab Results   Component Value Date    WBC 7.64 03/04/2024    HGB 10.9 (L) 03/04/2024    HCT 34.4 (L) 03/04/2024     03/04/2024    ALT 12 03/04/2024    AST 14 03/04/2024     03/04/2024    K 4.0 03/04/2024    CREATININE  0.83 03/04/2024    BUN 12.8 03/04/2024    CO2 21 (L) 03/04/2024    INR 1.1 02/12/2024          Narrative:     EXAMINATION:  NM LUNG VENTILATION AND PERFUSION IMAGING     CLINICAL HISTORY:  Pulmonary embolism (PE) suspected, positive D-dimer;     TECHNIQUE:  33.7 mCi of Tc-99m-DTPA were placed in the nebulizer. Following the inhalation Tc-99m-DTPA in aerosol and the subsequent IV administration of 5.5 mCi of Tc-99m-MAA, multiple images of the thorax were obtained in various projections.     COMPARISON:  None.     FINDINGS:  On the perfusion study, normal slightly heterogeneous perfusion without evidence for mismatched defects..  The ventilation study reveals normal appearance.  The CXR demonstrate mild patchy ground-glass opacities in the lung ba                Small patchy bibasilar airspace opacities, may be infectious or inflammatory        Electronically signed by:        Tata Houston  Date:                                        02/04/2024  Time:                                                11:46                     Narrative:     EXAMINATION:  XR CHEST 1 VIEW     CLINICAL HISTORY:  shortness of breath;     TECHNIQUE:  AP chest     COMPARISON:  None.     FINDINGS:  The heart is normal in size.  There are small patchy bibasilar airspace opacities.  There is no pleural effusion or visible pneumothorax.                        Narrative  Performed by: GEMUSE  Test Reason : R10.9,    Vent. Rate : 125 BPM     Atrial Rate : 125 BPM     P-R Int : 116 ms          QRS Dur : 070 ms      QT Int : 312 ms       P-R-T Axes : 081 066 057 degrees     QTc Int : 450 ms    Sinus tachycardia  Otherwise normal ECG  No previous ECGs available    Referred By: [email or web address]   SELF           Confirmed By:      Specimen Collected: 02/04/24 11:03 CST             No current facility-administered medications on file prior to encounter.     Current Outpatient Medications on File Prior to Encounter   Medication Sig Dispense Refill     amLODIPine (NORVASC) 5 MG tablet Take 1 tablet (5 mg total) by mouth once daily. 30 tablet 11    megestroL (MEGACE) 20 MG Tab Take 1 tablet (20 mg total) by mouth 2 (two) times daily. 60 tablet 0    OLANZapine (ZYPREXA) 5 MG tablet Take 1 tablet (5 mg total) by mouth every evening. Take nightly on days 1-3 of each chemotherapy cycle. 6 tablet 11    ondansetron (ZOFRAN) 8 MG tablet Take 1 tablet (8 mg total) by mouth every 8 (eight) hours as needed for Nausea. 60 tablet 2    prochlorperazine (COMPAZINE) 10 MG tablet Take 1 tablet (10 mg total) by mouth every 6 (six) hours as needed (nausea). 30 tablet 1    valsartan (DIOVAN) 320 MG tablet Take 1 tablet (320 mg total) by mouth once daily. 30 tablet 0         Pre-op Assessment    I have reviewed the Patient Summary Reports.     I have reviewed the Nursing Notes. I have reviewed the NPO Status.   I have reviewed the Medications.     Review of Systems  Anesthesia Hx:  No problems with previous Anesthesia   History of prior surgery of interest to airway management or planning:          Denies Family Hx of Anesthesia complications.    Denies Personal Hx of Anesthesia complications.                    Hematology/Oncology:  Hematology Normal   Oncology Normal                                   EENT/Dental:  EENT/Dental Normal           Cardiovascular:  Cardiovascular Normal                                            Pulmonary:  Pulmonary Normal                       Renal/:  Renal/ Normal                 Hepatic/GI:  Hepatic/GI Normal                 Musculoskeletal:  Musculoskeletal Normal                Neurological:  Neurology Normal                                      Endocrine:  Endocrine Normal            Dermatological:  Skin Normal    Psych:  Psychiatric Normal                  Physical Exam  General: Well nourished, Cooperative, Alert and Oriented    Airway:  Mallampati: I / I  Mouth Opening: Normal  TM Distance: Normal  Tongue: Normal  Neck ROM: Normal  ROM    Dental:  Intact      Anesthesia Plan  Type of Anesthesia, risks & benefits discussed:    Anesthesia Type: Gen Natural Airway, Gen Supraglottic Airway  Intra-op Monitoring Plan: Standard ASA Monitors  Post Op Pain Control Plan: multimodal analgesia and IV/PO Opioids PRN  Induction:  IV  Informed Consent: Informed consent signed with the Patient and all parties understand the risks and agree with anesthesia plan.  All questions answered. Patient consented to blood products? No  ASA Score: 3  Day of Surgery Review of History & Physical: H&P Update referred to the surgeon/provider.    Ready For Surgery From Anesthesia Perspective.     .

## 2024-03-27 VITALS
RESPIRATION RATE: 20 BRPM | SYSTOLIC BLOOD PRESSURE: 141 MMHG | TEMPERATURE: 98 F | BODY MASS INDEX: 22.66 KG/M2 | WEIGHT: 144.38 LBS | DIASTOLIC BLOOD PRESSURE: 84 MMHG | HEIGHT: 67 IN | OXYGEN SATURATION: 99 % | HEART RATE: 81 BPM

## 2024-03-27 DIAGNOSIS — C18.9 ADENOCARCINOMA OF COLON: Primary | ICD-10-CM

## 2024-03-27 NOTE — PROGRESS NOTES
Subjective:       Patient ID: Selene Smallwood is a 68 y.o. female.    Chief Complaint: Follow Up    Diagnosis: Stage IV Metastatic Colon Adenocarcinoma with mets to peritoneum    Current Treatment:   FOLFOX + Cetuximab - 4/2/24 -     Treatment History: N/A    HPI:   69 yo F presented in March '24 for evaluation of metastatic colon cancer. She initially presented to OSH in early February '24 with 2 days of upper abdominal pain. Imaging on 2/4 in the ER revealed evidence of non perforated acute appendicitis. She underwent Ex lap where a cecal mass was incidentally discovered and resected, as well as omental studding was noted and 1 small mass was taken for biopsy. Her final pathology revealed a large exophytic mass in the cecum measuring 4 x 2 cm extending to the appendiceal orifice and extends into pericolonic adipose tissue. G2, LVI+, PNI negative. 14 LN were resected, with 10 being positive for metastatic adenocarcinoma. She also had an omental mass measuring 2.5 x 1.5cm which was positive for metastatic adenocarcinoma, consistent with a colon primary. Her final pathology staging is consistent with [id number]. Her postop course was complicated by abdominal abscess formation s/p IR drain placement and subsequent removal and antibiotic treatment.      She underwent PET scan in March '24 with evidence of nodular foci within the omentum/peritoneum consistent with peritoneal carcinomatosis. No other evidence of disease. NGS testing was performed which revealed GULSHAN wild type and BRAF negative. Therefore she will proceed with 1st line therapy of FOLFOX + Cetuximab Q2w x 12 cycles.     Interval History:  Patient presents to clinic today for treatment clearance for C1 of Folfox and PET/CT results. She is here today with her daughter. She had mediport placement on 3/26/24. Denies SOB, chest pain, fever, chills. No abnormal bowels. No blood in stool.    No past medical history on file.   Past Surgical History:   Procedure  Laterality Date    APPENDECTOMY N/A 2/5/2024    Procedure: APPENDECTOMY;  Surgeon: Anna Murillo MD;  Location: [facility] OR;  Service: General;  Laterality: N/A;    LAPAROSCOPIC APPENDECTOMY N/A 2/5/2024    Procedure: APPENDECTOMY, LAPAROSCOPIC;  Surgeon: Anna Murillo MD;  Location: [facility] OR;  Service: General;  Laterality: N/A;    SURGICAL REMOVAL OF ILEUM WITH CECUM N/A 2/5/2024    Procedure: EXCISION, CECUM WITH ILEUM;  Surgeon: Anna Murillo MD;  Location: [facility] OR;  Service: General;  Laterality: N/A;    WASHOUT N/A 2/5/2024    Procedure: WASHOUT;  Surgeon: Anna Murillo MD;  Location: [facility] OR;  Service: General;  Laterality: N/A;     Social History     Socioeconomic History    Marital status:    Tobacco Use    Smoking status: Former     Types: Cigarettes    Smokeless tobacco: Never     Social Determinants of Health     Financial Resource Strain: Low Risk  (2/15/2024)    Overall Financial Resource Strain (CARDIA)     Difficulty of Paying Living Expenses: Not hard at all   Food Insecurity: No Food Insecurity (2/15/2024)    Hunger Vital Sign     Worried About Running Out of Food in the Last Year: Never true     Ran Out of Food in the Last Year: Never true   Transportation Needs: No Transportation Needs (2/15/2024)    PRAPARE - Transportation     Lack of Transportation (Medical): No     Lack of Transportation (Non-Medical): No   Physical Activity: Unknown (2/15/2024)    Exercise Vital Sign     Days of Exercise per Week: 0 days     Minutes of Exercise per Session: Patient unable to answer   Stress: No Stress Concern Present (2/15/2024)    [employer] [place] [employer] - Occupational Stress Questionnaire     Feeling of Stress : Not at all   Social Connections: Unknown (2/15/2024)    Social Connection and Isolation Panel [NHANES]     Frequency of Communication with Friends and Family: More than three times a week     Frequency of Social Gatherings with Friends and Family: More than three  times a week     Marital Status:    Housing Stability: Low Risk  (2/15/2024)    Housing Stability Vital Sign     Unable to Pay for Housing in the Last Year: No     Number of Places Lived in the Last Year: 1     Unstable Housing in the Last Year: No      No family history on file.   Review of patient's allergies indicates:   Allergen Reactions    Robaxin [methocarbamol] Other (See Comments)     Altered mental status       Review of Systems   Constitutional:  Negative for activity change, fever and unexpected weight change.   HENT:  Negative for sore throat.    Eyes:  Negative for visual disturbance.   Respiratory:  Negative for cough and shortness of breath.    Cardiovascular:  Negative for chest pain.   Gastrointestinal:  Negative for abdominal pain, diarrhea, nausea and vomiting.   Endocrine: Negative for polyuria.   Genitourinary:  Negative for dysuria and hot flashes.   Integumentary:  Negative for rash.   Neurological:  Negative for weakness and headaches.   Hematological:  Negative for adenopathy.   Psychiatric/Behavioral:  Negative for confusion.          Objective:      Vitals:    03/28/24 0842   BP: (!) 170/90   Pulse:    Resp:    Temp:          Physical Exam  Constitutional:       General: She is not in acute distress.     Appearance: Normal appearance. She is not ill-appearing.   HENT:      Head: Normocephalic and atraumatic.      Nose: Nose normal.      Mouth/Throat:      Mouth: Mucous membranes are moist.      Pharynx: Oropharynx is clear.   Eyes:      Extraocular Movements: Extraocular movements intact.      Conjunctiva/sclera: Conjunctivae normal.      Pupils: Pupils are equal, round, and reactive to light.   Cardiovascular:      Rate and Rhythm: Normal rate and regular rhythm.      Pulses: Normal pulses.      Heart sounds: Normal heart sounds. No murmur heard.  Pulmonary:      Effort: Pulmonary effort is normal. No respiratory distress.      Breath sounds: Normal breath sounds.   Abdominal:       General: There is no distension.      Palpations: Abdomen is soft.      Tenderness: There is no abdominal tenderness.   Musculoskeletal:         General: Normal range of motion.      Cervical back: Normal range of motion and neck supple.      Right lower leg: No edema.      Left lower leg: No edema.   Lymphadenopathy:      Cervical: No cervical adenopathy.   Skin:     General: Skin is warm and dry.   Neurological:      General: No focal deficit present.      Mental Status: She is alert and oriented to person, place, and time.         LABS AND IMAGING REVIEWED IN EPIC    IMAGIN24 CT C/A/P:  Impression:  1. Small to moderate bilateral pleural effusions.  2. Right upper lobe ground-glass could be infectious/inflammatory or relate to asymmetric edema.  3. Interval appendectomy with fluid collections along the surgical bed suspicious for developing abscesses.  24 Abdominal US:  Impression:  Cyst in the right lobe of the liver.  Elongated gallbladder with no definite gallstones minimal amount of fluid seen adjacent to the fundus.  Nephrolithiasis of the right kidney with a cyst of the right kidney minimal fullness of the collecting system.  Free fluid as described above  24 CT Abd/Pelvis:  Impression:  Dilated appendix with periappendiceal inflammatory changes,, compatible with non perforated acute appendicitis.  No fluid collection or abscess.  No free air.  Small amount of free fluid the pelvis.  Findings were communicated to Dr. Chavez at 13:56 2024  Mild atelectatic changes and ground-glass opacities in the lower lungs suspicious for infectious/inflammatory process.  3/15/24 PET/CT:  1. Nodular foci of hypermetabolic soft tissue thickening within the mesentery most consistent with peritoneal carcinomatosis.  2. Hypermetabolic nodule in the uterine fundus which is indeterminate. Pelvic ultrasound may be beneficial.       PATHOLOGY:  24 Biopsy:  Final Diagnosis  1. Appendix, appendectomy:  -  Acute appendicitis with perforation.  - Adenocarcinoma involves lymphatic spaces of the appendiceal wall and peritoneal surface.  2. Cecum, ileocecectomy:  - Colonic adenocarcinoma.  - See synoptic checklist for CAP cancer protocol.  3. Omental mass, biopsy:  - Metastatic adenocarcinoma, consistent with a colon primary.  4. Small bowel, segmental resection:  - Acute peritonitis.  - No evidence of malignancy.    MLH1: PRESERVED (INTACT) EXPRESSION IN TUMOR CELLS      MSH2: PRESERVED (INTACT) EXPRESSION IN TUMOR CELLS      MSH6: PRESERVED (INTACT) EXPRESSION IN TUMOR CELLS      PMS2: PRESERVED (INTACT) EXPRESSION IN TUMOR CELLS  Microscopic Description/Comments   These results show no deficiency of the mismatch repair proteins tested.    Assessment:   Stage IV Metastatic Colon Adenocarcinoma with mets to peritoneum - Cecal mass, nonobstructive, no perforation. [id number]. Plan for 1st line chemotherapy with FOLFOX + Cetuximab. Will remove bolus 5FU in metastatic setting.         Plan:   Labs adequate for C1 of Folfox to start 4/2/24  RTC in 1.5 week with NP for tox check/labs (4/9/24)  Cbc, cmp, mag, phos, cea- 1 hr prior @ [facility]    I spent a total of 40 minutes on the day of the visit.This includes face to face time and non-face to face time preparing to see the patient (eg, review of tests), obtaining and/or reviewing separately obtained history, documenting clinical information in the electronic or other health record, independently interpreting results and communicating results to the patient/family/caregiver, or care coordinator.    Elizabeth Kennedy LeJeune, MD  Hematology/Oncology   [facility]        Professional Services   I, Dana Carbajal LPN, acted solely as a scribe for and in the presence of Dr. Elizabeth Lejeune, who performed these services.

## 2024-03-28 ENCOUNTER — OFFICE VISIT (OUTPATIENT)
Dept: HEMATOLOGY/ONCOLOGY | Facility: CLINIC | Age: 69
End: 2024-03-28
Payer: MEDICARE

## 2024-03-28 ENCOUNTER — LAB VISIT (OUTPATIENT)
Dept: LAB | Facility: HOSPITAL | Age: 69
End: 2024-03-28
Attending: STUDENT IN AN ORGANIZED HEALTH CARE EDUCATION/TRAINING PROGRAM
Payer: MEDICARE

## 2024-03-28 ENCOUNTER — PATIENT MESSAGE (OUTPATIENT)
Dept: HEMATOLOGY/ONCOLOGY | Facility: CLINIC | Age: 69
End: 2024-03-28

## 2024-03-28 VITALS
HEIGHT: 67 IN | DIASTOLIC BLOOD PRESSURE: 90 MMHG | TEMPERATURE: 97 F | WEIGHT: 150.38 LBS | SYSTOLIC BLOOD PRESSURE: 170 MMHG | OXYGEN SATURATION: 100 % | RESPIRATION RATE: 20 BRPM | BODY MASS INDEX: 23.6 KG/M2 | HEART RATE: 74 BPM

## 2024-03-28 DIAGNOSIS — C18.2 MALIGNANT NEOPLASM OF ASCENDING COLON: Primary | ICD-10-CM

## 2024-03-28 DIAGNOSIS — C18.2 MALIGNANT NEOPLASM OF ASCENDING COLON: ICD-10-CM

## 2024-03-28 DIAGNOSIS — C78.6 MALIGNANT NEOPLASM METASTATIC TO PERITONEUM: ICD-10-CM

## 2024-03-28 DIAGNOSIS — C18.9 ADENOCARCINOMA OF COLON: ICD-10-CM

## 2024-03-28 DIAGNOSIS — C18.9 ADENOCARCINOMA OF COLON: Primary | ICD-10-CM

## 2024-03-28 LAB
ALBUMIN SERPL-MCNC: 3 G/DL (ref 3.4–4.8)
ALBUMIN/GLOB SERPL: 0.6 RATIO (ref 1.1–2)
ALP SERPL-CCNC: 93 UNIT/L (ref 40–150)
ALT SERPL-CCNC: 17 UNIT/L (ref 0–55)
AST SERPL-CCNC: 19 UNIT/L (ref 5–34)
BASOPHILS # BLD AUTO: 0.03 X10(3)/MCL
BASOPHILS NFR BLD AUTO: 0.5 %
BILIRUB SERPL-MCNC: 0.4 MG/DL
BUN SERPL-MCNC: 12 MG/DL (ref 9.8–20.1)
CALCIUM SERPL-MCNC: 9 MG/DL (ref 8.4–10.2)
CEA SERPL-MCNC: 40.78 NG/ML (ref 0–3)
CHLORIDE SERPL-SCNC: 114 MMOL/L (ref 98–107)
CO2 SERPL-SCNC: 23 MMOL/L (ref 23–31)
CREAT SERPL-MCNC: 0.7 MG/DL (ref 0.55–1.02)
EOSINOPHIL # BLD AUTO: 0.12 X10(3)/MCL (ref 0–0.9)
EOSINOPHIL NFR BLD AUTO: 1.8 %
ERYTHROCYTE [DISTWIDTH] IN BLOOD BY AUTOMATED COUNT: 16 % (ref 11.5–17)
GFR SERPLBLD CREATININE-BSD FMLA CKD-EPI: >60 MLS/MIN/1.73/M2
GLOBULIN SER-MCNC: 5.2 GM/DL (ref 2.4–3.5)
GLUCOSE SERPL-MCNC: 88 MG/DL (ref 82–115)
HCT VFR BLD AUTO: 33.3 % (ref 37–47)
HGB BLD-MCNC: 10.5 G/DL (ref 12–16)
IMM GRANULOCYTES # BLD AUTO: 0.03 X10(3)/MCL (ref 0–0.04)
IMM GRANULOCYTES NFR BLD AUTO: 0.5 %
LYMPHOCYTES # BLD AUTO: 2.27 X10(3)/MCL (ref 0.6–4.6)
LYMPHOCYTES NFR BLD AUTO: 34.6 %
MAGNESIUM SERPL-MCNC: 1.9 MG/DL (ref 1.6–2.6)
MCH RBC QN AUTO: 28.3 PG (ref 27–31)
MCHC RBC AUTO-ENTMCNC: 31.5 G/DL (ref 33–36)
MCV RBC AUTO: 89.8 FL (ref 80–94)
MONOCYTES # BLD AUTO: 0.5 X10(3)/MCL (ref 0.1–1.3)
MONOCYTES NFR BLD AUTO: 7.6 %
NEUTROPHILS # BLD AUTO: 3.62 X10(3)/MCL (ref 2.1–9.2)
NEUTROPHILS NFR BLD AUTO: 55 %
PHOSPHATE SERPL-MCNC: 3.9 MG/DL (ref 2.3–4.7)
PLATELET # BLD AUTO: 238 X10(3)/MCL (ref 130–400)
PMV BLD AUTO: 10.9 FL (ref 7.4–10.4)
POTASSIUM SERPL-SCNC: 3.6 MMOL/L (ref 3.5–5.1)
PROT SERPL-MCNC: 8.2 GM/DL (ref 5.8–7.6)
RBC # BLD AUTO: 3.71 X10(6)/MCL (ref 4.2–5.4)
SODIUM SERPL-SCNC: 143 MMOL/L (ref 136–145)
WBC # SPEC AUTO: 6.57 X10(3)/MCL (ref 4.5–11.5)

## 2024-03-28 PROCEDURE — 99215 OFFICE O/P EST HI 40 MIN: CPT | Mod: S$PBB,,, | Performed by: STUDENT IN AN ORGANIZED HEALTH CARE EDUCATION/TRAINING PROGRAM

## 2024-03-28 PROCEDURE — 84100 ASSAY OF PHOSPHORUS: CPT

## 2024-03-28 PROCEDURE — 80053 COMPREHEN METABOLIC PANEL: CPT

## 2024-03-28 PROCEDURE — 82378 CARCINOEMBRYONIC ANTIGEN: CPT

## 2024-03-28 PROCEDURE — 99999 PR PBB SHADOW E&M-EST. PATIENT-LVL IV: CPT | Mod: PBBFAC,,, | Performed by: STUDENT IN AN ORGANIZED HEALTH CARE EDUCATION/TRAINING PROGRAM

## 2024-03-28 PROCEDURE — 36415 COLL VENOUS BLD VENIPUNCTURE: CPT

## 2024-03-28 PROCEDURE — 99214 OFFICE O/P EST MOD 30 MIN: CPT | Mod: PBBFAC | Performed by: STUDENT IN AN ORGANIZED HEALTH CARE EDUCATION/TRAINING PROGRAM

## 2024-03-28 PROCEDURE — 85025 COMPLETE CBC W/AUTO DIFF WBC: CPT

## 2024-03-28 PROCEDURE — 83735 ASSAY OF MAGNESIUM: CPT

## 2024-03-28 RX ORDER — SODIUM CHLORIDE 0.9 % (FLUSH) 0.9 %
10 SYRINGE (ML) INJECTION
Status: CANCELLED | OUTPATIENT
Start: 2024-04-02

## 2024-03-28 RX ORDER — HEPARIN 100 UNIT/ML
500 SYRINGE INTRAVENOUS
Status: CANCELLED | OUTPATIENT
Start: 2024-04-02

## 2024-03-28 RX ORDER — EPINEPHRINE 0.3 MG/.3ML
0.3 INJECTION SUBCUTANEOUS ONCE AS NEEDED
Status: CANCELLED | OUTPATIENT
Start: 2024-04-02

## 2024-03-28 RX ORDER — HEPARIN 100 UNIT/ML
500 SYRINGE INTRAVENOUS
Status: CANCELLED | OUTPATIENT
Start: 2024-04-04

## 2024-03-28 RX ORDER — DIPHENHYDRAMINE HYDROCHLORIDE 50 MG/ML
25 INJECTION INTRAMUSCULAR; INTRAVENOUS
Status: CANCELLED
Start: 2024-04-02

## 2024-03-28 RX ORDER — PROCHLORPERAZINE EDISYLATE 5 MG/ML
5 INJECTION INTRAMUSCULAR; INTRAVENOUS ONCE AS NEEDED
Status: CANCELLED | OUTPATIENT
Start: 2024-04-04

## 2024-03-28 RX ORDER — SODIUM CHLORIDE 0.9 % (FLUSH) 0.9 %
10 SYRINGE (ML) INJECTION
Status: CANCELLED | OUTPATIENT
Start: 2024-04-04

## 2024-03-28 RX ORDER — PROCHLORPERAZINE EDISYLATE 5 MG/ML
5 INJECTION INTRAMUSCULAR; INTRAVENOUS ONCE AS NEEDED
Status: CANCELLED | OUTPATIENT
Start: 2024-04-02

## 2024-03-28 RX ORDER — DIPHENHYDRAMINE HYDROCHLORIDE 50 MG/ML
50 INJECTION INTRAMUSCULAR; INTRAVENOUS ONCE AS NEEDED
Status: CANCELLED | OUTPATIENT
Start: 2024-04-02

## 2024-03-29 LAB — CEFTIBUTEN ISLT MIC: NORMAL

## 2024-04-02 ENCOUNTER — INFUSION (OUTPATIENT)
Dept: INFUSION THERAPY | Facility: HOSPITAL | Age: 69
End: 2024-04-02
Payer: MEDICARE

## 2024-04-02 VITALS
OXYGEN SATURATION: 97 % | TEMPERATURE: 98 F | HEART RATE: 89 BPM | DIASTOLIC BLOOD PRESSURE: 77 MMHG | WEIGHT: 147 LBS | BODY MASS INDEX: 23.63 KG/M2 | RESPIRATION RATE: 20 BRPM | SYSTOLIC BLOOD PRESSURE: 130 MMHG | HEIGHT: 66 IN

## 2024-04-02 DIAGNOSIS — C18.2 MALIGNANT NEOPLASM OF ASCENDING COLON: Primary | ICD-10-CM

## 2024-04-02 PROCEDURE — 96416 CHEMO PROLONG INFUSE W/PUMP: CPT

## 2024-04-02 PROCEDURE — 96415 CHEMO IV INFUSION ADDL HR: CPT

## 2024-04-02 PROCEDURE — 96417 CHEMO IV INFUS EACH ADDL SEQ: CPT

## 2024-04-02 PROCEDURE — 96375 TX/PRO/DX INJ NEW DRUG ADDON: CPT

## 2024-04-02 PROCEDURE — 96413 CHEMO IV INFUSION 1 HR: CPT

## 2024-04-02 PROCEDURE — 25000003 PHARM REV CODE 250: Performed by: STUDENT IN AN ORGANIZED HEALTH CARE EDUCATION/TRAINING PROGRAM

## 2024-04-02 PROCEDURE — 96367 TX/PROPH/DG ADDL SEQ IV INF: CPT

## 2024-04-02 PROCEDURE — A4216 STERILE WATER/SALINE, 10 ML: HCPCS | Performed by: STUDENT IN AN ORGANIZED HEALTH CARE EDUCATION/TRAINING PROGRAM

## 2024-04-02 PROCEDURE — 63600175 PHARM REV CODE 636 W HCPCS: Performed by: STUDENT IN AN ORGANIZED HEALTH CARE EDUCATION/TRAINING PROGRAM

## 2024-04-02 PROCEDURE — 96368 THER/DIAG CONCURRENT INF: CPT

## 2024-04-02 RX ORDER — DIPHENHYDRAMINE HYDROCHLORIDE 50 MG/ML
50 INJECTION INTRAMUSCULAR; INTRAVENOUS ONCE AS NEEDED
Status: DISCONTINUED | OUTPATIENT
Start: 2024-04-02 | End: 2024-04-02 | Stop reason: HOSPADM

## 2024-04-02 RX ORDER — HEPARIN 100 UNIT/ML
500 SYRINGE INTRAVENOUS
Status: DISCONTINUED | OUTPATIENT
Start: 2024-04-02 | End: 2024-04-02 | Stop reason: HOSPADM

## 2024-04-02 RX ORDER — DIPHENHYDRAMINE HYDROCHLORIDE 50 MG/ML
25 INJECTION INTRAMUSCULAR; INTRAVENOUS
Status: COMPLETED | OUTPATIENT
Start: 2024-04-02 | End: 2024-04-02

## 2024-04-02 RX ORDER — PROCHLORPERAZINE EDISYLATE 5 MG/ML
5 INJECTION INTRAMUSCULAR; INTRAVENOUS ONCE AS NEEDED
Status: DISCONTINUED | OUTPATIENT
Start: 2024-04-02 | End: 2024-04-02 | Stop reason: HOSPADM

## 2024-04-02 RX ORDER — SODIUM CHLORIDE 0.9 % (FLUSH) 0.9 %
10 SYRINGE (ML) INJECTION
Status: DISCONTINUED | OUTPATIENT
Start: 2024-04-02 | End: 2024-04-02 | Stop reason: HOSPADM

## 2024-04-02 RX ORDER — EPINEPHRINE 0.3 MG/.3ML
0.3 INJECTION SUBCUTANEOUS ONCE AS NEEDED
Status: DISCONTINUED | OUTPATIENT
Start: 2024-04-02 | End: 2024-04-02 | Stop reason: HOSPADM

## 2024-04-02 RX ADMIN — DEXTROSE MONOHYDRATE: 50 INJECTION, SOLUTION INTRAVENOUS at 10:04

## 2024-04-02 RX ADMIN — CETUXIMAB 900 MG: 2 SOLUTION INTRAVENOUS at 11:04

## 2024-04-02 RX ADMIN — DEXAMETHASONE SODIUM PHOSPHATE 0.25 MG: 10 INJECTION, SOLUTION INTRAMUSCULAR; INTRAVENOUS at 10:04

## 2024-04-02 RX ADMIN — LEUCOVORIN CALCIUM 720 MG: 350 INJECTION, POWDER, LYOPHILIZED, FOR SUSPENSION INTRAMUSCULAR; INTRAVENOUS at 01:04

## 2024-04-02 RX ADMIN — OXALIPLATIN 150 MG: 5 INJECTION, SOLUTION INTRAVENOUS at 01:04

## 2024-04-02 RX ADMIN — FLUOROURACIL 4320 MG: 50 INJECTION, SOLUTION INTRAVENOUS at 03:04

## 2024-04-02 RX ADMIN — DIPHENHYDRAMINE HYDROCHLORIDE 25 MG: 50 INJECTION, SOLUTION INTRAMUSCULAR; INTRAVENOUS at 10:04

## 2024-04-02 RX ADMIN — Medication 10 ML: at 11:04

## 2024-04-03 DIAGNOSIS — C18.9 ADENOCARCINOMA OF COLON: ICD-10-CM

## 2024-04-03 DIAGNOSIS — T45.1X5A CHEMOTHERAPY INDUCED NAUSEA AND VOMITING: ICD-10-CM

## 2024-04-03 DIAGNOSIS — R11.2 CHEMOTHERAPY INDUCED NAUSEA AND VOMITING: ICD-10-CM

## 2024-04-03 DIAGNOSIS — C78.89 SECONDARY MALIGNANT NEOPLASM OF OTHER DIGESTIVE ORGANS: ICD-10-CM

## 2024-04-03 RX ORDER — ONDANSETRON HYDROCHLORIDE 8 MG/1
8 TABLET, FILM COATED ORAL EVERY 8 HOURS PRN
Qty: 60 TABLET | Refills: 2 | Status: SHIPPED | OUTPATIENT
Start: 2024-04-03 | End: 2024-06-11

## 2024-04-04 ENCOUNTER — INFUSION (OUTPATIENT)
Dept: INFUSION THERAPY | Facility: HOSPITAL | Age: 69
End: 2024-04-04
Attending: NURSE PRACTITIONER
Payer: MEDICARE

## 2024-04-04 ENCOUNTER — PATIENT MESSAGE (OUTPATIENT)
Dept: HEMATOLOGY/ONCOLOGY | Facility: CLINIC | Age: 69
End: 2024-04-04
Payer: MEDICARE

## 2024-04-04 ENCOUNTER — TELEPHONE (OUTPATIENT)
Dept: VASCULAR SURGERY | Facility: CLINIC | Age: 69
End: 2024-04-04
Payer: MEDICARE

## 2024-04-04 VITALS
DIASTOLIC BLOOD PRESSURE: 73 MMHG | RESPIRATION RATE: 20 BRPM | HEART RATE: 80 BPM | TEMPERATURE: 98 F | SYSTOLIC BLOOD PRESSURE: 133 MMHG

## 2024-04-04 DIAGNOSIS — C18.2 MALIGNANT NEOPLASM OF ASCENDING COLON: Primary | ICD-10-CM

## 2024-04-04 PROCEDURE — 63600175 PHARM REV CODE 636 W HCPCS: Performed by: STUDENT IN AN ORGANIZED HEALTH CARE EDUCATION/TRAINING PROGRAM

## 2024-04-04 RX ORDER — HEPARIN 100 UNIT/ML
500 SYRINGE INTRAVENOUS
Status: DISCONTINUED | OUTPATIENT
Start: 2024-04-04 | End: 2024-04-04 | Stop reason: HOSPADM

## 2024-04-04 RX ORDER — PROCHLORPERAZINE EDISYLATE 5 MG/ML
5 INJECTION INTRAMUSCULAR; INTRAVENOUS ONCE AS NEEDED
Status: DISCONTINUED | OUTPATIENT
Start: 2024-04-04 | End: 2024-04-04 | Stop reason: HOSPADM

## 2024-04-04 RX ORDER — SODIUM CHLORIDE 0.9 % (FLUSH) 0.9 %
10 SYRINGE (ML) INJECTION
Status: DISCONTINUED | OUTPATIENT
Start: 2024-04-04 | End: 2024-04-04 | Stop reason: HOSPADM

## 2024-04-04 RX ADMIN — HEPARIN 500 UNITS: 100 SYRINGE at 12:04

## 2024-04-04 NOTE — TELEPHONE ENCOUNTER
Selene Smallwood is s/p mediport insertion on 3/26/2024. Attempted to call patient to obtain post operative status, but was unsuccessful.   Unable to leave voicemail.

## 2024-04-05 ENCOUNTER — PATIENT MESSAGE (OUTPATIENT)
Dept: HEMATOLOGY/ONCOLOGY | Facility: CLINIC | Age: 69
End: 2024-04-05
Payer: MEDICARE

## 2024-04-05 LAB
ONEOME COMMENT: NORMAL
ONEOME METHOD: NORMAL

## 2024-04-09 ENCOUNTER — OFFICE VISIT (OUTPATIENT)
Dept: HEMATOLOGY/ONCOLOGY | Facility: CLINIC | Age: 69
End: 2024-04-09
Payer: MEDICARE

## 2024-04-09 ENCOUNTER — PATIENT MESSAGE (OUTPATIENT)
Dept: HEMATOLOGY/ONCOLOGY | Facility: CLINIC | Age: 69
End: 2024-04-09

## 2024-04-09 ENCOUNTER — LAB VISIT (OUTPATIENT)
Dept: LAB | Facility: HOSPITAL | Age: 69
End: 2024-04-09
Payer: MEDICARE

## 2024-04-09 VITALS
BODY MASS INDEX: 22.66 KG/M2 | HEIGHT: 66 IN | SYSTOLIC BLOOD PRESSURE: 133 MMHG | HEART RATE: 103 BPM | TEMPERATURE: 98 F | RESPIRATION RATE: 20 BRPM | OXYGEN SATURATION: 98 % | DIASTOLIC BLOOD PRESSURE: 85 MMHG | WEIGHT: 141 LBS

## 2024-04-09 DIAGNOSIS — C18.9 ADENOCARCINOMA OF COLON: Primary | ICD-10-CM

## 2024-04-09 DIAGNOSIS — C78.6 MALIGNANT NEOPLASM METASTATIC TO PERITONEUM: ICD-10-CM

## 2024-04-09 DIAGNOSIS — Z79.899 IMMUNODEFICIENCY DUE TO CHEMOTHERAPY: ICD-10-CM

## 2024-04-09 DIAGNOSIS — R11.2 CHEMOTHERAPY INDUCED NAUSEA AND VOMITING: ICD-10-CM

## 2024-04-09 DIAGNOSIS — C18.9 ADENOCARCINOMA OF COLON: ICD-10-CM

## 2024-04-09 DIAGNOSIS — T45.1X5A CHEMOTHERAPY INDUCED NAUSEA AND VOMITING: ICD-10-CM

## 2024-04-09 DIAGNOSIS — E87.6 HYPOKALEMIA: ICD-10-CM

## 2024-04-09 DIAGNOSIS — C78.89 SECONDARY MALIGNANT NEOPLASM OF OTHER DIGESTIVE ORGANS: ICD-10-CM

## 2024-04-09 DIAGNOSIS — C18.2 MALIGNANT NEOPLASM OF ASCENDING COLON: ICD-10-CM

## 2024-04-09 DIAGNOSIS — D84.821 IMMUNODEFICIENCY DUE TO CHEMOTHERAPY: ICD-10-CM

## 2024-04-09 DIAGNOSIS — T45.1X5A IMMUNODEFICIENCY DUE TO CHEMOTHERAPY: ICD-10-CM

## 2024-04-09 LAB
ALBUMIN SERPL-MCNC: 3.2 G/DL (ref 3.4–4.8)
ALBUMIN/GLOB SERPL: 0.7 RATIO (ref 1.1–2)
ALP SERPL-CCNC: 100 UNIT/L (ref 40–150)
ALT SERPL-CCNC: 34 UNIT/L (ref 0–55)
AST SERPL-CCNC: 20 UNIT/L (ref 5–34)
BASOPHILS # BLD AUTO: 0.01 X10(3)/MCL
BASOPHILS NFR BLD AUTO: 0.2 %
BILIRUB SERPL-MCNC: 0.7 MG/DL
BUN SERPL-MCNC: 9 MG/DL (ref 9.8–20.1)
CALCIUM SERPL-MCNC: 9.6 MG/DL (ref 8.4–10.2)
CEA SERPL-MCNC: 38.19 NG/ML (ref 0–3)
CHLORIDE SERPL-SCNC: 113 MMOL/L (ref 98–107)
CO2 SERPL-SCNC: 20 MMOL/L (ref 23–31)
CREAT SERPL-MCNC: 0.87 MG/DL (ref 0.55–1.02)
EOSINOPHIL # BLD AUTO: 0.19 X10(3)/MCL (ref 0–0.9)
EOSINOPHIL NFR BLD AUTO: 3.8 %
ERYTHROCYTE [DISTWIDTH] IN BLOOD BY AUTOMATED COUNT: 15.3 % (ref 11.5–17)
GFR SERPLBLD CREATININE-BSD FMLA CKD-EPI: >60 MLS/MIN/1.73/M2
GLOBULIN SER-MCNC: 4.9 GM/DL (ref 2.4–3.5)
GLUCOSE SERPL-MCNC: 88 MG/DL (ref 82–115)
HCT VFR BLD AUTO: 34.6 % (ref 37–47)
HGB BLD-MCNC: 11.1 G/DL (ref 12–16)
IMM GRANULOCYTES # BLD AUTO: 0.01 X10(3)/MCL (ref 0–0.04)
IMM GRANULOCYTES NFR BLD AUTO: 0.2 %
LYMPHOCYTES # BLD AUTO: 1.81 X10(3)/MCL (ref 0.6–4.6)
LYMPHOCYTES NFR BLD AUTO: 35.8 %
MAGNESIUM SERPL-MCNC: 1.7 MG/DL (ref 1.6–2.6)
MCH RBC QN AUTO: 28.1 PG (ref 27–31)
MCHC RBC AUTO-ENTMCNC: 32.1 G/DL (ref 33–36)
MCV RBC AUTO: 87.6 FL (ref 80–94)
MONOCYTES # BLD AUTO: 0.25 X10(3)/MCL (ref 0.1–1.3)
MONOCYTES NFR BLD AUTO: 4.9 %
NEUTROPHILS # BLD AUTO: 2.79 X10(3)/MCL (ref 2.1–9.2)
NEUTROPHILS NFR BLD AUTO: 55.1 %
NRBC BLD AUTO-RTO: 0 %
PHOSPHATE SERPL-MCNC: 2.1 MG/DL (ref 2.3–4.7)
PLATELET # BLD AUTO: 179 X10(3)/MCL (ref 130–400)
PMV BLD AUTO: 10.9 FL (ref 7.4–10.4)
POTASSIUM SERPL-SCNC: 3.3 MMOL/L (ref 3.5–5.1)
PROT SERPL-MCNC: 8.1 GM/DL (ref 5.8–7.6)
RBC # BLD AUTO: 3.95 X10(6)/MCL (ref 4.2–5.4)
SODIUM SERPL-SCNC: 141 MMOL/L (ref 136–145)
WBC # SPEC AUTO: 5.06 X10(3)/MCL (ref 4.5–11.5)

## 2024-04-09 PROCEDURE — 99214 OFFICE O/P EST MOD 30 MIN: CPT | Mod: PBBFAC

## 2024-04-09 PROCEDURE — 82378 CARCINOEMBRYONIC ANTIGEN: CPT

## 2024-04-09 PROCEDURE — 83735 ASSAY OF MAGNESIUM: CPT

## 2024-04-09 PROCEDURE — 80053 COMPREHEN METABOLIC PANEL: CPT

## 2024-04-09 PROCEDURE — 36415 COLL VENOUS BLD VENIPUNCTURE: CPT

## 2024-04-09 PROCEDURE — 99215 OFFICE O/P EST HI 40 MIN: CPT | Mod: S$PBB,,,

## 2024-04-09 PROCEDURE — 85025 COMPLETE CBC W/AUTO DIFF WBC: CPT

## 2024-04-09 PROCEDURE — 84100 ASSAY OF PHOSPHORUS: CPT

## 2024-04-09 PROCEDURE — 99999 PR PBB SHADOW E&M-EST. PATIENT-LVL IV: CPT | Mod: PBBFAC,,,

## 2024-04-09 RX ORDER — POTASSIUM CHLORIDE 20 MEQ/1
20 TABLET, EXTENDED RELEASE ORAL DAILY
Qty: 30 TABLET | Refills: 3 | Status: SHIPPED | OUTPATIENT
Start: 2024-04-09 | End: 2025-04-09

## 2024-04-09 NOTE — PROGRESS NOTES
Subjective:       Patient ID: Selene Smallwood is a 68 y.o. female.    Chief Complaint: Follow Up    Diagnosis: Stage IV Metastatic Colon Adenocarcinoma with mets to peritoneum    Current Treatment:   FOLFOX + Cetuximab - 4/2/24 -     Treatment History: N/A    HPI:   67 yo F presented in March '24 for evaluation of metastatic colon cancer. She initially presented to OSH in early February '24 with 2 days of upper abdominal pain. Imaging on 2/4 in the ER revealed evidence of non perforated acute appendicitis. She underwent Ex lap where a cecal mass was incidentally discovered and resected, as well as omental studding was noted and 1 small mass was taken for biopsy. Her final pathology revealed a large exophytic mass in the cecum measuring 4 x 2 cm extending to the appendiceal orifice and extends into pericolonic adipose tissue. G2, LVI+, PNI negative. 14 LN were resected, with 10 being positive for metastatic adenocarcinoma. She also had an omental mass measuring 2.5 x 1.5cm which was positive for metastatic adenocarcinoma, consistent with a colon primary. Her final pathology staging is consistent with [id number]. Her postop course was complicated by abdominal abscess formation s/p IR drain placement and subsequent removal and antibiotic treatment.      She underwent PET scan in March '24 with evidence of nodular foci within the omentum/peritoneum consistent with peritoneal carcinomatosis. No other evidence of disease. NGS testing was performed which revealed GULSHAN wild type and BRAF negative. Therefore she will proceed with 1st line therapy of FOLFOX + Cetuximab Q2w x 12 cycles.     Interval History:  She returns to the clinic today for a one week tox check following C1 of FOLFOX + Cetuximab. She reports having increased fatigue and decreased appetite following her treatment. She has lost about six pounds in the last week. She did have nausea that was controlled well with nausea medication. She was unable to take her  zyprexa due to side effects from the medication. She did have occasional neuropathy to her fingertips that is intermittent and does not affect her ADLs. Labs reviewed in detail with her and are stable. Denies SOB, chest pain, fever, chills. No abnormal bowels.     History reviewed. No pertinent past medical history.   Past Surgical History:   Procedure Laterality Date    APPENDECTOMY N/A 2/5/2024    Procedure: APPENDECTOMY;  Surgeon: Anna Murillo MD;  Location: [facility] OR;  Service: General;  Laterality: N/A;    INSERTION OF TUNNELED CENTRAL VENOUS CATHETER (CVC) WITH SUBCUTANEOUS PORT N/A 3/26/2024    Procedure: [id number]-CATH;  Surgeon: Armando Tran MD;  Location: [facility] OR;  Service: Peripheral Vascular;  Laterality: N/A;    LAPAROSCOPIC APPENDECTOMY N/A 2/5/2024    Procedure: APPENDECTOMY, LAPAROSCOPIC;  Surgeon: Anna Murillo MD;  Location: [facility] OR;  Service: General;  Laterality: N/A;    SURGICAL REMOVAL OF ILEUM WITH CECUM N/A 2/5/2024    Procedure: EXCISION, CECUM WITH ILEUM;  Surgeon: Anna Murillo MD;  Location: [facility] OR;  Service: General;  Laterality: N/A;    WASHOUT N/A 2/5/2024    Procedure: WASHOUT;  Surgeon: Anna Murillo MD;  Location: [facility] OR;  Service: General;  Laterality: N/A;     Social History     Socioeconomic History    Marital status:    Tobacco Use    Smoking status: Former     Types: Cigarettes    Smokeless tobacco: Never     Social Determinants of Health     Financial Resource Strain: Low Risk  (2/15/2024)    Overall Financial Resource Strain (CARDIA)     Difficulty of Paying Living Expenses: Not hard at all   Food Insecurity: No Food Insecurity (2/15/2024)    Hunger Vital Sign     Worried About Running Out of Food in the Last Year: Never true     Ran Out of Food in the Last Year: Never true   Transportation Needs: No Transportation Needs (2/15/2024)    PRAPARE - Transportation     Lack of Transportation (Medical): No     Lack of Transportation (Non-Medical):  No   Physical Activity: Unknown (2/15/2024)    Exercise Vital Sign     Days of Exercise per Week: 0 days     Minutes of Exercise per Session: Patient unable to answer   Stress: No Stress Concern Present (2/15/2024)    [employer] - Occupational Stress Questionnaire     Feeling of Stress : Not at all   Social Connections: Unknown (2/15/2024)    Social Connection and Isolation Panel [NHANES]     Frequency of Communication with Friends and Family: More than three times a week     Frequency of Social Gatherings with Friends and Family: More than three times a week     Marital Status:    Housing Stability: Low Risk  (2/15/2024)    Housing Stability Vital Sign     Unable to Pay for Housing in the Last Year: No     Number of Places Lived in the Last Year: 1     Unstable Housing in the Last Year: No      History reviewed. No pertinent family history.   Review of patient's allergies indicates:   Allergen Reactions    Robaxin [methocarbamol] Other (See Comments)     Altered mental status       Review of Systems   Constitutional:  Negative for activity change, fever and unexpected weight change.   HENT:  Negative for sore throat.    Eyes:  Negative for visual disturbance.   Respiratory:  Negative for cough and shortness of breath.    Cardiovascular:  Negative for chest pain.   Gastrointestinal:  Negative for abdominal pain, diarrhea, nausea and vomiting.   Endocrine: Negative for polyuria.   Genitourinary:  Negative for dysuria and hot flashes.   Integumentary:  Negative for rash.   Neurological:  Negative for weakness and headaches.   Hematological:  Negative for adenopathy.   Psychiatric/Behavioral:  Negative for confusion.          Objective:      Vitals:    04/09/24 1339   BP: 133/85   Pulse: 103   Resp: 20   Temp: 97.5 °F (36.4 °C)           Physical Exam  Constitutional:       General: She is not in acute distress.     Appearance: Normal appearance. She is not ill-appearing.   HENT:       Head: Normocephalic and atraumatic.      Nose: Nose normal.      Mouth/Throat:      Mouth: Mucous membranes are moist.      Pharynx: Oropharynx is clear.   Eyes:      Extraocular Movements: Extraocular movements intact.      Conjunctiva/sclera: Conjunctivae normal.      Pupils: Pupils are equal, round, and reactive to light.   Cardiovascular:      Rate and Rhythm: Normal rate and regular rhythm.      Pulses: Normal pulses.      Heart sounds: Normal heart sounds. No murmur heard.  Pulmonary:      Effort: Pulmonary effort is normal. No respiratory distress.      Breath sounds: Normal breath sounds.   Abdominal:      General: There is no distension.      Palpations: Abdomen is soft.      Tenderness: There is no abdominal tenderness.   Musculoskeletal:         General: Normal range of motion.      Cervical back: Normal range of motion and neck supple.      Right lower leg: No edema.      Left lower leg: No edema.   Lymphadenopathy:      Cervical: No cervical adenopathy.   Skin:     General: Skin is warm and dry.   Neurological:      General: No focal deficit present.      Mental Status: She is alert and oriented to person, place, and time.         LABS AND IMAGING REVIEWED IN EPIC    IMAGIN24 CT C/A/P:  Impression:  1. Small to moderate bilateral pleural effusions.  2. Right upper lobe ground-glass could be infectious/inflammatory or relate to asymmetric edema.  3. Interval appendectomy with fluid collections along the surgical bed suspicious for developing abscesses.  24 Abdominal US:  Impression:  Cyst in the right lobe of the liver.  Elongated gallbladder with no definite gallstones minimal amount of fluid seen adjacent to the fundus.  Nephrolithiasis of the right kidney with a cyst of the right kidney minimal fullness of the collecting system.  Free fluid as described above  24 CT Abd/Pelvis:  Impression:  Dilated appendix with periappendiceal inflammatory changes,, compatible with non perforated  acute appendicitis.  No fluid collection or abscess.  No free air.  Small amount of free fluid the pelvis.  Findings were communicated to Dr. Chavez at 13:56 02/04/2024  Mild atelectatic changes and ground-glass opacities in the lower lungs suspicious for infectious/inflammatory process.  3/15/24 PET/CT:  1. Nodular foci of hypermetabolic soft tissue thickening within the mesentery most consistent with peritoneal carcinomatosis.  2. Hypermetabolic nodule in the uterine fundus which is indeterminate. Pelvic ultrasound may be beneficial.       PATHOLOGY:  2/5/24 Biopsy:  Final Diagnosis  1. Appendix, appendectomy:  - Acute appendicitis with perforation.  - Adenocarcinoma involves lymphatic spaces of the appendiceal wall and peritoneal surface.  2. Cecum, ileocecectomy:  - Colonic adenocarcinoma.  - See synoptic checklist for CAP cancer protocol.  3. Omental mass, biopsy:  - Metastatic adenocarcinoma, consistent with a colon primary.  4. Small bowel, segmental resection:  - Acute peritonitis.  - No evidence of malignancy.    MLH1: PRESERVED (INTACT) EXPRESSION IN TUMOR CELLS      MSH2: PRESERVED (INTACT) EXPRESSION IN TUMOR CELLS      MSH6: PRESERVED (INTACT) EXPRESSION IN TUMOR CELLS      PMS2: PRESERVED (INTACT) EXPRESSION IN TUMOR CELLS  Microscopic Description/Comments   These results show no deficiency of the mismatch repair proteins tested.    Assessment:   Stage IV Metastatic Colon Adenocarcinoma with mets to peritoneum - Cecal mass, nonobstructive, no perforation. [id number].   Plan for 1st line chemotherapy with FOLFOX + Cetuximab. Will remove bolus 5FU in metastatic setting.         Plan:   Labs stable.  Potassium 20meq sent to pharmacy.  Nausea meds as needed.   Encouraged hydration and small frequent meals with ensures as tolerated.  RTC in 1 week with NP for FU/lab/treat C2        I spent a total of 40 minutes on the day of the visit.This includes face to face time and non-face to face time preparing to see  the patient (eg, review of tests), obtaining and/or reviewing separately obtained history, documenting clinical information in the electronic or other health record, independently interpreting results and communicating results to the patient/family/caregiver, or care coordinator.    GIFTY Morales-C  Oncology/Hematology   [facility]

## 2024-04-10 ENCOUNTER — DOCUMENTATION ONLY (OUTPATIENT)
Dept: HEMATOLOGY/ONCOLOGY | Facility: CLINIC | Age: 69
End: 2024-04-10
Payer: MEDICARE

## 2024-04-10 NOTE — NURSING
Spoke to patient's daughter, April. Inquiring about help at home/sitter services. Selene has Medicare only. Suggested she apply for Medicaid. She stated they applied in the past and makes too much over the limit. Suggested she reach out to Renetta Grover RN Navigator in [place] to assist with services.

## 2024-04-16 ENCOUNTER — INFUSION (OUTPATIENT)
Dept: INFUSION THERAPY | Facility: HOSPITAL | Age: 69
End: 2024-04-16
Payer: MEDICARE

## 2024-04-16 ENCOUNTER — OFFICE VISIT (OUTPATIENT)
Dept: HEMATOLOGY/ONCOLOGY | Facility: CLINIC | Age: 69
End: 2024-04-16
Payer: MEDICARE

## 2024-04-16 VITALS
RESPIRATION RATE: 18 BRPM | OXYGEN SATURATION: 100 % | DIASTOLIC BLOOD PRESSURE: 83 MMHG | HEIGHT: 66 IN | TEMPERATURE: 98 F | WEIGHT: 140 LBS | HEART RATE: 109 BPM | BODY MASS INDEX: 22.5 KG/M2 | SYSTOLIC BLOOD PRESSURE: 127 MMHG

## 2024-04-16 DIAGNOSIS — C78.6 MALIGNANT NEOPLASM METASTATIC TO PERITONEUM: ICD-10-CM

## 2024-04-16 DIAGNOSIS — C18.2 MALIGNANT NEOPLASM OF ASCENDING COLON: Primary | ICD-10-CM

## 2024-04-16 DIAGNOSIS — C78.89 SECONDARY MALIGNANT NEOPLASM OF OTHER DIGESTIVE ORGANS: ICD-10-CM

## 2024-04-16 DIAGNOSIS — G62.0 CHEMOTHERAPY-INDUCED NEUROPATHY: ICD-10-CM

## 2024-04-16 DIAGNOSIS — E87.6 HYPOKALEMIA: ICD-10-CM

## 2024-04-16 DIAGNOSIS — C18.2 MALIGNANT NEOPLASM OF ASCENDING COLON: ICD-10-CM

## 2024-04-16 DIAGNOSIS — D84.821 IMMUNODEFICIENCY DUE TO CHEMOTHERAPY: ICD-10-CM

## 2024-04-16 DIAGNOSIS — Z79.899 IMMUNODEFICIENCY DUE TO CHEMOTHERAPY: ICD-10-CM

## 2024-04-16 DIAGNOSIS — T45.1X5A CHEMOTHERAPY INDUCED NAUSEA AND VOMITING: ICD-10-CM

## 2024-04-16 DIAGNOSIS — T45.1X5A IMMUNODEFICIENCY DUE TO CHEMOTHERAPY: ICD-10-CM

## 2024-04-16 DIAGNOSIS — T45.1X5A CHEMOTHERAPY-INDUCED NEUROPATHY: ICD-10-CM

## 2024-04-16 DIAGNOSIS — C18.9 ADENOCARCINOMA OF COLON: Primary | ICD-10-CM

## 2024-04-16 DIAGNOSIS — R11.2 CHEMOTHERAPY INDUCED NAUSEA AND VOMITING: ICD-10-CM

## 2024-04-16 PROCEDURE — 96415 CHEMO IV INFUSION ADDL HR: CPT

## 2024-04-16 PROCEDURE — 96416 CHEMO PROLONG INFUSE W/PUMP: CPT

## 2024-04-16 PROCEDURE — 99214 OFFICE O/P EST MOD 30 MIN: CPT | Mod: PBBFAC,25

## 2024-04-16 PROCEDURE — 96413 CHEMO IV INFUSION 1 HR: CPT

## 2024-04-16 PROCEDURE — 96375 TX/PRO/DX INJ NEW DRUG ADDON: CPT

## 2024-04-16 PROCEDURE — 96367 TX/PROPH/DG ADDL SEQ IV INF: CPT

## 2024-04-16 PROCEDURE — 96417 CHEMO IV INFUS EACH ADDL SEQ: CPT

## 2024-04-16 PROCEDURE — 99999 PR PBB SHADOW E&M-EST. PATIENT-LVL IV: CPT | Mod: PBBFAC,,,

## 2024-04-16 PROCEDURE — 96368 THER/DIAG CONCURRENT INF: CPT

## 2024-04-16 PROCEDURE — 99215 OFFICE O/P EST HI 40 MIN: CPT | Mod: S$PBB,,,

## 2024-04-16 PROCEDURE — 25000003 PHARM REV CODE 250

## 2024-04-16 PROCEDURE — 63600175 PHARM REV CODE 636 W HCPCS

## 2024-04-16 RX ORDER — DEXAMETHASONE 4 MG/1
8 TABLET ORAL DAILY
Qty: 16 TABLET | Refills: 3 | Status: SHIPPED | OUTPATIENT
Start: 2024-04-16 | End: 2025-04-16

## 2024-04-16 RX ORDER — DIPHENHYDRAMINE HYDROCHLORIDE 50 MG/ML
25 INJECTION INTRAMUSCULAR; INTRAVENOUS
Status: CANCELLED
Start: 2024-04-16

## 2024-04-16 RX ORDER — PROCHLORPERAZINE EDISYLATE 5 MG/ML
5 INJECTION INTRAMUSCULAR; INTRAVENOUS ONCE AS NEEDED
Status: DISCONTINUED | OUTPATIENT
Start: 2024-04-16 | End: 2024-04-16 | Stop reason: HOSPADM

## 2024-04-16 RX ORDER — PROCHLORPERAZINE EDISYLATE 5 MG/ML
5 INJECTION INTRAMUSCULAR; INTRAVENOUS ONCE AS NEEDED
Status: CANCELLED | OUTPATIENT
Start: 2024-04-16

## 2024-04-16 RX ORDER — SODIUM CHLORIDE 0.9 % (FLUSH) 0.9 %
10 SYRINGE (ML) INJECTION
Status: CANCELLED | OUTPATIENT
Start: 2024-04-16

## 2024-04-16 RX ORDER — EPINEPHRINE 0.3 MG/.3ML
0.3 INJECTION SUBCUTANEOUS ONCE AS NEEDED
Status: CANCELLED | OUTPATIENT
Start: 2024-04-16

## 2024-04-16 RX ORDER — DIPHENHYDRAMINE HYDROCHLORIDE 50 MG/ML
25 INJECTION INTRAMUSCULAR; INTRAVENOUS
Status: COMPLETED | OUTPATIENT
Start: 2024-04-16 | End: 2024-04-16

## 2024-04-16 RX ORDER — SODIUM CHLORIDE 0.9 % (FLUSH) 0.9 %
10 SYRINGE (ML) INJECTION
Status: CANCELLED | OUTPATIENT
Start: 2024-04-18

## 2024-04-16 RX ORDER — HEPARIN 100 UNIT/ML
500 SYRINGE INTRAVENOUS
Status: CANCELLED | OUTPATIENT
Start: 2024-04-18

## 2024-04-16 RX ORDER — HEPARIN 100 UNIT/ML
500 SYRINGE INTRAVENOUS
Status: CANCELLED | OUTPATIENT
Start: 2024-04-16

## 2024-04-16 RX ORDER — DIPHENHYDRAMINE HYDROCHLORIDE 50 MG/ML
50 INJECTION INTRAMUSCULAR; INTRAVENOUS ONCE AS NEEDED
Status: DISCONTINUED | OUTPATIENT
Start: 2024-04-16 | End: 2024-04-16 | Stop reason: HOSPADM

## 2024-04-16 RX ORDER — EPINEPHRINE 0.3 MG/.3ML
0.3 INJECTION SUBCUTANEOUS ONCE AS NEEDED
Status: DISCONTINUED | OUTPATIENT
Start: 2024-04-16 | End: 2024-04-16 | Stop reason: HOSPADM

## 2024-04-16 RX ORDER — HEPARIN 100 UNIT/ML
500 SYRINGE INTRAVENOUS
Status: DISCONTINUED | OUTPATIENT
Start: 2024-04-16 | End: 2024-04-16 | Stop reason: HOSPADM

## 2024-04-16 RX ORDER — PROCHLORPERAZINE EDISYLATE 5 MG/ML
5 INJECTION INTRAMUSCULAR; INTRAVENOUS ONCE AS NEEDED
Status: CANCELLED | OUTPATIENT
Start: 2024-04-18

## 2024-04-16 RX ORDER — SODIUM CHLORIDE 0.9 % (FLUSH) 0.9 %
10 SYRINGE (ML) INJECTION
Status: DISCONTINUED | OUTPATIENT
Start: 2024-04-16 | End: 2024-04-16 | Stop reason: HOSPADM

## 2024-04-16 RX ORDER — DIPHENHYDRAMINE HYDROCHLORIDE 50 MG/ML
50 INJECTION INTRAMUSCULAR; INTRAVENOUS ONCE AS NEEDED
Status: CANCELLED | OUTPATIENT
Start: 2024-04-16

## 2024-04-16 RX ADMIN — CETUXIMAB 900 MG: 2 SOLUTION INTRAVENOUS at 11:04

## 2024-04-16 RX ADMIN — FLUOROURACIL 4320 MG: 50 INJECTION, SOLUTION INTRAVENOUS at 03:04

## 2024-04-16 RX ADMIN — DEXAMETHASONE SODIUM PHOSPHATE 0.25 MG: 10 INJECTION, SOLUTION INTRAMUSCULAR; INTRAVENOUS at 10:04

## 2024-04-16 RX ADMIN — LEUCOVORIN CALCIUM 720 MG: 350 INJECTION, POWDER, LYOPHILIZED, FOR SOLUTION INTRAMUSCULAR; INTRAVENOUS at 12:04

## 2024-04-16 RX ADMIN — DIPHENHYDRAMINE HYDROCHLORIDE 25 MG: 50 INJECTION, SOLUTION INTRAMUSCULAR; INTRAVENOUS at 10:04

## 2024-04-16 RX ADMIN — APREPITANT 130 MG: 130 INJECTION, EMULSION INTRAVENOUS at 10:04

## 2024-04-16 RX ADMIN — OXALIPLATIN 150 MG: 5 INJECTION, SOLUTION INTRAVENOUS at 01:04

## 2024-04-16 NOTE — PROGRESS NOTES
Subjective:       Patient ID: Selene Smallwood is a 68 y.o. female.    Chief Complaint: Follow Up    Diagnosis: Stage IV Metastatic Colon Adenocarcinoma with mets to peritoneum    Current Treatment:   FOLFOX + Cetuximab - 4/2/24 -     Treatment History: N/A    HPI:   67 yo F presented in March '24 for evaluation of metastatic colon cancer. She initially presented to OSH in early February '24 with 2 days of upper abdominal pain. Imaging on 2/4 in the ER revealed evidence of non perforated acute appendicitis. She underwent Ex lap where a cecal mass was incidentally discovered and resected, as well as omental studding was noted and 1 small mass was taken for biopsy. Her final pathology revealed a large exophytic mass in the cecum measuring 4 x 2 cm extending to the appendiceal orifice and extends into pericolonic adipose tissue. G2, LVI+, PNI negative. 14 LN were resected, with 10 being positive for metastatic adenocarcinoma. She also had an omental mass measuring 2.5 x 1.5cm which was positive for metastatic adenocarcinoma, consistent with a colon primary. Her final pathology staging is consistent with [id number]. Her postop course was complicated by abdominal abscess formation s/p IR drain placement and subsequent removal and antibiotic treatment.      She underwent PET scan in March '24 with evidence of nodular foci within the omentum/peritoneum consistent with peritoneal carcinomatosis. No other evidence of disease. NGS testing was performed which revealed GULSHAN wild type and BRAF negative. Therefore she will proceed with 1st line therapy of FOLFOX + Cetuximab Q2w x 12 cycles.     Interval History:  She returns to the clinic today for a follow-up and treatment clearance for C2 of FOLFOX + Cetuximab. She reports she has felt better over the past week and more like herself. She continues to have a decreased appetite, but weight has remained stable this week. Fatigue has improved. Nausea is controlled with current  regimen. Occasional neuropathy to her fingertips continues to be intermittent and does not affect her ADLs. Labs reviewed in detail with her and are stable. Denies SOB, chest pain, fever, chills. No abnormal bowels.     No past medical history on file.   Past Surgical History:   Procedure Laterality Date    APPENDECTOMY N/A 2/5/2024    Procedure: APPENDECTOMY;  Surgeon: Anna Murillo MD;  Location: [facility] OR;  Service: General;  Laterality: N/A;    INSERTION OF TUNNELED CENTRAL VENOUS CATHETER (CVC) WITH SUBCUTANEOUS PORT N/A 3/26/2024    Procedure: [id number];  Surgeon: Armando Tran MD;  Location: [facility] OR;  Service: Peripheral Vascular;  Laterality: N/A;    LAPAROSCOPIC APPENDECTOMY N/A 2/5/2024    Procedure: APPENDECTOMY, LAPAROSCOPIC;  Surgeon: Anna Murillo MD;  Location: [facility] OR;  Service: General;  Laterality: N/A;    SURGICAL REMOVAL OF ILEUM WITH CECUM N/A 2/5/2024    Procedure: EXCISION, CECUM WITH ILEUM;  Surgeon: Anna Muirllo MD;  Location: [facility] OR;  Service: General;  Laterality: N/A;    WASHOUT N/A 2/5/2024    Procedure: WASHOUT;  Surgeon: Anna Murillo MD;  Location: [facility] OR;  Service: General;  Laterality: N/A;     Social History     Socioeconomic History    Marital status:    Tobacco Use    Smoking status: Former     Types: Cigarettes    Smokeless tobacco: Never     Social Determinants of Health     Financial Resource Strain: Low Risk  (2/15/2024)    Overall Financial Resource Strain (CARDIA)     Difficulty of Paying Living Expenses: Not hard at all   Food Insecurity: No Food Insecurity (2/15/2024)    Hunger Vital Sign     Worried About Running Out of Food in the Last Year: Never true     Ran Out of Food in the Last Year: Never true   Transportation Needs: No Transportation Needs (2/15/2024)    PRAPARE - Transportation     Lack of Transportation (Medical): No     Lack of Transportation (Non-Medical): No   Physical Activity: Unknown (2/15/2024)    Exercise Vital  Sign     Days of Exercise per Week: 0 days     Minutes of Exercise per Session: Patient unable to answer   Stress: No Stress Concern Present (2/15/2024)    [employer] - Occupational Stress Questionnaire     Feeling of Stress : Not at all   Social Connections: Unknown (2/15/2024)    Social Connection and Isolation Panel [NHANES]     Frequency of Communication with Friends and Family: More than three times a week     Frequency of Social Gatherings with Friends and Family: More than three times a week     Marital Status:    Housing Stability: Low Risk  (2/15/2024)    Housing Stability Vital Sign     Unable to Pay for Housing in the Last Year: No     Number of Places Lived in the Last Year: 1     Unstable Housing in the Last Year: No      No family history on file.   Review of patient's allergies indicates:   Allergen Reactions    Robaxin [methocarbamol] Other (See Comments)     Altered mental status       Review of Systems   Constitutional:  Negative for activity change, fever and unexpected weight change.   HENT:  Negative for sore throat.    Eyes:  Negative for visual disturbance.   Respiratory:  Negative for cough and shortness of breath.    Cardiovascular:  Negative for chest pain.   Gastrointestinal:  Negative for abdominal pain, diarrhea, nausea and vomiting.   Endocrine: Negative for polyuria.   Genitourinary:  Negative for dysuria and hot flashes.   Integumentary:  Negative for rash.   Neurological:  Negative for weakness and headaches.   Hematological:  Negative for adenopathy.   Psychiatric/Behavioral:  Negative for confusion.          Objective:      Vitals:    04/16/24 0920   BP: 127/83   Pulse: 109   Resp: 18   Temp: 97.6 °F (36.4 °C)             Physical Exam  Constitutional:       General: She is not in acute distress.     Appearance: Normal appearance. She is not ill-appearing.   HENT:      Head: Normocephalic and atraumatic.      Nose: Nose normal.       Mouth/Throat:      Mouth: Mucous membranes are moist.      Pharynx: Oropharynx is clear.   Eyes:      Extraocular Movements: Extraocular movements intact.      Conjunctiva/sclera: Conjunctivae normal.      Pupils: Pupils are equal, round, and reactive to light.   Cardiovascular:      Rate and Rhythm: Normal rate and regular rhythm.      Pulses: Normal pulses.      Heart sounds: Normal heart sounds. No murmur heard.  Pulmonary:      Effort: Pulmonary effort is normal. No respiratory distress.      Breath sounds: Normal breath sounds.   Abdominal:      General: There is no distension.      Palpations: Abdomen is soft.      Tenderness: There is no abdominal tenderness.   Musculoskeletal:         General: Normal range of motion.      Cervical back: Normal range of motion and neck supple.      Right lower leg: No edema.      Left lower leg: No edema.   Lymphadenopathy:      Cervical: No cervical adenopathy.   Skin:     General: Skin is warm and dry.   Neurological:      General: No focal deficit present.      Mental Status: She is alert and oriented to person, place, and time.         LABS AND IMAGING REVIEWED IN EPIC    IMAGIN24 CT C/A/P:  Impression:  1. Small to moderate bilateral pleural effusions.  2. Right upper lobe ground-glass could be infectious/inflammatory or relate to asymmetric edema.  3. Interval appendectomy with fluid collections along the surgical bed suspicious for developing abscesses.  24 Abdominal US:  Impression:  Cyst in the right lobe of the liver.  Elongated gallbladder with no definite gallstones minimal amount of fluid seen adjacent to the fundus.  Nephrolithiasis of the right kidney with a cyst of the right kidney minimal fullness of the collecting system.  Free fluid as described above  24 CT Abd/Pelvis:  Impression:  Dilated appendix with periappendiceal inflammatory changes,, compatible with non perforated acute appendicitis.  No fluid collection or abscess.  No free  air.  Small amount of free fluid the pelvis.  Findings were communicated to Dr. Chavez at 13:56 02/04/2024  Mild atelectatic changes and ground-glass opacities in the lower lungs suspicious for infectious/inflammatory process.  3/15/24 PET/CT:  1. Nodular foci of hypermetabolic soft tissue thickening within the mesentery most consistent with peritoneal carcinomatosis.  2. Hypermetabolic nodule in the uterine fundus which is indeterminate. Pelvic ultrasound may be beneficial.       PATHOLOGY:  2/5/24 Biopsy:  Final Diagnosis  1. Appendix, appendectomy:  - Acute appendicitis with perforation.  - Adenocarcinoma involves lymphatic spaces of the appendiceal wall and peritoneal surface.  2. Cecum, ileocecectomy:  - Colonic adenocarcinoma.  - See synoptic checklist for CAP cancer protocol.  3. Omental mass, biopsy:  - Metastatic adenocarcinoma, consistent with a colon primary.  4. Small bowel, segmental resection:  - Acute peritonitis.  - No evidence of malignancy.    MLH1: PRESERVED (INTACT) EXPRESSION IN TUMOR CELLS      MSH2: PRESERVED (INTACT) EXPRESSION IN TUMOR CELLS      MSH6: PRESERVED (INTACT) EXPRESSION IN TUMOR CELLS      PMS2: PRESERVED (INTACT) EXPRESSION IN TUMOR CELLS  Microscopic Description/Comments   These results show no deficiency of the mismatch repair proteins tested.    Assessment:   Stage IV Metastatic Colon Adenocarcinoma with mets to peritoneum - Cecal mass, nonobstructive, no perforation. [id number].   Plan for 1st line chemotherapy with FOLFOX + Cetuximab. Will remove bolus 5FU in metastatic setting.         Plan:   Labs stable and adequate for treatment.  Continue with C2 FOLFOX + Cetuximab as scheduled.  Cinvanti added to regimen.  Dexamethasone 8mg days 2 and 3 of chemo sent to pharmacy.  IV fluids on day 3 with pump disconnect.   Continue potassium 20meq daily.   Encouraged hydration and small frequent meals with ensures as tolerated.  RTC in 2 week with NP for FU/lab/treat C3        I spent a  total of 40 minutes on the day of the visit.This includes face to face time and non-face to face time preparing to see the patient (eg, review of tests), obtaining and/or reviewing separately obtained history, documenting clinical information in the electronic or other health record, independently interpreting results and communicating results to the patient/family/caregiver, or care coordinator.    GIFTY Morales-C  Oncology/Hematology   [facility]

## 2024-04-18 ENCOUNTER — INFUSION (OUTPATIENT)
Dept: INFUSION THERAPY | Facility: HOSPITAL | Age: 69
End: 2024-04-18
Attending: NURSE PRACTITIONER
Payer: MEDICARE

## 2024-04-18 VITALS
HEART RATE: 90 BPM | TEMPERATURE: 98 F | RESPIRATION RATE: 18 BRPM | OXYGEN SATURATION: 94 % | SYSTOLIC BLOOD PRESSURE: 131 MMHG | DIASTOLIC BLOOD PRESSURE: 89 MMHG

## 2024-04-18 DIAGNOSIS — C18.2 MALIGNANT NEOPLASM OF ASCENDING COLON: Primary | ICD-10-CM

## 2024-04-18 PROCEDURE — 25000003 PHARM REV CODE 250

## 2024-04-18 PROCEDURE — 63600175 PHARM REV CODE 636 W HCPCS

## 2024-04-18 PROCEDURE — 96360 HYDRATION IV INFUSION INIT: CPT

## 2024-04-18 PROCEDURE — 96361 HYDRATE IV INFUSION ADD-ON: CPT

## 2024-04-18 RX ORDER — HEPARIN 100 UNIT/ML
500 SYRINGE INTRAVENOUS
Status: DISCONTINUED | OUTPATIENT
Start: 2024-04-18 | End: 2024-04-18 | Stop reason: HOSPADM

## 2024-04-18 RX ORDER — SODIUM CHLORIDE 0.9 % (FLUSH) 0.9 %
10 SYRINGE (ML) INJECTION
Status: DISCONTINUED | OUTPATIENT
Start: 2024-04-18 | End: 2024-04-18 | Stop reason: HOSPADM

## 2024-04-18 RX ORDER — PROCHLORPERAZINE EDISYLATE 5 MG/ML
5 INJECTION INTRAMUSCULAR; INTRAVENOUS ONCE AS NEEDED
Status: DISCONTINUED | OUTPATIENT
Start: 2024-04-18 | End: 2024-04-18 | Stop reason: HOSPADM

## 2024-04-18 RX ADMIN — SODIUM CHLORIDE 1000 ML: 9 INJECTION, SOLUTION INTRAVENOUS at 12:04

## 2024-04-18 RX ADMIN — Medication 500 UNITS: at 02:04

## 2024-04-29 NOTE — PROGRESS NOTES
Subjective:       Patient ID: Selene Smallwood is a 68 y.o. female.    Chief Complaint: Follow Up    Diagnosis: Stage IV Metastatic Colon Adenocarcinoma with mets to peritoneum    Current Treatment:   FOLFOX + Cetuximab - 4/2/24 -     Treatment History: N/A    HPI:   67 yo F presented in March '24 for evaluation of metastatic colon cancer. She initially presented to OSH in early February '24 with 2 days of upper abdominal pain. Imaging on 2/4 in the ER revealed evidence of non perforated acute appendicitis. She underwent Ex lap where a cecal mass was incidentally discovered and resected, as well as omental studding was noted and 1 small mass was taken for biopsy. Her final pathology revealed a large exophytic mass in the cecum measuring 4 x 2 cm extending to the appendiceal orifice and extends into pericolonic adipose tissue. G2, LVI+, PNI negative. 14 LN were resected, with 10 being positive for metastatic adenocarcinoma. She also had an omental mass measuring 2.5 x 1.5cm which was positive for metastatic adenocarcinoma, consistent with a colon primary. Her final pathology staging is consistent with [id number]. Her postop course was complicated by abdominal abscess formation s/p IR drain placement and subsequent removal and antibiotic treatment.      She underwent PET scan in March '24 with evidence of nodular foci within the omentum/peritoneum consistent with peritoneal carcinomatosis. No other evidence of disease. NGS testing was performed which revealed GULSHAN wild type and BRAF negative. Therefore she will proceed with 1st line therapy of FOLFOX + Cetuximab Q2w x 12 cycles.     Interval History:  She returns to the clinic today for a follow-up and treatment clearance for C3 of FOLFOX + Cetuximab. She reports she has felt better over the past week and more like herself. She continues to have a decreased appetite, but weight has remained stable this week. Fatigue has improved. She reports increased nausea, but has  not been taking her nausea meds correctly. Occasional neuropathy to her fingertips continues to be intermittent and stable today. Labs reviewed in detail with her and her daughter. Denies SOB, chest pain, fever, chills. No abnormal bowels.     No past medical history on file.   Past Surgical History:   Procedure Laterality Date    APPENDECTOMY N/A 2/5/2024    Procedure: APPENDECTOMY;  Surgeon: Anna Murillo MD;  Location: [facility] OR;  Service: General;  Laterality: N/A;    INSERTION OF TUNNELED CENTRAL VENOUS CATHETER (CVC) WITH SUBCUTANEOUS PORT N/A 3/26/2024    Procedure: [id number];  Surgeon: Armando Tran MD;  Location: [facility] OR;  Service: Peripheral Vascular;  Laterality: N/A;    LAPAROSCOPIC APPENDECTOMY N/A 2/5/2024    Procedure: APPENDECTOMY, LAPAROSCOPIC;  Surgeon: Anna Murillo MD;  Location: [facility] OR;  Service: General;  Laterality: N/A;    SURGICAL REMOVAL OF ILEUM WITH CECUM N/A 2/5/2024    Procedure: EXCISION, CECUM WITH ILEUM;  Surgeon: Anna Murillo MD;  Location: [facility] OR;  Service: General;  Laterality: N/A;    WASHOUT N/A 2/5/2024    Procedure: WASHOUT;  Surgeon: Anna Murillo MD;  Location: [facility] OR;  Service: General;  Laterality: N/A;     Social History     Socioeconomic History    Marital status:    Tobacco Use    Smoking status: Former     Types: Cigarettes    Smokeless tobacco: Never     Social Determinants of Health     Financial Resource Strain: Low Risk  (2/15/2024)    Overall Financial Resource Strain (CARDIA)     Difficulty of Paying Living Expenses: Not hard at all   Food Insecurity: No Food Insecurity (2/15/2024)    Hunger Vital Sign     Worried About Running Out of Food in the Last Year: Never true     Ran Out of Food in the Last Year: Never true   Transportation Needs: No Transportation Needs (2/15/2024)    PRAPARE - Transportation     Lack of Transportation (Medical): No     Lack of Transportation (Non-Medical): No   Physical Activity: Unknown  (2/15/2024)    Exercise Vital Sign     Days of Exercise per Week: 0 days     Minutes of Exercise per Session: Patient unable to answer   Stress: No Stress Concern Present (2/15/2024)    Citizen of [place] [employer] - Occupational Stress Questionnaire     Feeling of Stress : Not at all   Housing Stability: Low Risk  (2/15/2024)    Housing Stability Vital Sign     Unable to Pay for Housing in the Last Year: No     Number of Places Lived in the Last Year: 1     Unstable Housing in the Last Year: No      No family history on file.   Review of patient's allergies indicates:   Allergen Reactions    Robaxin [methocarbamol] Other (See Comments)     Altered mental status       Review of Systems   Constitutional:  Negative for activity change, fever and unexpected weight change.   HENT:  Negative for sore throat.    Eyes:  Negative for visual disturbance.   Respiratory:  Negative for cough and shortness of breath.    Cardiovascular:  Negative for chest pain.   Gastrointestinal:  Negative for abdominal pain, diarrhea, nausea and vomiting.   Endocrine: Negative for polyuria.   Genitourinary:  Negative for dysuria and hot flashes.   Integumentary:  Negative for rash.   Neurological:  Negative for weakness and headaches.   Hematological:  Negative for adenopathy.   Psychiatric/Behavioral:  Negative for confusion.          Objective:      Vitals:    04/30/24 0930   BP: 121/81   Pulse: 107   Resp: 18   Temp: 97.4 °F (36.3 °C)               Physical Exam  Constitutional:       General: She is not in acute distress.     Appearance: Normal appearance. She is not ill-appearing.   HENT:      Head: Normocephalic and atraumatic.      Nose: Nose normal.      Mouth/Throat:      Mouth: Mucous membranes are moist.      Pharynx: Oropharynx is clear.   Eyes:      Extraocular Movements: Extraocular movements intact.      Conjunctiva/sclera: Conjunctivae normal.      Pupils: Pupils are equal, round, and reactive to light.    Cardiovascular:      Rate and Rhythm: Normal rate and regular rhythm.      Pulses: Normal pulses.      Heart sounds: Normal heart sounds. No murmur heard.  Pulmonary:      Effort: Pulmonary effort is normal. No respiratory distress.      Breath sounds: Normal breath sounds.   Abdominal:      General: There is no distension.      Palpations: Abdomen is soft.      Tenderness: There is no abdominal tenderness.   Musculoskeletal:         General: Normal range of motion.      Cervical back: Normal range of motion and neck supple.      Right lower leg: No edema.      Left lower leg: No edema.   Lymphadenopathy:      Cervical: No cervical adenopathy.   Skin:     General: Skin is warm and dry.   Neurological:      General: No focal deficit present.      Mental Status: She is alert and oriented to person, place, and time.         LABS AND IMAGING REVIEWED IN EPIC    IMAGIN24 CT C/A/P:  Impression:  1. Small to moderate bilateral pleural effusions.  2. Right upper lobe ground-glass could be infectious/inflammatory or relate to asymmetric edema.  3. Interval appendectomy with fluid collections along the surgical bed suspicious for developing abscesses.  24 Abdominal US:  Impression:  Cyst in the right lobe of the liver.  Elongated gallbladder with no definite gallstones minimal amount of fluid seen adjacent to the fundus.  Nephrolithiasis of the right kidney with a cyst of the right kidney minimal fullness of the collecting system.  Free fluid as described above  24 CT Abd/Pelvis:  Impression:  Dilated appendix with periappendiceal inflammatory changes,, compatible with non perforated acute appendicitis.  No fluid collection or abscess.  No free air.  Small amount of free fluid the pelvis.  Findings were communicated to Dr. Chavez at 13:56 2024  Mild atelectatic changes and ground-glass opacities in the lower lungs suspicious for infectious/inflammatory process.  3/15/24 PET/CT:  1. Nodular foci of  hypermetabolic soft tissue thickening within the mesentery most consistent with peritoneal carcinomatosis.  2. Hypermetabolic nodule in the uterine fundus which is indeterminate. Pelvic ultrasound may be beneficial.       PATHOLOGY:  2/5/24 Biopsy:  Final Diagnosis  1. Appendix, appendectomy:  - Acute appendicitis with perforation.  - Adenocarcinoma involves lymphatic spaces of the appendiceal wall and peritoneal surface.  2. Cecum, ileocecectomy:  - Colonic adenocarcinoma.  - See synoptic checklist for CAP cancer protocol.  3. Omental mass, biopsy:  - Metastatic adenocarcinoma, consistent with a colon primary.  4. Small bowel, segmental resection:  - Acute peritonitis.  - No evidence of malignancy.    MLH1: PRESERVED (INTACT) EXPRESSION IN TUMOR CELLS      MSH2: PRESERVED (INTACT) EXPRESSION IN TUMOR CELLS      MSH6: PRESERVED (INTACT) EXPRESSION IN TUMOR CELLS      PMS2: PRESERVED (INTACT) EXPRESSION IN TUMOR CELLS  Microscopic Description/Comments   These results show no deficiency of the mismatch repair proteins tested.    Assessment:   Stage IV Metastatic Colon Adenocarcinoma with mets to peritoneum - Cecal mass, nonobstructive, no perforation. [id number].   Plan for 1st line chemotherapy with FOLFOX + Cetuximab. Will remove bolus 5FU in metastatic setting.   Hold chemo today. Will plan for lab and treat in one week with addition of neulasta due to neutropenia        Plan:   Hold chemo today due to neutropenia.  Will plan for lab and treat in one week with addition of neulasta.  Claritin x 5 days following neulasta   Nausea regimen reviewed in detail with her and her daughter.  Continue potassium 20meq daily.   Encouraged hydration and small frequent meals with ensures as tolerated.  RTC in 3 weeks with NP for FU/lab/treat C4        I spent a total of 40 minutes on the day of the visit.This includes face to face time and non-face to face time preparing to see the patient (eg, review of tests), obtaining and/or  reviewing separately obtained history, documenting clinical information in the electronic or other health record, independently interpreting results and communicating results to the patient/family/caregiver, or care coordinator.    GIFTY Morales-C  Oncology/Hematology   [facility]

## 2024-04-30 ENCOUNTER — LAB VISIT (OUTPATIENT)
Dept: LAB | Facility: HOSPITAL | Age: 69
End: 2024-04-30
Payer: MEDICARE

## 2024-04-30 ENCOUNTER — OFFICE VISIT (OUTPATIENT)
Dept: HEMATOLOGY/ONCOLOGY | Facility: CLINIC | Age: 69
End: 2024-04-30
Payer: MEDICARE

## 2024-04-30 VITALS
HEART RATE: 107 BPM | OXYGEN SATURATION: 100 % | WEIGHT: 138.81 LBS | TEMPERATURE: 97 F | RESPIRATION RATE: 18 BRPM | DIASTOLIC BLOOD PRESSURE: 81 MMHG | HEIGHT: 66 IN | SYSTOLIC BLOOD PRESSURE: 121 MMHG | BODY MASS INDEX: 22.31 KG/M2

## 2024-04-30 DIAGNOSIS — T45.1X5A CHEMOTHERAPY INDUCED NEUTROPENIA: ICD-10-CM

## 2024-04-30 DIAGNOSIS — C18.2 MALIGNANT NEOPLASM OF ASCENDING COLON: ICD-10-CM

## 2024-04-30 DIAGNOSIS — T45.1X5A CHEMOTHERAPY INDUCED NAUSEA AND VOMITING: ICD-10-CM

## 2024-04-30 DIAGNOSIS — D84.821 IMMUNODEFICIENCY DUE TO CHEMOTHERAPY: ICD-10-CM

## 2024-04-30 DIAGNOSIS — C78.89 SECONDARY MALIGNANT NEOPLASM OF OTHER DIGESTIVE ORGANS: ICD-10-CM

## 2024-04-30 DIAGNOSIS — C78.6 MALIGNANT NEOPLASM METASTATIC TO PERITONEUM: ICD-10-CM

## 2024-04-30 DIAGNOSIS — T45.1X5A IMMUNODEFICIENCY DUE TO CHEMOTHERAPY: ICD-10-CM

## 2024-04-30 DIAGNOSIS — D70.1 CHEMOTHERAPY INDUCED NEUTROPENIA: ICD-10-CM

## 2024-04-30 DIAGNOSIS — Z79.899 IMMUNODEFICIENCY DUE TO CHEMOTHERAPY: ICD-10-CM

## 2024-04-30 DIAGNOSIS — T45.1X5A CHEMOTHERAPY-INDUCED NEUROPATHY: ICD-10-CM

## 2024-04-30 DIAGNOSIS — C18.9 ADENOCARCINOMA OF COLON: ICD-10-CM

## 2024-04-30 DIAGNOSIS — E87.6 HYPOKALEMIA: ICD-10-CM

## 2024-04-30 DIAGNOSIS — R11.2 CHEMOTHERAPY INDUCED NAUSEA AND VOMITING: ICD-10-CM

## 2024-04-30 DIAGNOSIS — C18.9 ADENOCARCINOMA OF COLON: Primary | ICD-10-CM

## 2024-04-30 DIAGNOSIS — G62.0 CHEMOTHERAPY-INDUCED NEUROPATHY: ICD-10-CM

## 2024-04-30 LAB
ALBUMIN SERPL-MCNC: 2.9 G/DL (ref 3.4–4.8)
ALBUMIN/GLOB SERPL: 0.6 RATIO (ref 1.1–2)
ALP SERPL-CCNC: 106 UNIT/L (ref 40–150)
ALT SERPL-CCNC: 49 UNIT/L (ref 0–55)
AST SERPL-CCNC: 33 UNIT/L (ref 5–34)
BASOPHILS # BLD AUTO: 0.02 X10(3)/MCL
BASOPHILS NFR BLD AUTO: 0.7 %
BILIRUB SERPL-MCNC: 0.4 MG/DL
BUN SERPL-MCNC: 5 MG/DL (ref 9.8–20.1)
CALCIUM SERPL-MCNC: 9.3 MG/DL (ref 8.4–10.2)
CEA SERPL-MCNC: 30.13 NG/ML (ref 0–3)
CHLORIDE SERPL-SCNC: 112 MMOL/L (ref 98–107)
CO2 SERPL-SCNC: 21 MMOL/L (ref 23–31)
CREAT SERPL-MCNC: 0.71 MG/DL (ref 0.55–1.02)
EOSINOPHIL # BLD AUTO: 0.1 X10(3)/MCL (ref 0–0.9)
EOSINOPHIL NFR BLD AUTO: 3.3 %
ERYTHROCYTE [DISTWIDTH] IN BLOOD BY AUTOMATED COUNT: 16.8 % (ref 11.5–17)
GFR SERPLBLD CREATININE-BSD FMLA CKD-EPI: >60 MLS/MIN/1.73/M2
GLOBULIN SER-MCNC: 4.7 GM/DL (ref 2.4–3.5)
GLUCOSE SERPL-MCNC: 84 MG/DL (ref 82–115)
HCT VFR BLD AUTO: 33.6 % (ref 37–47)
HGB BLD-MCNC: 10.7 G/DL (ref 12–16)
IMM GRANULOCYTES # BLD AUTO: 0 X10(3)/MCL (ref 0–0.04)
IMM GRANULOCYTES NFR BLD AUTO: 0 %
LYMPHOCYTES # BLD AUTO: 1.34 X10(3)/MCL (ref 0.6–4.6)
LYMPHOCYTES NFR BLD AUTO: 43.8 %
MAGNESIUM SERPL-MCNC: 1.8 MG/DL (ref 1.6–2.6)
MCH RBC QN AUTO: 28.7 PG (ref 27–31)
MCHC RBC AUTO-ENTMCNC: 31.8 G/DL (ref 33–36)
MCV RBC AUTO: 90.1 FL (ref 80–94)
MONOCYTES # BLD AUTO: 0.48 X10(3)/MCL (ref 0.1–1.3)
MONOCYTES NFR BLD AUTO: 15.7 %
NEUTROPHILS # BLD AUTO: 1.12 X10(3)/MCL (ref 2.1–9.2)
NEUTROPHILS NFR BLD AUTO: 36.5 %
PHOSPHATE SERPL-MCNC: 1.9 MG/DL (ref 2.3–4.7)
PLATELET # BLD AUTO: 150 X10(3)/MCL (ref 130–400)
PMV BLD AUTO: 10.6 FL (ref 7.4–10.4)
POTASSIUM SERPL-SCNC: 3.4 MMOL/L (ref 3.5–5.1)
PROT SERPL-MCNC: 7.6 GM/DL (ref 5.8–7.6)
RBC # BLD AUTO: 3.73 X10(6)/MCL (ref 4.2–5.4)
SODIUM SERPL-SCNC: 139 MMOL/L (ref 136–145)
WBC # SPEC AUTO: 3.06 X10(3)/MCL (ref 4.5–11.5)

## 2024-04-30 PROCEDURE — 99215 OFFICE O/P EST HI 40 MIN: CPT | Mod: S$PBB,,,

## 2024-04-30 PROCEDURE — 84100 ASSAY OF PHOSPHORUS: CPT

## 2024-04-30 PROCEDURE — 83735 ASSAY OF MAGNESIUM: CPT

## 2024-04-30 PROCEDURE — 36415 COLL VENOUS BLD VENIPUNCTURE: CPT

## 2024-04-30 PROCEDURE — 85025 COMPLETE CBC W/AUTO DIFF WBC: CPT

## 2024-04-30 PROCEDURE — 99214 OFFICE O/P EST MOD 30 MIN: CPT | Mod: PBBFAC

## 2024-04-30 PROCEDURE — 99999 PR PBB SHADOW E&M-EST. PATIENT-LVL IV: CPT | Mod: PBBFAC,,,

## 2024-04-30 PROCEDURE — 82378 CARCINOEMBRYONIC ANTIGEN: CPT

## 2024-04-30 PROCEDURE — 80053 COMPREHEN METABOLIC PANEL: CPT

## 2024-05-08 ENCOUNTER — INFUSION (OUTPATIENT)
Dept: INFUSION THERAPY | Facility: HOSPITAL | Age: 69
End: 2024-05-08
Payer: MEDICARE

## 2024-05-08 VITALS
OXYGEN SATURATION: 98 % | TEMPERATURE: 98 F | DIASTOLIC BLOOD PRESSURE: 75 MMHG | BODY MASS INDEX: 21.8 KG/M2 | WEIGHT: 135.63 LBS | HEART RATE: 109 BPM | HEIGHT: 66 IN | SYSTOLIC BLOOD PRESSURE: 107 MMHG

## 2024-05-08 DIAGNOSIS — C18.2 MALIGNANT NEOPLASM OF ASCENDING COLON: Primary | ICD-10-CM

## 2024-05-08 PROCEDURE — 96416 CHEMO PROLONG INFUSE W/PUMP: CPT

## 2024-05-08 PROCEDURE — 96368 THER/DIAG CONCURRENT INF: CPT

## 2024-05-08 PROCEDURE — 96375 TX/PRO/DX INJ NEW DRUG ADDON: CPT

## 2024-05-08 PROCEDURE — 25000003 PHARM REV CODE 250

## 2024-05-08 PROCEDURE — 63600175 PHARM REV CODE 636 W HCPCS

## 2024-05-08 PROCEDURE — 96367 TX/PROPH/DG ADDL SEQ IV INF: CPT

## 2024-05-08 PROCEDURE — 96413 CHEMO IV INFUSION 1 HR: CPT

## 2024-05-08 PROCEDURE — 96415 CHEMO IV INFUSION ADDL HR: CPT

## 2024-05-08 PROCEDURE — 96417 CHEMO IV INFUS EACH ADDL SEQ: CPT

## 2024-05-08 RX ORDER — DIPHENHYDRAMINE HYDROCHLORIDE 50 MG/ML
50 INJECTION INTRAMUSCULAR; INTRAVENOUS ONCE AS NEEDED
Status: CANCELLED | OUTPATIENT
Start: 2024-05-08

## 2024-05-08 RX ORDER — SODIUM CHLORIDE 0.9 % (FLUSH) 0.9 %
10 SYRINGE (ML) INJECTION
Status: CANCELLED | OUTPATIENT
Start: 2024-05-08

## 2024-05-08 RX ORDER — EPINEPHRINE 0.3 MG/.3ML
0.3 INJECTION SUBCUTANEOUS ONCE AS NEEDED
Status: CANCELLED | OUTPATIENT
Start: 2024-05-08

## 2024-05-08 RX ORDER — DIPHENHYDRAMINE HYDROCHLORIDE 50 MG/ML
25 INJECTION INTRAMUSCULAR; INTRAVENOUS
Status: CANCELLED
Start: 2024-05-08

## 2024-05-08 RX ORDER — HEPARIN 100 UNIT/ML
500 SYRINGE INTRAVENOUS
Status: DISCONTINUED | OUTPATIENT
Start: 2024-05-08 | End: 2024-05-08 | Stop reason: HOSPADM

## 2024-05-08 RX ORDER — PROCHLORPERAZINE EDISYLATE 5 MG/ML
5 INJECTION INTRAMUSCULAR; INTRAVENOUS ONCE AS NEEDED
Status: CANCELLED | OUTPATIENT
Start: 2024-05-09

## 2024-05-08 RX ORDER — SODIUM CHLORIDE 0.9 % (FLUSH) 0.9 %
10 SYRINGE (ML) INJECTION
Status: DISCONTINUED | OUTPATIENT
Start: 2024-05-08 | End: 2024-05-08 | Stop reason: HOSPADM

## 2024-05-08 RX ORDER — HEPARIN 100 UNIT/ML
500 SYRINGE INTRAVENOUS
Status: CANCELLED | OUTPATIENT
Start: 2024-05-08

## 2024-05-08 RX ORDER — HEPARIN 100 UNIT/ML
500 SYRINGE INTRAVENOUS
Status: CANCELLED | OUTPATIENT
Start: 2024-05-09

## 2024-05-08 RX ORDER — PROCHLORPERAZINE EDISYLATE 5 MG/ML
5 INJECTION INTRAMUSCULAR; INTRAVENOUS ONCE AS NEEDED
Status: CANCELLED | OUTPATIENT
Start: 2024-05-08

## 2024-05-08 RX ORDER — PROCHLORPERAZINE EDISYLATE 5 MG/ML
5 INJECTION INTRAMUSCULAR; INTRAVENOUS ONCE AS NEEDED
Status: DISCONTINUED | OUTPATIENT
Start: 2024-05-08 | End: 2024-05-08 | Stop reason: HOSPADM

## 2024-05-08 RX ORDER — DIPHENHYDRAMINE HYDROCHLORIDE 50 MG/ML
50 INJECTION INTRAMUSCULAR; INTRAVENOUS ONCE AS NEEDED
Status: DISCONTINUED | OUTPATIENT
Start: 2024-05-08 | End: 2024-05-08 | Stop reason: HOSPADM

## 2024-05-08 RX ORDER — DIPHENHYDRAMINE HYDROCHLORIDE 50 MG/ML
25 INJECTION INTRAMUSCULAR; INTRAVENOUS
Status: COMPLETED | OUTPATIENT
Start: 2024-05-08 | End: 2024-05-08

## 2024-05-08 RX ORDER — EPINEPHRINE 0.3 MG/.3ML
0.3 INJECTION SUBCUTANEOUS ONCE AS NEEDED
Status: DISCONTINUED | OUTPATIENT
Start: 2024-05-08 | End: 2024-05-08 | Stop reason: HOSPADM

## 2024-05-08 RX ORDER — SODIUM CHLORIDE 0.9 % (FLUSH) 0.9 %
10 SYRINGE (ML) INJECTION
Status: CANCELLED | OUTPATIENT
Start: 2024-05-09

## 2024-05-08 RX ADMIN — FLUOROURACIL 4320 MG: 50 INJECTION, SOLUTION INTRAVENOUS at 02:05

## 2024-05-08 RX ADMIN — CETUXIMAB 900 MG: 2 SOLUTION INTRAVENOUS at 10:05

## 2024-05-08 RX ADMIN — DIPHENHYDRAMINE HYDROCHLORIDE 25 MG: 50 INJECTION, SOLUTION INTRAMUSCULAR; INTRAVENOUS at 10:05

## 2024-05-08 RX ADMIN — LEUCOVORIN CALCIUM 720 MG: 350 INJECTION, POWDER, LYOPHILIZED, FOR SOLUTION INTRAMUSCULAR; INTRAVENOUS at 11:05

## 2024-05-08 RX ADMIN — OXALIPLATIN 150 MG: 5 INJECTION, SOLUTION INTRAVENOUS at 11:05

## 2024-05-08 RX ADMIN — DEXAMETHASONE SODIUM PHOSPHATE 0.25 MG: 10 INJECTION, SOLUTION INTRAMUSCULAR; INTRAVENOUS at 09:05

## 2024-05-08 NOTE — PLAN OF CARE
Encourage frequent hand washing, avoid raw seafood, report temperature > 100.4, wash fresh fruits and vegetables   [facility] GENERAL SURGERY      S:   Patient presents s/p Angela's procedure. She reports doing well. O:   Comfortable         Incision site healing well. Open area clean and granulating              A:   S/P Angela's    P:   Continue local wound care as instructed.  Follow up 1 month

## 2024-05-10 ENCOUNTER — INFUSION (OUTPATIENT)
Dept: INFUSION THERAPY | Facility: HOSPITAL | Age: 69
End: 2024-05-10
Payer: MEDICARE

## 2024-05-10 VITALS
HEART RATE: 92 BPM | TEMPERATURE: 98 F | HEIGHT: 66 IN | WEIGHT: 135 LBS | BODY MASS INDEX: 21.69 KG/M2 | DIASTOLIC BLOOD PRESSURE: 94 MMHG | SYSTOLIC BLOOD PRESSURE: 162 MMHG | OXYGEN SATURATION: 99 %

## 2024-05-10 DIAGNOSIS — C18.2 MALIGNANT NEOPLASM OF ASCENDING COLON: Primary | ICD-10-CM

## 2024-05-10 PROCEDURE — A4216 STERILE WATER/SALINE, 10 ML: HCPCS

## 2024-05-10 PROCEDURE — 96377 APPLICATON ON-BODY INJECTOR: CPT

## 2024-05-10 PROCEDURE — 25000003 PHARM REV CODE 250

## 2024-05-10 PROCEDURE — 63600175 PHARM REV CODE 636 W HCPCS: Mod: JZ,JG

## 2024-05-10 RX ORDER — HEPARIN 100 UNIT/ML
500 SYRINGE INTRAVENOUS
Status: DISCONTINUED | OUTPATIENT
Start: 2024-05-10 | End: 2024-05-10 | Stop reason: HOSPADM

## 2024-05-10 RX ORDER — SODIUM CHLORIDE 0.9 % (FLUSH) 0.9 %
10 SYRINGE (ML) INJECTION
Status: DISCONTINUED | OUTPATIENT
Start: 2024-05-10 | End: 2024-05-10 | Stop reason: HOSPADM

## 2024-05-10 RX ORDER — PROCHLORPERAZINE EDISYLATE 5 MG/ML
5 INJECTION INTRAMUSCULAR; INTRAVENOUS ONCE AS NEEDED
Status: DISCONTINUED | OUTPATIENT
Start: 2024-05-10 | End: 2024-05-10 | Stop reason: HOSPADM

## 2024-05-10 RX ADMIN — Medication 500 UNITS: at 11:05

## 2024-05-10 RX ADMIN — PEGFILGRASTIM 6 MG: KIT SUBCUTANEOUS at 11:05

## 2024-05-10 RX ADMIN — SODIUM CHLORIDE, PRESERVATIVE FREE 10 ML: 5 INJECTION INTRAVENOUS at 11:05

## 2024-05-13 ENCOUNTER — PATIENT MESSAGE (OUTPATIENT)
Dept: HEMATOLOGY/ONCOLOGY | Facility: CLINIC | Age: 69
End: 2024-05-13
Payer: MEDICARE

## 2024-05-20 NOTE — PROGRESS NOTES
Subjective:       Patient ID: Selene Smallwood is a 69 y.o. female.    Chief Complaint: Follow Up    Diagnosis: Stage IV Metastatic Colon Adenocarcinoma with mets to peritoneum    Current Treatment:   FOLFOX + Cetuximab - 4/2/24 -     Treatment History: N/A    HPI:   67 yo F presented in March '24 for evaluation of metastatic colon cancer. She initially presented to OSH in early February '24 with 2 days of upper abdominal pain. Imaging on 2/4 in the ER revealed evidence of non perforated acute appendicitis. She underwent Ex lap where a cecal mass was incidentally discovered and resected, as well as omental studding was noted and 1 small mass was taken for biopsy. Her final pathology revealed a large exophytic mass in the cecum measuring 4 x 2 cm extending to the appendiceal orifice and extends into pericolonic adipose tissue. G2, LVI+, PNI negative. 14 LN were resected, with 10 being positive for metastatic adenocarcinoma. She also had an omental mass measuring 2.5 x 1.5cm which was positive for metastatic adenocarcinoma, consistent with a colon primary. Her final pathology staging is consistent with [id number]. Her postop course was complicated by abdominal abscess formation s/p IR drain placement and subsequent removal and antibiotic treatment.      She underwent PET scan in March '24 with evidence of nodular foci within the omentum/peritoneum consistent with peritoneal carcinomatosis. No other evidence of disease. NGS testing was performed which revealed GULSHAN wild type and BRAF negative. Therefore she will proceed with 1st line therapy of FOLFOX + Cetuximab Q2w x 12 cycles.     Interval History:  She returns to the clinic today for a follow-up and treatment clearance for C4 of FOLFOX + Cetuximab. She does have a stye to left eye that has been improving over the past three days. She continues to have a decreased appetite, but weight has remained stable this week. Fatigue has improved. She continues to have nausea  that is only mildly improved with nausea regimen. Occasional neuropathy to her fingertips continues to be intermittent and stable today. Labs reviewed in detail with her. Denies SOB, chest pain, fever, chills. No abnormal bowels.     History reviewed. No pertinent past medical history.   Past Surgical History:   Procedure Laterality Date    APPENDECTOMY N/A 2/5/2024    Procedure: APPENDECTOMY;  Surgeon: Anna Murillo MD;  Location: [facility] OR;  Service: General;  Laterality: N/A;    INSERTION OF TUNNELED CENTRAL VENOUS CATHETER (CVC) WITH SUBCUTANEOUS PORT N/A 3/26/2024    Procedure: [id number];  Surgeon: Armando Tran MD;  Location: [facility] OR;  Service: Peripheral Vascular;  Laterality: N/A;    LAPAROSCOPIC APPENDECTOMY N/A 2/5/2024    Procedure: APPENDECTOMY, LAPAROSCOPIC;  Surgeon: Anna Murillo MD;  Location: [facility] OR;  Service: General;  Laterality: N/A;    SURGICAL REMOVAL OF ILEUM WITH CECUM N/A 2/5/2024    Procedure: EXCISION, CECUM WITH ILEUM;  Surgeon: Anna Murillo MD;  Location: [facility] OR;  Service: General;  Laterality: N/A;    WASHOUT N/A 2/5/2024    Procedure: WASHOUT;  Surgeon: Anna Murillo MD;  Location: [facility] OR;  Service: General;  Laterality: N/A;     Social History     Socioeconomic History    Marital status:    Tobacco Use    Smoking status: Former     Types: Cigarettes    Smokeless tobacco: Never     Social Determinants of Health     Financial Resource Strain: Low Risk  (2/15/2024)    Overall Financial Resource Strain (CARDIA)     Difficulty of Paying Living Expenses: Not hard at all   Food Insecurity: No Food Insecurity (2/15/2024)    Hunger Vital Sign     Worried About Running Out of Food in the Last Year: Never true     Ran Out of Food in the Last Year: Never true   Transportation Needs: No Transportation Needs (2/15/2024)    PRAPARE - Transportation     Lack of Transportation (Medical): No     Lack of Transportation (Non-Medical): No   Physical Activity: Unknown  (2/15/2024)    Exercise Vital Sign     Days of Exercise per Week: 0 days     Minutes of Exercise per Session: Patient unable to answer   Stress: No Stress Concern Present (2/15/2024)    [place] [employer] - Occupational Stress Questionnaire     Feeling of Stress : Not at all   Housing Stability: Low Risk  (2/15/2024)    Housing Stability Vital Sign     Unable to Pay for Housing in the Last Year: No     Number of Places Lived in the Last Year: 1     Unstable Housing in the Last Year: No      No family history on file.   Review of patient's allergies indicates:   Allergen Reactions    Robaxin [methocarbamol] Other (See Comments)     Altered mental status       Review of Systems   Constitutional:  Negative for activity change, fever and unexpected weight change.   HENT:  Negative for sore throat.    Eyes:  Negative for visual disturbance.   Respiratory:  Negative for cough and shortness of breath.    Cardiovascular:  Negative for chest pain.   Gastrointestinal:  Negative for abdominal pain, diarrhea, nausea and vomiting.   Endocrine: Negative for polyuria.   Genitourinary:  Negative for dysuria and hot flashes.   Integumentary:  Negative for rash.   Neurological:  Negative for weakness and headaches.   Hematological:  Negative for adenopathy.   Psychiatric/Behavioral:  Negative for confusion.          Objective:      Vitals:    05/22/24 0813   BP: 101/69   Pulse: 99   Resp: 20   Temp: 97.5 °F (36.4 °C)                 Physical Exam  Constitutional:       General: She is not in acute distress.     Appearance: Normal appearance. She is not ill-appearing.   HENT:      Head: Normocephalic and atraumatic.      Nose: Nose normal.      Mouth/Throat:      Mouth: Mucous membranes are moist.      Pharynx: Oropharynx is clear.   Eyes:      Extraocular Movements: Extraocular movements intact.      Conjunctiva/sclera: Conjunctivae normal.      Pupils: Pupils are equal, round, and reactive to light.    Cardiovascular:      Rate and Rhythm: Normal rate and regular rhythm.      Pulses: Normal pulses.      Heart sounds: Normal heart sounds. No murmur heard.  Pulmonary:      Effort: Pulmonary effort is normal. No respiratory distress.      Breath sounds: Normal breath sounds.   Abdominal:      General: There is no distension.      Palpations: Abdomen is soft.      Tenderness: There is no abdominal tenderness.   Musculoskeletal:         General: Normal range of motion.      Cervical back: Normal range of motion and neck supple.      Right lower leg: No edema.      Left lower leg: No edema.   Lymphadenopathy:      Cervical: No cervical adenopathy.   Skin:     General: Skin is warm and dry.   Neurological:      General: No focal deficit present.      Mental Status: She is alert and oriented to person, place, and time.         LABS AND IMAGING REVIEWED IN EPIC    IMAGIN24 CT C/A/P:  Impression:  1. Small to moderate bilateral pleural effusions.  2. Right upper lobe ground-glass could be infectious/inflammatory or relate to asymmetric edema.  3. Interval appendectomy with fluid collections along the surgical bed suspicious for developing abscesses.  24 Abdominal US:  Impression:  Cyst in the right lobe of the liver.  Elongated gallbladder with no definite gallstones minimal amount of fluid seen adjacent to the fundus.  Nephrolithiasis of the right kidney with a cyst of the right kidney minimal fullness of the collecting system.  Free fluid as described above  24 CT Abd/Pelvis:  Impression:  Dilated appendix with periappendiceal inflammatory changes,, compatible with non perforated acute appendicitis.  No fluid collection or abscess.  No free air.  Small amount of free fluid the pelvis.  Findings were communicated to Dr. Chavez at 13:56 2024  Mild atelectatic changes and ground-glass opacities in the lower lungs suspicious for infectious/inflammatory process.  3/15/24 PET/CT:  1. Nodular foci of  hypermetabolic soft tissue thickening within the mesentery most consistent with peritoneal carcinomatosis.  2. Hypermetabolic nodule in the uterine fundus which is indeterminate. Pelvic ultrasound may be beneficial.       PATHOLOGY:  2/5/24 Biopsy:  Final Diagnosis  1. Appendix, appendectomy:  - Acute appendicitis with perforation.  - Adenocarcinoma involves lymphatic spaces of the appendiceal wall and peritoneal surface.  2. Cecum, ileocecectomy:  - Colonic adenocarcinoma.  - See synoptic checklist for CAP cancer protocol.  3. Omental mass, biopsy:  - Metastatic adenocarcinoma, consistent with a colon primary.  4. Small bowel, segmental resection:  - Acute peritonitis.  - No evidence of malignancy.    MLH1: PRESERVED (INTACT) EXPRESSION IN TUMOR CELLS      MSH2: PRESERVED (INTACT) EXPRESSION IN TUMOR CELLS      MSH6: PRESERVED (INTACT) EXPRESSION IN TUMOR CELLS      PMS2: PRESERVED (INTACT) EXPRESSION IN TUMOR CELLS  Microscopic Description/Comments   These results show no deficiency of the mismatch repair proteins tested.    Assessment:   Stage IV Metastatic Colon Adenocarcinoma with mets to peritoneum - Cecal mass, nonobstructive, no perforation. [id number].   Plan for 1st line chemotherapy with FOLFOX + Cetuximab. Will remove bolus 5FU in metastatic setting.   Neulasta added with C3.   Labs stable today.         Plan:   Labs stable.  Continue with C4 Folfox + Cetuximab with neulasta.  Cinvanti added today.   Declines IV fluids on pump disconnect.  Continue potassium 20meq daily.  Megace increased, current prescription from PCP.   Warm compress to left eye.   Encouraged hydration and small frequent meals with ensures as tolerated.  Scans due prior to C7   RTC in 2 weeks with NP for FU/lab/treat C5        I spent a total of 40 minutes on the day of the visit.This includes face to face time and non-face to face time preparing to see the patient (eg, review of tests), obtaining and/or reviewing separately obtained  history, documenting clinical information in the electronic or other health record, independently interpreting results and communicating results to the patient/family/caregiver, or care coordinator.    CAROLYNN MoralesP-C  Oncology/Hematology   [facility]

## 2024-05-21 ENCOUNTER — PATIENT MESSAGE (OUTPATIENT)
Dept: HEMATOLOGY/ONCOLOGY | Facility: CLINIC | Age: 69
End: 2024-05-21
Payer: MEDICARE

## 2024-05-22 ENCOUNTER — OFFICE VISIT (OUTPATIENT)
Dept: HEMATOLOGY/ONCOLOGY | Facility: CLINIC | Age: 69
End: 2024-05-22
Payer: MEDICARE

## 2024-05-22 ENCOUNTER — INFUSION (OUTPATIENT)
Dept: INFUSION THERAPY | Facility: HOSPITAL | Age: 69
End: 2024-05-22
Payer: MEDICARE

## 2024-05-22 VITALS
HEIGHT: 66 IN | RESPIRATION RATE: 20 BRPM | SYSTOLIC BLOOD PRESSURE: 101 MMHG | HEART RATE: 99 BPM | BODY MASS INDEX: 21.47 KG/M2 | DIASTOLIC BLOOD PRESSURE: 69 MMHG | TEMPERATURE: 98 F | OXYGEN SATURATION: 99 % | WEIGHT: 133.63 LBS

## 2024-05-22 DIAGNOSIS — C18.9 ADENOCARCINOMA OF COLON: ICD-10-CM

## 2024-05-22 DIAGNOSIS — T45.1X5A CHEMOTHERAPY INDUCED NEUTROPENIA: ICD-10-CM

## 2024-05-22 DIAGNOSIS — R64 CANCER CACHEXIA: ICD-10-CM

## 2024-05-22 DIAGNOSIS — D84.821 IMMUNODEFICIENCY DUE TO CHEMOTHERAPY: ICD-10-CM

## 2024-05-22 DIAGNOSIS — Z79.899 IMMUNODEFICIENCY DUE TO CHEMOTHERAPY: ICD-10-CM

## 2024-05-22 DIAGNOSIS — G62.0 CHEMOTHERAPY-INDUCED NEUROPATHY: ICD-10-CM

## 2024-05-22 DIAGNOSIS — C78.6 MALIGNANT NEOPLASM METASTATIC TO PERITONEUM: ICD-10-CM

## 2024-05-22 DIAGNOSIS — C18.2 MALIGNANT NEOPLASM OF ASCENDING COLON: Primary | ICD-10-CM

## 2024-05-22 DIAGNOSIS — R11.2 CHEMOTHERAPY INDUCED NAUSEA AND VOMITING: ICD-10-CM

## 2024-05-22 DIAGNOSIS — C78.89 SECONDARY MALIGNANT NEOPLASM OF OTHER DIGESTIVE ORGANS: ICD-10-CM

## 2024-05-22 DIAGNOSIS — T45.1X5A CHEMOTHERAPY-INDUCED NEUROPATHY: ICD-10-CM

## 2024-05-22 DIAGNOSIS — T45.1X5A CHEMOTHERAPY INDUCED NAUSEA AND VOMITING: ICD-10-CM

## 2024-05-22 DIAGNOSIS — D70.1 CHEMOTHERAPY INDUCED NEUTROPENIA: ICD-10-CM

## 2024-05-22 DIAGNOSIS — T45.1X5A IMMUNODEFICIENCY DUE TO CHEMOTHERAPY: ICD-10-CM

## 2024-05-22 PROCEDURE — 96375 TX/PRO/DX INJ NEW DRUG ADDON: CPT

## 2024-05-22 PROCEDURE — 96368 THER/DIAG CONCURRENT INF: CPT

## 2024-05-22 PROCEDURE — 96367 TX/PROPH/DG ADDL SEQ IV INF: CPT

## 2024-05-22 PROCEDURE — 99214 OFFICE O/P EST MOD 30 MIN: CPT | Mod: PBBFAC

## 2024-05-22 PROCEDURE — 99215 OFFICE O/P EST HI 40 MIN: CPT | Mod: S$PBB,,,

## 2024-05-22 PROCEDURE — 96415 CHEMO IV INFUSION ADDL HR: CPT

## 2024-05-22 PROCEDURE — 99999 PR PBB SHADOW E&M-EST. PATIENT-LVL IV: CPT | Mod: PBBFAC,,,

## 2024-05-22 PROCEDURE — 96417 CHEMO IV INFUS EACH ADDL SEQ: CPT

## 2024-05-22 PROCEDURE — 25000003 PHARM REV CODE 250

## 2024-05-22 PROCEDURE — 63600175 PHARM REV CODE 636 W HCPCS

## 2024-05-22 PROCEDURE — 96413 CHEMO IV INFUSION 1 HR: CPT

## 2024-05-22 RX ORDER — EPINEPHRINE 0.3 MG/.3ML
0.3 INJECTION SUBCUTANEOUS ONCE AS NEEDED
Status: DISCONTINUED | OUTPATIENT
Start: 2024-05-22 | End: 2024-05-22 | Stop reason: HOSPADM

## 2024-05-22 RX ORDER — HEPARIN 100 UNIT/ML
500 SYRINGE INTRAVENOUS
Status: CANCELLED | OUTPATIENT
Start: 2024-05-22

## 2024-05-22 RX ORDER — SODIUM CHLORIDE 0.9 % (FLUSH) 0.9 %
10 SYRINGE (ML) INJECTION
Status: CANCELLED | OUTPATIENT
Start: 2024-05-23

## 2024-05-22 RX ORDER — DIPHENHYDRAMINE HYDROCHLORIDE 50 MG/ML
25 INJECTION INTRAMUSCULAR; INTRAVENOUS
Status: COMPLETED | OUTPATIENT
Start: 2024-05-22 | End: 2024-05-22

## 2024-05-22 RX ORDER — PROCHLORPERAZINE EDISYLATE 5 MG/ML
5 INJECTION INTRAMUSCULAR; INTRAVENOUS ONCE AS NEEDED
Status: DISCONTINUED | OUTPATIENT
Start: 2024-05-22 | End: 2024-05-22 | Stop reason: HOSPADM

## 2024-05-22 RX ORDER — HEPARIN 100 UNIT/ML
500 SYRINGE INTRAVENOUS
Status: CANCELLED | OUTPATIENT
Start: 2024-05-23

## 2024-05-22 RX ORDER — PROCHLORPERAZINE EDISYLATE 5 MG/ML
5 INJECTION INTRAMUSCULAR; INTRAVENOUS ONCE AS NEEDED
Status: CANCELLED | OUTPATIENT
Start: 2024-05-22

## 2024-05-22 RX ORDER — PROCHLORPERAZINE EDISYLATE 5 MG/ML
5 INJECTION INTRAMUSCULAR; INTRAVENOUS ONCE AS NEEDED
Status: CANCELLED | OUTPATIENT
Start: 2024-05-23

## 2024-05-22 RX ORDER — DIPHENHYDRAMINE HYDROCHLORIDE 50 MG/ML
50 INJECTION INTRAMUSCULAR; INTRAVENOUS ONCE AS NEEDED
Status: CANCELLED | OUTPATIENT
Start: 2024-05-22

## 2024-05-22 RX ORDER — EPINEPHRINE 0.3 MG/.3ML
0.3 INJECTION SUBCUTANEOUS ONCE AS NEEDED
Status: CANCELLED | OUTPATIENT
Start: 2024-05-22

## 2024-05-22 RX ORDER — SODIUM CHLORIDE 0.9 % (FLUSH) 0.9 %
10 SYRINGE (ML) INJECTION
Status: DISCONTINUED | OUTPATIENT
Start: 2024-05-22 | End: 2024-05-22 | Stop reason: HOSPADM

## 2024-05-22 RX ORDER — MEGESTROL ACETATE 40 MG/ML
200 SUSPENSION ORAL DAILY
Qty: 150 ML | Refills: 3 | Status: SHIPPED | OUTPATIENT
Start: 2024-05-22 | End: 2025-05-22

## 2024-05-22 RX ORDER — SODIUM CHLORIDE 0.9 % (FLUSH) 0.9 %
10 SYRINGE (ML) INJECTION
Status: CANCELLED | OUTPATIENT
Start: 2024-05-22

## 2024-05-22 RX ORDER — DIPHENHYDRAMINE HYDROCHLORIDE 50 MG/ML
50 INJECTION INTRAMUSCULAR; INTRAVENOUS ONCE AS NEEDED
Status: DISCONTINUED | OUTPATIENT
Start: 2024-05-22 | End: 2024-05-22 | Stop reason: HOSPADM

## 2024-05-22 RX ORDER — DIPHENHYDRAMINE HYDROCHLORIDE 50 MG/ML
25 INJECTION INTRAMUSCULAR; INTRAVENOUS
Status: CANCELLED
Start: 2024-05-22

## 2024-05-22 RX ORDER — HEPARIN 100 UNIT/ML
500 SYRINGE INTRAVENOUS
Status: DISCONTINUED | OUTPATIENT
Start: 2024-05-22 | End: 2024-05-22 | Stop reason: HOSPADM

## 2024-05-22 RX ADMIN — APREPITANT 130 MG: 130 INJECTION, EMULSION INTRAVENOUS at 10:05

## 2024-05-22 RX ADMIN — DEXAMETHASONE SODIUM PHOSPHATE 0.25 MG: 10 INJECTION, SOLUTION INTRAMUSCULAR; INTRAVENOUS at 09:05

## 2024-05-22 RX ADMIN — DIPHENHYDRAMINE HYDROCHLORIDE 25 MG: 50 INJECTION, SOLUTION INTRAMUSCULAR; INTRAVENOUS at 09:05

## 2024-05-22 RX ADMIN — OXALIPLATIN 150 MG: 5 INJECTION, SOLUTION INTRAVENOUS at 12:05

## 2024-05-22 RX ADMIN — FLUOROURACIL 4320 MG: 50 INJECTION, SOLUTION INTRAVENOUS at 01:05

## 2024-05-22 RX ADMIN — DEXTROSE MONOHYDRATE 720 MG: 50 INJECTION, SOLUTION INTRAVENOUS at 12:05

## 2024-05-22 RX ADMIN — CETUXIMAB 900 MG: 2 SOLUTION INTRAVENOUS at 10:05

## 2024-05-23 ENCOUNTER — PATIENT MESSAGE (OUTPATIENT)
Dept: HEMATOLOGY/ONCOLOGY | Facility: CLINIC | Age: 69
End: 2024-05-23
Payer: MEDICARE

## 2024-05-24 ENCOUNTER — INFUSION (OUTPATIENT)
Dept: INFUSION THERAPY | Facility: HOSPITAL | Age: 69
End: 2024-05-24
Payer: MEDICARE

## 2024-05-24 VITALS
SYSTOLIC BLOOD PRESSURE: 114 MMHG | TEMPERATURE: 98 F | DIASTOLIC BLOOD PRESSURE: 78 MMHG | RESPIRATION RATE: 18 BRPM | HEART RATE: 90 BPM

## 2024-05-24 DIAGNOSIS — C18.2 MALIGNANT NEOPLASM OF ASCENDING COLON: Primary | ICD-10-CM

## 2024-05-24 PROCEDURE — 96377 APPLICATON ON-BODY INJECTOR: CPT

## 2024-05-24 PROCEDURE — 63600175 PHARM REV CODE 636 W HCPCS: Mod: JZ,JG

## 2024-05-24 RX ORDER — HEPARIN 100 UNIT/ML
500 SYRINGE INTRAVENOUS
Status: DISCONTINUED | OUTPATIENT
Start: 2024-05-24 | End: 2024-05-24 | Stop reason: HOSPADM

## 2024-05-24 RX ORDER — PROCHLORPERAZINE EDISYLATE 5 MG/ML
5 INJECTION INTRAMUSCULAR; INTRAVENOUS ONCE AS NEEDED
Status: DISCONTINUED | OUTPATIENT
Start: 2024-05-24 | End: 2024-05-24 | Stop reason: HOSPADM

## 2024-05-24 RX ORDER — SODIUM CHLORIDE 0.9 % (FLUSH) 0.9 %
10 SYRINGE (ML) INJECTION
Status: DISCONTINUED | OUTPATIENT
Start: 2024-05-24 | End: 2024-05-24 | Stop reason: HOSPADM

## 2024-05-24 RX ADMIN — PEGFILGRASTIM 6 MG: KIT SUBCUTANEOUS at 11:05

## 2024-06-01 DIAGNOSIS — C18.9 ADENOCARCINOMA OF COLON: ICD-10-CM

## 2024-06-01 DIAGNOSIS — C78.89 SECONDARY MALIGNANT NEOPLASM OF OTHER DIGESTIVE ORGANS: ICD-10-CM

## 2024-06-01 DIAGNOSIS — T45.1X5A CHEMOTHERAPY INDUCED NAUSEA AND VOMITING: ICD-10-CM

## 2024-06-01 DIAGNOSIS — R11.2 CHEMOTHERAPY INDUCED NAUSEA AND VOMITING: ICD-10-CM

## 2024-06-03 RX ORDER — PROCHLORPERAZINE MALEATE 10 MG
10 TABLET ORAL EVERY 6 HOURS PRN
Qty: 30 TABLET | Refills: 1 | Status: SHIPPED | OUTPATIENT
Start: 2024-06-03 | End: 2025-06-03

## 2024-06-05 ENCOUNTER — OFFICE VISIT (OUTPATIENT)
Dept: HEMATOLOGY/ONCOLOGY | Facility: CLINIC | Age: 69
End: 2024-06-05
Payer: MEDICARE

## 2024-06-05 ENCOUNTER — INFUSION (OUTPATIENT)
Dept: INFUSION THERAPY | Facility: HOSPITAL | Age: 69
End: 2024-06-05
Payer: MEDICARE

## 2024-06-05 ENCOUNTER — PATIENT MESSAGE (OUTPATIENT)
Dept: HEMATOLOGY/ONCOLOGY | Facility: CLINIC | Age: 69
End: 2024-06-05

## 2024-06-05 ENCOUNTER — LAB VISIT (OUTPATIENT)
Dept: LAB | Facility: HOSPITAL | Age: 69
End: 2024-06-05
Payer: MEDICARE

## 2024-06-05 VITALS
OXYGEN SATURATION: 98 % | RESPIRATION RATE: 18 BRPM | BODY MASS INDEX: 21.21 KG/M2 | HEART RATE: 106 BPM | HEIGHT: 66 IN | TEMPERATURE: 99 F | WEIGHT: 132 LBS | DIASTOLIC BLOOD PRESSURE: 87 MMHG | SYSTOLIC BLOOD PRESSURE: 115 MMHG

## 2024-06-05 VITALS — DIASTOLIC BLOOD PRESSURE: 70 MMHG | SYSTOLIC BLOOD PRESSURE: 130 MMHG

## 2024-06-05 DIAGNOSIS — C18.2 MALIGNANT NEOPLASM OF ASCENDING COLON: ICD-10-CM

## 2024-06-05 DIAGNOSIS — D84.821 IMMUNODEFICIENCY DUE TO CHEMOTHERAPY: ICD-10-CM

## 2024-06-05 DIAGNOSIS — C78.6 MALIGNANT NEOPLASM METASTATIC TO PERITONEUM: ICD-10-CM

## 2024-06-05 DIAGNOSIS — Z79.899 ON ANTINEOPLASTIC CHEMOTHERAPY: ICD-10-CM

## 2024-06-05 DIAGNOSIS — C18.2 MALIGNANT NEOPLASM OF ASCENDING COLON: Primary | ICD-10-CM

## 2024-06-05 DIAGNOSIS — T45.1X5A IMMUNODEFICIENCY DUE TO CHEMOTHERAPY: ICD-10-CM

## 2024-06-05 DIAGNOSIS — Z79.899 IMMUNODEFICIENCY DUE TO CHEMOTHERAPY: ICD-10-CM

## 2024-06-05 LAB
ALBUMIN SERPL-MCNC: 2.8 G/DL (ref 3.4–4.8)
ALBUMIN/GLOB SERPL: 0.6 RATIO (ref 1.1–2)
ALP SERPL-CCNC: 131 UNIT/L (ref 40–150)
ALT SERPL-CCNC: 38 UNIT/L (ref 0–55)
ANION GAP SERPL CALC-SCNC: 8 MEQ/L
AST SERPL-CCNC: 33 UNIT/L (ref 5–34)
BASOPHILS # BLD AUTO: 0.07 X10(3)/MCL
BASOPHILS NFR BLD AUTO: 0.6 %
BILIRUB SERPL-MCNC: 0.2 MG/DL
BUN SERPL-MCNC: 9 MG/DL (ref 9.8–20.1)
CALCIUM SERPL-MCNC: 9.5 MG/DL (ref 8.4–10.2)
CEA SERPL-MCNC: 36.4 NG/ML (ref 0–3)
CHLORIDE SERPL-SCNC: 112 MMOL/L (ref 98–107)
CO2 SERPL-SCNC: 21 MMOL/L (ref 23–31)
CREAT SERPL-MCNC: 0.78 MG/DL (ref 0.55–1.02)
CREAT/UREA NIT SERPL: 12
EOSINOPHIL # BLD AUTO: 0.14 X10(3)/MCL (ref 0–0.9)
EOSINOPHIL NFR BLD AUTO: 1.1 %
ERYTHROCYTE [DISTWIDTH] IN BLOOD BY AUTOMATED COUNT: 19.3 % (ref 11.5–17)
GFR SERPLBLD CREATININE-BSD FMLA CKD-EPI: >60 ML/MIN/1.73/M2
GLOBULIN SER-MCNC: 5 GM/DL (ref 2.4–3.5)
GLUCOSE SERPL-MCNC: 90 MG/DL (ref 82–115)
HCT VFR BLD AUTO: 34.5 % (ref 37–47)
HGB BLD-MCNC: 10.9 G/DL (ref 12–16)
IMM GRANULOCYTES # BLD AUTO: 0.29 X10(3)/MCL (ref 0–0.04)
IMM GRANULOCYTES NFR BLD AUTO: 2.3 %
LYMPHOCYTES # BLD AUTO: 2.37 X10(3)/MCL (ref 0.6–4.6)
LYMPHOCYTES NFR BLD AUTO: 19 %
MAGNESIUM SERPL-MCNC: 2 MG/DL (ref 1.6–2.6)
MCH RBC QN AUTO: 28.5 PG (ref 27–31)
MCHC RBC AUTO-ENTMCNC: 31.6 G/DL (ref 33–36)
MCV RBC AUTO: 90.1 FL (ref 80–94)
MONOCYTES # BLD AUTO: 1.84 X10(3)/MCL (ref 0.1–1.3)
MONOCYTES NFR BLD AUTO: 14.7 %
NEUTROPHILS # BLD AUTO: 7.77 X10(3)/MCL (ref 2.1–9.2)
NEUTROPHILS NFR BLD AUTO: 62.3 %
PLATELET # BLD AUTO: 183 X10(3)/MCL (ref 130–400)
PMV BLD AUTO: 11 FL (ref 7.4–10.4)
POTASSIUM SERPL-SCNC: 4.1 MMOL/L (ref 3.5–5.1)
PROT SERPL-MCNC: 7.8 GM/DL (ref 5.8–7.6)
RBC # BLD AUTO: 3.83 X10(6)/MCL (ref 4.2–5.4)
SODIUM SERPL-SCNC: 141 MMOL/L (ref 136–145)
WBC # SPEC AUTO: 12.48 X10(3)/MCL (ref 4.5–11.5)

## 2024-06-05 PROCEDURE — 96416 CHEMO PROLONG INFUSE W/PUMP: CPT

## 2024-06-05 PROCEDURE — 99999 PR PBB SHADOW E&M-EST. PATIENT-LVL IV: CPT | Mod: PBBFAC,,,

## 2024-06-05 PROCEDURE — 83735 ASSAY OF MAGNESIUM: CPT

## 2024-06-05 PROCEDURE — 96413 CHEMO IV INFUSION 1 HR: CPT

## 2024-06-05 PROCEDURE — 80053 COMPREHEN METABOLIC PANEL: CPT

## 2024-06-05 PROCEDURE — 96368 THER/DIAG CONCURRENT INF: CPT

## 2024-06-05 PROCEDURE — 85025 COMPLETE CBC W/AUTO DIFF WBC: CPT

## 2024-06-05 PROCEDURE — 99214 OFFICE O/P EST MOD 30 MIN: CPT | Mod: PBBFAC

## 2024-06-05 PROCEDURE — 96417 CHEMO IV INFUS EACH ADDL SEQ: CPT

## 2024-06-05 PROCEDURE — 96415 CHEMO IV INFUSION ADDL HR: CPT

## 2024-06-05 PROCEDURE — 25000003 PHARM REV CODE 250

## 2024-06-05 PROCEDURE — 63600175 PHARM REV CODE 636 W HCPCS

## 2024-06-05 PROCEDURE — 82378 CARCINOEMBRYONIC ANTIGEN: CPT

## 2024-06-05 PROCEDURE — 96375 TX/PRO/DX INJ NEW DRUG ADDON: CPT

## 2024-06-05 PROCEDURE — 96367 TX/PROPH/DG ADDL SEQ IV INF: CPT

## 2024-06-05 PROCEDURE — 36415 COLL VENOUS BLD VENIPUNCTURE: CPT

## 2024-06-05 PROCEDURE — 99215 OFFICE O/P EST HI 40 MIN: CPT | Mod: S$PBB,,,

## 2024-06-05 RX ORDER — PROCHLORPERAZINE EDISYLATE 5 MG/ML
5 INJECTION INTRAMUSCULAR; INTRAVENOUS ONCE AS NEEDED
Status: DISCONTINUED | OUTPATIENT
Start: 2024-06-05 | End: 2024-06-05 | Stop reason: HOSPADM

## 2024-06-05 RX ORDER — DIPHENHYDRAMINE HYDROCHLORIDE 50 MG/ML
25 INJECTION INTRAMUSCULAR; INTRAVENOUS
Status: COMPLETED | OUTPATIENT
Start: 2024-06-05 | End: 2024-06-05

## 2024-06-05 RX ORDER — DIPHENHYDRAMINE HYDROCHLORIDE 50 MG/ML
25 INJECTION INTRAMUSCULAR; INTRAVENOUS
Status: CANCELLED
Start: 2024-06-05

## 2024-06-05 RX ORDER — SODIUM CHLORIDE 0.9 % (FLUSH) 0.9 %
10 SYRINGE (ML) INJECTION
Status: DISCONTINUED | OUTPATIENT
Start: 2024-06-05 | End: 2024-06-05 | Stop reason: HOSPADM

## 2024-06-05 RX ORDER — EPINEPHRINE 0.3 MG/.3ML
0.3 INJECTION SUBCUTANEOUS ONCE AS NEEDED
Status: CANCELLED | OUTPATIENT
Start: 2024-06-05

## 2024-06-05 RX ORDER — DIPHENHYDRAMINE HYDROCHLORIDE 50 MG/ML
50 INJECTION INTRAMUSCULAR; INTRAVENOUS ONCE AS NEEDED
Status: DISCONTINUED | OUTPATIENT
Start: 2024-06-05 | End: 2024-06-05 | Stop reason: HOSPADM

## 2024-06-05 RX ORDER — HEPARIN 100 UNIT/ML
500 SYRINGE INTRAVENOUS
Status: CANCELLED | OUTPATIENT
Start: 2024-06-06

## 2024-06-05 RX ORDER — HEPARIN 100 UNIT/ML
500 SYRINGE INTRAVENOUS
Status: DISCONTINUED | OUTPATIENT
Start: 2024-06-05 | End: 2024-06-05 | Stop reason: HOSPADM

## 2024-06-05 RX ORDER — SODIUM CHLORIDE 0.9 % (FLUSH) 0.9 %
10 SYRINGE (ML) INJECTION
Status: CANCELLED | OUTPATIENT
Start: 2024-06-05

## 2024-06-05 RX ORDER — DIPHENHYDRAMINE HYDROCHLORIDE 50 MG/ML
50 INJECTION INTRAMUSCULAR; INTRAVENOUS ONCE AS NEEDED
Status: CANCELLED | OUTPATIENT
Start: 2024-06-05

## 2024-06-05 RX ORDER — SODIUM CHLORIDE 0.9 % (FLUSH) 0.9 %
10 SYRINGE (ML) INJECTION
Status: CANCELLED | OUTPATIENT
Start: 2024-06-06

## 2024-06-05 RX ORDER — HEPARIN 100 UNIT/ML
500 SYRINGE INTRAVENOUS
Status: CANCELLED | OUTPATIENT
Start: 2024-06-05

## 2024-06-05 RX ORDER — EPINEPHRINE 0.3 MG/.3ML
0.3 INJECTION SUBCUTANEOUS ONCE AS NEEDED
Status: DISCONTINUED | OUTPATIENT
Start: 2024-06-05 | End: 2024-06-05 | Stop reason: HOSPADM

## 2024-06-05 RX ORDER — PROCHLORPERAZINE EDISYLATE 5 MG/ML
5 INJECTION INTRAMUSCULAR; INTRAVENOUS ONCE AS NEEDED
Status: CANCELLED | OUTPATIENT
Start: 2024-06-06

## 2024-06-05 RX ORDER — PROCHLORPERAZINE EDISYLATE 5 MG/ML
5 INJECTION INTRAMUSCULAR; INTRAVENOUS ONCE AS NEEDED
Status: CANCELLED | OUTPATIENT
Start: 2024-06-05

## 2024-06-05 RX ADMIN — DEXTROSE MONOHYDRATE: 50 INJECTION, SOLUTION INTRAVENOUS at 08:06

## 2024-06-05 RX ADMIN — OXALIPLATIN 150 MG: 5 INJECTION, SOLUTION INTRAVENOUS at 10:06

## 2024-06-05 RX ADMIN — LEUCOVORIN CALCIUM 720 MG: 350 INJECTION, POWDER, LYOPHILIZED, FOR SOLUTION INTRAMUSCULAR; INTRAVENOUS at 10:06

## 2024-06-05 RX ADMIN — DEXAMETHASONE SODIUM PHOSPHATE 0.25 MG: 10 INJECTION, SOLUTION INTRAMUSCULAR; INTRAVENOUS at 09:06

## 2024-06-05 RX ADMIN — CETUXIMAB 900 MG: 2 SOLUTION INTRAVENOUS at 09:06

## 2024-06-05 RX ADMIN — APREPITANT 130 MG: 130 INJECTION, EMULSION INTRAVENOUS at 08:06

## 2024-06-05 RX ADMIN — FLUOROURACIL 4320 MG: 50 INJECTION, SOLUTION INTRAVENOUS at 01:06

## 2024-06-05 RX ADMIN — DIPHENHYDRAMINE HYDROCHLORIDE 25 MG: 50 INJECTION, SOLUTION INTRAMUSCULAR; INTRAVENOUS at 08:06

## 2024-06-05 NOTE — PROGRESS NOTES
Subjective:       Patient ID: Selene Smallwood is a 69 y.o. female.    Chief Complaint: Follow Up    Diagnosis: Stage IV Metastatic Colon Adenocarcinoma with mets to peritoneum    Current Treatment:   FOLFOX + Cetuximab - 4/2/24 -     Treatment History: N/A    HPI:   67 yo F presented in March '24 for evaluation of metastatic colon cancer. She initially presented to OSH in early February '24 with 2 days of upper abdominal pain. Imaging on 2/4 in the ER revealed evidence of non perforated acute appendicitis. She underwent Ex lap where a cecal mass was incidentally discovered and resected, as well as omental studding was noted and 1 small mass was taken for biopsy. Her final pathology revealed a large exophytic mass in the cecum measuring 4 x 2 cm extending to the appendiceal orifice and extends into pericolonic adipose tissue. G2, LVI+, PNI negative. 14 LN were resected, with 10 being positive for metastatic adenocarcinoma. She also had an omental mass measuring 2.5 x 1.5cm which was positive for metastatic adenocarcinoma, consistent with a colon primary. Her final pathology staging is consistent with [id number]. Her postop course was complicated by abdominal abscess formation s/p IR drain placement and subsequent removal and antibiotic treatment.      She underwent PET scan in March '24 with evidence of nodular foci within the omentum/peritoneum consistent with peritoneal carcinomatosis. No other evidence of disease. NGS testing was performed which revealed GULSHAN wild type and BRAF negative. Therefore she will proceed with 1st line therapy of FOLFOX + Cetuximab Q2w x 12 cycles.     Interval History:  She returns to the clinic today for a follow-up and treatment clearance for C5 of FOLFOX + Cetuximab, accompanied by her daughter. She does have a stye to left eye that has been improving over the week with warm compresses. She continues to have a decreased appetite, but weight has remained stable this week. Fatigue has  improved. She continues with occasional nausea. She denies any peripheral neuropathy to fingertips and toes. Labs reviewed in detail with her. Denies SOB, chest pain, fever, chills. No abnormal bowels.     History reviewed. No pertinent past medical history.   Past Surgical History:   Procedure Laterality Date    APPENDECTOMY N/A 2/5/2024    Procedure: APPENDECTOMY;  Surgeon: Anna Murillo MD;  Location: [facility] OR;  Service: General;  Laterality: N/A;    INSERTION OF TUNNELED CENTRAL VENOUS CATHETER (CVC) WITH SUBCUTANEOUS PORT N/A 3/26/2024    Procedure: [id number]-A-CATH;  Surgeon: Armando Tran MD;  Location: [facility] OR;  Service: Peripheral Vascular;  Laterality: N/A;    LAPAROSCOPIC APPENDECTOMY N/A 2/5/2024    Procedure: APPENDECTOMY, LAPAROSCOPIC;  Surgeon: Anna Murillo MD;  Location: [facility] OR;  Service: General;  Laterality: N/A;    SURGICAL REMOVAL OF ILEUM WITH CECUM N/A 2/5/2024    Procedure: EXCISION, CECUM WITH ILEUM;  Surgeon: Anna Murillo MD;  Location: [facility] OR;  Service: General;  Laterality: N/A;    WASHOUT N/A 2/5/2024    Procedure: WASHOUT;  Surgeon: Anna Murillo MD;  Location: [facility] OR;  Service: General;  Laterality: N/A;     Social History     Socioeconomic History    Marital status:    Tobacco Use    Smoking status: Former     Types: Cigarettes    Smokeless tobacco: Never     Social Determinants of Health     Financial Resource Strain: Low Risk  (2/15/2024)    Overall Financial Resource Strain (CARDIA)     Difficulty of Paying Living Expenses: Not hard at all   Food Insecurity: No Food Insecurity (2/15/2024)    Hunger Vital Sign     Worried About Running Out of Food in the Last Year: Never true     Ran Out of Food in the Last Year: Never true   Transportation Needs: No Transportation Needs (2/15/2024)    PRAPARE - Transportation     Lack of Transportation (Medical): No     Lack of Transportation (Non-Medical): No   Physical Activity: Unknown (2/15/2024)    Exercise  Vital Sign     Days of Exercise per Week: 0 days     Minutes of Exercise per Session: Patient unable to answer   Stress: No Stress Concern Present (2/15/2024)    [employer] [place] - Occupational Stress Questionnaire     Feeling of Stress : Not at all   Housing Stability: Low Risk  (2/15/2024)    Housing Stability Vital Sign     Unable to Pay for Housing in the Last Year: No     Number of Places Lived in the Last Year: 1     Unstable Housing in the Last Year: No      No family history on file.   Review of patient's allergies indicates:   Allergen Reactions    Robaxin [methocarbamol] Other (See Comments)     Altered mental status       Review of Systems   Constitutional:  Negative for activity change, fever and unexpected weight change.   HENT:  Negative for sore throat.    Eyes:  Negative for visual disturbance.   Respiratory:  Negative for cough and shortness of breath.    Cardiovascular:  Negative for chest pain.   Gastrointestinal:  Negative for abdominal pain, diarrhea, nausea and vomiting.   Endocrine: Negative for polyuria.   Genitourinary:  Negative for dysuria and hot flashes.   Integumentary:  Negative for rash.   Allergic/Immunologic: Negative for immunocompromised state.   Neurological:  Negative for weakness and headaches.   Hematological:  Negative for adenopathy.   Psychiatric/Behavioral:  Negative for confusion.          Objective:      Vitals:    06/05/24 0807   BP: 115/87   Pulse: 106   Resp: 18   Temp: 98.6 °F (37 °C)                 Physical Exam  Constitutional:       General: She is not in acute distress.     Appearance: Normal appearance. She is not ill-appearing.   HENT:      Head: Normocephalic and atraumatic.      Nose: Nose normal.      Mouth/Throat:      Mouth: Mucous membranes are moist.      Pharynx: Oropharynx is clear.   Eyes:      Extraocular Movements: Extraocular movements intact.      Conjunctiva/sclera: Conjunctivae normal.      Pupils: Pupils are equal,  round, and reactive to light.   Cardiovascular:      Rate and Rhythm: Normal rate and regular rhythm.      Pulses: Normal pulses.      Heart sounds: Normal heart sounds. No murmur heard.  Pulmonary:      Effort: Pulmonary effort is normal. No respiratory distress.      Breath sounds: Normal breath sounds.   Abdominal:      General: There is no distension.      Palpations: Abdomen is soft.      Tenderness: There is no abdominal tenderness.   Musculoskeletal:         General: Normal range of motion.      Cervical back: Normal range of motion and neck supple.      Right lower leg: No edema.      Left lower leg: No edema.   Lymphadenopathy:      Cervical: No cervical adenopathy.   Skin:     General: Skin is warm and dry.   Neurological:      General: No focal deficit present.      Mental Status: She is alert and oriented to person, place, and time.         LABS AND IMAGING REVIEWED IN EPIC    IMAGIN24 CT C/A/P:  Impression:  1. Small to moderate bilateral pleural effusions.  2. Right upper lobe ground-glass could be infectious/inflammatory or relate to asymmetric edema.  3. Interval appendectomy with fluid collections along the surgical bed suspicious for developing abscesses.  24 Abdominal US:  Impression:  Cyst in the right lobe of the liver.  Elongated gallbladder with no definite gallstones minimal amount of fluid seen adjacent to the fundus.  Nephrolithiasis of the right kidney with a cyst of the right kidney minimal fullness of the collecting system.  Free fluid as described above  24 CT Abd/Pelvis:  Impression:  Dilated appendix with periappendiceal inflammatory changes,, compatible with non perforated acute appendicitis.  No fluid collection or abscess.  No free air.  Small amount of free fluid the pelvis.  Findings were communicated to Dr. Chavez at 13:56 2024  Mild atelectatic changes and ground-glass opacities in the lower lungs suspicious for infectious/inflammatory process.  3/15/24  PET/CT:  1. Nodular foci of hypermetabolic soft tissue thickening within the mesentery most consistent with peritoneal carcinomatosis.  2. Hypermetabolic nodule in the uterine fundus which is indeterminate. Pelvic ultrasound may be beneficial.       PATHOLOGY:  2/5/24 Biopsy:  Final Diagnosis  1. Appendix, appendectomy:  - Acute appendicitis with perforation.  - Adenocarcinoma involves lymphatic spaces of the appendiceal wall and peritoneal surface.  2. Cecum, ileocecectomy:  - Colonic adenocarcinoma.  - See synoptic checklist for CAP cancer protocol.  3. Omental mass, biopsy:  - Metastatic adenocarcinoma, consistent with a colon primary.  4. Small bowel, segmental resection:  - Acute peritonitis.  - No evidence of malignancy.    MLH1: PRESERVED (INTACT) EXPRESSION IN TUMOR CELLS      MSH2: PRESERVED (INTACT) EXPRESSION IN TUMOR CELLS      MSH6: PRESERVED (INTACT) EXPRESSION IN TUMOR CELLS      PMS2: PRESERVED (INTACT) EXPRESSION IN TUMOR CELLS  Microscopic Description/Comments   These results show no deficiency of the mismatch repair proteins tested.    Assessment:   Stage IV Metastatic Colon Adenocarcinoma with mets to peritoneum - Cecal mass, nonobstructive, no perforation. [id number].   Plan for 1st line chemotherapy with FOLFOX + Cetuximab. Will remove bolus 5FU in metastatic setting.   Neulasta added with C3.   Labs stable today.         Plan:   Labs stable.  Continue with C5 Folfox + Cetuximab with neulasta.  Cinvanti added.   Declines IV fluids on pump disconnect.  Continue potassium 20meq daily.  Megace increased, current prescription from PCP.   Continue warm compress to left eye.   Encouraged hydration and small frequent meals with ensures as tolerated.  Scans due prior to C7, CT C/A/P ordered today    RTC in 2 weeks with NP for FU/lab/treat C6        I spent a total of 40 minutes on the day of the visit.This includes face to face time and non-face to face time preparing to see the patient (eg, review of  tests), obtaining and/or reviewing separately obtained history, documenting clinical information in the electronic or other health record, independently interpreting results and communicating results to the patient/family/caregiver, or care coordinator.    CAROLYNN SantiagoP-C  Oncology/Hematology  [facility]

## 2024-06-07 ENCOUNTER — INFUSION (OUTPATIENT)
Dept: INFUSION THERAPY | Facility: HOSPITAL | Age: 69
End: 2024-06-07
Payer: MEDICARE

## 2024-06-07 VITALS
WEIGHT: 132.63 LBS | HEART RATE: 105 BPM | SYSTOLIC BLOOD PRESSURE: 107 MMHG | OXYGEN SATURATION: 98 % | RESPIRATION RATE: 18 BRPM | DIASTOLIC BLOOD PRESSURE: 71 MMHG | TEMPERATURE: 98 F | HEIGHT: 65 IN | BODY MASS INDEX: 22.1 KG/M2

## 2024-06-07 DIAGNOSIS — C18.2 MALIGNANT NEOPLASM OF ASCENDING COLON: Primary | ICD-10-CM

## 2024-06-07 PROCEDURE — 96377 APPLICATON ON-BODY INJECTOR: CPT

## 2024-06-07 PROCEDURE — A4216 STERILE WATER/SALINE, 10 ML: HCPCS

## 2024-06-07 PROCEDURE — 25000003 PHARM REV CODE 250

## 2024-06-07 PROCEDURE — 63600175 PHARM REV CODE 636 W HCPCS: Mod: JZ,JG

## 2024-06-07 RX ORDER — HEPARIN 100 UNIT/ML
500 SYRINGE INTRAVENOUS
Status: DISCONTINUED | OUTPATIENT
Start: 2024-06-07 | End: 2024-06-07 | Stop reason: HOSPADM

## 2024-06-07 RX ORDER — SODIUM CHLORIDE 0.9 % (FLUSH) 0.9 %
10 SYRINGE (ML) INJECTION
Status: DISCONTINUED | OUTPATIENT
Start: 2024-06-07 | End: 2024-06-07 | Stop reason: HOSPADM

## 2024-06-07 RX ORDER — PROCHLORPERAZINE EDISYLATE 5 MG/ML
5 INJECTION INTRAMUSCULAR; INTRAVENOUS ONCE AS NEEDED
Status: DISCONTINUED | OUTPATIENT
Start: 2024-06-07 | End: 2024-06-07 | Stop reason: HOSPADM

## 2024-06-07 RX ADMIN — SODIUM CHLORIDE, PRESERVATIVE FREE 10 ML: 5 INJECTION INTRAVENOUS at 11:06

## 2024-06-07 RX ADMIN — PEGFILGRASTIM 6 MG: KIT SUBCUTANEOUS at 11:06

## 2024-06-07 RX ADMIN — HEPARIN SODIUM (PORCINE) LOCK FLUSH IV SOLN 100 UNIT/ML 500 UNITS: 100 SOLUTION at 11:06

## 2024-06-11 DIAGNOSIS — C78.89 SECONDARY MALIGNANT NEOPLASM OF OTHER DIGESTIVE ORGANS: ICD-10-CM

## 2024-06-11 DIAGNOSIS — T45.1X5A CHEMOTHERAPY INDUCED NAUSEA AND VOMITING: ICD-10-CM

## 2024-06-11 DIAGNOSIS — C18.9 ADENOCARCINOMA OF COLON: ICD-10-CM

## 2024-06-11 DIAGNOSIS — R11.2 CHEMOTHERAPY INDUCED NAUSEA AND VOMITING: ICD-10-CM

## 2024-06-11 RX ORDER — ONDANSETRON HYDROCHLORIDE 8 MG/1
8 TABLET, FILM COATED ORAL EVERY 8 HOURS PRN
Qty: 60 TABLET | Refills: 2 | Status: SHIPPED | OUTPATIENT
Start: 2024-06-11

## 2024-06-17 NOTE — PROGRESS NOTES
Subjective:       Patient ID: Selene Smallwood is a 69 y.o. female.    Chief Complaint: Follow Up    Diagnosis: Stage IV Metastatic Colon Adenocarcinoma with mets to peritoneum    Current Treatment:   FOLFOX + Cetuximab - 4/2/24 -     Treatment History: N/A    HPI:   69 yo F presented in March '24 for evaluation of metastatic colon cancer. She initially presented to OSH in early February '24 with 2 days of upper abdominal pain. Imaging on 2/4 in the ER revealed evidence of non perforated acute appendicitis. She underwent Ex lap where a cecal mass was incidentally discovered and resected, as well as omental studding was noted and 1 small mass was taken for biopsy. Her final pathology revealed a large exophytic mass in the cecum measuring 4 x 2 cm extending to the appendiceal orifice and extends into pericolonic adipose tissue. G2, LVI+, PNI negative. 14 LN were resected, with 10 being positive for metastatic adenocarcinoma. She also had an omental mass measuring 2.5 x 1.5cm which was positive for metastatic adenocarcinoma, consistent with a colon primary. Her final pathology staging is consistent with [id number]. Her postop course was complicated by abdominal abscess formation s/p IR drain placement and subsequent removal and antibiotic treatment.      She underwent PET scan in March '24 with evidence of nodular foci within the omentum/peritoneum consistent with peritoneal carcinomatosis. No other evidence of disease. NGS testing was performed which revealed GULSHAN wild type and BRAF negative. Therefore she will proceed with 1st line therapy of FOLFOX + Cetuximab Q2w x 12 cycles.     Interval History:  She returns to the clinic today for a follow-up and treatment clearance for C6 of FOLFOX + Cetuximab. She notes dry nares and has been applying abx cream which has helped. She was diagnosed with a UTI 6/10/2024 and has almost completed her abx. Symptoms have now resolved. She is now making smoothies and eating more  frequently. Fatigue has improved. She continues with occasional nausea. She denies any peripheral neuropathy to fingertips and toes. Labs reviewed in detail with her. Denies SOB, chest pain, fever, chills. No abnormal bowels.     History reviewed. No pertinent past medical history.   Past Surgical History:   Procedure Laterality Date    APPENDECTOMY N/A 2/5/2024    Procedure: APPENDECTOMY;  Surgeon: Anna Murillo MD;  Location: [facility] OR;  Service: General;  Laterality: N/A;    INSERTION OF TUNNELED CENTRAL VENOUS CATHETER (CVC) WITH SUBCUTANEOUS PORT N/A 3/26/2024    Procedure: [id number];  Surgeon: Armando Tran MD;  Location: [facility] OR;  Service: Peripheral Vascular;  Laterality: N/A;    LAPAROSCOPIC APPENDECTOMY N/A 2/5/2024    Procedure: APPENDECTOMY, LAPAROSCOPIC;  Surgeon: Anna Murillo MD;  Location: [facility] OR;  Service: General;  Laterality: N/A;    SURGICAL REMOVAL OF ILEUM WITH CECUM N/A 2/5/2024    Procedure: EXCISION, CECUM WITH ILEUM;  Surgeon: Anna Murillo MD;  Location: [facility] OR;  Service: General;  Laterality: N/A;    WASHOUT N/A 2/5/2024    Procedure: WASHOUT;  Surgeon: Anna Murillo MD;  Location: [facility] OR;  Service: General;  Laterality: N/A;     Social History     Socioeconomic History    Marital status:    Tobacco Use    Smoking status: Former     Types: Cigarettes    Smokeless tobacco: Never     Social Determinants of Health     Financial Resource Strain: Low Risk  (2/15/2024)    Overall Financial Resource Strain (CARDIA)     Difficulty of Paying Living Expenses: Not hard at all   Food Insecurity: No Food Insecurity (2/15/2024)    Hunger Vital Sign     Worried About Running Out of Food in the Last Year: Never true     Ran Out of Food in the Last Year: Never true   Transportation Needs: No Transportation Needs (2/15/2024)    PRAPARE - Transportation     Lack of Transportation (Medical): No     Lack of Transportation (Non-Medical): No   Physical Activity: Unknown  (2/15/2024)    Exercise Vital Sign     Days of Exercise per Week: 0 days     Minutes of Exercise per Session: Patient unable to answer   Stress: No Stress Concern Present (2/15/2024)    [employer] [employer] - Occupational Stress Questionnaire     Feeling of Stress : Not at all   Housing Stability: Low Risk  (2/15/2024)    Housing Stability Vital Sign     Unable to Pay for Housing in the Last Year: No     Number of Places Lived in the Last Year: 1     Unstable Housing in the Last Year: No      No family history on file.   Review of patient's allergies indicates:   Allergen Reactions    Robaxin [methocarbamol] Other (See Comments)     Altered mental status       Review of Systems   Constitutional:  Positive for appetite change and fatigue. Negative for activity change, fever and unexpected weight change.   HENT:  Negative for sore throat.    Eyes:  Negative for visual disturbance.   Respiratory:  Negative for cough and shortness of breath.    Cardiovascular:  Negative for chest pain.   Gastrointestinal:  Positive for nausea. Negative for abdominal pain, diarrhea and vomiting.   Endocrine: Negative for polyuria.   Genitourinary:  Negative for dysuria and hot flashes.   Integumentary:  Negative for rash.   Allergic/Immunologic: Negative for immunocompromised state.   Neurological:  Negative for weakness and headaches.   Hematological:  Negative for adenopathy.   Psychiatric/Behavioral:  Negative for confusion.          Objective:      Vitals:    06/18/24 0945   BP: (P) 111/75   Pulse: (P) 99   Resp: (P) 20   Temp: (P) 97.5 °F (36.4 °C)         Physical Exam  Constitutional:       General: She is not in acute distress.     Appearance: Normal appearance. She is not ill-appearing.   HENT:      Head: Normocephalic and atraumatic.      Nose: Nose normal.      Mouth/Throat:      Mouth: Mucous membranes are moist.      Pharynx: Oropharynx is clear.   Eyes:      Extraocular Movements: Extraocular movements  intact.      Conjunctiva/sclera: Conjunctivae normal.      Pupils: Pupils are equal, round, and reactive to light.   Cardiovascular:      Rate and Rhythm: Normal rate and regular rhythm.      Pulses: Normal pulses.      Heart sounds: Normal heart sounds. No murmur heard.  Pulmonary:      Effort: Pulmonary effort is normal. No respiratory distress.      Breath sounds: Normal breath sounds.   Abdominal:      General: There is no distension.      Palpations: Abdomen is soft.      Tenderness: There is no abdominal tenderness.   Musculoskeletal:         General: Normal range of motion.      Cervical back: Normal range of motion and neck supple.      Right lower leg: No edema.      Left lower leg: No edema.   Lymphadenopathy:      Cervical: No cervical adenopathy.   Skin:     General: Skin is warm and dry.   Neurological:      General: No focal deficit present.      Mental Status: She is alert and oriented to person, place, and time.         LABS AND IMAGING REVIEWED IN EPIC    IMAGIN24 CT C/A/P:  Impression:  1. Small to moderate bilateral pleural effusions.  2. Right upper lobe ground-glass could be infectious/inflammatory or relate to asymmetric edema.  3. Interval appendectomy with fluid collections along the surgical bed suspicious for developing abscesses.  24 Abdominal US:  Impression:  Cyst in the right lobe of the liver.  Elongated gallbladder with no definite gallstones minimal amount of fluid seen adjacent to the fundus.  Nephrolithiasis of the right kidney with a cyst of the right kidney minimal fullness of the collecting system.  Free fluid as described above  24 CT Abd/Pelvis:  Impression:  Dilated appendix with periappendiceal inflammatory changes,, compatible with non perforated acute appendicitis.  No fluid collection or abscess.  No free air.  Small amount of free fluid the pelvis.  Findings were communicated to Dr. Chavez at 13:56 2024  Mild atelectatic changes and ground-glass  opacities in the lower lungs suspicious for infectious/inflammatory process.  3/15/24 PET/CT:  1. Nodular foci of hypermetabolic soft tissue thickening within the mesentery most consistent with peritoneal carcinomatosis.  2. Hypermetabolic nodule in the uterine fundus which is indeterminate. Pelvic ultrasound may be beneficial.       PATHOLOGY:  2/5/24 Biopsy:  Final Diagnosis  1. Appendix, appendectomy:  - Acute appendicitis with perforation.  - Adenocarcinoma involves lymphatic spaces of the appendiceal wall and peritoneal surface.  2. Cecum, ileocecectomy:  - Colonic adenocarcinoma.  - See synoptic checklist for CAP cancer protocol.  3. Omental mass, biopsy:  - Metastatic adenocarcinoma, consistent with a colon primary.  4. Small bowel, segmental resection:  - Acute peritonitis.  - No evidence of malignancy.    MLH1: PRESERVED (INTACT) EXPRESSION IN TUMOR CELLS      MSH2: PRESERVED (INTACT) EXPRESSION IN TUMOR CELLS      MSH6: PRESERVED (INTACT) EXPRESSION IN TUMOR CELLS      PMS2: PRESERVED (INTACT) EXPRESSION IN TUMOR CELLS  Microscopic Description/Comments   These results show no deficiency of the mismatch repair proteins tested.    Assessment:   Stage IV Metastatic Colon Adenocarcinoma with mets to peritoneum - Cecal mass, nonobstructive, no perforation. [id number].   Plan for 1st line chemotherapy with FOLFOX + Cetuximab. Will remove bolus 5FU in metastatic setting.   Neulasta added with C3.   Labs stable today.         Plan:   Labs stable.  Continue with C6 Folfox + Cetuximab with neulasta.  IV fluids if needed on pump disconnect.  Continue potassium 20meq daily.  Encouraged hydration and small frequent meals with ensures as tolerated.  Scans due prior to C7, CT C/A/P scheduled for 6/25/2024  RTC in one week with MD for FU/lab/scan review.   RTC in 2 weeks with NP for FU/lab/treat C7        I spent a total of 40 minutes on the day of the visit.This includes face to face time and non-face to face time  preparing to see the patient (eg, review of tests), obtaining and/or reviewing separately obtained history, documenting clinical information in the electronic or other health record, independently interpreting results and communicating results to the patient/family/caregiver, or care coordinator.    Susan Gayle, CAROLYNNP-C  Oncology/Hematology   [facility] Center [facility]

## 2024-06-18 ENCOUNTER — OFFICE VISIT (OUTPATIENT)
Dept: HEMATOLOGY/ONCOLOGY | Facility: CLINIC | Age: 69
End: 2024-06-18
Payer: MEDICARE

## 2024-06-18 ENCOUNTER — INFUSION (OUTPATIENT)
Dept: INFUSION THERAPY | Facility: HOSPITAL | Age: 69
End: 2024-06-18
Payer: MEDICARE

## 2024-06-18 ENCOUNTER — LAB VISIT (OUTPATIENT)
Dept: LAB | Facility: HOSPITAL | Age: 69
End: 2024-06-18
Payer: MEDICARE

## 2024-06-18 VITALS
TEMPERATURE: 98 F | WEIGHT: 133.5 LBS | HEIGHT: 65 IN | RESPIRATION RATE: 20 BRPM | BODY MASS INDEX: 22.24 KG/M2 | DIASTOLIC BLOOD PRESSURE: 75 MMHG | HEART RATE: 99 BPM | SYSTOLIC BLOOD PRESSURE: 111 MMHG | OXYGEN SATURATION: 99 %

## 2024-06-18 DIAGNOSIS — D84.821 IMMUNODEFICIENCY DUE TO CHEMOTHERAPY: ICD-10-CM

## 2024-06-18 DIAGNOSIS — Z79.899 IMMUNODEFICIENCY DUE TO CHEMOTHERAPY: ICD-10-CM

## 2024-06-18 DIAGNOSIS — T45.1X5A IMMUNODEFICIENCY DUE TO CHEMOTHERAPY: ICD-10-CM

## 2024-06-18 DIAGNOSIS — G62.0 CHEMOTHERAPY-INDUCED NEUROPATHY: ICD-10-CM

## 2024-06-18 DIAGNOSIS — C18.9 ADENOCARCINOMA OF COLON: ICD-10-CM

## 2024-06-18 DIAGNOSIS — C18.2 MALIGNANT NEOPLASM OF ASCENDING COLON: ICD-10-CM

## 2024-06-18 DIAGNOSIS — D70.1 CHEMOTHERAPY INDUCED NEUTROPENIA: ICD-10-CM

## 2024-06-18 DIAGNOSIS — R64 CANCER CACHEXIA: ICD-10-CM

## 2024-06-18 DIAGNOSIS — C78.6 MALIGNANT NEOPLASM METASTATIC TO PERITONEUM: ICD-10-CM

## 2024-06-18 DIAGNOSIS — C18.2 MALIGNANT NEOPLASM OF ASCENDING COLON: Primary | ICD-10-CM

## 2024-06-18 DIAGNOSIS — R11.2 CHEMOTHERAPY INDUCED NAUSEA AND VOMITING: ICD-10-CM

## 2024-06-18 DIAGNOSIS — T45.1X5A CHEMOTHERAPY INDUCED NAUSEA AND VOMITING: ICD-10-CM

## 2024-06-18 DIAGNOSIS — T45.1X5A CHEMOTHERAPY INDUCED NEUTROPENIA: ICD-10-CM

## 2024-06-18 DIAGNOSIS — C78.89 SECONDARY MALIGNANT NEOPLASM OF OTHER DIGESTIVE ORGANS: ICD-10-CM

## 2024-06-18 DIAGNOSIS — Z79.899 ON ANTINEOPLASTIC CHEMOTHERAPY: ICD-10-CM

## 2024-06-18 DIAGNOSIS — T45.1X5A CHEMOTHERAPY-INDUCED NEUROPATHY: ICD-10-CM

## 2024-06-18 LAB
ALBUMIN SERPL-MCNC: 2.9 G/DL (ref 3.4–4.8)
ALBUMIN/GLOB SERPL: 0.6 RATIO (ref 1.1–2)
ALP SERPL-CCNC: 119 UNIT/L (ref 40–150)
ALT SERPL-CCNC: 42 UNIT/L (ref 0–55)
ANION GAP SERPL CALC-SCNC: 10 MEQ/L
AST SERPL-CCNC: 36 UNIT/L (ref 5–34)
BASOPHILS # BLD AUTO: 0.06 X10(3)/MCL
BASOPHILS NFR BLD AUTO: 0.6 %
BILIRUB SERPL-MCNC: 0.3 MG/DL
BUN SERPL-MCNC: 7 MG/DL (ref 9.8–20.1)
CALCIUM SERPL-MCNC: 9.6 MG/DL (ref 8.4–10.2)
CEA SERPL-MCNC: 42.48 NG/ML (ref 0–3)
CHLORIDE SERPL-SCNC: 112 MMOL/L (ref 98–107)
CO2 SERPL-SCNC: 20 MMOL/L (ref 23–31)
CREAT SERPL-MCNC: 0.79 MG/DL (ref 0.55–1.02)
CREAT/UREA NIT SERPL: 9
EOSINOPHIL # BLD AUTO: 0.13 X10(3)/MCL (ref 0–0.9)
EOSINOPHIL NFR BLD AUTO: 1.4 %
ERYTHROCYTE [DISTWIDTH] IN BLOOD BY AUTOMATED COUNT: 20.7 % (ref 11.5–17)
GFR SERPLBLD CREATININE-BSD FMLA CKD-EPI: >60 ML/MIN/1.73/M2
GLOBULIN SER-MCNC: 4.7 GM/DL (ref 2.4–3.5)
GLUCOSE SERPL-MCNC: 93 MG/DL (ref 82–115)
HCT VFR BLD AUTO: 33.6 % (ref 37–47)
HGB BLD-MCNC: 10.7 G/DL (ref 12–16)
IMM GRANULOCYTES # BLD AUTO: 0.17 X10(3)/MCL (ref 0–0.04)
IMM GRANULOCYTES NFR BLD AUTO: 1.8 %
LYMPHOCYTES # BLD AUTO: 2.58 X10(3)/MCL (ref 0.6–4.6)
LYMPHOCYTES NFR BLD AUTO: 27 %
MAGNESIUM SERPL-MCNC: 1.8 MG/DL (ref 1.6–2.6)
MCH RBC QN AUTO: 29.1 PG (ref 27–31)
MCHC RBC AUTO-ENTMCNC: 31.8 G/DL (ref 33–36)
MCV RBC AUTO: 91.3 FL (ref 80–94)
MONOCYTES # BLD AUTO: 1.54 X10(3)/MCL (ref 0.1–1.3)
MONOCYTES NFR BLD AUTO: 16.1 %
NEUTROPHILS # BLD AUTO: 5.09 X10(3)/MCL (ref 2.1–9.2)
NEUTROPHILS NFR BLD AUTO: 53.1 %
PHOSPHATE SERPL-MCNC: 3.5 MG/DL (ref 2.3–4.7)
PLATELET # BLD AUTO: 151 X10(3)/MCL (ref 130–400)
PMV BLD AUTO: 10.9 FL (ref 7.4–10.4)
POTASSIUM SERPL-SCNC: 3.9 MMOL/L (ref 3.5–5.1)
PROT SERPL-MCNC: 7.6 GM/DL (ref 5.8–7.6)
RBC # BLD AUTO: 3.68 X10(6)/MCL (ref 4.2–5.4)
SODIUM SERPL-SCNC: 142 MMOL/L (ref 136–145)
WBC # BLD AUTO: 9.57 X10(3)/MCL (ref 4.5–11.5)

## 2024-06-18 PROCEDURE — 84100 ASSAY OF PHOSPHORUS: CPT

## 2024-06-18 PROCEDURE — 96375 TX/PRO/DX INJ NEW DRUG ADDON: CPT

## 2024-06-18 PROCEDURE — 85025 COMPLETE CBC W/AUTO DIFF WBC: CPT

## 2024-06-18 PROCEDURE — 96416 CHEMO PROLONG INFUSE W/PUMP: CPT

## 2024-06-18 PROCEDURE — 99214 OFFICE O/P EST MOD 30 MIN: CPT | Mod: PBBFAC

## 2024-06-18 PROCEDURE — 83735 ASSAY OF MAGNESIUM: CPT

## 2024-06-18 PROCEDURE — 99215 OFFICE O/P EST HI 40 MIN: CPT | Mod: S$PBB,,,

## 2024-06-18 PROCEDURE — 96413 CHEMO IV INFUSION 1 HR: CPT

## 2024-06-18 PROCEDURE — 63600175 PHARM REV CODE 636 W HCPCS

## 2024-06-18 PROCEDURE — 80053 COMPREHEN METABOLIC PANEL: CPT

## 2024-06-18 PROCEDURE — 96415 CHEMO IV INFUSION ADDL HR: CPT

## 2024-06-18 PROCEDURE — 96367 TX/PROPH/DG ADDL SEQ IV INF: CPT

## 2024-06-18 PROCEDURE — 25000003 PHARM REV CODE 250

## 2024-06-18 PROCEDURE — 36415 COLL VENOUS BLD VENIPUNCTURE: CPT

## 2024-06-18 PROCEDURE — 96368 THER/DIAG CONCURRENT INF: CPT

## 2024-06-18 PROCEDURE — 82378 CARCINOEMBRYONIC ANTIGEN: CPT

## 2024-06-18 PROCEDURE — 96417 CHEMO IV INFUS EACH ADDL SEQ: CPT

## 2024-06-18 PROCEDURE — 99999 PR PBB SHADOW E&M-EST. PATIENT-LVL IV: CPT | Mod: PBBFAC,,,

## 2024-06-18 RX ORDER — DIPHENHYDRAMINE HYDROCHLORIDE 50 MG/ML
50 INJECTION INTRAMUSCULAR; INTRAVENOUS ONCE AS NEEDED
Status: CANCELLED | OUTPATIENT
Start: 2024-06-18

## 2024-06-18 RX ORDER — SODIUM CHLORIDE 0.9 % (FLUSH) 0.9 %
10 SYRINGE (ML) INJECTION
Status: CANCELLED | OUTPATIENT
Start: 2024-06-20

## 2024-06-18 RX ORDER — DIPHENHYDRAMINE HYDROCHLORIDE 50 MG/ML
25 INJECTION INTRAMUSCULAR; INTRAVENOUS
Status: CANCELLED
Start: 2024-06-18

## 2024-06-18 RX ORDER — DIPHENHYDRAMINE HYDROCHLORIDE 50 MG/ML
25 INJECTION INTRAMUSCULAR; INTRAVENOUS
Status: COMPLETED | OUTPATIENT
Start: 2024-06-18 | End: 2024-06-18

## 2024-06-18 RX ORDER — PROCHLORPERAZINE EDISYLATE 5 MG/ML
5 INJECTION INTRAMUSCULAR; INTRAVENOUS ONCE AS NEEDED
OUTPATIENT
Start: 2024-06-20

## 2024-06-18 RX ORDER — HEPARIN 100 UNIT/ML
500 SYRINGE INTRAVENOUS
Status: CANCELLED | OUTPATIENT
Start: 2024-06-20

## 2024-06-18 RX ORDER — HEPARIN 100 UNIT/ML
500 SYRINGE INTRAVENOUS
Status: CANCELLED | OUTPATIENT
Start: 2024-06-18

## 2024-06-18 RX ORDER — SODIUM CHLORIDE 0.9 % (FLUSH) 0.9 %
10 SYRINGE (ML) INJECTION
Status: DISCONTINUED | OUTPATIENT
Start: 2024-06-18 | End: 2024-06-18 | Stop reason: HOSPADM

## 2024-06-18 RX ORDER — SODIUM CHLORIDE 0.9 % (FLUSH) 0.9 %
10 SYRINGE (ML) INJECTION
Status: CANCELLED | OUTPATIENT
Start: 2024-06-18

## 2024-06-18 RX ORDER — EPINEPHRINE 0.3 MG/.3ML
0.3 INJECTION SUBCUTANEOUS ONCE AS NEEDED
Status: CANCELLED | OUTPATIENT
Start: 2024-06-18

## 2024-06-18 RX ORDER — PROCHLORPERAZINE EDISYLATE 5 MG/ML
5 INJECTION INTRAMUSCULAR; INTRAVENOUS ONCE AS NEEDED
Status: CANCELLED | OUTPATIENT
Start: 2024-06-18

## 2024-06-18 RX ADMIN — DIPHENHYDRAMINE HYDROCHLORIDE 25 MG: 50 INJECTION, SOLUTION INTRAMUSCULAR; INTRAVENOUS at 10:06

## 2024-06-18 RX ADMIN — CETUXIMAB 900 MG: 2 SOLUTION INTRAVENOUS at 11:06

## 2024-06-18 RX ADMIN — APREPITANT 130 MG: 130 INJECTION, EMULSION INTRAVENOUS at 10:06

## 2024-06-18 RX ADMIN — DEXAMETHASONE SODIUM PHOSPHATE 0.25 MG: 10 INJECTION, SOLUTION INTRAMUSCULAR; INTRAVENOUS at 10:06

## 2024-06-18 RX ADMIN — FLUOROURACIL 4320 MG: 50 INJECTION, SOLUTION INTRAVENOUS at 03:06

## 2024-06-18 RX ADMIN — OXALIPLATIN 150 MG: 5 INJECTION, SOLUTION INTRAVENOUS at 01:06

## 2024-06-18 RX ADMIN — DEXTROSE MONOHYDRATE 720 MG: 50 INJECTION, SOLUTION INTRAVENOUS at 12:06

## 2024-06-18 RX ADMIN — DEXTROSE MONOHYDRATE: 50 INJECTION, SOLUTION INTRAVENOUS at 02:06

## 2024-06-20 ENCOUNTER — INFUSION (OUTPATIENT)
Dept: INFUSION THERAPY | Facility: HOSPITAL | Age: 69
End: 2024-06-20
Payer: MEDICARE

## 2024-06-20 VITALS
TEMPERATURE: 98 F | HEART RATE: 84 BPM | BODY MASS INDEX: 21.72 KG/M2 | OXYGEN SATURATION: 99 % | HEIGHT: 65 IN | WEIGHT: 130.38 LBS | SYSTOLIC BLOOD PRESSURE: 121 MMHG | DIASTOLIC BLOOD PRESSURE: 86 MMHG

## 2024-06-20 DIAGNOSIS — C18.2 MALIGNANT NEOPLASM OF ASCENDING COLON: Primary | ICD-10-CM

## 2024-06-20 PROCEDURE — 96377 APPLICATON ON-BODY INJECTOR: CPT

## 2024-06-20 PROCEDURE — 25000003 PHARM REV CODE 250

## 2024-06-20 PROCEDURE — 63600175 PHARM REV CODE 636 W HCPCS

## 2024-06-20 PROCEDURE — A4216 STERILE WATER/SALINE, 10 ML: HCPCS

## 2024-06-20 RX ORDER — HEPARIN 100 UNIT/ML
500 SYRINGE INTRAVENOUS
Status: DISCONTINUED | OUTPATIENT
Start: 2024-06-20 | End: 2024-06-20 | Stop reason: HOSPADM

## 2024-06-20 RX ORDER — SODIUM CHLORIDE 0.9 % (FLUSH) 0.9 %
10 SYRINGE (ML) INJECTION
Status: DISCONTINUED | OUTPATIENT
Start: 2024-06-20 | End: 2024-06-20 | Stop reason: HOSPADM

## 2024-06-20 RX ADMIN — HEPARIN SODIUM (PORCINE) LOCK FLUSH IV SOLN 100 UNIT/ML 500 UNITS: 100 SOLUTION at 12:06

## 2024-06-20 RX ADMIN — PEGFILGRASTIM 6 MG: KIT SUBCUTANEOUS at 12:06

## 2024-06-20 RX ADMIN — SODIUM CHLORIDE, PRESERVATIVE FREE 10 ML: 5 INJECTION INTRAVENOUS at 12:06

## 2024-06-25 ENCOUNTER — HOSPITAL ENCOUNTER (OUTPATIENT)
Dept: RADIOLOGY | Facility: HOSPITAL | Age: 69
Discharge: HOME | End: 2024-06-25
Payer: MEDICARE

## 2024-06-25 ENCOUNTER — DOCUMENTATION ONLY (OUTPATIENT)
Dept: HEMATOLOGY/ONCOLOGY | Facility: CLINIC | Age: 69
End: 2024-06-25
Payer: MEDICARE

## 2024-06-25 ENCOUNTER — HOSPITAL ENCOUNTER (EMERGENCY)
Facility: HOSPITAL | Age: 69
Discharge: HOME | End: 2024-06-25
Attending: STUDENT IN AN ORGANIZED HEALTH CARE EDUCATION/TRAINING PROGRAM
Payer: MEDICARE

## 2024-06-25 VITALS
HEIGHT: 66 IN | HEART RATE: 109 BPM | OXYGEN SATURATION: 100 % | DIASTOLIC BLOOD PRESSURE: 74 MMHG | WEIGHT: 128 LBS | BODY MASS INDEX: 20.57 KG/M2 | TEMPERATURE: 97 F | SYSTOLIC BLOOD PRESSURE: 123 MMHG | RESPIRATION RATE: 20 BRPM

## 2024-06-25 DIAGNOSIS — C18.2 MALIGNANT NEOPLASM OF ASCENDING COLON: ICD-10-CM

## 2024-06-25 DIAGNOSIS — R06.02 SOB (SHORTNESS OF BREATH): ICD-10-CM

## 2024-06-25 DIAGNOSIS — C78.6 MALIGNANT NEOPLASM METASTATIC TO PERITONEUM: ICD-10-CM

## 2024-06-25 DIAGNOSIS — I26.99 ACUTE PULMONARY EMBOLISM, UNSPECIFIED PULMONARY EMBOLISM TYPE, UNSPECIFIED WHETHER ACUTE COR PULMONALE PRESENT: Primary | ICD-10-CM

## 2024-06-25 DIAGNOSIS — I26.99 PULMONARY EMBOLISM, UNSPECIFIED CHRONICITY, UNSPECIFIED PULMONARY EMBOLISM TYPE, UNSPECIFIED WHETHER ACUTE COR PULMONALE PRESENT: Primary | ICD-10-CM

## 2024-06-25 DIAGNOSIS — I26.99 PULMONARY EMBOLISM, UNSPECIFIED CHRONICITY, UNSPECIFIED PULMONARY EMBOLISM TYPE, UNSPECIFIED WHETHER ACUTE COR PULMONALE PRESENT: ICD-10-CM

## 2024-06-25 LAB
ALBUMIN SERPL-MCNC: 3.2 G/DL (ref 3.4–4.8)
ALBUMIN/GLOB SERPL: 0.7 RATIO (ref 1.1–2)
ALP SERPL-CCNC: 144 UNIT/L (ref 40–150)
ALT SERPL-CCNC: 51 UNIT/L (ref 0–55)
ANION GAP SERPL CALC-SCNC: 11 MEQ/L
APTT PPP: 33.5 SECONDS (ref 23.2–33.7)
AST SERPL-CCNC: 46 UNIT/L (ref 5–34)
BASOPHILS # BLD AUTO: 0.05 X10(3)/MCL
BASOPHILS NFR BLD AUTO: 0.4 %
BILIRUB SERPL-MCNC: 0.3 MG/DL
BNP BLD-MCNC: 21.2 PG/ML
BUN SERPL-MCNC: 13.4 MG/DL (ref 9.8–20.1)
CALCIUM SERPL-MCNC: 9 MG/DL (ref 8.4–10.2)
CHLORIDE SERPL-SCNC: 112 MMOL/L (ref 98–107)
CO2 SERPL-SCNC: 18 MMOL/L (ref 23–31)
CREAT SERPL-MCNC: 0.78 MG/DL (ref 0.55–1.02)
CREAT/UREA NIT SERPL: 17
EOSINOPHIL # BLD AUTO: 0.06 X10(3)/MCL (ref 0–0.9)
EOSINOPHIL NFR BLD AUTO: 0.5 %
ERYTHROCYTE [DISTWIDTH] IN BLOOD BY AUTOMATED COUNT: 19.4 % (ref 11.5–17)
GFR SERPLBLD CREATININE-BSD FMLA CKD-EPI: >60 ML/MIN/1.73/M2
GLOBULIN SER-MCNC: 4.5 GM/DL (ref 2.4–3.5)
GLUCOSE SERPL-MCNC: 110 MG/DL (ref 82–115)
HCT VFR BLD AUTO: 32.4 % (ref 37–47)
HGB BLD-MCNC: 11.2 G/DL (ref 12–16)
IMM GRANULOCYTES # BLD AUTO: 0.14 X10(3)/MCL (ref 0–0.04)
IMM GRANULOCYTES NFR BLD AUTO: 1.2 %
INR PPP: 1.5
LYMPHOCYTES # BLD AUTO: 1.98 X10(3)/MCL (ref 0.6–4.6)
LYMPHOCYTES NFR BLD AUTO: 17.5 %
MCH RBC QN AUTO: 30.4 PG (ref 27–31)
MCHC RBC AUTO-ENTMCNC: 34.6 G/DL (ref 33–36)
MCV RBC AUTO: 87.8 FL (ref 80–94)
MONOCYTES # BLD AUTO: 0.37 X10(3)/MCL (ref 0.1–1.3)
MONOCYTES NFR BLD AUTO: 3.3 %
NEUTROPHILS # BLD AUTO: 8.7 X10(3)/MCL (ref 2.1–9.2)
NEUTROPHILS NFR BLD AUTO: 77.1 %
NRBC BLD AUTO-RTO: 0 %
PLATELET # BLD AUTO: 101 X10(3)/MCL (ref 130–400)
PLATELETS.RETICULATED NFR BLD AUTO: 4.6 % (ref 0.9–11.2)
PMV BLD AUTO: 11.6 FL (ref 7.4–10.4)
POTASSIUM SERPL-SCNC: 4 MMOL/L (ref 3.5–5.1)
PROT SERPL-MCNC: 7.7 GM/DL (ref 5.8–7.6)
PROTHROMBIN TIME: 18.2 SECONDS (ref 12.5–14.5)
RBC # BLD AUTO: 3.69 X10(6)/MCL (ref 4.2–5.4)
SODIUM SERPL-SCNC: 141 MMOL/L (ref 136–145)
TROPONIN I SERPL-MCNC: <0.01 NG/ML (ref 0–0.04)
WBC # BLD AUTO: 11.3 X10(3)/MCL (ref 4.5–11.5)

## 2024-06-25 PROCEDURE — 83880 ASSAY OF NATRIURETIC PEPTIDE: CPT | Performed by: NURSE PRACTITIONER

## 2024-06-25 PROCEDURE — 84484 ASSAY OF TROPONIN QUANT: CPT | Performed by: NURSE PRACTITIONER

## 2024-06-25 PROCEDURE — 93005 ELECTROCARDIOGRAM TRACING: CPT

## 2024-06-25 PROCEDURE — 25500020 PHARM REV CODE 255

## 2024-06-25 PROCEDURE — 85610 PROTHROMBIN TIME: CPT | Performed by: NURSE PRACTITIONER

## 2024-06-25 PROCEDURE — 74177 CT ABD & PELVIS W/CONTRAST: CPT | Mod: TC

## 2024-06-25 PROCEDURE — 85025 COMPLETE CBC W/AUTO DIFF WBC: CPT | Performed by: NURSE PRACTITIONER

## 2024-06-25 PROCEDURE — 80053 COMPREHEN METABOLIC PANEL: CPT | Performed by: NURSE PRACTITIONER

## 2024-06-25 PROCEDURE — 93010 ELECTROCARDIOGRAM REPORT: CPT | Mod: ,,, | Performed by: INTERNAL MEDICINE

## 2024-06-25 PROCEDURE — 85730 THROMBOPLASTIN TIME PARTIAL: CPT | Performed by: NURSE PRACTITIONER

## 2024-06-25 PROCEDURE — 25000003 PHARM REV CODE 250: Performed by: STUDENT IN AN ORGANIZED HEALTH CARE EDUCATION/TRAINING PROGRAM

## 2024-06-25 PROCEDURE — 99284 EMERGENCY DEPT VISIT MOD MDM: CPT | Mod: 25

## 2024-06-25 PROCEDURE — 71260 CT THORAX DX C+: CPT | Mod: TC

## 2024-06-25 RX ADMIN — RIVAROXABAN 15 MG: 15 TABLET, FILM COATED ORAL at 09:06

## 2024-06-25 RX ADMIN — DIATRIZOATE MEGLUMINE AND DIATRIZOATE SODIUM 30 ML: 660; 100 LIQUID ORAL; RECTAL at 10:06

## 2024-06-25 RX ADMIN — IOPAMIDOL 100 ML: 755 INJECTION, SOLUTION INTRAVENOUS at 10:06

## 2024-06-25 NOTE — PROGRESS NOTES
Spoke with patient and patient's daughter and informed of PE seen on CT CAP, informed on medication patient would be starting for PE. Given strict ED precautions for PE. Patient currently is feeling good, no c/o of SOB or chest pain

## 2024-06-25 NOTE — PROGRESS NOTES
Attempted to contact patient several times in regards to findings on CT CAP done on 6/24/24. No answer, multiple voice mails left

## 2024-06-26 ENCOUNTER — PATIENT MESSAGE (OUTPATIENT)
Dept: HEMATOLOGY/ONCOLOGY | Facility: CLINIC | Age: 69
End: 2024-06-26
Payer: MEDICARE

## 2024-06-26 LAB
OHS QRS DURATION: 72 MS
OHS QTC CALCULATION: 448 MS

## 2024-06-26 NOTE — PROGRESS NOTES
Subjective:       Patient ID: Selene Smallwood is a 69 y.o. female.    Chief Complaint: Follow Up    Diagnosis:   Stage IV Metastatic Colon Adenocarcinoma with mets to peritoneum  2. Pulmonary Embolisum    Current Treatment:   FOLFOX + Cetuximab q2w x 12- 4/2/24 -   Xarelto 15 mg BID x 21 days then 20 mg daily- started 6/25/24    Treatment History: N/A    HPI:   67 yo F presented in March '24 for evaluation of metastatic colon cancer. She initially presented to OSH in early February '24 with 2 days of upper abdominal pain. Imaging on 2/4 in the ER revealed evidence of non perforated acute appendicitis. She underwent Ex lap where a cecal mass was incidentally discovered and resected, as well as omental studding was noted and 1 small mass was taken for biopsy. Her final pathology revealed a large exophytic mass in the cecum measuring 4 x 2 cm extending to the appendiceal orifice and extends into pericolonic adipose tissue. G2, LVI+, PNI negative. 14 LN were resected, with 10 being positive for metastatic adenocarcinoma. She also had an omental mass measuring 2.5 x 1.5cm which was positive for metastatic adenocarcinoma, consistent with a colon primary. Her final pathology staging is consistent with [id number]. Her postop course was complicated by abdominal abscess formation s/p IR drain placement and subsequent removal and antibiotic treatment.      She underwent PET scan in March '24 with evidence of nodular foci within the omentum/peritoneum consistent with peritoneal carcinomatosis. No other evidence of disease. NGS testing was performed which revealed GULSHAN wild type and BRAF negative. Therefore she will proceed with 1st line therapy of FOLFOX + Cetuximab Q2w x 12 cycles.     Interval History:  She returns to the clinic today for a follow-up and treatment clearance for C of FOLFOX + Cetuximab and scan results.   Reviewed recent scans and how they are not able to assess her peritoneal disease, therefore will proceed  with PET scan to further evaluate treatment response  Scans incidentally found PE, she has no respiratory complaints. Unable to afford Xarelto and has not started any anticoagulation.   I gave her 2 weeks of Xarelto 15mg BID samples in the office today, will work with pharmacy to see about more affordable options  She continues with occasional nausea.   Requesting something to help her sleep.   She denies any peripheral neuropathy to fingertips and toes.   Denies SOB, chest pain, fever, chills. No abnormal bowels.     History reviewed. No pertinent past medical history.   Past Surgical History:   Procedure Laterality Date    APPENDECTOMY N/A 2/5/2024    Procedure: APPENDECTOMY;  Surgeon: Anna Murillo MD;  Location: [facility] OR;  Service: General;  Laterality: N/A;    INSERTION OF TUNNELED CENTRAL VENOUS CATHETER (CVC) WITH SUBCUTANEOUS PORT N/A 3/26/2024    Procedure: [id number]-I-CATH;  Surgeon: Armando Tran MD;  Location: [facility] OR;  Service: Peripheral Vascular;  Laterality: N/A;    LAPAROSCOPIC APPENDECTOMY N/A 2/5/2024    Procedure: APPENDECTOMY, LAPAROSCOPIC;  Surgeon: Anna Murillo MD;  Location: [facility] OR;  Service: General;  Laterality: N/A;    SURGICAL REMOVAL OF ILEUM WITH CECUM N/A 2/5/2024    Procedure: EXCISION, CECUM WITH ILEUM;  Surgeon: Anna Murillo MD;  Location: [facility] OR;  Service: General;  Laterality: N/A;    WASHOUT N/A 2/5/2024    Procedure: WASHOUT;  Surgeon: Anna Murillo MD;  Location: [facility] OR;  Service: General;  Laterality: N/A;     Social History     Socioeconomic History    Marital status:    Tobacco Use    Smoking status: Former     Types: Cigarettes    Smokeless tobacco: Never     Social Determinants of Health     Financial Resource Strain: Low Risk  (2/15/2024)    Overall Financial Resource Strain (CARDIA)     Difficulty of Paying Living Expenses: Not hard at all   Food Insecurity: No Food Insecurity (2/15/2024)    Hunger Vital Sign     Worried About Running  Out of Food in the Last Year: Never true     Ran Out of Food in the Last Year: Never true   Transportation Needs: No Transportation Needs (2/15/2024)    PRAPARE - Transportation     Lack of Transportation (Medical): No     Lack of Transportation (Non-Medical): No   Physical Activity: Unknown (2/15/2024)    Exercise Vital Sign     Days of Exercise per Week: 0 days     Minutes of Exercise per Session: Patient unable to answer   Stress: No Stress Concern Present (2/15/2024)    [employer] - Occupational Stress Questionnaire     Feeling of Stress : Not at all   Housing Stability: Low Risk  (2/15/2024)    Housing Stability Vital Sign     Unable to Pay for Housing in the Last Year: No     Number of Places Lived in the Last Year: 1     Unstable Housing in the Last Year: No      No family history on file.   Review of patient's allergies indicates:   Allergen Reactions    Robaxin [methocarbamol] Other (See Comments)     Altered mental status       Review of Systems   Constitutional:  Positive for appetite change and fatigue. Negative for activity change, fever and unexpected weight change.   HENT:  Negative for sore throat.    Eyes:  Negative for visual disturbance.   Respiratory:  Negative for cough and shortness of breath.    Cardiovascular:  Negative for chest pain.   Gastrointestinal:  Positive for nausea. Negative for abdominal pain, diarrhea and vomiting.   Endocrine: Negative for polyuria.   Genitourinary:  Negative for dysuria and hot flashes.   Integumentary:  Negative for rash.   Allergic/Immunologic: Negative for immunocompromised state.   Neurological:  Negative for weakness and headaches.   Hematological:  Negative for adenopathy.   Psychiatric/Behavioral:  Negative for confusion.          Objective:      Vitals:    06/27/24 1041   BP: (!) 145/87   Pulse: 86   Resp: 20   Temp: 97.5 °F (36.4 °C)           Physical Exam  Constitutional:       General: She is not in acute distress.      Appearance: Normal appearance. She is not ill-appearing.   HENT:      Head: Normocephalic and atraumatic.      Nose: Nose normal.      Mouth/Throat:      Mouth: Mucous membranes are moist.      Pharynx: Oropharynx is clear.   Eyes:      Extraocular Movements: Extraocular movements intact.      Conjunctiva/sclera: Conjunctivae normal.      Pupils: Pupils are equal, round, and reactive to light.   Cardiovascular:      Rate and Rhythm: Normal rate and regular rhythm.      Pulses: Normal pulses.      Heart sounds: Normal heart sounds. No murmur heard.  Pulmonary:      Effort: Pulmonary effort is normal. No respiratory distress.      Breath sounds: Normal breath sounds.   Abdominal:      General: There is no distension.      Palpations: Abdomen is soft.      Tenderness: There is no abdominal tenderness.   Musculoskeletal:         General: Normal range of motion.      Cervical back: Normal range of motion and neck supple.      Right lower leg: No edema.      Left lower leg: No edema.   Lymphadenopathy:      Cervical: No cervical adenopathy.   Skin:     General: Skin is warm and dry.   Neurological:      General: No focal deficit present.      Mental Status: She is alert and oriented to person, place, and time.         LABS AND IMAGING REVIEWED IN EPIC    IMAGIN24 CT C/A/P:  Impression:  1. Small to moderate bilateral pleural effusions.  2. Right upper lobe ground-glass could be infectious/inflammatory or relate to asymmetric edema.  3. Interval appendectomy with fluid collections along the surgical bed suspicious for developing abscesses.  24 Abdominal US:  Impression:  Cyst in the right lobe of the liver.  Elongated gallbladder with no definite gallstones minimal amount of fluid seen adjacent to the fundus.  Nephrolithiasis of the right kidney with a cyst of the right kidney minimal fullness of the collecting system.  Free fluid as described above  24 CT Abd/Pelvis:  Impression:  Dilated appendix with  periappendiceal inflammatory changes,, compatible with non perforated acute appendicitis.  No fluid collection or abscess.  No free air.  Small amount of free fluid the pelvis.  Findings were communicated to Dr. Chavez at 13:56 02/04/2024  Mild atelectatic changes and ground-glass opacities in the lower lungs suspicious for infectious/inflammatory process.  3/15/24 PET/CT:  1. Nodular foci of hypermetabolic soft tissue thickening within the mesentery most consistent with peritoneal carcinomatosis.  2. Hypermetabolic nodule in the uterine fundus which is indeterminate. Pelvic ultrasound may be beneficial.       PATHOLOGY:  2/5/24 Biopsy:  Final Diagnosis  1. Appendix, appendectomy:  - Acute appendicitis with perforation.  - Adenocarcinoma involves lymphatic spaces of the appendiceal wall and peritoneal surface.  2. Cecum, ileocecectomy:  - Colonic adenocarcinoma.  - See synoptic checklist for CAP cancer protocol.  3. Omental mass, biopsy:  - Metastatic adenocarcinoma, consistent with a colon primary.  4. Small bowel, segmental resection:  - Acute peritonitis.  - No evidence of malignancy.    MLH1: PRESERVED (INTACT) EXPRESSION IN TUMOR CELLS      MSH2: PRESERVED (INTACT) EXPRESSION IN TUMOR CELLS      MSH6: PRESERVED (INTACT) EXPRESSION IN TUMOR CELLS      PMS2: PRESERVED (INTACT) EXPRESSION IN TUMOR CELLS  Microscopic Description/Comments   These results show no deficiency of the mismatch repair proteins tested.    Assessment:   Stage IV Metastatic Colon Adenocarcinoma with mets to peritoneum - Cecal mass, nonobstructive, no perforation. [id number].   Plan for 1st line chemotherapy with FOLFOX + Cetuximab. Will remove bolus 5FU in metastatic setting.   Neulasta added with C3.   2. Pulmonary Embolus at the right mainstem bronchus    A. Incidentally found during CT CAP 6/25/24   B. No drug coverage. Unable to afford Xarelto. Xarelto 15mg BID x 14 day samples given in clinic. Reached out to Gladis Hennessy with pharmacy  for drug assistance. May need to swtich to coumadin.          Plan:     Toxicity reviewed, okay to proceed with C7 Folfox + Cetuximab with neulasta as scheduled 7/2  Start Xarelto 15mg BID tonight with samples given, will follow up with pharmacy to see other options for anticoagulation. Will need lifelong.   IV fluids if needed on pump disconnect.  Continue potassium 20meq daily.  Melatonin 5mg Rx sent to pharmacy. She can also  OTC  PET scan ordered to better evaluate her carcinomatosis, CT CAP unable to evaluate disease response further and in the setting of increasing tumor markers I am concerned for possible progression.   RTC in 2.5 weeks with NP for FU/lab/treat C8  Cbc, cmp, cea- 1 hr prior @ [facility]      I spent a total of 40 minutes on the day of the visit.This includes face to face time and non-face to face time preparing to see the patient (eg, review of tests), obtaining and/or reviewing separately obtained history, documenting clinical information in the electronic or other health record, independently interpreting results and communicating results to the patient/family/caregiver, or care coordinator.      Elizabeth Lejeune MD  Hematology /Oncology  [facility]        Professional Services:  Dana ROJO LPN, acted solely as a scribe for and in the presence of Dr. Elizabeth Lejeune, who performed these services.

## 2024-06-26 NOTE — ED PROVIDER NOTES
Encounter Date: 6/25/2024    SCRIBE #1 NOTE: I, Oskar Kelly, am scribing for, and in the presence of,  Jose Raul Shanks MD. I have scribed the following portions of the note - the EKG reading. Other sections scribed: HPI, ROS, PE.       History     Chief Complaint   Patient presents with    Sent by Dr     Pt reports to ED from [facility] with instructions to go to ER for PE found on CT today. Reports mild SOB/CP intermittently, none at time of triage. Hx Colon cancer, last dose Chemo 6/18, Oncologist Dr. Lejeune.      69 y.o. female with hx colon cancer with chemo presents to the ED from [facility] for a PE. Pt reports intermittent SOB and chest pain, mild, for weeks. Denies symptoms at rest in ER. Pt's PCP is GIFTY Vasquez and oncologist is Elizabeth Lejeune, MD.    Per chart review, CT showed a pulmonary embolism in the right main stem of bronchus, no obvious right heart strain     The history is provided by the patient. No  was used.     Review of patient's allergies indicates:   Allergen Reactions    Robaxin [methocarbamol] Other (See Comments)     Altered mental status      No past medical history on file.  Past Surgical History:   Procedure Laterality Date    APPENDECTOMY N/A 2/5/2024    Procedure: APPENDECTOMY;  Surgeon: Anna Murillo MD;  Location: [facility];  Service: General;  Laterality: N/A;    INSERTION OF TUNNELED CENTRAL VENOUS CATHETER (CVC) WITH SUBCUTANEOUS PORT N/A 3/26/2024    Procedure: [id number]-[id number];  Surgeon: Armando Tran MD;  Location: [facility] OR;  Service: Peripheral Vascular;  Laterality: N/A;    LAPAROSCOPIC APPENDECTOMY N/A 2/5/2024    Procedure: APPENDECTOMY, LAPAROSCOPIC;  Surgeon: Anna Murillo MD;  Location: [facility] OR;  Service: General;  Laterality: N/A;    SURGICAL REMOVAL OF ILEUM WITH CECUM N/A 2/5/2024    Procedure: EXCISION, CECUM WITH ILEUM;  Surgeon: Anna Murillo MD;  Location: [facility] OR;  Service: General;  Laterality: N/A;     WASHOUT N/A 2/5/2024    Procedure: WASHOUT;  Surgeon: Anna Murillo MD;  Location: [facility] OR;  Service: General;  Laterality: N/A;     No family history on file.  Social History     Tobacco Use    Smoking status: Former     Types: Cigarettes    Smokeless tobacco: Never     Review of Systems   Constitutional:  Negative for chills and fever.   HENT:  Negative for congestion, rhinorrhea and sore throat.    Eyes:  Negative for visual disturbance.   Respiratory:  Positive for shortness of breath. Negative for cough.    Cardiovascular:  Positive for chest pain. Negative for leg swelling.   Gastrointestinal:  Negative for abdominal pain, nausea and vomiting.   Genitourinary:  Negative for dysuria, hematuria, vaginal bleeding and vaginal discharge.   Musculoskeletal:  Negative for joint swelling.   Skin:  Negative for rash.   Neurological:  Negative for weakness.   Psychiatric/Behavioral:  Negative for confusion.        Physical Exam     Initial Vitals [06/25/24 1812]   BP Pulse Resp Temp SpO2   123/74 109 20 97.4 °F (36.3 °C) 100 %      MAP       --         Physical Exam    Nursing note and vitals reviewed.  Constitutional: She is not diaphoretic. No distress.   HENT:   Head: Normocephalic and atraumatic.   Neck: Neck supple.   Normal range of motion.  Cardiovascular:  Normal rate and regular rhythm.           No murmur heard.  Right chest wall mediport c/d/I, no tenderness/erythema    Pulmonary/Chest: Breath sounds normal. No respiratory distress.   Abdominal: Abdomen is soft. She exhibits no distension. There is no abdominal tenderness.   Musculoskeletal:      Cervical back: Normal range of motion and neck supple.     Neurological: She is alert and oriented to person, place, and time. She has normal strength. No cranial nerve deficit or sensory deficit.   Skin: Skin is warm. Capillary refill takes less than 2 seconds.   Psychiatric: She has a normal mood and affect.         ED Course   Procedures  Labs Reviewed   CBC  WITH DIFFERENTIAL - Abnormal; Notable for the following components:       Result Value    RBC 3.69 (*)     Hgb 11.2 (*)     Hct 32.4 (*)     RDW 19.4 (*)     Platelet 101 (*)     MPV 11.6 (*)     IG# 0.14 (*)     All other components within normal limits   COMPREHENSIVE METABOLIC PANEL - Abnormal; Notable for the following components:    Chloride 112 (*)     CO2 18 (*)     Protein Total 7.7 (*)     Albumin 3.2 (*)     Globulin 4.5 (*)     Albumin/Globulin Ratio 0.7 (*)     AST 46 (*)     All other components within normal limits   PROTIME-INR - Abnormal; Notable for the following components:    PT 18.2 (*)     INR 1.5 (*)     All other components within normal limits   APTT - Normal   B-TYPE NATRIURETIC PEPTIDE - Normal   TROPONIN I - Normal     EKG Readings: (Independently Interpreted)   Initial Reading: No STEMI. Rhythm: Sinus Tachycardia. Heart Rate: 106. Ectopy: No Ectopy. Conduction: Normal. ST Segments: Normal ST Segments. T Waves: Normal. Axis: Normal.   .        Imaging Results    None          Medications   rivaroxaban tablet 15 mg (15 mg Oral Given 24)     Medical Decision Making  68 yo with history of colon cancer - presenting with abnormal outpatient CT  Has had mild intermittent CP/sob outpatient, no new symptoms  Patient is on room air asymptomatic in the ER, otherwise normal vital signs  Her troponin and BNP are negative  Discussed with Oncology Dr. Nelly Morris she reports that  plan was for outpatient treatment   Also reports thrombus at tip of mediport is treated with oral anticoagulation as well, doesn't need to be removed, can still be used for chemo  I discussed this with the patient she reports that she was told she would be treated outpatient and a prescription was called in however she got nervous and scared because she had a sister who  from a blood clot so she came to the ER on her own accord  Patient however on my review once explain the plan of care is amenable  to outpatient treatment she was aware that she needs to return immediately for any worsening chest pain shortness of breath trouble breathing syncope or any other concerning findings her 1st dose of Xarelto was given here in the ER she will follow up closely with her oncology    Differential diagnosis includes, but is not limited to:  Judging by the patient's chief complaint and pertinent history, the patient has the following possible differential diagnoses, including but not limited to the following.  Some of these are deemed to be lower likelihood and some more likely based on my physical exam and history combined with possible lab work and/or imaging studies.   Please see the pertinent studies, and refer to the HPI.  Some of these diagnoses will take further evaluation to fully rule out, perhaps as an outpatient and the patient was encouraged to follow up when discharged for more comprehensive evaluation.    ACS, pneumonia, COVID/Flu, congestive heart failure, asthma, COPD, pleural effusion, pulmonary edema, acute bronchitis, PE, pneumothorax, hemothorax, aortic dissection, electrolyte abnormalities, anemia, anxiety       Problems Addressed:  Acute pulmonary embolism, unspecified pulmonary embolism type, unspecified whether acute cor pulmonale present: acute illness or injury that poses a threat to life or bodily functions  SOB (shortness of breath): acute illness or injury that poses a threat to life or bodily functions    Amount and/or Complexity of Data Reviewed  External Data Reviewed: radiology.     Details: Reviewed CT scan done today - showing PE   Labs: ordered.  ECG/medicine tests: ordered and independent interpretation performed.     Details: No STEMI. Rhythm: Sinus Tachycardia. Heart Rate: 106. Ectopy: No Ectopy. Conduction: Normal. ST Segments: Normal ST Segments. T Waves: Normal. Axis: Normal.1810.  Discussion of management or test interpretation with external provider(s): Discussed at length with  Dr. Nelly Morris - mila for outpatient treatment with xarelot or elaquis    Risk  OTC drugs.  Prescription drug management.  Decision regarding hospitalization.            Scribe Attestation:   Scribe #1: I performed the above scribed service and the documentation accurately describes the services I performed. I attest to the accuracy of the note.    Attending Attestation:           Physician Attestation for Scribe:  Physician Attestation Statement for Scribe #1: I, Jose Raul Shanks MD, reviewed documentation, as scribed by Oskar Kelly in my presence, and it is both accurate and complete.             ED Course as of 06/25/24 2227 Tue Jun 25, 2024 1946 Paged oncology Dr. Lejeune [JM]   2015 Discussed with on-call for Lejeune, MD - Nelly Morris MD [ED]   2033 On call for Dr. Ish Morris - can discuss with patient options for treatment, if stable and asymptomatic can be treated outpatient but okay if decision to admit  [AC]   2058 Patient is on RA, HR/BP normal. Asymptomatic in ER. Reports plan was to start xarelto. Will give first dose here, treat outpatient, return precuations given.   [AC]      ED Course User Index  [AC] Jose Raul Shanks IV, MD  [ED] Elana Huitron  [] Oskar Kelly                           Clinical Impression:  Final diagnoses:  [R06.02] SOB (shortness of breath)  [I26.99] Acute pulmonary embolism, unspecified pulmonary embolism type, unspecified whether acute cor pulmonale present (Primary)          ED Disposition Condition    Discharge Stable          ED Prescriptions       Medication Sig Dispense Start Date End Date Auth. Provider    rivaroxaban (XARELTO) 20 mg Tab  (Status: Discontinued) Take 1 tablet (20 mg total) by mouth daily with dinner or evening meal. 30 tablet 7/17/2024 6/25/2024 Jose Raul Shanks IV, MD    rivaroxaban (XARELTO) 20 mg Tab Take 1 tablet (20 mg total) by mouth daily with dinner or evening meal. 30 tablet 7/17/2024 8/16/2024 Jose Raul Shanks IV, MD          Follow-up  Information       Follow up With Specialties Details Why Contact Info    [facility] - Emergency Dept Emergency Medicine Go to  If symptoms worsen [address] [facility] [id number] [phone]    Sammie Gonzales, P Family Medicine Schedule an appointment as soon as possible for a visit   [facility] [facility] [address]  [phone]      Primary care physician  Schedule an appointment as soon as possible for a visit   Follow up with you primary care physician.   If you do not have a primary care physician call [phone] to schedule an appointment.             Jose Raul Shanks IV, MD  06/26/24 0144

## 2024-06-26 NOTE — DISCHARGE INSTRUCTIONS
Thanks for letting use take care of you today! It is our goal to give you courteous care and to keep you comfortable and informed. If you have any questions before you leave I will be happy to try and answer them.     Advice after your visit:  Your visit in the emergency department is NOT definitive care - please follow-up with your primary care doctor and/or specialist within 1-2 days. If you do not have a primary care physician call [phone] to schedule an appointment. Please return if you have any worsening in your condition or if you have any other concerns.    Return to the emergency department if any worsening symptoms including fever, chest pain, difficulty breathing, weakness, numbness, tingling, nausea, vomiting, inability to eat, drink or take your medication, or any other new symptoms or concerns arise.      Please signup for MyChart as noted below in your paperwork to review all labwork, imaging results, and any other incidental findings from today's visit.     If you had radiology exams like an XRAY or CT in the emergency Department the interpreation on them may be preliminary - there may be less time sensitive findings on the reports please obtain these reports within 24 hours from the hospital or by using your out on your mobile phone to access records.  Bring these to your primary care doctor and/or specialist for further review of incidental findings.    Please review any LAB WORK from your visit today with your primary care physician.    If you were prescribed OPIATE PAIN MEDICATION - please understand of these medications can be addictive, you may fill less of the prescription was written for, you do not have to take the full prescription.  You may discard what you do not use.  Please seek help if you feel you are having problems with addiction.  Do not drive or operate heavy machinery if you are taking sedating medications.  Do not mix these medications with alcohol.      If you had a SPLINT  placed in the emergency department if you have severe pain numbness tingling or discoloration of year digits please remove the splint and return to the emergency department for further evaluation as this may represent a sign of compromise to the nerves or blood vessels due to swelling.    If you had SUTURES in the emergency department please have them removed in the prescribed time frame typically within 7-14 days.  You may shower but please do not bathe or swim.  Keep the wounds clean and dry and covered with a clean dressing.  Please return if he have any signs of infection like redness or drainage or pain at the suture site.    Please take the full course of  any ANTIBIOTICS you were prescribed - incomplete courses of antibiotics can cause resistance to antibiotics in the future which will make it difficult to treat any infections you may have.

## 2024-06-27 ENCOUNTER — OFFICE VISIT (OUTPATIENT)
Dept: HEMATOLOGY/ONCOLOGY | Facility: CLINIC | Age: 69
End: 2024-06-27
Payer: MEDICARE

## 2024-06-27 ENCOUNTER — LAB VISIT (OUTPATIENT)
Dept: LAB | Facility: HOSPITAL | Age: 69
End: 2024-06-27
Payer: MEDICARE

## 2024-06-27 VITALS
HEIGHT: 65 IN | RESPIRATION RATE: 20 BRPM | HEART RATE: 86 BPM | TEMPERATURE: 98 F | BODY MASS INDEX: 21.18 KG/M2 | SYSTOLIC BLOOD PRESSURE: 145 MMHG | OXYGEN SATURATION: 100 % | WEIGHT: 127.13 LBS | DIASTOLIC BLOOD PRESSURE: 87 MMHG

## 2024-06-27 DIAGNOSIS — C78.6 MALIGNANT NEOPLASM METASTATIC TO PERITONEUM: ICD-10-CM

## 2024-06-27 DIAGNOSIS — Z79.899 IMMUNODEFICIENCY DUE TO DRUG THERAPY: ICD-10-CM

## 2024-06-27 DIAGNOSIS — C18.2 MALIGNANT NEOPLASM OF ASCENDING COLON: ICD-10-CM

## 2024-06-27 DIAGNOSIS — C18.2 MALIGNANT NEOPLASM OF ASCENDING COLON: Primary | ICD-10-CM

## 2024-06-27 DIAGNOSIS — G47.9 DIFFICULTY SLEEPING: Primary | ICD-10-CM

## 2024-06-27 DIAGNOSIS — D84.821 IMMUNODEFICIENCY DUE TO DRUG THERAPY: ICD-10-CM

## 2024-06-27 DIAGNOSIS — I26.99 OTHER ACUTE PULMONARY EMBOLISM, UNSPECIFIED WHETHER ACUTE COR PULMONALE PRESENT: ICD-10-CM

## 2024-06-27 LAB
ALBUMIN SERPL-MCNC: 3.1 G/DL (ref 3.4–4.8)
ALBUMIN/GLOB SERPL: 0.7 RATIO (ref 1.1–2)
ALP SERPL-CCNC: 126 UNIT/L (ref 40–150)
ALT SERPL-CCNC: 51 UNIT/L (ref 0–55)
ANION GAP SERPL CALC-SCNC: 9 MEQ/L
AST SERPL-CCNC: 47 UNIT/L (ref 5–34)
BASOPHILS # BLD AUTO: 0.06 X10(3)/MCL
BASOPHILS NFR BLD AUTO: 1.1 %
BILIRUB SERPL-MCNC: 0.4 MG/DL
BUN SERPL-MCNC: 8 MG/DL (ref 9.8–20.1)
CALCIUM SERPL-MCNC: 9.6 MG/DL (ref 8.4–10.2)
CEA SERPL-MCNC: 43.19 NG/ML (ref 0–3)
CHLORIDE SERPL-SCNC: 112 MMOL/L (ref 98–107)
CO2 SERPL-SCNC: 19 MMOL/L (ref 23–31)
CREAT SERPL-MCNC: 0.72 MG/DL (ref 0.55–1.02)
CREAT/UREA NIT SERPL: 11
EOSINOPHIL # BLD AUTO: 0.09 X10(3)/MCL (ref 0–0.9)
EOSINOPHIL NFR BLD AUTO: 1.6 %
ERYTHROCYTE [DISTWIDTH] IN BLOOD BY AUTOMATED COUNT: 19.4 % (ref 11.5–17)
GFR SERPLBLD CREATININE-BSD FMLA CKD-EPI: >60 ML/MIN/1.73/M2
GLOBULIN SER-MCNC: 4.5 GM/DL (ref 2.4–3.5)
GLUCOSE SERPL-MCNC: 104 MG/DL (ref 82–115)
HCT VFR BLD AUTO: 32.8 % (ref 37–47)
HGB BLD-MCNC: 10.7 G/DL (ref 12–16)
IMM GRANULOCYTES # BLD AUTO: 0.02 X10(3)/MCL (ref 0–0.04)
IMM GRANULOCYTES NFR BLD AUTO: 0.4 %
LYMPHOCYTES # BLD AUTO: 1.7 X10(3)/MCL (ref 0.6–4.6)
LYMPHOCYTES NFR BLD AUTO: 30.8 %
MAGNESIUM SERPL-MCNC: 1.8 MG/DL (ref 1.6–2.6)
MCH RBC QN AUTO: 29.7 PG (ref 27–31)
MCHC RBC AUTO-ENTMCNC: 32.6 G/DL (ref 33–36)
MCV RBC AUTO: 91.1 FL (ref 80–94)
MONOCYTES # BLD AUTO: 0.7 X10(3)/MCL (ref 0.1–1.3)
MONOCYTES NFR BLD AUTO: 12.7 %
NEUTROPHILS # BLD AUTO: 2.95 X10(3)/MCL (ref 2.1–9.2)
NEUTROPHILS NFR BLD AUTO: 53.4 %
PLATELET # BLD AUTO: 100 X10(3)/MCL (ref 130–400)
PMV BLD AUTO: 10.7 FL (ref 7.4–10.4)
POTASSIUM SERPL-SCNC: 3.7 MMOL/L (ref 3.5–5.1)
PROT SERPL-MCNC: 7.6 GM/DL (ref 5.8–7.6)
RBC # BLD AUTO: 3.6 X10(6)/MCL (ref 4.2–5.4)
SODIUM SERPL-SCNC: 140 MMOL/L (ref 136–145)
WBC # BLD AUTO: 5.52 X10(3)/MCL (ref 4.5–11.5)

## 2024-06-27 PROCEDURE — 99215 OFFICE O/P EST HI 40 MIN: CPT | Mod: S$PBB,,, | Performed by: STUDENT IN AN ORGANIZED HEALTH CARE EDUCATION/TRAINING PROGRAM

## 2024-06-27 PROCEDURE — 80053 COMPREHEN METABOLIC PANEL: CPT

## 2024-06-27 PROCEDURE — 82378 CARCINOEMBRYONIC ANTIGEN: CPT

## 2024-06-27 PROCEDURE — 36415 COLL VENOUS BLD VENIPUNCTURE: CPT

## 2024-06-27 PROCEDURE — 83735 ASSAY OF MAGNESIUM: CPT

## 2024-06-27 PROCEDURE — 99214 OFFICE O/P EST MOD 30 MIN: CPT | Mod: PBBFAC | Performed by: STUDENT IN AN ORGANIZED HEALTH CARE EDUCATION/TRAINING PROGRAM

## 2024-06-27 PROCEDURE — 99999 PR PBB SHADOW E&M-EST. PATIENT-LVL IV: CPT | Mod: PBBFAC,,, | Performed by: STUDENT IN AN ORGANIZED HEALTH CARE EDUCATION/TRAINING PROGRAM

## 2024-06-27 PROCEDURE — 85025 COMPLETE CBC W/AUTO DIFF WBC: CPT

## 2024-06-27 RX ORDER — SODIUM CHLORIDE 0.9 % (FLUSH) 0.9 %
10 SYRINGE (ML) INJECTION
OUTPATIENT
Start: 2024-07-02

## 2024-06-27 RX ORDER — PROCHLORPERAZINE EDISYLATE 5 MG/ML
5 INJECTION INTRAMUSCULAR; INTRAVENOUS ONCE AS NEEDED
OUTPATIENT
Start: 2024-07-02

## 2024-06-27 RX ORDER — SODIUM CHLORIDE 0.9 % (FLUSH) 0.9 %
10 SYRINGE (ML) INJECTION
OUTPATIENT
Start: 2024-07-04

## 2024-06-27 RX ORDER — DIPHENHYDRAMINE HYDROCHLORIDE 50 MG/ML
50 INJECTION INTRAMUSCULAR; INTRAVENOUS ONCE AS NEEDED
OUTPATIENT
Start: 2024-07-02

## 2024-06-27 RX ORDER — PROCHLORPERAZINE EDISYLATE 5 MG/ML
5 INJECTION INTRAMUSCULAR; INTRAVENOUS ONCE AS NEEDED
OUTPATIENT
Start: 2024-07-04

## 2024-06-27 RX ORDER — HEPARIN 100 UNIT/ML
500 SYRINGE INTRAVENOUS
OUTPATIENT
Start: 2024-07-04

## 2024-06-27 RX ORDER — HEPARIN 100 UNIT/ML
500 SYRINGE INTRAVENOUS
OUTPATIENT
Start: 2024-07-02

## 2024-06-27 RX ORDER — MELATONIN 5 MG
5 CAPSULE ORAL NIGHTLY PRN
Qty: 30 CAPSULE | Refills: 2 | Status: SHIPPED | OUTPATIENT
Start: 2024-06-27

## 2024-06-27 RX ORDER — EPINEPHRINE 0.3 MG/.3ML
0.3 INJECTION SUBCUTANEOUS ONCE AS NEEDED
OUTPATIENT
Start: 2024-07-02

## 2024-06-27 RX ORDER — DIPHENHYDRAMINE HYDROCHLORIDE 50 MG/ML
25 INJECTION INTRAMUSCULAR; INTRAVENOUS
Start: 2024-07-02

## 2024-07-02 ENCOUNTER — HOSPITAL ENCOUNTER (OUTPATIENT)
Dept: RADIOLOGY | Facility: HOSPITAL | Age: 69
Discharge: HOME | End: 2024-07-02
Attending: STUDENT IN AN ORGANIZED HEALTH CARE EDUCATION/TRAINING PROGRAM
Payer: MEDICARE

## 2024-07-02 DIAGNOSIS — C18.2 MALIGNANT NEOPLASM OF ASCENDING COLON: ICD-10-CM

## 2024-07-02 DIAGNOSIS — C78.6 MALIGNANT NEOPLASM METASTATIC TO PERITONEUM: ICD-10-CM

## 2024-07-02 PROCEDURE — A9552 F18 FDG: HCPCS | Performed by: STUDENT IN AN ORGANIZED HEALTH CARE EDUCATION/TRAINING PROGRAM

## 2024-07-02 PROCEDURE — 78815 PET IMAGE W/CT SKULL-THIGH: CPT | Mod: TC

## 2024-07-02 RX ORDER — FLUDEOXYGLUCOSE F18 500 MCI/ML
10 INJECTION INTRAVENOUS
Status: COMPLETED | OUTPATIENT
Start: 2024-07-02 | End: 2024-07-02

## 2024-07-02 RX ADMIN — FLUDEOXYGLUCOSE F-18 10.4 MILLICURIE: 500 INJECTION INTRAVENOUS at 10:07

## 2024-07-08 ENCOUNTER — LAB VISIT (OUTPATIENT)
Dept: LAB | Facility: HOSPITAL | Age: 69
End: 2024-07-08
Payer: MEDICARE

## 2024-07-08 ENCOUNTER — INFUSION (OUTPATIENT)
Dept: INFUSION THERAPY | Facility: HOSPITAL | Age: 69
End: 2024-07-08
Payer: MEDICARE

## 2024-07-08 VITALS
DIASTOLIC BLOOD PRESSURE: 90 MMHG | TEMPERATURE: 98 F | SYSTOLIC BLOOD PRESSURE: 150 MMHG | OXYGEN SATURATION: 99 % | WEIGHT: 129 LBS | BODY MASS INDEX: 21.49 KG/M2 | HEART RATE: 79 BPM | HEIGHT: 65 IN | RESPIRATION RATE: 20 BRPM

## 2024-07-08 DIAGNOSIS — C18.2 MALIGNANT NEOPLASM OF ASCENDING COLON: Primary | ICD-10-CM

## 2024-07-08 DIAGNOSIS — C18.2 MALIGNANT NEOPLASM OF ASCENDING COLON: ICD-10-CM

## 2024-07-08 DIAGNOSIS — C78.6 MALIGNANT NEOPLASM METASTATIC TO PERITONEUM: ICD-10-CM

## 2024-07-08 LAB
ALBUMIN SERPL-MCNC: 2.8 G/DL (ref 3.4–4.8)
ALBUMIN/GLOB SERPL: 0.6 RATIO (ref 1.1–2)
ALP SERPL-CCNC: 126 UNIT/L (ref 40–150)
ALT SERPL-CCNC: 21 UNIT/L (ref 0–55)
ANION GAP SERPL CALC-SCNC: 8 MEQ/L
AST SERPL-CCNC: 27 UNIT/L (ref 5–34)
BASOPHILS # BLD AUTO: 0.04 X10(3)/MCL
BASOPHILS NFR BLD AUTO: 0.6 %
BILIRUB SERPL-MCNC: 0.3 MG/DL
BUN SERPL-MCNC: 6 MG/DL (ref 9.8–20.1)
CALCIUM SERPL-MCNC: 9.6 MG/DL (ref 8.4–10.2)
CEA SERPL-MCNC: 31.54 NG/ML (ref 0–3)
CHLORIDE SERPL-SCNC: 111 MMOL/L (ref 98–107)
CO2 SERPL-SCNC: 22 MMOL/L (ref 23–31)
CREAT SERPL-MCNC: 0.72 MG/DL (ref 0.55–1.02)
CREAT/UREA NIT SERPL: 8
EOSINOPHIL # BLD AUTO: 0.11 X10(3)/MCL (ref 0–0.9)
EOSINOPHIL NFR BLD AUTO: 1.8 %
ERYTHROCYTE [DISTWIDTH] IN BLOOD BY AUTOMATED COUNT: 19.9 % (ref 11.5–17)
GFR SERPLBLD CREATININE-BSD FMLA CKD-EPI: >60 ML/MIN/1.73/M2
GLOBULIN SER-MCNC: 5 GM/DL (ref 2.4–3.5)
GLUCOSE SERPL-MCNC: 85 MG/DL (ref 82–115)
HCT VFR BLD AUTO: 34.7 % (ref 37–47)
HGB BLD-MCNC: 11 G/DL (ref 12–16)
IMM GRANULOCYTES # BLD AUTO: 0.03 X10(3)/MCL (ref 0–0.04)
IMM GRANULOCYTES NFR BLD AUTO: 0.5 %
LYMPHOCYTES # BLD AUTO: 2.17 X10(3)/MCL (ref 0.6–4.6)
LYMPHOCYTES NFR BLD AUTO: 34.6 %
MAGNESIUM SERPL-MCNC: 1.8 MG/DL (ref 1.6–2.6)
MCH RBC QN AUTO: 30.6 PG (ref 27–31)
MCHC RBC AUTO-ENTMCNC: 31.7 G/DL (ref 33–36)
MCV RBC AUTO: 96.7 FL (ref 80–94)
MONOCYTES # BLD AUTO: 1.08 X10(3)/MCL (ref 0.1–1.3)
MONOCYTES NFR BLD AUTO: 17.2 %
NEUTROPHILS # BLD AUTO: 2.84 X10(3)/MCL (ref 2.1–9.2)
NEUTROPHILS NFR BLD AUTO: 45.3 %
PLATELET # BLD AUTO: 267 X10(3)/MCL (ref 130–400)
PMV BLD AUTO: 10.3 FL (ref 7.4–10.4)
POTASSIUM SERPL-SCNC: 3.6 MMOL/L (ref 3.5–5.1)
PROT SERPL-MCNC: 7.8 GM/DL (ref 5.8–7.6)
RBC # BLD AUTO: 3.59 X10(6)/MCL (ref 4.2–5.4)
SODIUM SERPL-SCNC: 141 MMOL/L (ref 136–145)
WBC # BLD AUTO: 6.27 X10(3)/MCL (ref 4.5–11.5)

## 2024-07-08 PROCEDURE — 80053 COMPREHEN METABOLIC PANEL: CPT

## 2024-07-08 PROCEDURE — 96368 THER/DIAG CONCURRENT INF: CPT

## 2024-07-08 PROCEDURE — 82378 CARCINOEMBRYONIC ANTIGEN: CPT

## 2024-07-08 PROCEDURE — 96417 CHEMO IV INFUS EACH ADDL SEQ: CPT

## 2024-07-08 PROCEDURE — 63600175 PHARM REV CODE 636 W HCPCS: Performed by: STUDENT IN AN ORGANIZED HEALTH CARE EDUCATION/TRAINING PROGRAM

## 2024-07-08 PROCEDURE — 96375 TX/PRO/DX INJ NEW DRUG ADDON: CPT

## 2024-07-08 PROCEDURE — 96367 TX/PROPH/DG ADDL SEQ IV INF: CPT

## 2024-07-08 PROCEDURE — 83735 ASSAY OF MAGNESIUM: CPT

## 2024-07-08 PROCEDURE — 85025 COMPLETE CBC W/AUTO DIFF WBC: CPT

## 2024-07-08 PROCEDURE — 96415 CHEMO IV INFUSION ADDL HR: CPT

## 2024-07-08 PROCEDURE — 36415 COLL VENOUS BLD VENIPUNCTURE: CPT

## 2024-07-08 PROCEDURE — 25000003 PHARM REV CODE 250: Performed by: STUDENT IN AN ORGANIZED HEALTH CARE EDUCATION/TRAINING PROGRAM

## 2024-07-08 PROCEDURE — 96413 CHEMO IV INFUSION 1 HR: CPT

## 2024-07-08 RX ORDER — DIPHENHYDRAMINE HYDROCHLORIDE 50 MG/ML
25 INJECTION INTRAMUSCULAR; INTRAVENOUS
Status: COMPLETED | OUTPATIENT
Start: 2024-07-08 | End: 2024-07-08

## 2024-07-08 RX ADMIN — DEXTROSE MONOHYDRATE: 50 INJECTION, SOLUTION INTRAVENOUS at 11:07

## 2024-07-08 RX ADMIN — SODIUM CHLORIDE: 9 INJECTION, SOLUTION INTRAVENOUS at 08:07

## 2024-07-08 RX ADMIN — LEUCOVORIN CALCIUM 720 MG: 350 INJECTION, POWDER, LYOPHILIZED, FOR SOLUTION INTRAMUSCULAR; INTRAVENOUS at 11:07

## 2024-07-08 RX ADMIN — OXALIPLATIN 150 MG: 5 INJECTION, SOLUTION INTRAVENOUS at 11:07

## 2024-07-08 RX ADMIN — APREPITANT 130 MG: 130 INJECTION, EMULSION INTRAVENOUS at 09:07

## 2024-07-08 RX ADMIN — CETUXIMAB 900 MG: 2 SOLUTION INTRAVENOUS at 09:07

## 2024-07-08 RX ADMIN — DIPHENHYDRAMINE HYDROCHLORIDE 25 MG: 50 INJECTION, SOLUTION INTRAMUSCULAR; INTRAVENOUS at 08:07

## 2024-07-08 RX ADMIN — FLUOROURACIL 4320 MG: 50 INJECTION, SOLUTION INTRAVENOUS at 02:07

## 2024-07-08 RX ADMIN — PALONOSETRON HYDROCHLORIDE 0.25 MG: 0.25 INJECTION, SOLUTION INTRAVENOUS at 08:07

## 2024-07-10 ENCOUNTER — INFUSION (OUTPATIENT)
Dept: INFUSION THERAPY | Facility: HOSPITAL | Age: 69
End: 2024-07-10
Payer: MEDICARE

## 2024-07-10 VITALS
OXYGEN SATURATION: 99 % | HEART RATE: 75 BPM | TEMPERATURE: 98 F | SYSTOLIC BLOOD PRESSURE: 126 MMHG | RESPIRATION RATE: 20 BRPM | HEIGHT: 65 IN | WEIGHT: 129 LBS | BODY MASS INDEX: 21.49 KG/M2 | DIASTOLIC BLOOD PRESSURE: 87 MMHG

## 2024-07-10 DIAGNOSIS — C18.2 MALIGNANT NEOPLASM OF ASCENDING COLON: Primary | ICD-10-CM

## 2024-07-10 PROCEDURE — 63600175 PHARM REV CODE 636 W HCPCS: Performed by: STUDENT IN AN ORGANIZED HEALTH CARE EDUCATION/TRAINING PROGRAM

## 2024-07-10 PROCEDURE — 96377 APPLICATON ON-BODY INJECTOR: CPT

## 2024-07-10 PROCEDURE — 25000003 PHARM REV CODE 250: Performed by: STUDENT IN AN ORGANIZED HEALTH CARE EDUCATION/TRAINING PROGRAM

## 2024-07-10 PROCEDURE — A4216 STERILE WATER/SALINE, 10 ML: HCPCS | Performed by: STUDENT IN AN ORGANIZED HEALTH CARE EDUCATION/TRAINING PROGRAM

## 2024-07-10 RX ORDER — HEPARIN 100 UNIT/ML
500 SYRINGE INTRAVENOUS
Status: DISCONTINUED | OUTPATIENT
Start: 2024-07-10 | End: 2024-07-10 | Stop reason: HOSPADM

## 2024-07-10 RX ORDER — SODIUM CHLORIDE 0.9 % (FLUSH) 0.9 %
10 SYRINGE (ML) INJECTION
Status: DISCONTINUED | OUTPATIENT
Start: 2024-07-10 | End: 2024-07-10 | Stop reason: HOSPADM

## 2024-07-10 RX ADMIN — PEGFILGRASTIM 6 MG: KIT SUBCUTANEOUS at 11:07

## 2024-07-10 RX ADMIN — Medication 10 ML: at 11:07

## 2024-07-10 RX ADMIN — HEPARIN SODIUM (PORCINE) LOCK FLUSH IV SOLN 100 UNIT/ML 500 UNITS: 100 SOLUTION at 11:07

## 2024-07-12 DIAGNOSIS — C18.2 MALIGNANT NEOPLASM OF ASCENDING COLON: Primary | ICD-10-CM

## 2024-07-19 NOTE — PROGRESS NOTES
Subjective:       Patient ID: Selene Smallwood is a 69 y.o. female.    Chief Complaint: Follow Up    Diagnosis:   Stage IV Metastatic Colon Adenocarcinoma with mets to peritoneum  2. Pulmonary Embolisum    Current Treatment:   FOLFOX + Cetuximab q2w x 12- 4/2/24 -   Xarelto 15 mg BID x 21 days then 20 mg daily- started 6/25/24    Treatment History: N/A    HPI:   69 yo F presented in March '24 for evaluation of metastatic colon cancer. She initially presented to OSH in early February '24 with 2 days of upper abdominal pain. Imaging on 2/4 in the ER revealed evidence of non perforated acute appendicitis. She underwent Ex lap where a cecal mass was incidentally discovered and resected, as well as omental studding was noted and 1 small mass was taken for biopsy. Her final pathology revealed a large exophytic mass in the cecum measuring 4 x 2 cm extending to the appendiceal orifice and extends into pericolonic adipose tissue. G2, LVI+, PNI negative. 14 LN were resected, with 10 being positive for metastatic adenocarcinoma. She also had an omental mass measuring 2.5 x 1.5cm which was positive for metastatic adenocarcinoma, consistent with a colon primary. Her final pathology staging is consistent with [id number]. Her postop course was complicated by abdominal abscess formation s/p IR drain placement and subsequent removal and antibiotic treatment.      She underwent PET scan in March '24 with evidence of nodular foci within the omentum/peritoneum consistent with peritoneal carcinomatosis. No other evidence of disease. NGS testing was performed which revealed GULSHAN wild type and BRAF negative. Therefore she will proceed with 1st line therapy of FOLFOX + Cetuximab Q2w x 12 cycles.     Interval History:  She returns to the clinic today for a follow-up and treatment clearance for C8 of FOLFOX + Cetuximab. She feels well today. She continues to have a decreased appetite and fatigue. She is no longer taking Xarelto as she can not  afford it and her discount card does not begin until August. She has no respiratory complaints. She did not take her blood pressure medication this morning. She denies any peripheral neuropathy to fingertips and toes. Denies SOB, chest pain, fever, chills. No abnormal bowels.     History reviewed. No pertinent past medical history.   Past Surgical History:   Procedure Laterality Date    APPENDECTOMY N/A 2/5/2024    Procedure: APPENDECTOMY;  Surgeon: Anna Murillo MD;  Location: [facility] OR;  Service: General;  Laterality: N/A;    INSERTION OF TUNNELED CENTRAL VENOUS CATHETER (CVC) WITH SUBCUTANEOUS PORT N/A 3/26/2024    Procedure: [id number];  Surgeon: Armando Tran MD;  Location: [facility] OR;  Service: Peripheral Vascular;  Laterality: N/A;    LAPAROSCOPIC APPENDECTOMY N/A 2/5/2024    Procedure: APPENDECTOMY, LAPAROSCOPIC;  Surgeon: Anna Murillo MD;  Location: [facility] OR;  Service: General;  Laterality: N/A;    SURGICAL REMOVAL OF ILEUM WITH CECUM N/A 2/5/2024    Procedure: EXCISION, CECUM WITH ILEUM;  Surgeon: Anna Murillo MD;  Location: [facility] OR;  Service: General;  Laterality: N/A;    WASHOUT N/A 2/5/2024    Procedure: WASHOUT;  Surgeon: Anna Murillo MD;  Location: [facility] OR;  Service: General;  Laterality: N/A;     Social History     Socioeconomic History    Marital status:    Tobacco Use    Smoking status: Former     Types: Cigarettes    Smokeless tobacco: Never     Social Determinants of Health     Financial Resource Strain: Low Risk  (2/15/2024)    Overall Financial Resource Strain (CARDIA)     Difficulty of Paying Living Expenses: Not hard at all   Food Insecurity: No Food Insecurity (2/15/2024)    Hunger Vital Sign     Worried About Running Out of Food in the Last Year: Never true     Ran Out of Food in the Last Year: Never true   Transportation Needs: No Transportation Needs (2/15/2024)    PRAPARE - Transportation     Lack of Transportation (Medical): No     Lack of Transportation  (Non-Medical): No   Physical Activity: Unknown (2/15/2024)    Exercise Vital Sign     Days of Exercise per Week: 0 days     Minutes of Exercise per Session: Patient unable to answer   Stress: No Stress Concern Present (2/15/2024)    [employer] - Occupational Stress Questionnaire     Feeling of Stress : Not at all   Housing Stability: Low Risk  (2/15/2024)    Housing Stability Vital Sign     Unable to Pay for Housing in the Last Year: No     Number of Places Lived in the Last Year: 1     Unstable Housing in the Last Year: No      No family history on file.   Review of patient's allergies indicates:   Allergen Reactions    Robaxin [methocarbamol] Other (See Comments)     Altered mental status       Review of Systems   Constitutional:  Positive for appetite change and fatigue. Negative for activity change, fever and unexpected weight change.   HENT:  Negative for sore throat.    Eyes:  Negative for visual disturbance.   Respiratory:  Negative for cough and shortness of breath.    Cardiovascular:  Negative for chest pain.   Gastrointestinal:  Positive for nausea. Negative for abdominal pain, diarrhea and vomiting.   Endocrine: Negative for polyuria.   Genitourinary:  Negative for dysuria and hot flashes.   Integumentary:  Negative for rash.   Allergic/Immunologic: Negative for immunocompromised state.   Neurological:  Negative for weakness and headaches.   Hematological:  Negative for adenopathy.   Psychiatric/Behavioral:  Negative for confusion.          Objective:      Vitals:    07/22/24 0919   BP: 125/76   Pulse: (!) 116   Resp: 20   Temp: 97.3 °F (36.3 °C)             Physical Exam  Constitutional:       General: She is not in acute distress.     Appearance: Normal appearance. She is not ill-appearing.   HENT:      Head: Normocephalic and atraumatic.      Nose: Nose normal.      Mouth/Throat:      Mouth: Mucous membranes are moist.      Pharynx: Oropharynx is clear.   Eyes:       Extraocular Movements: Extraocular movements intact.      Conjunctiva/sclera: Conjunctivae normal.      Pupils: Pupils are equal, round, and reactive to light.   Cardiovascular:      Rate and Rhythm: Normal rate and regular rhythm.      Pulses: Normal pulses.      Heart sounds: Normal heart sounds. No murmur heard.  Pulmonary:      Effort: Pulmonary effort is normal. No respiratory distress.      Breath sounds: Normal breath sounds.   Abdominal:      General: There is no distension.      Palpations: Abdomen is soft.      Tenderness: There is no abdominal tenderness.   Musculoskeletal:         General: Normal range of motion.      Cervical back: Normal range of motion and neck supple.      Right lower leg: No edema.      Left lower leg: No edema.   Lymphadenopathy:      Cervical: No cervical adenopathy.   Skin:     General: Skin is warm and dry.   Neurological:      General: No focal deficit present.      Mental Status: She is alert and oriented to person, place, and time.         LABS AND IMAGING REVIEWED IN EPIC    IMAGIN24 CT C/A/P:  Impression:  1. Small to moderate bilateral pleural effusions.  2. Right upper lobe ground-glass could be infectious/inflammatory or relate to asymmetric edema.  3. Interval appendectomy with fluid collections along the surgical bed suspicious for developing abscesses.  24 Abdominal US:  Impression:  Cyst in the right lobe of the liver.  Elongated gallbladder with no definite gallstones minimal amount of fluid seen adjacent to the fundus.  Nephrolithiasis of the right kidney with a cyst of the right kidney minimal fullness of the collecting system.  Free fluid as described above  24 CT Abd/Pelvis:  Impression:  Dilated appendix with periappendiceal inflammatory changes,, compatible with non perforated acute appendicitis.  No fluid collection or abscess.  No free air.  Small amount of free fluid the pelvis.  Findings were communicated to Dr. Chavez at 13:56  02/04/2024  Mild atelectatic changes and ground-glass opacities in the lower lungs suspicious for infectious/inflammatory process.  3/15/24 PET/CT:  1. Nodular foci of hypermetabolic soft tissue thickening within the mesentery most consistent with peritoneal carcinomatosis.  2. Hypermetabolic nodule in the uterine fundus which is indeterminate. Pelvic ultrasound may be beneficial.  6/25/2024 CT CAP:  1. Interim development of a pulmonary embolus at the right mainstem bronchus  2. Suspect small thrombus formation at the tip of the right central line/MediPort at the SVC  3. Interim improved aeration and clearing of the lungs bilaterally since the prior exam  4. 3 mm subpleural nodule lateral left upper lobe  5. 1.5 cm round low-attenuation focus again evident at the liver  7/2/2024 PET:  No evidence of FDG avid disease on this exam.        PATHOLOGY:  2/5/24 Biopsy:  Final Diagnosis  1. Appendix, appendectomy:  - Acute appendicitis with perforation.  - Adenocarcinoma involves lymphatic spaces of the appendiceal wall and peritoneal surface.  2. Cecum, ileocecectomy:  - Colonic adenocarcinoma.  - See synoptic checklist for CAP cancer protocol.  3. Omental mass, biopsy:  - Metastatic adenocarcinoma, consistent with a colon primary.  4. Small bowel, segmental resection:  - Acute peritonitis.  - No evidence of malignancy.    MLH1: PRESERVED (INTACT) EXPRESSION IN TUMOR CELLS      MSH2: PRESERVED (INTACT) EXPRESSION IN TUMOR CELLS      MSH6: PRESERVED (INTACT) EXPRESSION IN TUMOR CELLS      PMS2: PRESERVED (INTACT) EXPRESSION IN TUMOR CELLS  Microscopic Description/Comments   These results show no deficiency of the mismatch repair proteins tested.    Assessment:   Stage IV Metastatic Colon Adenocarcinoma with mets to peritoneum - Cecal mass, nonobstructive, no perforation. [id number].   Plan for 1st line chemotherapy with FOLFOX + Cetuximab. Will remove bolus 5FU in metastatic setting.   Neulasta added with C3.   2.  Pulmonary Embolus at the right mainstem bronchus   Incidentally found during CT CAP 6/25/24  No drug coverage. Unable to afford Xarelto. Xarelto 15mg BID x 14 day samples given in clinic. Drug assistance begin 8/1.   Eliquis 10mg BID x 7 days then 5mg BID daily thereafter sent to pharmacy.   May need to swtich to coumadin if unable to afford either one, will contact pharmacy and follow-up on this. Strict ED precautions given      Plan:     Hold C8 Folfox + Cetuximab with neulasta due to neutropenia  IV fluids if needed on pump disconnect.  Eliquis sent to pharmacy, will also follow-up with pharmacy for costs.   Continue potassium 20meq daily.  Continue Melatonin 5mg Rx sent to pharmacy. She can also  OTC  RTC in one week with NP for FU/lab/treat C8  Cbc, cmp, cea- 1 hr prior @ [facility]      I spent a total of 40 minutes on the day of the visit.This includes face to face time and non-face to face time preparing to see the patient (eg, review of tests), obtaining and/or reviewing separately obtained history, documenting clinical information in the electronic or other health record, independently interpreting results and communicating results to the patient/family/caregiver, or care coordinator.      GIFTY Morales-ROXIE  Oncology/Hematology   [facility]       Addendum: Spoke with pharmacy. Xarelto is covered with $0 co-pay information was given to pt. She was instructed to  today. Pt and daughter verbalized understanding. Eliquis canceled

## 2024-07-22 ENCOUNTER — LAB VISIT (OUTPATIENT)
Dept: LAB | Facility: HOSPITAL | Age: 69
End: 2024-07-22
Attending: STUDENT IN AN ORGANIZED HEALTH CARE EDUCATION/TRAINING PROGRAM
Payer: MEDICARE

## 2024-07-22 ENCOUNTER — OFFICE VISIT (OUTPATIENT)
Dept: HEMATOLOGY/ONCOLOGY | Facility: CLINIC | Age: 69
End: 2024-07-22
Payer: MEDICARE

## 2024-07-22 VITALS
HEART RATE: 116 BPM | WEIGHT: 124 LBS | SYSTOLIC BLOOD PRESSURE: 125 MMHG | BODY MASS INDEX: 20.66 KG/M2 | OXYGEN SATURATION: 100 % | DIASTOLIC BLOOD PRESSURE: 76 MMHG | RESPIRATION RATE: 20 BRPM | TEMPERATURE: 97 F | HEIGHT: 65 IN

## 2024-07-22 DIAGNOSIS — R11.2 CHEMOTHERAPY INDUCED NAUSEA AND VOMITING: ICD-10-CM

## 2024-07-22 DIAGNOSIS — C18.2 MALIGNANT NEOPLASM OF ASCENDING COLON: ICD-10-CM

## 2024-07-22 DIAGNOSIS — I26.99 OTHER ACUTE PULMONARY EMBOLISM, UNSPECIFIED WHETHER ACUTE COR PULMONALE PRESENT: ICD-10-CM

## 2024-07-22 DIAGNOSIS — Z79.899 ON ANTINEOPLASTIC CHEMOTHERAPY: ICD-10-CM

## 2024-07-22 DIAGNOSIS — D84.821 IMMUNODEFICIENCY DUE TO DRUG THERAPY: ICD-10-CM

## 2024-07-22 DIAGNOSIS — C18.9 ADENOCARCINOMA OF COLON: ICD-10-CM

## 2024-07-22 DIAGNOSIS — C18.2 MALIGNANT NEOPLASM OF ASCENDING COLON: Primary | ICD-10-CM

## 2024-07-22 DIAGNOSIS — Z79.899 IMMUNODEFICIENCY DUE TO DRUG THERAPY: ICD-10-CM

## 2024-07-22 DIAGNOSIS — T45.1X5A CHEMOTHERAPY INDUCED NAUSEA AND VOMITING: ICD-10-CM

## 2024-07-22 DIAGNOSIS — I26.99 PULMONARY EMBOLISM, UNSPECIFIED CHRONICITY, UNSPECIFIED PULMONARY EMBOLISM TYPE, UNSPECIFIED WHETHER ACUTE COR PULMONALE PRESENT: ICD-10-CM

## 2024-07-22 DIAGNOSIS — Z79.899 IMMUNODEFICIENCY DUE TO CHEMOTHERAPY: ICD-10-CM

## 2024-07-22 DIAGNOSIS — D84.821 IMMUNODEFICIENCY DUE TO CHEMOTHERAPY: ICD-10-CM

## 2024-07-22 DIAGNOSIS — C78.89 SECONDARY MALIGNANT NEOPLASM OF OTHER DIGESTIVE ORGANS: ICD-10-CM

## 2024-07-22 DIAGNOSIS — T45.1X5A IMMUNODEFICIENCY DUE TO CHEMOTHERAPY: ICD-10-CM

## 2024-07-22 DIAGNOSIS — G47.9 DIFFICULTY SLEEPING: ICD-10-CM

## 2024-07-22 DIAGNOSIS — C78.6 MALIGNANT NEOPLASM METASTATIC TO PERITONEUM: ICD-10-CM

## 2024-07-22 LAB
ABS NEUT CALC (OHS): 0.65 X10(3)/MCL (ref 2.1–9.2)
ALBUMIN SERPL-MCNC: 2.9 G/DL (ref 3.4–4.8)
ALBUMIN/GLOB SERPL: 0.6 RATIO (ref 1.1–2)
ALP SERPL-CCNC: 126 UNIT/L (ref 40–150)
ALT SERPL-CCNC: 43 UNIT/L (ref 0–55)
ANION GAP SERPL CALC-SCNC: 10 MEQ/L
AST SERPL-CCNC: 50 UNIT/L (ref 5–34)
BILIRUB SERPL-MCNC: 0.3 MG/DL
BUN SERPL-MCNC: 13 MG/DL (ref 9.8–20.1)
CALCIUM SERPL-MCNC: 9.9 MG/DL (ref 8.4–10.2)
CEA SERPL-MCNC: 36.36 NG/ML (ref 0–3)
CHLORIDE SERPL-SCNC: 114 MMOL/L (ref 98–107)
CO2 SERPL-SCNC: 21 MMOL/L (ref 23–31)
CREAT SERPL-MCNC: 0.85 MG/DL (ref 0.55–1.02)
CREAT/UREA NIT SERPL: 15
ELLIPTOCYTOSIS (OHS): SLIGHT
EOSINOPHIL NFR BLD MANUAL: 0.05 X10(3)/MCL (ref 0–0.9)
EOSINOPHIL NFR BLD MANUAL: 2 % (ref 0–8)
ERYTHROCYTE [DISTWIDTH] IN BLOOD BY AUTOMATED COUNT: 19.5 % (ref 11.5–17)
GFR SERPLBLD CREATININE-BSD FMLA CKD-EPI: >60 ML/MIN/1.73/M2
GLOBULIN SER-MCNC: 4.7 GM/DL (ref 2.4–3.5)
GLUCOSE SERPL-MCNC: 113 MG/DL (ref 82–115)
HCT VFR BLD AUTO: 34.3 % (ref 37–47)
HGB BLD-MCNC: 10.8 G/DL (ref 12–16)
LYMPHOCYTES NFR BLD MANUAL: 1.46 X10(3)/MCL
LYMPHOCYTES NFR BLD MANUAL: 58 % (ref 13–40)
MCH RBC QN AUTO: 31 PG (ref 27–31)
MCHC RBC AUTO-ENTMCNC: 31.5 G/DL (ref 33–36)
MCV RBC AUTO: 98.6 FL (ref 80–94)
MONOCYTES NFR BLD MANUAL: 0.35 X10(3)/MCL (ref 0.1–1.3)
MONOCYTES NFR BLD MANUAL: 14 % (ref 2–11)
NEUTROPHILS NFR BLD MANUAL: 26 % (ref 47–80)
PLATELET # BLD AUTO: 109 X10(3)/MCL (ref 130–400)
PLATELET # BLD EST: ABNORMAL 10*3/UL
PMV BLD AUTO: 10.9 FL (ref 7.4–10.4)
POIKILOCYTOSIS BLD QL SMEAR: SLIGHT
POTASSIUM SERPL-SCNC: 4 MMOL/L (ref 3.5–5.1)
PROT SERPL-MCNC: 7.6 GM/DL (ref 5.8–7.6)
RBC # BLD AUTO: 3.48 X10(6)/MCL (ref 4.2–5.4)
RBC MORPH BLD: ABNORMAL
SODIUM SERPL-SCNC: 145 MMOL/L (ref 136–145)
WBC # BLD AUTO: 2.51 X10(3)/MCL (ref 4.5–11.5)

## 2024-07-22 PROCEDURE — 99214 OFFICE O/P EST MOD 30 MIN: CPT | Mod: PBBFAC

## 2024-07-22 PROCEDURE — 82378 CARCINOEMBRYONIC ANTIGEN: CPT

## 2024-07-22 PROCEDURE — 99215 OFFICE O/P EST HI 40 MIN: CPT | Mod: S$PBB,,,

## 2024-07-22 PROCEDURE — 85027 COMPLETE CBC AUTOMATED: CPT

## 2024-07-22 PROCEDURE — 36415 COLL VENOUS BLD VENIPUNCTURE: CPT

## 2024-07-22 PROCEDURE — 99999 PR PBB SHADOW E&M-EST. PATIENT-LVL IV: CPT | Mod: PBBFAC,,,

## 2024-07-22 PROCEDURE — 80053 COMPREHEN METABOLIC PANEL: CPT

## 2024-07-26 NOTE — PROGRESS NOTES
Subjective:       Patient ID: Selene Smallwood is a 69 y.o. female.    Chief Complaint: Follow Up    Diagnosis:   Stage IV Metastatic Colon Adenocarcinoma with mets to peritoneum  2. Pulmonary Embolisum    Current Treatment:   FOLFOX + Cetuximab q2w x 12- 4/2/24 -   Xarelto 15 mg BID x 21 days then 20 mg daily- started 6/25/24    Treatment History: N/A    HPI:   67 yo F presented in March '24 for evaluation of metastatic colon cancer. She initially presented to OSH in early February '24 with 2 days of upper abdominal pain. Imaging on 2/4 in the ER revealed evidence of non perforated acute appendicitis. She underwent Ex lap where a cecal mass was incidentally discovered and resected, as well as omental studding was noted and 1 small mass was taken for biopsy. Her final pathology revealed a large exophytic mass in the cecum measuring 4 x 2 cm extending to the appendiceal orifice and extends into pericolonic adipose tissue. G2, LVI+, PNI negative. 14 LN were resected, with 10 being positive for metastatic adenocarcinoma. She also had an omental mass measuring 2.5 x 1.5cm which was positive for metastatic adenocarcinoma, consistent with a colon primary. Her final pathology staging is consistent with [id number]. Her postop course was complicated by abdominal abscess formation s/p IR drain placement and subsequent removal and antibiotic treatment.      She underwent PET scan in March '24 with evidence of nodular foci within the omentum/peritoneum consistent with peritoneal carcinomatosis. No other evidence of disease. NGS testing was performed which revealed GULSHAN wild type and BRAF negative. Therefore she will proceed with 1st line therapy of FOLFOX + Cetuximab Q2w x 12 cycles.     Interval History:  She returns to the clinic today for a follow-up and treatment clearance for C8 of FOLFOX + Cetuximab. Treatment was held last week due to neutropenia. She feels well today. She continues to have a decreased appetite and  fatigue. Weight is stable today. She is now taking her xarelto as prescribed.  She denies any peripheral neuropathy to fingertips and toes. Denies SOB, chest pain, fever, chills. No abnormal bowels.     History reviewed. No pertinent past medical history.   Past Surgical History:   Procedure Laterality Date    APPENDECTOMY N/A 2/5/2024    Procedure: APPENDECTOMY;  Surgeon: Anna Murillo MD;  Location: [facility] OR;  Service: General;  Laterality: N/A;    INSERTION OF TUNNELED CENTRAL VENOUS CATHETER (CVC) WITH SUBCUTANEOUS PORT N/A 3/26/2024    Procedure: [id number];  Surgeon: Armando Tran MD;  Location: [facility] OR;  Service: Peripheral Vascular;  Laterality: N/A;    LAPAROSCOPIC APPENDECTOMY N/A 2/5/2024    Procedure: APPENDECTOMY, LAPAROSCOPIC;  Surgeon: Anna Murillo MD;  Location: [facility] OR;  Service: General;  Laterality: N/A;    SURGICAL REMOVAL OF ILEUM WITH CECUM N/A 2/5/2024    Procedure: EXCISION, CECUM WITH ILEUM;  Surgeon: Anna Murillo MD;  Location: [facility] OR;  Service: General;  Laterality: N/A;    WASHOUT N/A 2/5/2024    Procedure: WASHOUT;  Surgeon: Anna Murillo MD;  Location: [facility] OR;  Service: General;  Laterality: N/A;     Social History     Socioeconomic History    Marital status:    Tobacco Use    Smoking status: Former     Types: Cigarettes    Smokeless tobacco: Never     Social Determinants of Health     Financial Resource Strain: Low Risk  (2/15/2024)    Overall Financial Resource Strain (CARDIA)     Difficulty of Paying Living Expenses: Not hard at all   Food Insecurity: No Food Insecurity (2/15/2024)    Hunger Vital Sign     Worried About Running Out of Food in the Last Year: Never true     Ran Out of Food in the Last Year: Never true   Transportation Needs: No Transportation Needs (2/15/2024)    PRAPARE - Transportation     Lack of Transportation (Medical): No     Lack of Transportation (Non-Medical): No   Physical Activity: Unknown (2/15/2024)    Exercise Vital  Sign     Days of Exercise per Week: 0 days     Minutes of Exercise per Session: Patient unable to answer   Stress: No Stress Concern Present (2/15/2024)    [employer] - Occupational Stress Questionnaire     Feeling of Stress : Not at all   Housing Stability: Low Risk  (2/15/2024)    Housing Stability Vital Sign     Unable to Pay for Housing in the Last Year: No     Number of Places Lived in the Last Year: 1     Unstable Housing in the Last Year: No      No family history on file.   Review of patient's allergies indicates:   Allergen Reactions    Robaxin [methocarbamol] Other (See Comments)     Altered mental status       Review of Systems   Constitutional:  Positive for appetite change and fatigue. Negative for activity change, fever and unexpected weight change.   HENT:  Negative for sore throat.    Eyes:  Negative for visual disturbance.   Respiratory:  Negative for cough and shortness of breath.    Cardiovascular:  Negative for chest pain.   Gastrointestinal:  Positive for nausea. Negative for abdominal pain, diarrhea and vomiting.   Endocrine: Negative for polyuria.   Genitourinary:  Negative for dysuria and hot flashes.   Integumentary:  Negative for rash.   Allergic/Immunologic: Negative for immunocompromised state.   Neurological:  Negative for weakness and headaches.   Hematological:  Negative for adenopathy.   Psychiatric/Behavioral:  Negative for confusion.          Objective:      Vitals:    07/29/24 0902   BP: 122/88   Pulse: 104   Resp: 20   Temp: 97.4 °F (36.3 °C)               Physical Exam  Constitutional:       General: She is not in acute distress.     Appearance: Normal appearance. She is not ill-appearing.   HENT:      Head: Normocephalic and atraumatic.      Nose: Nose normal.      Mouth/Throat:      Mouth: Mucous membranes are moist.      Pharynx: Oropharynx is clear.   Eyes:      Extraocular Movements: Extraocular movements intact.      Conjunctiva/sclera:  Conjunctivae normal.      Pupils: Pupils are equal, round, and reactive to light.   Cardiovascular:      Rate and Rhythm: Normal rate and regular rhythm.      Pulses: Normal pulses.      Heart sounds: Normal heart sounds. No murmur heard.  Pulmonary:      Effort: Pulmonary effort is normal. No respiratory distress.      Breath sounds: Normal breath sounds.   Abdominal:      General: There is no distension.      Palpations: Abdomen is soft.      Tenderness: There is no abdominal tenderness.   Musculoskeletal:         General: Normal range of motion.      Cervical back: Normal range of motion and neck supple.      Right lower leg: No edema.      Left lower leg: No edema.   Lymphadenopathy:      Cervical: No cervical adenopathy.   Skin:     General: Skin is warm and dry.   Neurological:      General: No focal deficit present.      Mental Status: She is alert and oriented to person, place, and time.         LABS AND IMAGING REVIEWED IN EPIC    IMAGIN24 CT C/A/P:  Impression:  1. Small to moderate bilateral pleural effusions.  2. Right upper lobe ground-glass could be infectious/inflammatory or relate to asymmetric edema.  3. Interval appendectomy with fluid collections along the surgical bed suspicious for developing abscesses.  24 Abdominal US:  Impression:  Cyst in the right lobe of the liver.  Elongated gallbladder with no definite gallstones minimal amount of fluid seen adjacent to the fundus.  Nephrolithiasis of the right kidney with a cyst of the right kidney minimal fullness of the collecting system.  Free fluid as described above  24 CT Abd/Pelvis:  Impression:  Dilated appendix with periappendiceal inflammatory changes,, compatible with non perforated acute appendicitis.  No fluid collection or abscess.  No free air.  Small amount of free fluid the pelvis.  Findings were communicated to Dr. Chavez at 13:56 2024  Mild atelectatic changes and ground-glass opacities in the lower lungs  suspicious for infectious/inflammatory process.  3/15/24 PET/CT:  1. Nodular foci of hypermetabolic soft tissue thickening within the mesentery most consistent with peritoneal carcinomatosis.  2. Hypermetabolic nodule in the uterine fundus which is indeterminate. Pelvic ultrasound may be beneficial.  6/25/2024 CT CAP:  1. Interim development of a pulmonary embolus at the right mainstem bronchus  2. Suspect small thrombus formation at the tip of the right central line/MediPort at the SVC  3. Interim improved aeration and clearing of the lungs bilaterally since the prior exam  4. 3 mm subpleural nodule lateral left upper lobe  5. 1.5 cm round low-attenuation focus again evident at the liver  7/2/2024 PET:  No evidence of FDG avid disease on this exam.        PATHOLOGY:  2/5/24 Biopsy:  Final Diagnosis  1. Appendix, appendectomy:  - Acute appendicitis with perforation.  - Adenocarcinoma involves lymphatic spaces of the appendiceal wall and peritoneal surface.  2. Cecum, ileocecectomy:  - Colonic adenocarcinoma.  - See synoptic checklist for CAP cancer protocol.  3. Omental mass, biopsy:  - Metastatic adenocarcinoma, consistent with a colon primary.  4. Small bowel, segmental resection:  - Acute peritonitis.  - No evidence of malignancy.    MLH1: PRESERVED (INTACT) EXPRESSION IN TUMOR CELLS      MSH2: PRESERVED (INTACT) EXPRESSION IN TUMOR CELLS      MSH6: PRESERVED (INTACT) EXPRESSION IN TUMOR CELLS      PMS2: PRESERVED (INTACT) EXPRESSION IN TUMOR CELLS  Microscopic Description/Comments   These results show no deficiency of the mismatch repair proteins tested.    Assessment:   Stage IV Metastatic Colon Adenocarcinoma with mets to peritoneum - Cecal mass, nonobstructive, no perforation. [id number].   Plan for 1st line chemotherapy with FOLFOX + Cetuximab. Will remove bolus 5FU in metastatic setting.   Neulasta added with C3.   Pulmonary Embolus at the right mainstem bronchus   Incidentally found during CT CAP  6/25/24  Now on xarelto, continue at this time.       Plan:   Hold C8 Folfox + Cetuximab with neulasta again today due to continued neutropenia.   Labs and possible treat in one week if ANC improves.   IV fluids if needed on pump disconnect.  Continue xarelto  Continue potassium 20meq daily.  Continue Melatonin 5mg. She can also  OTC  RTC in three weeks with NP for FU/lab/treat C9  Cbc, cmp, cea- 1 hr prior @ [facility]      I spent a total of 40 minutes on the day of the visit.This includes face to face time and non-face to face time preparing to see the patient (eg, review of tests), obtaining and/or reviewing separately obtained history, documenting clinical information in the electronic or other health record, independently interpreting results and communicating results to the patient/family/caregiver, or care coordinator.      Susan Gayle, FNP-C  Oncology/Hematology   [facility]

## 2024-07-29 ENCOUNTER — OFFICE VISIT (OUTPATIENT)
Dept: HEMATOLOGY/ONCOLOGY | Facility: CLINIC | Age: 69
End: 2024-07-29
Payer: MEDICARE

## 2024-07-29 ENCOUNTER — LAB VISIT (OUTPATIENT)
Dept: LAB | Facility: HOSPITAL | Age: 69
End: 2024-07-29
Payer: MEDICARE

## 2024-07-29 VITALS
WEIGHT: 124 LBS | HEIGHT: 65 IN | BODY MASS INDEX: 20.66 KG/M2 | RESPIRATION RATE: 20 BRPM | SYSTOLIC BLOOD PRESSURE: 122 MMHG | TEMPERATURE: 97 F | OXYGEN SATURATION: 100 % | HEART RATE: 104 BPM | DIASTOLIC BLOOD PRESSURE: 88 MMHG

## 2024-07-29 DIAGNOSIS — Z79.899 ON ANTINEOPLASTIC CHEMOTHERAPY: ICD-10-CM

## 2024-07-29 DIAGNOSIS — C78.6 MALIGNANT NEOPLASM METASTATIC TO PERITONEUM: ICD-10-CM

## 2024-07-29 DIAGNOSIS — T45.1X5A CHEMOTHERAPY INDUCED NAUSEA AND VOMITING: ICD-10-CM

## 2024-07-29 DIAGNOSIS — C78.89 SECONDARY MALIGNANT NEOPLASM OF OTHER DIGESTIVE ORGANS: ICD-10-CM

## 2024-07-29 DIAGNOSIS — R64 CANCER CACHEXIA: ICD-10-CM

## 2024-07-29 DIAGNOSIS — Z79.899 IMMUNODEFICIENCY DUE TO CHEMOTHERAPY: ICD-10-CM

## 2024-07-29 DIAGNOSIS — T45.1X5A CHEMOTHERAPY INDUCED NEUTROPENIA: ICD-10-CM

## 2024-07-29 DIAGNOSIS — C18.9 ADENOCARCINOMA OF COLON: ICD-10-CM

## 2024-07-29 DIAGNOSIS — I26.99 OTHER ACUTE PULMONARY EMBOLISM, UNSPECIFIED WHETHER ACUTE COR PULMONALE PRESENT: ICD-10-CM

## 2024-07-29 DIAGNOSIS — D70.1 CHEMOTHERAPY INDUCED NEUTROPENIA: ICD-10-CM

## 2024-07-29 DIAGNOSIS — C18.2 MALIGNANT NEOPLASM OF ASCENDING COLON: Primary | ICD-10-CM

## 2024-07-29 DIAGNOSIS — C18.2 MALIGNANT NEOPLASM OF ASCENDING COLON: ICD-10-CM

## 2024-07-29 DIAGNOSIS — I26.99 PULMONARY EMBOLISM, UNSPECIFIED CHRONICITY, UNSPECIFIED PULMONARY EMBOLISM TYPE, UNSPECIFIED WHETHER ACUTE COR PULMONALE PRESENT: ICD-10-CM

## 2024-07-29 DIAGNOSIS — T45.1X5A CHEMOTHERAPY-INDUCED NEUROPATHY: ICD-10-CM

## 2024-07-29 DIAGNOSIS — D84.821 IMMUNODEFICIENCY DUE TO CHEMOTHERAPY: ICD-10-CM

## 2024-07-29 DIAGNOSIS — T45.1X5A IMMUNODEFICIENCY DUE TO CHEMOTHERAPY: ICD-10-CM

## 2024-07-29 DIAGNOSIS — Z79.899 IMMUNODEFICIENCY DUE TO DRUG THERAPY: ICD-10-CM

## 2024-07-29 DIAGNOSIS — D84.821 IMMUNODEFICIENCY DUE TO DRUG THERAPY: ICD-10-CM

## 2024-07-29 DIAGNOSIS — G62.0 CHEMOTHERAPY-INDUCED NEUROPATHY: ICD-10-CM

## 2024-07-29 DIAGNOSIS — R11.2 CHEMOTHERAPY INDUCED NAUSEA AND VOMITING: ICD-10-CM

## 2024-07-29 LAB
ALBUMIN SERPL-MCNC: 2.8 G/DL (ref 3.4–4.8)
ALBUMIN/GLOB SERPL: 0.6 RATIO (ref 1.1–2)
ALP SERPL-CCNC: 123 UNIT/L (ref 40–150)
ALT SERPL-CCNC: 19 UNIT/L (ref 0–55)
ANION GAP SERPL CALC-SCNC: 7 MEQ/L
AST SERPL-CCNC: 29 UNIT/L (ref 5–34)
BASOPHILS # BLD AUTO: 0.03 X10(3)/MCL
BASOPHILS NFR BLD AUTO: 0.8 %
BILIRUB SERPL-MCNC: 0.3 MG/DL
BUN SERPL-MCNC: 6 MG/DL (ref 9.8–20.1)
CALCIUM SERPL-MCNC: 10.2 MG/DL (ref 8.4–10.2)
CEA SERPL-MCNC: 38.28 NG/ML (ref 0–3)
CHLORIDE SERPL-SCNC: 113 MMOL/L (ref 98–107)
CO2 SERPL-SCNC: 21 MMOL/L (ref 23–31)
CREAT SERPL-MCNC: 0.74 MG/DL (ref 0.55–1.02)
CREAT/UREA NIT SERPL: 8
EOSINOPHIL # BLD AUTO: 0.14 X10(3)/MCL (ref 0–0.9)
EOSINOPHIL NFR BLD AUTO: 3.7 %
ERYTHROCYTE [DISTWIDTH] IN BLOOD BY AUTOMATED COUNT: 18.4 % (ref 11.5–17)
GFR SERPLBLD CREATININE-BSD FMLA CKD-EPI: >60 ML/MIN/1.73/M2
GLOBULIN SER-MCNC: 5 GM/DL (ref 2.4–3.5)
GLUCOSE SERPL-MCNC: 84 MG/DL (ref 82–115)
HCT VFR BLD AUTO: 35.5 % (ref 37–47)
HGB BLD-MCNC: 11.6 G/DL (ref 12–16)
IMM GRANULOCYTES # BLD AUTO: 0.01 X10(3)/MCL (ref 0–0.04)
IMM GRANULOCYTES NFR BLD AUTO: 0.3 %
LYMPHOCYTES # BLD AUTO: 1.93 X10(3)/MCL (ref 0.6–4.6)
LYMPHOCYTES NFR BLD AUTO: 50.5 %
MAGNESIUM SERPL-MCNC: 1.9 MG/DL (ref 1.6–2.6)
MCH RBC QN AUTO: 31.4 PG (ref 27–31)
MCHC RBC AUTO-ENTMCNC: 32.7 G/DL (ref 33–36)
MCV RBC AUTO: 96.2 FL (ref 80–94)
MONOCYTES # BLD AUTO: 0.98 X10(3)/MCL (ref 0.1–1.3)
MONOCYTES NFR BLD AUTO: 25.7 %
NEUTROPHILS # BLD AUTO: 0.73 X10(3)/MCL (ref 2.1–9.2)
NEUTROPHILS NFR BLD AUTO: 19 %
PLATELET # BLD AUTO: 180 X10(3)/MCL (ref 130–400)
PMV BLD AUTO: 10.1 FL (ref 7.4–10.4)
POTASSIUM SERPL-SCNC: 4.2 MMOL/L (ref 3.5–5.1)
PROT SERPL-MCNC: 7.8 GM/DL (ref 5.8–7.6)
RBC # BLD AUTO: 3.69 X10(6)/MCL (ref 4.2–5.4)
SODIUM SERPL-SCNC: 141 MMOL/L (ref 136–145)
WBC # BLD AUTO: 3.82 X10(3)/MCL (ref 4.5–11.5)

## 2024-07-29 PROCEDURE — 99999 PR PBB SHADOW E&M-EST. PATIENT-LVL IV: CPT | Mod: PBBFAC,,,

## 2024-07-29 PROCEDURE — 80053 COMPREHEN METABOLIC PANEL: CPT

## 2024-07-29 PROCEDURE — 82378 CARCINOEMBRYONIC ANTIGEN: CPT

## 2024-07-29 PROCEDURE — 99215 OFFICE O/P EST HI 40 MIN: CPT | Mod: S$PBB,,,

## 2024-07-29 PROCEDURE — 85025 COMPLETE CBC W/AUTO DIFF WBC: CPT

## 2024-07-29 PROCEDURE — 99214 OFFICE O/P EST MOD 30 MIN: CPT | Mod: PBBFAC

## 2024-07-29 PROCEDURE — 36415 COLL VENOUS BLD VENIPUNCTURE: CPT

## 2024-07-29 PROCEDURE — 83735 ASSAY OF MAGNESIUM: CPT

## 2024-08-05 ENCOUNTER — LAB VISIT (OUTPATIENT)
Dept: LAB | Facility: HOSPITAL | Age: 69
End: 2024-08-05
Payer: MEDICARE

## 2024-08-05 ENCOUNTER — INFUSION (OUTPATIENT)
Dept: INFUSION THERAPY | Facility: HOSPITAL | Age: 69
End: 2024-08-05
Payer: MEDICARE

## 2024-08-05 VITALS
TEMPERATURE: 98 F | RESPIRATION RATE: 18 BRPM | HEIGHT: 65 IN | HEART RATE: 103 BPM | WEIGHT: 123.81 LBS | OXYGEN SATURATION: 99 % | BODY MASS INDEX: 20.63 KG/M2 | DIASTOLIC BLOOD PRESSURE: 68 MMHG | SYSTOLIC BLOOD PRESSURE: 108 MMHG

## 2024-08-05 DIAGNOSIS — C18.2 MALIGNANT NEOPLASM OF ASCENDING COLON: ICD-10-CM

## 2024-08-05 DIAGNOSIS — C18.2 MALIGNANT NEOPLASM OF ASCENDING COLON: Primary | ICD-10-CM

## 2024-08-05 LAB
ALBUMIN SERPL-MCNC: 2.8 G/DL (ref 3.4–4.8)
ALBUMIN/GLOB SERPL: 0.5 RATIO (ref 1.1–2)
ALP SERPL-CCNC: 122 UNIT/L (ref 40–150)
ALT SERPL-CCNC: 18 UNIT/L (ref 0–55)
ANION GAP SERPL CALC-SCNC: 9 MEQ/L
AST SERPL-CCNC: 27 UNIT/L (ref 5–34)
BASOPHILS # BLD AUTO: 0.03 X10(3)/MCL
BASOPHILS NFR BLD AUTO: 0.6 %
BILIRUB SERPL-MCNC: 0.3 MG/DL
BUN SERPL-MCNC: 7 MG/DL (ref 9.8–20.1)
CALCIUM SERPL-MCNC: 10 MG/DL (ref 8.4–10.2)
CHLORIDE SERPL-SCNC: 110 MMOL/L (ref 98–107)
CO2 SERPL-SCNC: 22 MMOL/L (ref 23–31)
CREAT SERPL-MCNC: 0.76 MG/DL (ref 0.55–1.02)
CREAT/UREA NIT SERPL: 9
EOSINOPHIL # BLD AUTO: 0.08 X10(3)/MCL (ref 0–0.9)
EOSINOPHIL NFR BLD AUTO: 1.6 %
ERYTHROCYTE [DISTWIDTH] IN BLOOD BY AUTOMATED COUNT: 17.1 % (ref 11.5–17)
GFR SERPLBLD CREATININE-BSD FMLA CKD-EPI: >60 ML/MIN/1.73/M2
GLOBULIN SER-MCNC: 5.3 GM/DL (ref 2.4–3.5)
GLUCOSE SERPL-MCNC: 99 MG/DL (ref 82–115)
HCT VFR BLD AUTO: 35.6 % (ref 37–47)
HGB BLD-MCNC: 11.8 G/DL (ref 12–16)
IMM GRANULOCYTES # BLD AUTO: 0 X10(3)/MCL (ref 0–0.04)
IMM GRANULOCYTES NFR BLD AUTO: 0 %
LYMPHOCYTES # BLD AUTO: 2.37 X10(3)/MCL (ref 0.6–4.6)
LYMPHOCYTES NFR BLD AUTO: 46.4 %
MAGNESIUM SERPL-MCNC: 1.7 MG/DL (ref 1.6–2.6)
MCH RBC QN AUTO: 31.6 PG (ref 27–31)
MCHC RBC AUTO-ENTMCNC: 33.1 G/DL (ref 33–36)
MCV RBC AUTO: 95.2 FL (ref 80–94)
MONOCYTES # BLD AUTO: 0.66 X10(3)/MCL (ref 0.1–1.3)
MONOCYTES NFR BLD AUTO: 12.9 %
NEUTROPHILS # BLD AUTO: 1.97 X10(3)/MCL (ref 2.1–9.2)
NEUTROPHILS NFR BLD AUTO: 38.5 %
PLATELET # BLD AUTO: 183 X10(3)/MCL (ref 130–400)
PMV BLD AUTO: 10.8 FL (ref 7.4–10.4)
POTASSIUM SERPL-SCNC: 3.9 MMOL/L (ref 3.5–5.1)
PROT SERPL-MCNC: 8.1 GM/DL (ref 5.8–7.6)
RBC # BLD AUTO: 3.74 X10(6)/MCL (ref 4.2–5.4)
SODIUM SERPL-SCNC: 141 MMOL/L (ref 136–145)
WBC # BLD AUTO: 5.11 X10(3)/MCL (ref 4.5–11.5)

## 2024-08-05 PROCEDURE — 96413 CHEMO IV INFUSION 1 HR: CPT

## 2024-08-05 PROCEDURE — 63600175 PHARM REV CODE 636 W HCPCS

## 2024-08-05 PROCEDURE — 36415 COLL VENOUS BLD VENIPUNCTURE: CPT

## 2024-08-05 PROCEDURE — 96375 TX/PRO/DX INJ NEW DRUG ADDON: CPT

## 2024-08-05 PROCEDURE — 96417 CHEMO IV INFUS EACH ADDL SEQ: CPT

## 2024-08-05 PROCEDURE — 96415 CHEMO IV INFUSION ADDL HR: CPT

## 2024-08-05 PROCEDURE — 83735 ASSAY OF MAGNESIUM: CPT

## 2024-08-05 PROCEDURE — 85025 COMPLETE CBC W/AUTO DIFF WBC: CPT

## 2024-08-05 PROCEDURE — 25000003 PHARM REV CODE 250

## 2024-08-05 PROCEDURE — 80053 COMPREHEN METABOLIC PANEL: CPT

## 2024-08-05 PROCEDURE — 96368 THER/DIAG CONCURRENT INF: CPT

## 2024-08-05 PROCEDURE — 96367 TX/PROPH/DG ADDL SEQ IV INF: CPT

## 2024-08-05 RX ORDER — DIPHENHYDRAMINE HYDROCHLORIDE 50 MG/ML
25 INJECTION INTRAMUSCULAR; INTRAVENOUS
Status: COMPLETED | OUTPATIENT
Start: 2024-08-05 | End: 2024-08-05

## 2024-08-05 RX ORDER — DIPHENHYDRAMINE HYDROCHLORIDE 50 MG/ML
25 INJECTION INTRAMUSCULAR; INTRAVENOUS
Status: CANCELLED
Start: 2024-08-05

## 2024-08-05 RX ORDER — PROCHLORPERAZINE EDISYLATE 5 MG/ML
5 INJECTION INTRAMUSCULAR; INTRAVENOUS ONCE AS NEEDED
Status: CANCELLED | OUTPATIENT
Start: 2024-08-06

## 2024-08-05 RX ORDER — SODIUM CHLORIDE 0.9 % (FLUSH) 0.9 %
10 SYRINGE (ML) INJECTION
Status: CANCELLED | OUTPATIENT
Start: 2024-08-06

## 2024-08-05 RX ORDER — HEPARIN 100 UNIT/ML
500 SYRINGE INTRAVENOUS
Status: CANCELLED | OUTPATIENT
Start: 2024-08-06

## 2024-08-05 RX ORDER — DIPHENHYDRAMINE HYDROCHLORIDE 50 MG/ML
50 INJECTION INTRAMUSCULAR; INTRAVENOUS ONCE AS NEEDED
Status: DISCONTINUED | OUTPATIENT
Start: 2024-08-05 | End: 2024-08-05 | Stop reason: HOSPADM

## 2024-08-05 RX ORDER — DIPHENHYDRAMINE HYDROCHLORIDE 50 MG/ML
50 INJECTION INTRAMUSCULAR; INTRAVENOUS ONCE AS NEEDED
Status: CANCELLED | OUTPATIENT
Start: 2024-08-05

## 2024-08-05 RX ORDER — EPINEPHRINE 0.3 MG/.3ML
0.3 INJECTION SUBCUTANEOUS ONCE AS NEEDED
Status: DISCONTINUED | OUTPATIENT
Start: 2024-08-05 | End: 2024-08-05 | Stop reason: HOSPADM

## 2024-08-05 RX ORDER — EPINEPHRINE 0.3 MG/.3ML
0.3 INJECTION SUBCUTANEOUS ONCE AS NEEDED
Status: CANCELLED | OUTPATIENT
Start: 2024-08-05

## 2024-08-05 RX ORDER — PROCHLORPERAZINE EDISYLATE 5 MG/ML
5 INJECTION INTRAMUSCULAR; INTRAVENOUS ONCE AS NEEDED
Status: DISCONTINUED | OUTPATIENT
Start: 2024-08-05 | End: 2024-08-05 | Stop reason: HOSPADM

## 2024-08-05 RX ORDER — SODIUM CHLORIDE 0.9 % (FLUSH) 0.9 %
10 SYRINGE (ML) INJECTION
Status: CANCELLED | OUTPATIENT
Start: 2024-08-05

## 2024-08-05 RX ORDER — PROCHLORPERAZINE EDISYLATE 5 MG/ML
5 INJECTION INTRAMUSCULAR; INTRAVENOUS ONCE AS NEEDED
Status: CANCELLED | OUTPATIENT
Start: 2024-08-05

## 2024-08-05 RX ORDER — HEPARIN 100 UNIT/ML
500 SYRINGE INTRAVENOUS
Status: DISCONTINUED | OUTPATIENT
Start: 2024-08-05 | End: 2024-08-05 | Stop reason: HOSPADM

## 2024-08-05 RX ORDER — SODIUM CHLORIDE 0.9 % (FLUSH) 0.9 %
10 SYRINGE (ML) INJECTION
Status: DISCONTINUED | OUTPATIENT
Start: 2024-08-05 | End: 2024-08-05 | Stop reason: HOSPADM

## 2024-08-05 RX ORDER — HEPARIN 100 UNIT/ML
500 SYRINGE INTRAVENOUS
Status: CANCELLED | OUTPATIENT
Start: 2024-08-05

## 2024-08-05 RX ORDER — FLUOROURACIL 50 MG/ML
2400 INJECTION, SOLUTION INTRAVENOUS
Status: DISCONTINUED | OUTPATIENT
Start: 2024-08-05 | End: 2024-08-05

## 2024-08-05 RX ADMIN — DIPHENHYDRAMINE HYDROCHLORIDE 25 MG: 50 INJECTION, SOLUTION INTRAMUSCULAR; INTRAVENOUS at 10:08

## 2024-08-05 RX ADMIN — DEXAMETHASONE SODIUM PHOSPHATE 0.25 MG: 10 INJECTION, SOLUTION INTRAMUSCULAR; INTRAVENOUS at 01:08

## 2024-08-05 RX ADMIN — FLUOROURACIL 3865 MG: 50 INJECTION, SOLUTION INTRAVENOUS at 03:08

## 2024-08-05 RX ADMIN — CETUXIMAB 800 MG: 2 SOLUTION INTRAVENOUS at 10:08

## 2024-08-05 RX ADMIN — OXALIPLATIN 137 MG: 100 INJECTION, SOLUTION, CONCENTRATE INTRAVENOUS at 01:08

## 2024-08-05 RX ADMIN — APREPITANT 130 MG: 130 INJECTION, EMULSION INTRAVENOUS at 10:08

## 2024-08-05 RX ADMIN — LEUCOVORIN CALCIUM 645 MG: 350 INJECTION, POWDER, LYOPHILIZED, FOR SOLUTION INTRAMUSCULAR; INTRAVENOUS at 01:08

## 2024-08-07 ENCOUNTER — INFUSION (OUTPATIENT)
Dept: INFUSION THERAPY | Facility: HOSPITAL | Age: 69
End: 2024-08-07
Payer: MEDICARE

## 2024-08-07 VITALS
BODY MASS INDEX: 20.61 KG/M2 | WEIGHT: 123.69 LBS | HEART RATE: 83 BPM | RESPIRATION RATE: 18 BRPM | SYSTOLIC BLOOD PRESSURE: 111 MMHG | HEIGHT: 65 IN | OXYGEN SATURATION: 99 % | DIASTOLIC BLOOD PRESSURE: 77 MMHG | TEMPERATURE: 98 F

## 2024-08-07 DIAGNOSIS — C18.2 MALIGNANT NEOPLASM OF ASCENDING COLON: Primary | ICD-10-CM

## 2024-08-07 PROCEDURE — A4216 STERILE WATER/SALINE, 10 ML: HCPCS

## 2024-08-07 PROCEDURE — 63600175 PHARM REV CODE 636 W HCPCS: Mod: JZ,JG

## 2024-08-07 PROCEDURE — 96377 APPLICATON ON-BODY INJECTOR: CPT

## 2024-08-07 PROCEDURE — 25000003 PHARM REV CODE 250

## 2024-08-07 RX ORDER — SODIUM CHLORIDE 0.9 % (FLUSH) 0.9 %
10 SYRINGE (ML) INJECTION
Status: DISCONTINUED | OUTPATIENT
Start: 2024-08-07 | End: 2024-08-07 | Stop reason: HOSPADM

## 2024-08-07 RX ORDER — HEPARIN 100 UNIT/ML
500 SYRINGE INTRAVENOUS
Status: DISCONTINUED | OUTPATIENT
Start: 2024-08-07 | End: 2024-08-07 | Stop reason: HOSPADM

## 2024-08-07 RX ADMIN — PEGFILGRASTIM 6 MG: KIT SUBCUTANEOUS at 11:08

## 2024-08-07 RX ADMIN — Medication 10 ML: at 11:08

## 2024-08-07 RX ADMIN — HEPARIN SODIUM (PORCINE) LOCK FLUSH IV SOLN 100 UNIT/ML 500 UNITS: 100 SOLUTION at 11:08

## 2024-08-19 ENCOUNTER — OFFICE VISIT (OUTPATIENT)
Dept: HEMATOLOGY/ONCOLOGY | Facility: CLINIC | Age: 69
End: 2024-08-19
Payer: MEDICARE

## 2024-08-19 ENCOUNTER — LAB VISIT (OUTPATIENT)
Dept: LAB | Facility: HOSPITAL | Age: 69
End: 2024-08-19
Payer: MEDICARE

## 2024-08-19 VITALS
OXYGEN SATURATION: 98 % | HEART RATE: 98 BPM | TEMPERATURE: 98 F | HEIGHT: 70 IN | WEIGHT: 123.63 LBS | BODY MASS INDEX: 17.7 KG/M2 | SYSTOLIC BLOOD PRESSURE: 133 MMHG | DIASTOLIC BLOOD PRESSURE: 75 MMHG | RESPIRATION RATE: 18 BRPM

## 2024-08-19 DIAGNOSIS — C18.2 MALIGNANT NEOPLASM OF ASCENDING COLON: ICD-10-CM

## 2024-08-19 DIAGNOSIS — N39.0 URINARY TRACT INFECTION WITHOUT HEMATURIA, SITE UNSPECIFIED: ICD-10-CM

## 2024-08-19 DIAGNOSIS — R35.0 URINARY FREQUENCY: Primary | ICD-10-CM

## 2024-08-19 LAB
ALBUMIN SERPL-MCNC: 2.8 G/DL (ref 3.4–4.8)
ALBUMIN/GLOB SERPL: 0.6 RATIO (ref 1.1–2)
ALP SERPL-CCNC: 120 UNIT/L (ref 40–150)
ALT SERPL-CCNC: 41 UNIT/L (ref 0–55)
ANION GAP SERPL CALC-SCNC: 9 MEQ/L
AST SERPL-CCNC: 49 UNIT/L (ref 5–34)
BACTERIA #/AREA URNS AUTO: ABNORMAL /HPF
BASOPHILS # BLD AUTO: 0.01 X10(3)/MCL
BASOPHILS NFR BLD AUTO: 0.4 %
BILIRUB SERPL-MCNC: 0.3 MG/DL
BILIRUB UR QL STRIP.AUTO: NEGATIVE
BUN SERPL-MCNC: 6 MG/DL (ref 9.8–20.1)
CALCIUM SERPL-MCNC: 9.8 MG/DL (ref 8.4–10.2)
CEA SERPL-MCNC: 45.66 NG/ML (ref 0–3)
CHLORIDE SERPL-SCNC: 110 MMOL/L (ref 98–107)
CLARITY UR: ABNORMAL
CO2 SERPL-SCNC: 22 MMOL/L (ref 23–31)
COLOR UR AUTO: YELLOW
CREAT SERPL-MCNC: 0.66 MG/DL (ref 0.55–1.02)
CREAT/UREA NIT SERPL: 9
EOSINOPHIL # BLD AUTO: 0.07 X10(3)/MCL (ref 0–0.9)
EOSINOPHIL NFR BLD AUTO: 2.8 %
ERYTHROCYTE [DISTWIDTH] IN BLOOD BY AUTOMATED COUNT: 15.9 % (ref 11.5–17)
GFR SERPLBLD CREATININE-BSD FMLA CKD-EPI: >60 ML/MIN/1.73/M2
GLOBULIN SER-MCNC: 4.9 GM/DL (ref 2.4–3.5)
GLUCOSE SERPL-MCNC: 86 MG/DL (ref 82–115)
GLUCOSE UR QL STRIP: NEGATIVE
HCT VFR BLD AUTO: 33.9 % (ref 37–47)
HGB BLD-MCNC: 11.4 G/DL (ref 12–16)
HGB UR QL STRIP: ABNORMAL
IMM GRANULOCYTES # BLD AUTO: 0 X10(3)/MCL (ref 0–0.04)
IMM GRANULOCYTES NFR BLD AUTO: 0 %
KETONES UR QL STRIP: NEGATIVE
LEUKOCYTE ESTERASE UR QL STRIP: ABNORMAL
LYMPHOCYTES # BLD AUTO: 1.39 X10(3)/MCL (ref 0.6–4.6)
LYMPHOCYTES NFR BLD AUTO: 56.5 %
MAGNESIUM SERPL-MCNC: 1.6 MG/DL (ref 1.6–2.6)
MCH RBC QN AUTO: 31.6 PG (ref 27–31)
MCHC RBC AUTO-ENTMCNC: 33.6 G/DL (ref 33–36)
MCV RBC AUTO: 93.9 FL (ref 80–94)
MONOCYTES # BLD AUTO: 0.36 X10(3)/MCL (ref 0.1–1.3)
MONOCYTES NFR BLD AUTO: 14.6 %
NEUTROPHILS # BLD AUTO: 0.63 X10(3)/MCL (ref 2.1–9.2)
NEUTROPHILS NFR BLD AUTO: 25.7 %
NITRITE UR QL STRIP: POSITIVE
PH UR STRIP: 6.5 [PH]
PLATELET # BLD AUTO: 120 X10(3)/MCL (ref 130–400)
PMV BLD AUTO: 10.9 FL (ref 7.4–10.4)
POTASSIUM SERPL-SCNC: 3.8 MMOL/L (ref 3.5–5.1)
PROT SERPL-MCNC: 7.7 GM/DL (ref 5.8–7.6)
PROT UR QL STRIP: ABNORMAL
RBC # BLD AUTO: 3.61 X10(6)/MCL (ref 4.2–5.4)
RBC #/AREA URNS AUTO: ABNORMAL /HPF
SODIUM SERPL-SCNC: 141 MMOL/L (ref 136–145)
SP GR UR STRIP.AUTO: 1.01 (ref 1–1.03)
SQUAMOUS #/AREA URNS AUTO: ABNORMAL /HPF
UROBILINOGEN UR STRIP-ACNC: 0.2
WBC # BLD AUTO: 2.46 X10(3)/MCL (ref 4.5–11.5)
WBC #/AREA URNS AUTO: ABNORMAL /HPF

## 2024-08-19 PROCEDURE — 99215 OFFICE O/P EST HI 40 MIN: CPT | Mod: S$PBB,,,

## 2024-08-19 PROCEDURE — 36415 COLL VENOUS BLD VENIPUNCTURE: CPT

## 2024-08-19 PROCEDURE — 81003 URINALYSIS AUTO W/O SCOPE: CPT

## 2024-08-19 PROCEDURE — 87086 URINE CULTURE/COLONY COUNT: CPT

## 2024-08-19 PROCEDURE — 99214 OFFICE O/P EST MOD 30 MIN: CPT | Mod: PBBFAC

## 2024-08-19 PROCEDURE — 99999 PR PBB SHADOW E&M-EST. PATIENT-LVL IV: CPT | Mod: PBBFAC,,,

## 2024-08-19 PROCEDURE — 85025 COMPLETE CBC W/AUTO DIFF WBC: CPT

## 2024-08-19 PROCEDURE — 80053 COMPREHEN METABOLIC PANEL: CPT

## 2024-08-19 PROCEDURE — 83735 ASSAY OF MAGNESIUM: CPT

## 2024-08-19 PROCEDURE — 87077 CULTURE AEROBIC IDENTIFY: CPT

## 2024-08-19 PROCEDURE — 82378 CARCINOEMBRYONIC ANTIGEN: CPT

## 2024-08-19 RX ORDER — NITROFURANTOIN 25; 75 MG/1; MG/1
100 CAPSULE ORAL 2 TIMES DAILY
Qty: 10 CAPSULE | Refills: 0 | Status: SHIPPED | OUTPATIENT
Start: 2024-08-19 | End: 2024-08-24

## 2024-08-19 NOTE — PROGRESS NOTES
Subjective:       Patient ID: Selene Smallwood is a 69 y.o. female.    Chief Complaint: Follow Up    Diagnosis:   Stage IV Metastatic Colon Adenocarcinoma with mets to peritoneum  2. Pulmonary Embolisum    Current Treatment:   FOLFOX + Cetuximab q2w x 12- 4/2/24 -   Xarelto 15 mg BID x 21 days then 20 mg daily- started 6/25/24    Treatment History: N/A    HPI:   69 yo F presented in March '24 for evaluation of metastatic colon cancer. She initially presented to OSH in early February '24 with 2 days of upper abdominal pain. Imaging on 2/4 in the ER revealed evidence of non perforated acute appendicitis. She underwent Ex lap where a cecal mass was incidentally discovered and resected, as well as omental studding was noted and 1 small mass was taken for biopsy. Her final pathology revealed a large exophytic mass in the cecum measuring 4 x 2 cm extending to the appendiceal orifice and extends into pericolonic adipose tissue. G2, LVI+, PNI negative. 14 LN were resected, with 10 being positive for metastatic adenocarcinoma. She also had an omental mass measuring 2.5 x 1.5cm which was positive for metastatic adenocarcinoma, consistent with a colon primary. Her final pathology staging is consistent with [id number]. Her postop course was complicated by abdominal abscess formation s/p IR drain placement and subsequent removal and antibiotic treatment.      She underwent PET scan in March '24 with evidence of nodular foci within the omentum/peritoneum consistent with peritoneal carcinomatosis. No other evidence of disease. NGS testing was performed which revealed GULSHAN wild type and BRAF negative. Therefore she will proceed with 1st line therapy of FOLFOX + Cetuximab Q2w x 12 cycles.     Interval History:  She returns to the clinic today for a follow-up and treatment clearance for C9 of FOLFOX + Cetuximab. She feels well today, other than urinary frequency and urgency that started a few days ago. She continues to have a  decreased appetite and fatigue. Weight is stable today. She is now taking her xarelto as prescribed.  She denies any peripheral neuropathy to fingertips and toes. Denies SOB, chest pain, fever, chills. No abnormal bowels.     History reviewed. No pertinent past medical history.   Past Surgical History:   Procedure Laterality Date    APPENDECTOMY N/A 2/5/2024    Procedure: APPENDECTOMY;  Surgeon: Anna Murillo MD;  Location: [facility] OR;  Service: General;  Laterality: N/A;    INSERTION OF TUNNELED CENTRAL VENOUS CATHETER (CVC) WITH SUBCUTANEOUS PORT N/A 3/26/2024    Procedure: [id number];  Surgeon: Armando Tran MD;  Location: [facility] OR;  Service: Peripheral Vascular;  Laterality: N/A;    LAPAROSCOPIC APPENDECTOMY N/A 2/5/2024    Procedure: APPENDECTOMY, LAPAROSCOPIC;  Surgeon: Anna Murillo MD;  Location: [facility] OR;  Service: General;  Laterality: N/A;    SURGICAL REMOVAL OF ILEUM WITH CECUM N/A 2/5/2024    Procedure: EXCISION, CECUM WITH ILEUM;  Surgeon: Anna Murillo MD;  Location: [facility] OR;  Service: General;  Laterality: N/A;    WASHOUT N/A 2/5/2024    Procedure: WASHOUT;  Surgeon: Anna Murillo MD;  Location: [facility] OR;  Service: General;  Laterality: N/A;     Social History     Socioeconomic History    Marital status:    Tobacco Use    Smoking status: Former     Types: Cigarettes    Smokeless tobacco: Never     Social Determinants of Health     Financial Resource Strain: Low Risk  (2/15/2024)    Overall Financial Resource Strain (CARDIA)     Difficulty of Paying Living Expenses: Not hard at all   Food Insecurity: No Food Insecurity (2/15/2024)    Hunger Vital Sign     Worried About Running Out of Food in the Last Year: Never true     Ran Out of Food in the Last Year: Never true   Transportation Needs: No Transportation Needs (2/15/2024)    PRAPARE - Transportation     Lack of Transportation (Medical): No     Lack of Transportation (Non-Medical): No   Physical Activity: Unknown  (2/15/2024)    Exercise Vital Sign     Days of Exercise per Week: 0 days     Minutes of Exercise per Session: Patient unable to answer   Stress: No Stress Concern Present (2/15/2024)    [employer] - Occupational Stress Questionnaire     Feeling of Stress : Not at all   Housing Stability: Low Risk  (2/15/2024)    Housing Stability Vital Sign     Unable to Pay for Housing in the Last Year: No     Number of Places Lived in the Last Year: 1     Unstable Housing in the Last Year: No      No family history on file.   Review of patient's allergies indicates:   Allergen Reactions    Robaxin [methocarbamol] Other (See Comments)     Altered mental status       Review of Systems   Constitutional:  Positive for appetite change and fatigue. Negative for activity change, fever and unexpected weight change.   HENT:  Negative for sore throat.    Eyes:  Negative for visual disturbance.   Respiratory:  Negative for cough and shortness of breath.    Cardiovascular:  Negative for chest pain.   Gastrointestinal:  Positive for nausea. Negative for abdominal pain, diarrhea and vomiting.   Endocrine: Negative for polyuria.   Genitourinary:  Positive for frequency and urgency. Negative for dysuria and hot flashes.   Integumentary:  Negative for rash.   Allergic/Immunologic: Negative for immunocompromised state.   Neurological:  Negative for weakness and headaches.   Hematological:  Negative for adenopathy.   Psychiatric/Behavioral:  Negative for confusion.          Objective:      Vitals:    08/19/24 0945   BP: 133/75   Pulse: 98   Resp: 18   Temp: 97.5 °F (36.4 °C)               Physical Exam  Constitutional:       General: She is not in acute distress.     Appearance: Normal appearance. She is not ill-appearing.   HENT:      Head: Normocephalic and atraumatic.      Nose: Nose normal.      Mouth/Throat:      Mouth: Mucous membranes are moist.      Pharynx: Oropharynx is clear.   Eyes:      Extraocular  Movements: Extraocular movements intact.      Conjunctiva/sclera: Conjunctivae normal.      Pupils: Pupils are equal, round, and reactive to light.   Cardiovascular:      Rate and Rhythm: Normal rate and regular rhythm.      Pulses: Normal pulses.      Heart sounds: Normal heart sounds. No murmur heard.  Pulmonary:      Effort: Pulmonary effort is normal. No respiratory distress.      Breath sounds: Normal breath sounds.   Abdominal:      General: There is no distension.      Palpations: Abdomen is soft.      Tenderness: There is no abdominal tenderness.   Musculoskeletal:         General: Normal range of motion.      Cervical back: Normal range of motion and neck supple.      Right lower leg: No edema.      Left lower leg: No edema.   Lymphadenopathy:      Cervical: No cervical adenopathy.   Skin:     General: Skin is warm and dry.   Neurological:      General: No focal deficit present.      Mental Status: She is alert and oriented to person, place, and time.         LABS AND IMAGING REVIEWED IN EPIC    IMAGIN24 CT C/A/P:  Impression:  1. Small to moderate bilateral pleural effusions.  2. Right upper lobe ground-glass could be infectious/inflammatory or relate to asymmetric edema.  3. Interval appendectomy with fluid collections along the surgical bed suspicious for developing abscesses.  24 Abdominal US:  Impression:  Cyst in the right lobe of the liver.  Elongated gallbladder with no definite gallstones minimal amount of fluid seen adjacent to the fundus.  Nephrolithiasis of the right kidney with a cyst of the right kidney minimal fullness of the collecting system.  Free fluid as described above  24 CT Abd/Pelvis:  Impression:  Dilated appendix with periappendiceal inflammatory changes,, compatible with non perforated acute appendicitis.  No fluid collection or abscess.  No free air.  Small amount of free fluid the pelvis.  Findings were communicated to Dr. Chavez at 13:56 2024  Mild  atelectatic changes and ground-glass opacities in the lower lungs suspicious for infectious/inflammatory process.  3/15/24 PET/CT:  1. Nodular foci of hypermetabolic soft tissue thickening within the mesentery most consistent with peritoneal carcinomatosis.  2. Hypermetabolic nodule in the uterine fundus which is indeterminate. Pelvic ultrasound may be beneficial.  6/25/2024 CT CAP:  1. Interim development of a pulmonary embolus at the right mainstem bronchus  2. Suspect small thrombus formation at the tip of the right central line/MediPort at the SVC  3. Interim improved aeration and clearing of the lungs bilaterally since the prior exam  4. 3 mm subpleural nodule lateral left upper lobe  5. 1.5 cm round low-attenuation focus again evident at the liver  7/2/2024 PET:  No evidence of FDG avid disease on this exam.        PATHOLOGY:  2/5/24 Biopsy:  Final Diagnosis  1. Appendix, appendectomy:  - Acute appendicitis with perforation.  - Adenocarcinoma involves lymphatic spaces of the appendiceal wall and peritoneal surface.  2. Cecum, ileocecectomy:  - Colonic adenocarcinoma.  - See synoptic checklist for CAP cancer protocol.  3. Omental mass, biopsy:  - Metastatic adenocarcinoma, consistent with a colon primary.  4. Small bowel, segmental resection:  - Acute peritonitis.  - No evidence of malignancy.    MLH1: PRESERVED (INTACT) EXPRESSION IN TUMOR CELLS      MSH2: PRESERVED (INTACT) EXPRESSION IN TUMOR CELLS      MSH6: PRESERVED (INTACT) EXPRESSION IN TUMOR CELLS      PMS2: PRESERVED (INTACT) EXPRESSION IN TUMOR CELLS  Microscopic Description/Comments   These results show no deficiency of the mismatch repair proteins tested.    Assessment:   Stage IV Metastatic Colon Adenocarcinoma with mets to peritoneum - Cecal mass, nonobstructive, no perforation. [id number].   Plan for 1st line chemotherapy with FOLFOX + Cetuximab. Will remove bolus 5FU in metastatic setting.   Neulasta added with C3.   Pulmonary Embolus at the  right mainstem bronchus   Incidentally found during CT CAP 6/25/24  Now on xarelto, continue at this time.       Plan:   Hold C9 Folfox + Cetuximab with neulasta again today due to continued neutropenia.   Labs and possible treat in one week if ANC improves.   IV fluids if needed on pump disconnect.  Continue xarelto  Continue potassium 20meq daily.  Continue Melatonin 5mg. She can also  OTC  Macrobid 100 mg BID x 5 days for UTI   Neutropenic precautions explained to patient   RTC in three weeks with NP for FU/lab/treat C9  Cbc, cmp, cea- 1 hr prior @ [facility]      I spent a total of 40 minutes on the day of the visit.This includes face to face time and non-face to face time preparing to see the patient (eg, review of tests), obtaining and/or reviewing separately obtained history, documenting clinical information in the electronic or other health record, independently interpreting results and communicating results to the patient/family/caregiver, or care coordinator.    Maria Isabel Rodriguez, CAROLYNNP-C  Oncology/Hematology  [facility]

## 2024-08-22 LAB — BACTERIA UR CULT: ABNORMAL

## 2024-08-26 DIAGNOSIS — C78.6 MALIGNANT NEOPLASM METASTATIC TO PERITONEUM: Primary | ICD-10-CM

## 2024-09-03 ENCOUNTER — LAB VISIT (OUTPATIENT)
Dept: LAB | Facility: HOSPITAL | Age: 69
End: 2024-09-03
Payer: MEDICARE

## 2024-09-03 DIAGNOSIS — C78.6 MALIGNANT NEOPLASM METASTATIC TO PERITONEUM: ICD-10-CM

## 2024-09-03 DIAGNOSIS — C78.6 MALIGNANT NEOPLASM METASTATIC TO PERITONEUM: Primary | ICD-10-CM

## 2024-09-03 LAB
ALBUMIN SERPL-MCNC: 2.7 G/DL (ref 3.4–4.8)
ALBUMIN/GLOB SERPL: 0.5 RATIO (ref 1.1–2)
ALP SERPL-CCNC: 127 UNIT/L (ref 40–150)
ALT SERPL-CCNC: 19 UNIT/L (ref 0–55)
ANION GAP SERPL CALC-SCNC: 5 MEQ/L
AST SERPL-CCNC: 30 UNIT/L (ref 5–34)
BASOPHILS # BLD AUTO: 0.03 X10(3)/MCL
BASOPHILS NFR BLD AUTO: 0.8 %
BILIRUB SERPL-MCNC: 0.2 MG/DL
BUN SERPL-MCNC: 8 MG/DL (ref 9.8–20.1)
CALCIUM SERPL-MCNC: 9.2 MG/DL (ref 8.4–10.2)
CHLORIDE SERPL-SCNC: 112 MMOL/L (ref 98–107)
CO2 SERPL-SCNC: 25 MMOL/L (ref 23–31)
CREAT SERPL-MCNC: 0.69 MG/DL (ref 0.55–1.02)
CREAT/UREA NIT SERPL: 12
EOSINOPHIL # BLD AUTO: 0.04 X10(3)/MCL (ref 0–0.9)
EOSINOPHIL NFR BLD AUTO: 1 %
ERYTHROCYTE [DISTWIDTH] IN BLOOD BY AUTOMATED COUNT: 15.8 % (ref 11.5–17)
GFR SERPLBLD CREATININE-BSD FMLA CKD-EPI: >60 ML/MIN/1.73/M2
GLOBULIN SER-MCNC: 5.3 GM/DL (ref 2.4–3.5)
GLUCOSE SERPL-MCNC: 96 MG/DL (ref 82–115)
HCT VFR BLD AUTO: 33.9 % (ref 37–47)
HGB BLD-MCNC: 11 G/DL (ref 12–16)
IMM GRANULOCYTES # BLD AUTO: 0.01 X10(3)/MCL (ref 0–0.04)
IMM GRANULOCYTES NFR BLD AUTO: 0.3 %
LYMPHOCYTES # BLD AUTO: 1.73 X10(3)/MCL (ref 0.6–4.6)
LYMPHOCYTES NFR BLD AUTO: 43.5 %
MCH RBC QN AUTO: 31.5 PG (ref 27–31)
MCHC RBC AUTO-ENTMCNC: 32.4 G/DL (ref 33–36)
MCV RBC AUTO: 97.1 FL (ref 80–94)
MONOCYTES # BLD AUTO: 0.63 X10(3)/MCL (ref 0.1–1.3)
MONOCYTES NFR BLD AUTO: 15.8 %
NEUTROPHILS # BLD AUTO: 1.54 X10(3)/MCL (ref 2.1–9.2)
NEUTROPHILS NFR BLD AUTO: 38.6 %
PLATELET # BLD AUTO: 191 X10(3)/MCL (ref 130–400)
PMV BLD AUTO: 11.2 FL (ref 7.4–10.4)
POTASSIUM SERPL-SCNC: 3.4 MMOL/L (ref 3.5–5.1)
PROT SERPL-MCNC: 8 GM/DL (ref 5.8–7.6)
RBC # BLD AUTO: 3.49 X10(6)/MCL (ref 4.2–5.4)
SODIUM SERPL-SCNC: 142 MMOL/L (ref 136–145)
WBC # BLD AUTO: 3.98 X10(3)/MCL (ref 4.5–11.5)

## 2024-09-03 PROCEDURE — 85025 COMPLETE CBC W/AUTO DIFF WBC: CPT

## 2024-09-03 PROCEDURE — 80053 COMPREHEN METABOLIC PANEL: CPT

## 2024-09-03 PROCEDURE — 36415 COLL VENOUS BLD VENIPUNCTURE: CPT

## 2024-09-04 ENCOUNTER — INFUSION (OUTPATIENT)
Dept: INFUSION THERAPY | Facility: HOSPITAL | Age: 69
End: 2024-09-04
Payer: MEDICARE

## 2024-09-04 VITALS
TEMPERATURE: 97 F | WEIGHT: 126.38 LBS | SYSTOLIC BLOOD PRESSURE: 132 MMHG | DIASTOLIC BLOOD PRESSURE: 85 MMHG | HEART RATE: 87 BPM | HEIGHT: 67 IN | BODY MASS INDEX: 19.83 KG/M2 | OXYGEN SATURATION: 99 %

## 2024-09-04 DIAGNOSIS — C18.2 MALIGNANT NEOPLASM OF ASCENDING COLON: Primary | ICD-10-CM

## 2024-09-04 DIAGNOSIS — C7B.8 OTHER SECONDARY NEUROENDOCRINE TUMORS: ICD-10-CM

## 2024-09-04 DIAGNOSIS — C78.6 MALIGNANT NEOPLASM METASTATIC TO PERITONEUM: Primary | ICD-10-CM

## 2024-09-04 PROCEDURE — 96413 CHEMO IV INFUSION 1 HR: CPT

## 2024-09-04 PROCEDURE — 96417 CHEMO IV INFUS EACH ADDL SEQ: CPT

## 2024-09-04 PROCEDURE — 96415 CHEMO IV INFUSION ADDL HR: CPT

## 2024-09-04 PROCEDURE — 96416 CHEMO PROLONG INFUSE W/PUMP: CPT

## 2024-09-04 PROCEDURE — 96368 THER/DIAG CONCURRENT INF: CPT

## 2024-09-04 PROCEDURE — 96367 TX/PROPH/DG ADDL SEQ IV INF: CPT

## 2024-09-04 PROCEDURE — 96375 TX/PRO/DX INJ NEW DRUG ADDON: CPT

## 2024-09-04 PROCEDURE — 25000003 PHARM REV CODE 250

## 2024-09-04 PROCEDURE — 63600175 PHARM REV CODE 636 W HCPCS

## 2024-09-04 RX ORDER — DIPHENHYDRAMINE HYDROCHLORIDE 50 MG/ML
25 INJECTION INTRAMUSCULAR; INTRAVENOUS
Status: COMPLETED | OUTPATIENT
Start: 2024-09-04 | End: 2024-09-04

## 2024-09-04 RX ORDER — HEPARIN 100 UNIT/ML
500 SYRINGE INTRAVENOUS
Status: CANCELLED | OUTPATIENT
Start: 2024-09-04

## 2024-09-04 RX ORDER — DIPHENHYDRAMINE HYDROCHLORIDE 50 MG/ML
50 INJECTION INTRAMUSCULAR; INTRAVENOUS ONCE AS NEEDED
Status: CANCELLED | OUTPATIENT
Start: 2024-09-04

## 2024-09-04 RX ORDER — PROCHLORPERAZINE EDISYLATE 5 MG/ML
5 INJECTION INTRAMUSCULAR; INTRAVENOUS ONCE AS NEEDED
Status: CANCELLED | OUTPATIENT
Start: 2024-09-05

## 2024-09-04 RX ORDER — EPINEPHRINE 0.3 MG/.3ML
0.3 INJECTION SUBCUTANEOUS ONCE AS NEEDED
Status: CANCELLED | OUTPATIENT
Start: 2024-09-04

## 2024-09-04 RX ORDER — SODIUM CHLORIDE 0.9 % (FLUSH) 0.9 %
10 SYRINGE (ML) INJECTION
Status: DISCONTINUED | OUTPATIENT
Start: 2024-09-04 | End: 2024-09-04 | Stop reason: HOSPADM

## 2024-09-04 RX ORDER — PROCHLORPERAZINE EDISYLATE 5 MG/ML
5 INJECTION INTRAMUSCULAR; INTRAVENOUS ONCE AS NEEDED
Status: CANCELLED | OUTPATIENT
Start: 2024-09-04

## 2024-09-04 RX ORDER — SODIUM CHLORIDE 0.9 % (FLUSH) 0.9 %
10 SYRINGE (ML) INJECTION
Status: CANCELLED | OUTPATIENT
Start: 2024-09-05

## 2024-09-04 RX ORDER — HEPARIN 100 UNIT/ML
500 SYRINGE INTRAVENOUS
Status: CANCELLED | OUTPATIENT
Start: 2024-09-05

## 2024-09-04 RX ADMIN — DEXAMETHASONE SODIUM PHOSPHATE 0.25 MG: 10 INJECTION, SOLUTION INTRAMUSCULAR; INTRAVENOUS at 10:09

## 2024-09-04 RX ADMIN — LEUCOVORIN CALCIUM 660 MG: 350 INJECTION, POWDER, LYOPHILIZED, FOR SOLUTION INTRAMUSCULAR; INTRAVENOUS at 11:09

## 2024-09-04 RX ADMIN — CETUXIMAB 800 MG: 2 SOLUTION INTRAVENOUS at 08:09

## 2024-09-04 RX ADMIN — APREPITANT 130 MG: 130 INJECTION, EMULSION INTRAVENOUS at 08:09

## 2024-09-04 RX ADMIN — OXALIPLATIN 140 MG: 100 INJECTION, SOLUTION, CONCENTRATE INTRAVENOUS at 11:09

## 2024-09-04 RX ADMIN — DIPHENHYDRAMINE HYDROCHLORIDE 25 MG: 50 INJECTION, SOLUTION INTRAMUSCULAR; INTRAVENOUS at 08:09

## 2024-09-04 RX ADMIN — FLUOROURACIL 3960 MG: 50 INJECTION, SOLUTION INTRAVENOUS at 01:09

## 2024-09-04 RX ADMIN — DEXTROSE MONOHYDRATE: 50 INJECTION, SOLUTION INTRAVENOUS at 01:09

## 2024-09-06 ENCOUNTER — INFUSION (OUTPATIENT)
Dept: INFUSION THERAPY | Facility: HOSPITAL | Age: 69
End: 2024-09-06
Payer: MEDICARE

## 2024-09-06 VITALS
RESPIRATION RATE: 18 BRPM | HEART RATE: 95 BPM | OXYGEN SATURATION: 97 % | BODY MASS INDEX: 19.93 KG/M2 | DIASTOLIC BLOOD PRESSURE: 99 MMHG | TEMPERATURE: 98 F | SYSTOLIC BLOOD PRESSURE: 152 MMHG | HEIGHT: 67 IN | WEIGHT: 127 LBS

## 2024-09-06 DIAGNOSIS — C18.2 MALIGNANT NEOPLASM OF ASCENDING COLON: Primary | ICD-10-CM

## 2024-09-06 PROCEDURE — 96377 APPLICATON ON-BODY INJECTOR: CPT

## 2024-09-06 PROCEDURE — 25000003 PHARM REV CODE 250

## 2024-09-06 PROCEDURE — A4216 STERILE WATER/SALINE, 10 ML: HCPCS

## 2024-09-06 PROCEDURE — 63600175 PHARM REV CODE 636 W HCPCS

## 2024-09-06 RX ORDER — PROCHLORPERAZINE EDISYLATE 5 MG/ML
5 INJECTION INTRAMUSCULAR; INTRAVENOUS ONCE AS NEEDED
Status: DISCONTINUED | OUTPATIENT
Start: 2024-09-06 | End: 2024-09-06 | Stop reason: HOSPADM

## 2024-09-06 RX ORDER — HEPARIN 100 UNIT/ML
500 SYRINGE INTRAVENOUS
Status: DISCONTINUED | OUTPATIENT
Start: 2024-09-06 | End: 2024-09-06 | Stop reason: HOSPADM

## 2024-09-06 RX ORDER — SODIUM CHLORIDE 0.9 % (FLUSH) 0.9 %
10 SYRINGE (ML) INJECTION
Status: DISCONTINUED | OUTPATIENT
Start: 2024-09-06 | End: 2024-09-06 | Stop reason: HOSPADM

## 2024-09-06 RX ADMIN — Medication 10 ML: at 10:09

## 2024-09-06 RX ADMIN — HEPARIN SODIUM (PORCINE) LOCK FLUSH IV SOLN 100 UNIT/ML 500 UNITS: 100 SOLUTION at 10:09

## 2024-09-17 ENCOUNTER — LAB VISIT (OUTPATIENT)
Dept: LAB | Facility: HOSPITAL | Age: 69
End: 2024-09-17
Payer: MEDICARE

## 2024-09-17 ENCOUNTER — INFUSION (OUTPATIENT)
Dept: INFUSION THERAPY | Facility: HOSPITAL | Age: 69
End: 2024-09-17
Payer: MEDICARE

## 2024-09-17 ENCOUNTER — OFFICE VISIT (OUTPATIENT)
Dept: HEMATOLOGY/ONCOLOGY | Facility: CLINIC | Age: 69
End: 2024-09-17
Payer: MEDICARE

## 2024-09-17 VITALS
OXYGEN SATURATION: 100 % | SYSTOLIC BLOOD PRESSURE: 138 MMHG | TEMPERATURE: 98 F | BODY MASS INDEX: 18.79 KG/M2 | RESPIRATION RATE: 20 BRPM | HEIGHT: 67 IN | HEART RATE: 104 BPM | WEIGHT: 119.69 LBS | DIASTOLIC BLOOD PRESSURE: 92 MMHG

## 2024-09-17 VITALS
HEART RATE: 104 BPM | TEMPERATURE: 98 F | BODY MASS INDEX: 18.79 KG/M2 | OXYGEN SATURATION: 100 % | DIASTOLIC BLOOD PRESSURE: 92 MMHG | RESPIRATION RATE: 20 BRPM | HEIGHT: 67 IN | SYSTOLIC BLOOD PRESSURE: 138 MMHG | WEIGHT: 119.69 LBS

## 2024-09-17 DIAGNOSIS — L03.032 ACUTE PARONYCHIA OF TOE OF LEFT FOOT: ICD-10-CM

## 2024-09-17 DIAGNOSIS — Z79.899 IMMUNODEFICIENCY DUE TO CHEMOTHERAPY: ICD-10-CM

## 2024-09-17 DIAGNOSIS — C18.2 MALIGNANT NEOPLASM OF ASCENDING COLON: Primary | ICD-10-CM

## 2024-09-17 DIAGNOSIS — C78.6 MALIGNANT NEOPLASM METASTATIC TO PERITONEUM: ICD-10-CM

## 2024-09-17 DIAGNOSIS — T45.1X5A IMMUNODEFICIENCY DUE TO CHEMOTHERAPY: ICD-10-CM

## 2024-09-17 DIAGNOSIS — C7B.8 OTHER SECONDARY NEUROENDOCRINE TUMORS: ICD-10-CM

## 2024-09-17 DIAGNOSIS — Z79.899 IMMUNODEFICIENCY DUE TO DRUG THERAPY: ICD-10-CM

## 2024-09-17 DIAGNOSIS — D84.821 IMMUNODEFICIENCY DUE TO CHEMOTHERAPY: ICD-10-CM

## 2024-09-17 DIAGNOSIS — Z79.899 ON ANTINEOPLASTIC CHEMOTHERAPY: ICD-10-CM

## 2024-09-17 DIAGNOSIS — L03.012 ACUTE PARONYCHIA OF LEFT THUMB: ICD-10-CM

## 2024-09-17 DIAGNOSIS — D84.821 IMMUNODEFICIENCY DUE TO DRUG THERAPY: ICD-10-CM

## 2024-09-17 DIAGNOSIS — C18.9 MALIGNANT NEOPLASM OF COLON, UNSPECIFIED PART OF COLON: ICD-10-CM

## 2024-09-17 LAB
ALBUMIN SERPL-MCNC: 2.9 G/DL (ref 3.4–4.8)
ALBUMIN/GLOB SERPL: 0.6 RATIO (ref 1.1–2)
ALP SERPL-CCNC: 166 UNIT/L (ref 40–150)
ALT SERPL-CCNC: 27 UNIT/L (ref 0–55)
ANION GAP SERPL CALC-SCNC: 7 MEQ/L
AST SERPL-CCNC: 37 UNIT/L (ref 5–34)
BASOPHILS # BLD AUTO: 0.03 X10(3)/MCL
BASOPHILS NFR BLD AUTO: 0.4 %
BILIRUB SERPL-MCNC: 0.3 MG/DL
BUN SERPL-MCNC: 7 MG/DL (ref 9.8–20.1)
CALCIUM SERPL-MCNC: 9.8 MG/DL (ref 8.4–10.2)
CEA SERPL-MCNC: 73.82 NG/ML (ref 0–3)
CHLORIDE SERPL-SCNC: 110 MMOL/L (ref 98–107)
CO2 SERPL-SCNC: 25 MMOL/L (ref 23–31)
CREAT SERPL-MCNC: 0.7 MG/DL (ref 0.55–1.02)
CREAT/UREA NIT SERPL: 10
EOSINOPHIL # BLD AUTO: 0.12 X10(3)/MCL (ref 0–0.9)
EOSINOPHIL NFR BLD AUTO: 1.6 %
ERYTHROCYTE [DISTWIDTH] IN BLOOD BY AUTOMATED COUNT: 15.6 % (ref 11.5–17)
GFR SERPLBLD CREATININE-BSD FMLA CKD-EPI: >60 ML/MIN/1.73/M2
GLOBULIN SER-MCNC: 5.2 GM/DL (ref 2.4–3.5)
GLUCOSE SERPL-MCNC: 87 MG/DL (ref 82–115)
HCT VFR BLD AUTO: 34.4 % (ref 37–47)
HGB BLD-MCNC: 11.2 G/DL (ref 12–16)
IMM GRANULOCYTES # BLD AUTO: 0.09 X10(3)/MCL (ref 0–0.04)
IMM GRANULOCYTES NFR BLD AUTO: 1.2 %
LYMPHOCYTES # BLD AUTO: 2.16 X10(3)/MCL (ref 0.6–4.6)
LYMPHOCYTES NFR BLD AUTO: 28.6 %
MCH RBC QN AUTO: 31.7 PG (ref 27–31)
MCHC RBC AUTO-ENTMCNC: 32.6 G/DL (ref 33–36)
MCV RBC AUTO: 97.5 FL (ref 80–94)
MONOCYTES # BLD AUTO: 1.07 X10(3)/MCL (ref 0.1–1.3)
MONOCYTES NFR BLD AUTO: 14.2 %
NEUTROPHILS # BLD AUTO: 4.07 X10(3)/MCL (ref 2.1–9.2)
NEUTROPHILS NFR BLD AUTO: 54 %
PLATELET # BLD AUTO: 158 X10(3)/MCL (ref 130–400)
PMV BLD AUTO: 11 FL (ref 7.4–10.4)
POTASSIUM SERPL-SCNC: 3.7 MMOL/L (ref 3.5–5.1)
PROT SERPL-MCNC: 8.1 GM/DL (ref 5.8–7.6)
RBC # BLD AUTO: 3.53 X10(6)/MCL (ref 4.2–5.4)
SODIUM SERPL-SCNC: 142 MMOL/L (ref 136–145)
WBC # BLD AUTO: 7.54 X10(3)/MCL (ref 4.5–11.5)

## 2024-09-17 PROCEDURE — 80053 COMPREHEN METABOLIC PANEL: CPT

## 2024-09-17 PROCEDURE — 96415 CHEMO IV INFUSION ADDL HR: CPT

## 2024-09-17 PROCEDURE — 63600175 PHARM REV CODE 636 W HCPCS: Mod: JZ,JG

## 2024-09-17 PROCEDURE — 96413 CHEMO IV INFUSION 1 HR: CPT

## 2024-09-17 PROCEDURE — 85025 COMPLETE CBC W/AUTO DIFF WBC: CPT

## 2024-09-17 PROCEDURE — 96417 CHEMO IV INFUS EACH ADDL SEQ: CPT

## 2024-09-17 PROCEDURE — 63600175 PHARM REV CODE 636 W HCPCS

## 2024-09-17 PROCEDURE — A4216 STERILE WATER/SALINE, 10 ML: HCPCS

## 2024-09-17 PROCEDURE — 96375 TX/PRO/DX INJ NEW DRUG ADDON: CPT

## 2024-09-17 PROCEDURE — 96368 THER/DIAG CONCURRENT INF: CPT

## 2024-09-17 PROCEDURE — 82378 CARCINOEMBRYONIC ANTIGEN: CPT

## 2024-09-17 PROCEDURE — 96367 TX/PROPH/DG ADDL SEQ IV INF: CPT

## 2024-09-17 PROCEDURE — 99214 OFFICE O/P EST MOD 30 MIN: CPT | Mod: PBBFAC

## 2024-09-17 PROCEDURE — 25000003 PHARM REV CODE 250

## 2024-09-17 PROCEDURE — 99999 PR PBB SHADOW E&M-EST. PATIENT-LVL IV: CPT | Mod: PBBFAC,,,

## 2024-09-17 PROCEDURE — 36415 COLL VENOUS BLD VENIPUNCTURE: CPT

## 2024-09-17 PROCEDURE — 99215 OFFICE O/P EST HI 40 MIN: CPT | Mod: S$PBB,,,

## 2024-09-17 PROCEDURE — 96416 CHEMO PROLONG INFUSE W/PUMP: CPT

## 2024-09-17 RX ORDER — SODIUM CHLORIDE 0.9 % (FLUSH) 0.9 %
10 SYRINGE (ML) INJECTION
Status: DISCONTINUED | OUTPATIENT
Start: 2024-09-17 | End: 2024-09-17 | Stop reason: HOSPADM

## 2024-09-17 RX ORDER — PROCHLORPERAZINE EDISYLATE 5 MG/ML
5 INJECTION INTRAMUSCULAR; INTRAVENOUS ONCE AS NEEDED
Status: CANCELLED | OUTPATIENT
Start: 2024-09-19

## 2024-09-17 RX ORDER — SODIUM CHLORIDE 0.9 % (FLUSH) 0.9 %
10 SYRINGE (ML) INJECTION
Status: CANCELLED | OUTPATIENT
Start: 2024-09-17

## 2024-09-17 RX ORDER — MUPIROCIN 20 MG/G
OINTMENT TOPICAL 3 TIMES DAILY
Qty: 1 G | Refills: 1 | Status: SHIPPED | OUTPATIENT
Start: 2024-09-17 | End: 2024-10-01

## 2024-09-17 RX ORDER — DIPHENHYDRAMINE HYDROCHLORIDE 50 MG/ML
25 INJECTION INTRAMUSCULAR; INTRAVENOUS
Status: CANCELLED
Start: 2024-09-17

## 2024-09-17 RX ORDER — DIPHENHYDRAMINE HYDROCHLORIDE 50 MG/ML
50 INJECTION INTRAMUSCULAR; INTRAVENOUS ONCE AS NEEDED
Status: CANCELLED | OUTPATIENT
Start: 2024-09-17

## 2024-09-17 RX ORDER — EPINEPHRINE 0.3 MG/.3ML
0.3 INJECTION SUBCUTANEOUS ONCE AS NEEDED
Status: CANCELLED | OUTPATIENT
Start: 2024-09-17

## 2024-09-17 RX ORDER — HEPARIN 100 UNIT/ML
500 SYRINGE INTRAVENOUS
Status: CANCELLED | OUTPATIENT
Start: 2024-09-19

## 2024-09-17 RX ORDER — DIPHENHYDRAMINE HYDROCHLORIDE 50 MG/ML
25 INJECTION INTRAMUSCULAR; INTRAVENOUS
Status: COMPLETED | OUTPATIENT
Start: 2024-09-17 | End: 2024-09-17

## 2024-09-17 RX ORDER — SODIUM CHLORIDE 0.9 % (FLUSH) 0.9 %
10 SYRINGE (ML) INJECTION
Status: CANCELLED | OUTPATIENT
Start: 2024-09-19

## 2024-09-17 RX ORDER — HEPARIN 100 UNIT/ML
500 SYRINGE INTRAVENOUS
Status: CANCELLED | OUTPATIENT
Start: 2024-09-17

## 2024-09-17 RX ORDER — PROCHLORPERAZINE EDISYLATE 5 MG/ML
5 INJECTION INTRAMUSCULAR; INTRAVENOUS ONCE AS NEEDED
Status: CANCELLED | OUTPATIENT
Start: 2024-09-17

## 2024-09-17 RX ADMIN — LEUCOVORIN CALCIUM 640 MG: 350 INJECTION, POWDER, LYOPHILIZED, FOR SOLUTION INTRAMUSCULAR; INTRAVENOUS at 02:09

## 2024-09-17 RX ADMIN — OXALIPLATIN 136 MG: 5 INJECTION, SOLUTION INTRAVENOUS at 02:09

## 2024-09-17 RX ADMIN — CETUXIMAB 800 MG: 2 SOLUTION INTRAVENOUS at 12:09

## 2024-09-17 RX ADMIN — SODIUM CHLORIDE, PRESERVATIVE FREE 10 ML: 5 INJECTION INTRAVENOUS at 04:09

## 2024-09-17 RX ADMIN — PALONOSETRON HYDROCHLORIDE 0.25 MG: 0.25 INJECTION, SOLUTION INTRAVENOUS at 11:09

## 2024-09-17 RX ADMIN — FLUOROURACIL 3840 MG: 50 INJECTION, SOLUTION INTRAVENOUS at 04:09

## 2024-09-17 RX ADMIN — DIPHENHYDRAMINE HYDROCHLORIDE 25 MG: 50 INJECTION INTRAMUSCULAR; INTRAVENOUS at 11:09

## 2024-09-17 RX ADMIN — DEXTROSE MONOHYDRATE: 50 INJECTION, SOLUTION INTRAVENOUS at 04:09

## 2024-09-17 RX ADMIN — APREPITANT 130 MG: 130 INJECTION, EMULSION INTRAVENOUS at 11:09

## 2024-09-17 NOTE — PROGRESS NOTES
Subjective:       Patient ID: Selene Smallwood is a 69 y.o. female.    Chief Complaint: Follow Up    Diagnosis:   Stage IV Metastatic Colon Adenocarcinoma with mets to peritoneum  2. Pulmonary Embolisum    Current Treatment:   FOLFOX + Cetuximab q2w x 12- 4/2/24 -   Xarelto 15 mg BID x 21 days then 20 mg daily- started 6/25/24    Treatment History: N/A    HPI:   67 yo F presented in March '24 for evaluation of metastatic colon cancer. She initially presented to OSH in early February '24 with 2 days of upper abdominal pain. Imaging on 2/4 in the ER revealed evidence of non perforated acute appendicitis. She underwent Ex lap where a cecal mass was incidentally discovered and resected, as well as omental studding was noted and 1 small mass was taken for biopsy. Her final pathology revealed a large exophytic mass in the cecum measuring 4 x 2 cm extending to the appendiceal orifice and extends into pericolonic adipose tissue. G2, LVI+, PNI negative. 14 LN were resected, with 10 being positive for metastatic adenocarcinoma. She also had an omental mass measuring 2.5 x 1.5cm which was positive for metastatic adenocarcinoma, consistent with a colon primary. Her final pathology staging is consistent with [id number]. Her postop course was complicated by abdominal abscess formation s/p IR drain placement and subsequent removal and antibiotic treatment.      She underwent PET scan in March '24 with evidence of nodular foci within the omentum/peritoneum consistent with peritoneal carcinomatosis. No other evidence of disease. NGS testing was performed which revealed GULSHAN wild type and BRAF negative. Therefore she will proceed with 1st line therapy of FOLFOX + Cetuximab Q2w x 12 cycles.     Interval History:  She returns to the clinic today for a follow-up and treatment clearance for C10 of FOLFOX + Cetuximab. She feels well today. She continues to have a decreased appetite and fatigue. She is taking  xarelto as prescribed.  She  denies any peripheral neuropathy to fingertips and toes. Denies SOB, chest pain, fever, chills. No abnormal bowels. She does report blisters to left great toe and left thumb.     9/17/2024      No past medical history on file.   Past Surgical History:   Procedure Laterality Date    APPENDECTOMY N/A 2/5/2024    Procedure: APPENDECTOMY;  Surgeon: Anna Murillo MD;  Location: [facility] OR;  Service: General;  Laterality: N/A;    INSERTION OF TUNNELED CENTRAL VENOUS CATHETER (CVC) WITH SUBCUTANEOUS PORT N/A 3/26/2024    Procedure: [id number];  Surgeon: Armando Tran MD;  Location: [facility] OR;  Service: Peripheral Vascular;  Laterality: N/A;    LAPAROSCOPIC APPENDECTOMY N/A 2/5/2024    Procedure: APPENDECTOMY, LAPAROSCOPIC;  Surgeon: Anna Murillo MD;  Location: [facility] OR;  Service: General;  Laterality: N/A;    SURGICAL REMOVAL OF ILEUM WITH CECUM N/A 2/5/2024    Procedure: EXCISION, CECUM WITH ILEUM;  Surgeon: Anna Murillo MD;  Location: [facility] OR;  Service: General;  Laterality: N/A;    WASHOUT N/A 2/5/2024    Procedure: WASHOUT;  Surgeon: Anna Murillo MD;  Location: [facility] OR;  Service: General;  Laterality: N/A;     Social History     Socioeconomic History    Marital status:    Tobacco Use    Smoking status: Former     Types: Cigarettes    Smokeless tobacco: Never     Social Determinants of Health     Financial Resource Strain: Low Risk  (2/15/2024)    Overall Financial Resource Strain (CARDIA)     Difficulty of Paying Living Expenses: Not hard at all   Food Insecurity: No Food Insecurity (2/15/2024)    Hunger Vital Sign     Worried About Running Out of Food in the Last Year: Never true     Ran Out of Food in the Last Year: Never true   Transportation Needs: No Transportation Needs (2/15/2024)    PRAPARE - Transportation     Lack of Transportation (Medical): No     Lack of Transportation (Non-Medical): No   Physical Activity: Unknown (2/15/2024)    Exercise Vital Sign     Days of Exercise per  Week: 0 days     Minutes of Exercise per Session: Patient unable to answer   Stress: No Stress Concern Present (2/15/2024)    [employer] [place] [employer] - Occupational Stress Questionnaire     Feeling of Stress : Not at all   Housing Stability: Low Risk  (2/15/2024)    Housing Stability Vital Sign     Unable to Pay for Housing in the Last Year: No     Number of Places Lived in the Last Year: 1     Unstable Housing in the Last Year: No      No family history on file.   Review of patient's allergies indicates:   Allergen Reactions    Robaxin [methocarbamol] Other (See Comments)     Altered mental status       Review of Systems   Constitutional:  Positive for appetite change and fatigue. Negative for activity change, fever and unexpected weight change.   HENT:  Negative for sore throat.    Eyes:  Negative for visual disturbance.   Respiratory:  Negative for cough and shortness of breath.    Cardiovascular:  Negative for chest pain.   Gastrointestinal:  Positive for nausea. Negative for abdominal pain, diarrhea and vomiting.   Endocrine: Negative for polyuria.   Genitourinary:  Positive for frequency and urgency. Negative for dysuria and hot flashes.   Integumentary:  Negative for rash.   Allergic/Immunologic: Negative for immunocompromised state.   Neurological:  Negative for weakness and headaches.   Hematological:  Negative for adenopathy.   Psychiatric/Behavioral:  Negative for confusion.          Objective:      There were no vitals filed for this visit.              Physical Exam  Constitutional:       General: She is not in acute distress.     Appearance: Normal appearance. She is not ill-appearing.   HENT:      Head: Normocephalic and atraumatic.      Nose: Nose normal.      Mouth/Throat:      Mouth: Mucous membranes are moist.      Pharynx: Oropharynx is clear.   Eyes:      Extraocular Movements: Extraocular movements intact.      Conjunctiva/sclera: Conjunctivae normal.      Pupils: Pupils are  equal, round, and reactive to light.   Cardiovascular:      Rate and Rhythm: Normal rate and regular rhythm.      Pulses: Normal pulses.      Heart sounds: Normal heart sounds. No murmur heard.  Pulmonary:      Effort: Pulmonary effort is normal. No respiratory distress.      Breath sounds: Normal breath sounds.   Abdominal:      General: There is no distension.      Palpations: Abdomen is soft.      Tenderness: There is no abdominal tenderness.   Musculoskeletal:         General: Normal range of motion.      Cervical back: Normal range of motion and neck supple.      Right lower leg: No edema.      Left lower leg: No edema.   Feet:      Left foot:      Skin integrity: Blister present.      Comments: Acute paronychia noted to left thumb and left great toe   Lymphadenopathy:      Cervical: No cervical adenopathy.   Skin:     General: Skin is warm and dry.   Neurological:      General: No focal deficit present.      Mental Status: She is alert and oriented to person, place, and time.         LABS AND IMAGING REVIEWED IN EPIC    IMAGIN24 CT C/A/P:  Impression:  1. Small to moderate bilateral pleural effusions.  2. Right upper lobe ground-glass could be infectious/inflammatory or relate to asymmetric edema.  3. Interval appendectomy with fluid collections along the surgical bed suspicious for developing abscesses.  24 Abdominal US:  Impression:  Cyst in the right lobe of the liver.  Elongated gallbladder with no definite gallstones minimal amount of fluid seen adjacent to the fundus.  Nephrolithiasis of the right kidney with a cyst of the right kidney minimal fullness of the collecting system.  Free fluid as described above  24 CT Abd/Pelvis:  Impression:  Dilated appendix with periappendiceal inflammatory changes,, compatible with non perforated acute appendicitis.  No fluid collection or abscess.  No free air.  Small amount of free fluid the pelvis.  Findings were communicated to Dr. Chavez at 13:56  02/04/2024  Mild atelectatic changes and ground-glass opacities in the lower lungs suspicious for infectious/inflammatory process.  3/15/24 PET/CT:  1. Nodular foci of hypermetabolic soft tissue thickening within the mesentery most consistent with peritoneal carcinomatosis.  2. Hypermetabolic nodule in the uterine fundus which is indeterminate. Pelvic ultrasound may be beneficial.  6/25/2024 CT CAP:  1. Interim development of a pulmonary embolus at the right mainstem bronchus  2. Suspect small thrombus formation at the tip of the right central line/MediPort at the SVC  3. Interim improved aeration and clearing of the lungs bilaterally since the prior exam  4. 3 mm subpleural nodule lateral left upper lobe  5. 1.5 cm round low-attenuation focus again evident at the liver  7/2/2024 PET:  No evidence of FDG avid disease on this exam.        PATHOLOGY:  2/5/24 Biopsy:  Final Diagnosis  1. Appendix, appendectomy:  - Acute appendicitis with perforation.  - Adenocarcinoma involves lymphatic spaces of the appendiceal wall and peritoneal surface.  2. Cecum, ileocecectomy:  - Colonic adenocarcinoma.  - See synoptic checklist for CAP cancer protocol.  3. Omental mass, biopsy:  - Metastatic adenocarcinoma, consistent with a colon primary.  4. Small bowel, segmental resection:  - Acute peritonitis.  - No evidence of malignancy.    MLH1: PRESERVED (INTACT) EXPRESSION IN TUMOR CELLS      MSH2: PRESERVED (INTACT) EXPRESSION IN TUMOR CELLS      MSH6: PRESERVED (INTACT) EXPRESSION IN TUMOR CELLS      PMS2: PRESERVED (INTACT) EXPRESSION IN TUMOR CELLS  Microscopic Description/Comments   These results show no deficiency of the mismatch repair proteins tested.    Assessment:   Stage IV Metastatic Colon Adenocarcinoma with mets to peritoneum - Cecal mass, nonobstructive, no perforation. [id number].   Plan for 1st line chemotherapy with FOLFOX + Cetuximab. Will remove bolus 5FU in metastatic setting.   Neulasta added with C3.   Pulmonary  Embolus at the right mainstem bronchus   Incidentally found during CT CAP 6/25/24  Now on xarelto, continue at this time.       Plan:   Proceed with C10 Folfox + Cetuximab with neulasta   IV fluids if needed on pump disconnect.  Continue xarelto  Continue potassium 20meq daily.  Continue Melatonin 5mg.   Bactroban ointment sent to pharmacy for acute paronychia of left thumb and great toe  RTC in two weeks with NP for FU/lab/treat C11  Cbc, cmp, cea- 1 hr prior @ [facility]      I spent a total of 40 minutes on the day of the visit.This includes face to face time and non-face to face time preparing to see the patient (eg, review of tests), obtaining and/or reviewing separately obtained history, documenting clinical information in the electronic or other health record, independently interpreting results and communicating results to the patient/family/caregiver, or care coordinator.    Maria Isabel Rodriguez, CAROLYNNP-C  Oncology/Hematology  [facility]

## 2024-09-19 ENCOUNTER — INFUSION (OUTPATIENT)
Dept: INFUSION THERAPY | Facility: HOSPITAL | Age: 69
End: 2024-09-19
Payer: MEDICARE

## 2024-09-19 DIAGNOSIS — C18.2 MALIGNANT NEOPLASM OF ASCENDING COLON: Primary | ICD-10-CM

## 2024-09-19 PROCEDURE — 63600175 PHARM REV CODE 636 W HCPCS

## 2024-09-19 PROCEDURE — 96377 APPLICATON ON-BODY INJECTOR: CPT

## 2024-09-19 PROCEDURE — 25000003 PHARM REV CODE 250

## 2024-09-19 PROCEDURE — A4216 STERILE WATER/SALINE, 10 ML: HCPCS

## 2024-09-19 RX ORDER — PROCHLORPERAZINE EDISYLATE 5 MG/ML
5 INJECTION INTRAMUSCULAR; INTRAVENOUS ONCE AS NEEDED
Status: DISCONTINUED | OUTPATIENT
Start: 2024-09-19 | End: 2024-09-19 | Stop reason: HOSPADM

## 2024-09-19 RX ORDER — SODIUM CHLORIDE 0.9 % (FLUSH) 0.9 %
10 SYRINGE (ML) INJECTION
Status: DISCONTINUED | OUTPATIENT
Start: 2024-09-19 | End: 2024-09-19 | Stop reason: HOSPADM

## 2024-09-19 RX ORDER — HEPARIN 100 UNIT/ML
500 SYRINGE INTRAVENOUS
Status: DISCONTINUED | OUTPATIENT
Start: 2024-09-19 | End: 2024-09-19 | Stop reason: HOSPADM

## 2024-09-19 RX ADMIN — HEPARIN SODIUM (PORCINE) LOCK FLUSH IV SOLN 100 UNIT/ML 500 UNITS: 100 SOLUTION at 11:09

## 2024-09-19 RX ADMIN — PEGFILGRASTIM 6 MG: KIT SUBCUTANEOUS at 11:09

## 2024-09-19 RX ADMIN — Medication 10 ML: at 11:09

## 2024-09-26 ENCOUNTER — PATIENT MESSAGE (OUTPATIENT)
Dept: HEMATOLOGY/ONCOLOGY | Facility: CLINIC | Age: 69
End: 2024-09-26
Payer: MEDICARE

## 2024-09-30 PROBLEM — I26.99 ACUTE PULMONARY EMBOLISM: Status: RESOLVED | Noted: 2024-06-27 | Resolved: 2024-09-30

## 2024-10-01 ENCOUNTER — DOCUMENTATION ONLY (OUTPATIENT)
Dept: HEMATOLOGY/ONCOLOGY | Facility: CLINIC | Age: 69
End: 2024-10-01
Payer: MEDICARE

## 2024-10-01 NOTE — PROGRESS NOTES
"Patient was scheduled to see NP with chemo to follow on 10/01/24. Patient called stating that she is very weak and nauseous, and does not feel well enough to attend her appointment today. Patient requested to reschedule her office visit and chemo for the following week. I informed the patient that in doing so, she would be delaying her care. I also informed the patient that we could provide her with IV fluids if necessary. Patient voiced understanding, but refused fluids at this time and insisted that her appointment be rescheduled. Patient states that she would rather "drink a lot of water", and take her nausea medication for now. Appointment has been rescheduled for 10/09/24 and patient aware. Provider notified.     "

## 2024-10-03 ENCOUNTER — DOCUMENTATION ONLY (OUTPATIENT)
Dept: HEMATOLOGY/ONCOLOGY | Facility: CLINIC | Age: 69
End: 2024-10-03
Payer: MEDICARE

## 2024-10-03 NOTE — PROGRESS NOTES
"Patient's daughter submitted a "verification of the need for full time care" form to be completed by Dr. LeJeune. At this time, the patient does not meet the form requirements, therefore, Dr. LeJeune is unable to complete the form. I explained this to the daughter and she disagreed. The patient's daughter states that the patient needs assistance getting around the house due to severe weakness, frequently falls, and is no longer able to drive herself. I explained to the patient's daughter that due to the decline in the patient's health she will need to be evaluated by Dr. LeJeune regarding her performance status. I further explained that this performance evaluation is also important to assess her treatment options moving forward. The daughter agreed and requested a document stating that Dr. LeJeune would not complete the form until that evaluation is complete. I spoke with Dr. LeJeune and she agreed to write that letter. I notified the patient's daughter that the letter would be written and sent to her on 10/04/24. I also explained that the patient's 10/09/24 appointment with NP would be rescheduled to 10/17/24 with MD. The daughter voiced understanding, but refused to accept the appointment and stated that I would have to contact her mother directly in regards to the appointment. I contacted the patient directly, explained the situation, and notified her of the appointment change. Patient voiced understanding and provider notified.     "

## 2024-10-07 DIAGNOSIS — E87.6 HYPOKALEMIA: ICD-10-CM

## 2024-10-07 RX ORDER — POTASSIUM CHLORIDE 20 MEQ/1
20 TABLET, EXTENDED RELEASE ORAL DAILY
Qty: 30 TABLET | Refills: 3 | Status: SHIPPED | OUTPATIENT
Start: 2024-10-07 | End: 2025-10-07

## 2024-10-17 ENCOUNTER — LAB VISIT (OUTPATIENT)
Dept: LAB | Facility: HOSPITAL | Age: 69
End: 2024-10-17
Payer: MEDICARE

## 2024-10-17 ENCOUNTER — OFFICE VISIT (OUTPATIENT)
Dept: HEMATOLOGY/ONCOLOGY | Facility: CLINIC | Age: 69
End: 2024-10-17
Payer: MEDICARE

## 2024-10-17 VITALS
BODY MASS INDEX: 18.78 KG/M2 | HEART RATE: 102 BPM | TEMPERATURE: 97 F | HEIGHT: 67 IN | DIASTOLIC BLOOD PRESSURE: 88 MMHG | RESPIRATION RATE: 16 BRPM | SYSTOLIC BLOOD PRESSURE: 144 MMHG | OXYGEN SATURATION: 98 % | WEIGHT: 119.63 LBS

## 2024-10-17 DIAGNOSIS — D84.821 IMMUNODEFICIENCY DUE TO DRUG THERAPY: Primary | ICD-10-CM

## 2024-10-17 DIAGNOSIS — Z79.899 ON ANTINEOPLASTIC CHEMOTHERAPY: ICD-10-CM

## 2024-10-17 DIAGNOSIS — C18.2 MALIGNANT NEOPLASM OF ASCENDING COLON: ICD-10-CM

## 2024-10-17 DIAGNOSIS — Z79.899 IMMUNODEFICIENCY DUE TO DRUG THERAPY: Primary | ICD-10-CM

## 2024-10-17 DIAGNOSIS — D84.821 IMMUNODEFICIENCY DUE TO DRUG THERAPY: ICD-10-CM

## 2024-10-17 DIAGNOSIS — Z79.899 IMMUNODEFICIENCY DUE TO DRUG THERAPY: ICD-10-CM

## 2024-10-17 DIAGNOSIS — C78.6 MALIGNANT NEOPLASM METASTATIC TO PERITONEUM: ICD-10-CM

## 2024-10-17 DIAGNOSIS — T45.1X5A IMMUNODEFICIENCY DUE TO CHEMOTHERAPY: ICD-10-CM

## 2024-10-17 DIAGNOSIS — C18.9 MALIGNANT NEOPLASM OF COLON, UNSPECIFIED PART OF COLON: ICD-10-CM

## 2024-10-17 DIAGNOSIS — D84.821 IMMUNODEFICIENCY DUE TO CHEMOTHERAPY: ICD-10-CM

## 2024-10-17 DIAGNOSIS — Z79.899 IMMUNODEFICIENCY DUE TO CHEMOTHERAPY: ICD-10-CM

## 2024-10-17 DIAGNOSIS — C18.9 MALIGNANT NEOPLASM OF COLON, UNSPECIFIED PART OF COLON: Primary | ICD-10-CM

## 2024-10-17 LAB
ALBUMIN SERPL-MCNC: 3.2 G/DL (ref 3.4–4.8)
ALBUMIN/GLOB SERPL: 0.6 RATIO (ref 1.1–2)
ALP SERPL-CCNC: 153 UNIT/L (ref 40–150)
ALT SERPL-CCNC: 28 UNIT/L (ref 0–55)
ANION GAP SERPL CALC-SCNC: 8 MEQ/L
AST SERPL-CCNC: 38 UNIT/L (ref 5–34)
BASOPHILS # BLD AUTO: 0.05 X10(3)/MCL
BASOPHILS NFR BLD AUTO: 0.9 %
BILIRUB SERPL-MCNC: 0.4 MG/DL
BUN SERPL-MCNC: 12 MG/DL (ref 9.8–20.1)
CALCIUM SERPL-MCNC: 9.8 MG/DL (ref 8.4–10.2)
CEA SERPL-MCNC: 113.41 NG/ML (ref 0–3)
CHLORIDE SERPL-SCNC: 112 MMOL/L (ref 98–107)
CO2 SERPL-SCNC: 23 MMOL/L (ref 23–31)
CREAT SERPL-MCNC: 0.75 MG/DL (ref 0.55–1.02)
CREAT/UREA NIT SERPL: 16
EOSINOPHIL # BLD AUTO: 0.19 X10(3)/MCL (ref 0–0.9)
EOSINOPHIL NFR BLD AUTO: 3.6 %
ERYTHROCYTE [DISTWIDTH] IN BLOOD BY AUTOMATED COUNT: 17 % (ref 11.5–17)
GFR SERPLBLD CREATININE-BSD FMLA CKD-EPI: >60 ML/MIN/1.73/M2
GLOBULIN SER-MCNC: 5.4 GM/DL (ref 2.4–3.5)
GLUCOSE SERPL-MCNC: 89 MG/DL (ref 82–115)
HCT VFR BLD AUTO: 39.7 % (ref 37–47)
HGB BLD-MCNC: 12.3 G/DL (ref 12–16)
IMM GRANULOCYTES # BLD AUTO: 0.02 X10(3)/MCL (ref 0–0.04)
IMM GRANULOCYTES NFR BLD AUTO: 0.4 %
LYMPHOCYTES # BLD AUTO: 2.11 X10(3)/MCL (ref 0.6–4.6)
LYMPHOCYTES NFR BLD AUTO: 39.7 %
MCH RBC QN AUTO: 31.6 PG (ref 27–31)
MCHC RBC AUTO-ENTMCNC: 31 G/DL (ref 33–36)
MCV RBC AUTO: 102.1 FL (ref 80–94)
MONOCYTES # BLD AUTO: 0.91 X10(3)/MCL (ref 0.1–1.3)
MONOCYTES NFR BLD AUTO: 17.1 %
NEUTROPHILS # BLD AUTO: 2.03 X10(3)/MCL (ref 2.1–9.2)
NEUTROPHILS NFR BLD AUTO: 38.3 %
PLATELET # BLD AUTO: 189 X10(3)/MCL (ref 130–400)
PMV BLD AUTO: 11.7 FL (ref 7.4–10.4)
POTASSIUM SERPL-SCNC: 3.9 MMOL/L (ref 3.5–5.1)
PROT SERPL-MCNC: 8.6 GM/DL (ref 5.8–7.6)
RBC # BLD AUTO: 3.89 X10(6)/MCL (ref 4.2–5.4)
SODIUM SERPL-SCNC: 143 MMOL/L (ref 136–145)
WBC # BLD AUTO: 5.31 X10(3)/MCL (ref 4.5–11.5)

## 2024-10-17 PROCEDURE — 99214 OFFICE O/P EST MOD 30 MIN: CPT | Mod: PBBFAC | Performed by: STUDENT IN AN ORGANIZED HEALTH CARE EDUCATION/TRAINING PROGRAM

## 2024-10-17 PROCEDURE — 36415 COLL VENOUS BLD VENIPUNCTURE: CPT

## 2024-10-17 PROCEDURE — 99999 PR PBB SHADOW E&M-EST. PATIENT-LVL IV: CPT | Mod: PBBFAC,,, | Performed by: STUDENT IN AN ORGANIZED HEALTH CARE EDUCATION/TRAINING PROGRAM

## 2024-10-17 PROCEDURE — 85025 COMPLETE CBC W/AUTO DIFF WBC: CPT

## 2024-10-17 PROCEDURE — 80053 COMPREHEN METABOLIC PANEL: CPT

## 2024-10-17 PROCEDURE — 99215 OFFICE O/P EST HI 40 MIN: CPT | Mod: S$PBB,,, | Performed by: STUDENT IN AN ORGANIZED HEALTH CARE EDUCATION/TRAINING PROGRAM

## 2024-10-17 PROCEDURE — 82378 CARCINOEMBRYONIC ANTIGEN: CPT

## 2024-10-17 RX ORDER — HEPARIN 100 UNIT/ML
500 SYRINGE INTRAVENOUS
OUTPATIENT
Start: 2024-10-17

## 2024-10-17 RX ORDER — DIPHENHYDRAMINE HYDROCHLORIDE 50 MG/ML
25 INJECTION INTRAMUSCULAR; INTRAVENOUS
Start: 2024-10-17

## 2024-10-17 RX ORDER — SODIUM CHLORIDE 0.9 % (FLUSH) 0.9 %
10 SYRINGE (ML) INJECTION
OUTPATIENT
Start: 2024-10-17

## 2024-10-17 RX ORDER — DIPHENHYDRAMINE HYDROCHLORIDE 50 MG/ML
50 INJECTION INTRAMUSCULAR; INTRAVENOUS ONCE AS NEEDED
OUTPATIENT
Start: 2024-10-17

## 2024-10-17 RX ORDER — PROCHLORPERAZINE EDISYLATE 5 MG/ML
5 INJECTION INTRAMUSCULAR; INTRAVENOUS ONCE AS NEEDED
OUTPATIENT
Start: 2024-10-17

## 2024-10-17 RX ORDER — EPINEPHRINE 0.3 MG/.3ML
0.3 INJECTION SUBCUTANEOUS ONCE AS NEEDED
OUTPATIENT
Start: 2024-10-17

## 2024-10-17 NOTE — PROGRESS NOTES
Subjective:       Patient ID: Selene Smallwood is a 69 y.o. female.    Chief Complaint: Follow Up    Diagnosis:   Stage IV Metastatic Colon Adenocarcinoma with mets to peritoneum  2. Pulmonary Embolisum    Current Treatment:   FOLFOX + Cetuximab q2w x 12- 4/2/24 - present  Hold Oxaliplatin for C11 and C12 due to tolerance  Xarelto 15 mg BID x 21 days then 20 mg daily- started 6/25/24    Treatment History: N/A    HPI:   67 yo F presented in March '24 for evaluation of metastatic colon cancer. She initially presented to OSH in early February '24 with 2 days of upper abdominal pain. Imaging on 2/4 in the ER revealed evidence of non perforated acute appendicitis. She underwent Ex lap where a cecal mass was incidentally discovered and resected, as well as omental studding was noted and 1 small mass was taken for biopsy. Her final pathology revealed a large exophytic mass in the cecum measuring 4 x 2 cm extending to the appendiceal orifice and extends into pericolonic adipose tissue. G2, LVI+, PNI negative. 14 LN were resected, with 10 being positive for metastatic adenocarcinoma. She also had an omental mass measuring 2.5 x 1.5cm which was positive for metastatic adenocarcinoma, consistent with a colon primary. Her final pathology staging is consistent with [id number]. Her postop course was complicated by abdominal abscess formation s/p IR drain placement and subsequent removal and antibiotic treatment.      She underwent PET scan in March '24 with evidence of nodular foci within the omentum/peritoneum consistent with peritoneal carcinomatosis. No other evidence of disease. NGS testing was performed which revealed GULSHAN wild type and BRAF negative. Therefore she will proceed with 1st line therapy of FOLFOX + Cetuximab Q2w x 12 cycles.     Interval History:  She returns to the clinic today for a follow-up and treatment clearance for C11 of FOLFOX + Cetuximab with her daughter. She feels well today. She reports balancing  issues when standing up too fast. She continues to have a decreased appetite and fatigue. She is taking xarelto as prescribed. She reports peripheral neuropathy to fingertips and toes, grade 1, intermittent. . Denies SOB, chest pain, fever, chills. No abnormal bowels. She continues with blisters to left great toe and left thumb, applying Bactroban to affected areas.     History reviewed. No pertinent past medical history.   Past Surgical History:   Procedure Laterality Date    APPENDECTOMY N/A 2/5/2024    Procedure: APPENDECTOMY;  Surgeon: Anna Murillo MD;  Location: [facility] OR;  Service: General;  Laterality: N/A;    INSERTION OF TUNNELED CENTRAL VENOUS CATHETER (CVC) WITH SUBCUTANEOUS PORT N/A 3/26/2024    Procedure: [id number]-T-CATH;  Surgeon: Armando Tran MD;  Location: [facility] OR;  Service: Peripheral Vascular;  Laterality: N/A;    LAPAROSCOPIC APPENDECTOMY N/A 2/5/2024    Procedure: APPENDECTOMY, LAPAROSCOPIC;  Surgeon: Anna Murillo MD;  Location: [facility] OR;  Service: General;  Laterality: N/A;    SURGICAL REMOVAL OF ILEUM WITH CECUM N/A 2/5/2024    Procedure: EXCISION, CECUM WITH ILEUM;  Surgeon: Anna Murillo MD;  Location: [facility] OR;  Service: General;  Laterality: N/A;    WASHOUT N/A 2/5/2024    Procedure: WASHOUT;  Surgeon: Anna Murillo MD;  Location: [facility] OR;  Service: General;  Laterality: N/A;     Social History     Socioeconomic History    Marital status:    Tobacco Use    Smoking status: Former     Types: Cigarettes    Smokeless tobacco: Never     Social Drivers of Health     Financial Resource Strain: Low Risk  (2/15/2024)    Overall Financial Resource Strain (CARDIA)     Difficulty of Paying Living Expenses: Not hard at all   Food Insecurity: No Food Insecurity (2/15/2024)    Hunger Vital Sign     Worried About Running Out of Food in the Last Year: Never true     Ran Out of Food in the Last Year: Never true   Transportation Needs: No Transportation Needs (2/15/2024)     PRAPARE - Transportation     Lack of Transportation (Medical): No     Lack of Transportation (Non-Medical): No   Physical Activity: Unknown (2/15/2024)    Exercise Vital Sign     Days of Exercise per Week: 0 days     Minutes of Exercise per Session: Patient unable to answer   Stress: No Stress Concern Present (2/15/2024)    [employer] [place] [employer] - Occupational Stress Questionnaire     Feeling of Stress : Not at all   Housing Stability: Low Risk  (2/15/2024)    Housing Stability Vital Sign     Unable to Pay for Housing in the Last Year: No     Number of Places Lived in the Last Year: 1     Unstable Housing in the Last Year: No      No family history on file.   Review of patient's allergies indicates:   Allergen Reactions    Robaxin [methocarbamol] Other (See Comments)     Altered mental status       Review of Systems   Constitutional:  Positive for appetite change and fatigue. Negative for activity change, fever and unexpected weight change.   HENT:  Negative for sore throat.    Eyes:  Negative for visual disturbance.   Respiratory:  Negative for cough and shortness of breath.    Cardiovascular:  Negative for chest pain.   Gastrointestinal:  Positive for nausea. Negative for abdominal pain, diarrhea and vomiting.   Endocrine: Negative for polyuria.   Genitourinary:  Positive for frequency and urgency. Negative for dysuria and hot flashes.   Integumentary:  Negative for rash.   Allergic/Immunologic: Negative for immunocompromised state.   Neurological:  Negative for weakness and headaches.   Hematological:  Negative for adenopathy.   Psychiatric/Behavioral:  Negative for confusion.          Objective:      Vitals:    10/17/24 1050   BP: (!) 144/88   Pulse: 102   Resp: 16   Temp: 97.4 °F (36.3 °C)       Physical Exam  Constitutional:       General: She is not in acute distress.     Appearance: Normal appearance. She is not ill-appearing.   HENT:      Head: Normocephalic and atraumatic.      Nose:  Nose normal.      Mouth/Throat:      Mouth: Mucous membranes are moist.      Pharynx: Oropharynx is clear.   Eyes:      Extraocular Movements: Extraocular movements intact.      Conjunctiva/sclera: Conjunctivae normal.      Pupils: Pupils are equal, round, and reactive to light.   Cardiovascular:      Rate and Rhythm: Normal rate and regular rhythm.      Pulses: Normal pulses.      Heart sounds: Normal heart sounds. No murmur heard.  Pulmonary:      Effort: Pulmonary effort is normal. No respiratory distress.      Breath sounds: Normal breath sounds.   Abdominal:      General: There is no distension.      Palpations: Abdomen is soft.      Tenderness: There is no abdominal tenderness.   Musculoskeletal:         General: Normal range of motion.      Cervical back: Normal range of motion and neck supple.      Right lower leg: No edema.      Left lower leg: No edema.   Feet:      Left foot:      Skin integrity: Blister present.      Comments: Acute paronychia noted to left thumb and left great toe   Lymphadenopathy:      Cervical: No cervical adenopathy.   Skin:     General: Skin is warm and dry.   Neurological:      General: No focal deficit present.      Mental Status: She is alert and oriented to person, place, and time.         LABS AND IMAGING REVIEWED IN EPIC    IMAGIN24 CT C/A/P:  Impression:  1. Small to moderate bilateral pleural effusions.  2. Right upper lobe ground-glass could be infectious/inflammatory or relate to asymmetric edema.  3. Interval appendectomy with fluid collections along the surgical bed suspicious for developing abscesses.  24 Abdominal US:  Impression:  Cyst in the right lobe of the liver.  Elongated gallbladder with no definite gallstones minimal amount of fluid seen adjacent to the fundus.  Nephrolithiasis of the right kidney with a cyst of the right kidney minimal fullness of the collecting system.  Free fluid as described above  24 CT Abd/Pelvis:  Impression:  Dilated  appendix with periappendiceal inflammatory changes,, compatible with non perforated acute appendicitis.  No fluid collection or abscess.  No free air.  Small amount of free fluid the pelvis.  Findings were communicated to Dr. Chavez at 13:56 02/04/2024  Mild atelectatic changes and ground-glass opacities in the lower lungs suspicious for infectious/inflammatory process.  3/15/24 PET/CT:  1. Nodular foci of hypermetabolic soft tissue thickening within the mesentery most consistent with peritoneal carcinomatosis.  2. Hypermetabolic nodule in the uterine fundus which is indeterminate. Pelvic ultrasound may be beneficial.  6/25/2024 CT CAP:  1. Interim development of a pulmonary embolus at the right mainstem bronchus  2. Suspect small thrombus formation at the tip of the right central line/MediPort at the SVC  3. Interim improved aeration and clearing of the lungs bilaterally since the prior exam  4. 3 mm subpleural nodule lateral left upper lobe  5. 1.5 cm round low-attenuation focus again evident at the liver  7/2/2024 PET:  No evidence of FDG avid disease on this exam.        PATHOLOGY:  2/5/24 Biopsy:  Final Diagnosis  1. Appendix, appendectomy:  - Acute appendicitis with perforation.  - Adenocarcinoma involves lymphatic spaces of the appendiceal wall and peritoneal surface.  2. Cecum, ileocecectomy:  - Colonic adenocarcinoma.  - See synoptic checklist for CAP cancer protocol.  3. Omental mass, biopsy:  - Metastatic adenocarcinoma, consistent with a colon primary.  4. Small bowel, segmental resection:  - Acute peritonitis.  - No evidence of malignancy.    MLH1: PRESERVED (INTACT) EXPRESSION IN TUMOR CELLS      MSH2: PRESERVED (INTACT) EXPRESSION IN TUMOR CELLS      MSH6: PRESERVED (INTACT) EXPRESSION IN TUMOR CELLS      PMS2: PRESERVED (INTACT) EXPRESSION IN TUMOR CELLS  Microscopic Description/Comments   These results show no deficiency of the mismatch repair proteins tested.    Assessment:   Stage IV Metastatic  Colon Adenocarcinoma with mets to peritoneum - Cecal mass, nonobstructive, no perforation. [id number].   Plan for 1st line chemotherapy with FOLFOX + Cetuximab. Will remove bolus 5FU in metastatic setting.   Neulasta added with C3.   Will remove Oxaliplatin for C11 and beyond given peripheral neuropathy  Pulmonary Embolus at the right mainstem bronchus   Incidentally found during CT CAP 6/25/24  Now on xarelto, continue at this time.       Plan:   D/C Oxaliplatin given tolerance, proceed with C11 5FU + Cetuximab with neulasta as scheduled 10/21/24  Continue xarelto  CT CAP after C12  Continue Bactroban ointment for acute paronychia of left thumb and great toe  RTC in 2 weeks with NP for FU/lab/treat C12  Cbc, cmp, cea- 1 hr prior @ [facility]      I spent a total of 40 minutes on the day of the visit.This includes face to face time and non-face to face time preparing to see the patient (eg, review of tests), obtaining and/or reviewing separately obtained history, documenting clinical information in the electronic or other health record, independently interpreting results and communicating results to the patient/family/caregiver, or care coordinator.      Elizabeth Lejeune MD  Hematology /Oncology  [facility]      Professional Services:  IDana LPN, acted solely as a scribe for and in the presence of Dr. Elizabeth Lejeune, who performed these services.

## 2024-10-21 ENCOUNTER — INFUSION (OUTPATIENT)
Dept: INFUSION THERAPY | Facility: HOSPITAL | Age: 69
End: 2024-10-21
Payer: MEDICARE

## 2024-10-21 VITALS
TEMPERATURE: 98 F | HEIGHT: 67 IN | WEIGHT: 123.63 LBS | HEART RATE: 78 BPM | BODY MASS INDEX: 19.4 KG/M2 | OXYGEN SATURATION: 99 % | SYSTOLIC BLOOD PRESSURE: 135 MMHG | DIASTOLIC BLOOD PRESSURE: 86 MMHG

## 2024-10-21 DIAGNOSIS — C18.2 MALIGNANT NEOPLASM OF ASCENDING COLON: Primary | ICD-10-CM

## 2024-10-21 PROCEDURE — 96375 TX/PRO/DX INJ NEW DRUG ADDON: CPT

## 2024-10-21 PROCEDURE — 96367 TX/PROPH/DG ADDL SEQ IV INF: CPT

## 2024-10-21 PROCEDURE — 96413 CHEMO IV INFUSION 1 HR: CPT

## 2024-10-21 PROCEDURE — A4216 STERILE WATER/SALINE, 10 ML: HCPCS | Performed by: STUDENT IN AN ORGANIZED HEALTH CARE EDUCATION/TRAINING PROGRAM

## 2024-10-21 PROCEDURE — 25000003 PHARM REV CODE 250: Performed by: STUDENT IN AN ORGANIZED HEALTH CARE EDUCATION/TRAINING PROGRAM

## 2024-10-21 PROCEDURE — 96415 CHEMO IV INFUSION ADDL HR: CPT

## 2024-10-21 PROCEDURE — 63600175 PHARM REV CODE 636 W HCPCS: Mod: JZ,JG | Performed by: STUDENT IN AN ORGANIZED HEALTH CARE EDUCATION/TRAINING PROGRAM

## 2024-10-21 RX ORDER — DIPHENHYDRAMINE HYDROCHLORIDE 50 MG/ML
25 INJECTION INTRAMUSCULAR; INTRAVENOUS
Status: COMPLETED | OUTPATIENT
Start: 2024-10-21 | End: 2024-10-21

## 2024-10-21 RX ORDER — SODIUM CHLORIDE 0.9 % (FLUSH) 0.9 %
10 SYRINGE (ML) INJECTION
Status: DISCONTINUED | OUTPATIENT
Start: 2024-10-21 | End: 2024-10-21 | Stop reason: HOSPADM

## 2024-10-21 RX ADMIN — CETUXIMAB 800 MG: 2 SOLUTION INTRAVENOUS at 11:10

## 2024-10-21 RX ADMIN — SODIUM CHLORIDE, PRESERVATIVE FREE 10 ML: 5 INJECTION INTRAVENOUS at 02:10

## 2024-10-21 RX ADMIN — DEXAMETHASONE SODIUM PHOSPHATE 0.25 MG: 10 INJECTION, SOLUTION INTRAMUSCULAR; INTRAVENOUS at 11:10

## 2024-10-21 RX ADMIN — DIPHENHYDRAMINE HYDROCHLORIDE 25 MG: 50 INJECTION INTRAMUSCULAR; INTRAVENOUS at 11:10

## 2024-10-21 RX ADMIN — SODIUM CHLORIDE: 9 INJECTION, SOLUTION INTRAVENOUS at 01:10

## 2024-10-21 RX ADMIN — LEUCOVORIN CALCIUM 650 MG: 350 INJECTION, POWDER, LYOPHILIZED, FOR SOLUTION INTRAMUSCULAR; INTRAVENOUS at 01:10

## 2024-10-21 RX ADMIN — APREPITANT 130 MG: 130 INJECTION, EMULSION INTRAVENOUS at 11:10

## 2024-10-21 RX ADMIN — FLUOROURACIL 3910 MG: 50 INJECTION, SOLUTION INTRAVENOUS at 02:10

## 2024-10-23 ENCOUNTER — INFUSION (OUTPATIENT)
Dept: INFUSION THERAPY | Facility: HOSPITAL | Age: 69
End: 2024-10-23
Payer: MEDICARE

## 2024-10-23 VITALS
HEIGHT: 67 IN | DIASTOLIC BLOOD PRESSURE: 73 MMHG | BODY MASS INDEX: 19.4 KG/M2 | HEART RATE: 68 BPM | RESPIRATION RATE: 18 BRPM | SYSTOLIC BLOOD PRESSURE: 111 MMHG | WEIGHT: 123.63 LBS | TEMPERATURE: 98 F | OXYGEN SATURATION: 98 %

## 2024-10-23 DIAGNOSIS — C18.2 MALIGNANT NEOPLASM OF ASCENDING COLON: Primary | ICD-10-CM

## 2024-10-23 PROCEDURE — A4216 STERILE WATER/SALINE, 10 ML: HCPCS | Performed by: STUDENT IN AN ORGANIZED HEALTH CARE EDUCATION/TRAINING PROGRAM

## 2024-10-23 PROCEDURE — 63600175 PHARM REV CODE 636 W HCPCS: Performed by: STUDENT IN AN ORGANIZED HEALTH CARE EDUCATION/TRAINING PROGRAM

## 2024-10-23 PROCEDURE — 96377 APPLICATON ON-BODY INJECTOR: CPT

## 2024-10-23 PROCEDURE — 25000003 PHARM REV CODE 250: Performed by: STUDENT IN AN ORGANIZED HEALTH CARE EDUCATION/TRAINING PROGRAM

## 2024-10-23 RX ORDER — SODIUM CHLORIDE 0.9 % (FLUSH) 0.9 %
10 SYRINGE (ML) INJECTION
Status: DISCONTINUED | OUTPATIENT
Start: 2024-10-23 | End: 2024-10-23 | Stop reason: HOSPADM

## 2024-10-23 RX ORDER — HEPARIN 100 UNIT/ML
500 SYRINGE INTRAVENOUS
Status: DISCONTINUED | OUTPATIENT
Start: 2024-10-23 | End: 2024-10-23 | Stop reason: HOSPADM

## 2024-10-23 RX ADMIN — Medication 10 ML: at 11:10

## 2024-10-23 RX ADMIN — HEPARIN SODIUM (PORCINE) LOCK FLUSH IV SOLN 100 UNIT/ML 500 UNITS: 100 SOLUTION at 11:10

## 2024-10-23 RX ADMIN — PEGFILGRASTIM 6 MG: KIT SUBCUTANEOUS at 11:10

## 2024-11-06 ENCOUNTER — OFFICE VISIT (OUTPATIENT)
Dept: HEMATOLOGY/ONCOLOGY | Facility: CLINIC | Age: 69
End: 2024-11-06
Payer: MEDICARE

## 2024-11-06 ENCOUNTER — LAB VISIT (OUTPATIENT)
Dept: LAB | Facility: HOSPITAL | Age: 69
End: 2024-11-06
Attending: NURSE PRACTITIONER
Payer: MEDICARE

## 2024-11-06 ENCOUNTER — INFUSION (OUTPATIENT)
Dept: INFUSION THERAPY | Facility: HOSPITAL | Age: 69
End: 2024-11-06
Payer: MEDICARE

## 2024-11-06 VITALS
DIASTOLIC BLOOD PRESSURE: 94 MMHG | WEIGHT: 119 LBS | HEART RATE: 85 BPM | TEMPERATURE: 98 F | HEIGHT: 67 IN | RESPIRATION RATE: 18 BRPM | SYSTOLIC BLOOD PRESSURE: 144 MMHG | BODY MASS INDEX: 18.68 KG/M2 | OXYGEN SATURATION: 99 %

## 2024-11-06 VITALS
RESPIRATION RATE: 18 BRPM | HEIGHT: 67 IN | WEIGHT: 119 LBS | HEART RATE: 85 BPM | OXYGEN SATURATION: 99 % | DIASTOLIC BLOOD PRESSURE: 94 MMHG | BODY MASS INDEX: 18.68 KG/M2 | SYSTOLIC BLOOD PRESSURE: 144 MMHG | TEMPERATURE: 98 F

## 2024-11-06 DIAGNOSIS — C18.9 MALIGNANT NEOPLASM OF COLON, UNSPECIFIED PART OF COLON: Primary | ICD-10-CM

## 2024-11-06 DIAGNOSIS — G47.00 INSOMNIA, UNSPECIFIED TYPE: ICD-10-CM

## 2024-11-06 DIAGNOSIS — G62.9 NEUROPATHY: ICD-10-CM

## 2024-11-06 DIAGNOSIS — C18.2 MALIGNANT NEOPLASM OF ASCENDING COLON: Primary | ICD-10-CM

## 2024-11-06 DIAGNOSIS — C18.9 MALIGNANT NEOPLASM OF COLON, UNSPECIFIED PART OF COLON: ICD-10-CM

## 2024-11-06 DIAGNOSIS — F41.9 ANXIETY: ICD-10-CM

## 2024-11-06 LAB
ALBUMIN SERPL-MCNC: 3 G/DL (ref 3.4–4.8)
ALBUMIN/GLOB SERPL: 0.6 RATIO (ref 1.1–2)
ALP SERPL-CCNC: 147 UNIT/L (ref 40–150)
ALT SERPL-CCNC: 20 UNIT/L (ref 0–55)
ANION GAP SERPL CALC-SCNC: 7 MEQ/L
AST SERPL-CCNC: 25 UNIT/L (ref 5–34)
BASOPHILS # BLD AUTO: 0.03 X10(3)/MCL
BASOPHILS NFR BLD AUTO: 0.5 %
BILIRUB SERPL-MCNC: 0.3 MG/DL
BUN SERPL-MCNC: 8 MG/DL (ref 9.8–20.1)
CALCIUM SERPL-MCNC: 9.6 MG/DL (ref 8.4–10.2)
CEA SERPL-MCNC: 115.32 NG/ML (ref 0–3)
CHLORIDE SERPL-SCNC: 109 MMOL/L (ref 98–107)
CO2 SERPL-SCNC: 25 MMOL/L (ref 23–31)
CREAT SERPL-MCNC: 0.76 MG/DL (ref 0.55–1.02)
CREAT/UREA NIT SERPL: 11
EOSINOPHIL # BLD AUTO: 0.17 X10(3)/MCL (ref 0–0.9)
EOSINOPHIL NFR BLD AUTO: 2.7 %
ERYTHROCYTE [DISTWIDTH] IN BLOOD BY AUTOMATED COUNT: 16.8 % (ref 11.5–17)
GFR SERPLBLD CREATININE-BSD FMLA CKD-EPI: >60 ML/MIN/1.73/M2
GLOBULIN SER-MCNC: 4.9 GM/DL (ref 2.4–3.5)
GLUCOSE SERPL-MCNC: 88 MG/DL (ref 82–115)
HCT VFR BLD AUTO: 34.8 % (ref 37–47)
HGB BLD-MCNC: 11.2 G/DL (ref 12–16)
IMM GRANULOCYTES # BLD AUTO: 0.02 X10(3)/MCL (ref 0–0.04)
IMM GRANULOCYTES NFR BLD AUTO: 0.3 %
LYMPHOCYTES # BLD AUTO: 2.12 X10(3)/MCL (ref 0.6–4.6)
LYMPHOCYTES NFR BLD AUTO: 33.4 %
MCH RBC QN AUTO: 31 PG (ref 27–31)
MCHC RBC AUTO-ENTMCNC: 32.2 G/DL (ref 33–36)
MCV RBC AUTO: 96.4 FL (ref 80–94)
MONOCYTES # BLD AUTO: 0.6 X10(3)/MCL (ref 0.1–1.3)
MONOCYTES NFR BLD AUTO: 9.5 %
NEUTROPHILS # BLD AUTO: 3.4 X10(3)/MCL (ref 2.1–9.2)
NEUTROPHILS NFR BLD AUTO: 53.6 %
PLATELET # BLD AUTO: 181 X10(3)/MCL (ref 130–400)
PMV BLD AUTO: 10.9 FL (ref 7.4–10.4)
POTASSIUM SERPL-SCNC: 3.6 MMOL/L (ref 3.5–5.1)
PROT SERPL-MCNC: 7.9 GM/DL (ref 5.8–7.6)
RBC # BLD AUTO: 3.61 X10(6)/MCL (ref 4.2–5.4)
SODIUM SERPL-SCNC: 141 MMOL/L (ref 136–145)
WBC # BLD AUTO: 6.34 X10(3)/MCL (ref 4.5–11.5)

## 2024-11-06 PROCEDURE — 96375 TX/PRO/DX INJ NEW DRUG ADDON: CPT

## 2024-11-06 PROCEDURE — A4216 STERILE WATER/SALINE, 10 ML: HCPCS | Performed by: NURSE PRACTITIONER

## 2024-11-06 PROCEDURE — 85025 COMPLETE CBC W/AUTO DIFF WBC: CPT

## 2024-11-06 PROCEDURE — 63600175 PHARM REV CODE 636 W HCPCS: Performed by: NURSE PRACTITIONER

## 2024-11-06 PROCEDURE — 99215 OFFICE O/P EST HI 40 MIN: CPT | Mod: S$PBB,,, | Performed by: NURSE PRACTITIONER

## 2024-11-06 PROCEDURE — 36415 COLL VENOUS BLD VENIPUNCTURE: CPT

## 2024-11-06 PROCEDURE — 25000003 PHARM REV CODE 250: Performed by: NURSE PRACTITIONER

## 2024-11-06 PROCEDURE — 25000003 PHARM REV CODE 250

## 2024-11-06 PROCEDURE — 63600175 PHARM REV CODE 636 W HCPCS

## 2024-11-06 PROCEDURE — 96415 CHEMO IV INFUSION ADDL HR: CPT

## 2024-11-06 PROCEDURE — 96416 CHEMO PROLONG INFUSE W/PUMP: CPT

## 2024-11-06 PROCEDURE — 82378 CARCINOEMBRYONIC ANTIGEN: CPT

## 2024-11-06 PROCEDURE — 80053 COMPREHEN METABOLIC PANEL: CPT

## 2024-11-06 PROCEDURE — 96413 CHEMO IV INFUSION 1 HR: CPT

## 2024-11-06 PROCEDURE — 99999 PR PBB SHADOW E&M-EST. PATIENT-LVL IV: CPT | Mod: PBBFAC,,, | Performed by: NURSE PRACTITIONER

## 2024-11-06 PROCEDURE — 96367 TX/PROPH/DG ADDL SEQ IV INF: CPT

## 2024-11-06 PROCEDURE — 99214 OFFICE O/P EST MOD 30 MIN: CPT | Mod: PBBFAC,25 | Performed by: NURSE PRACTITIONER

## 2024-11-06 RX ORDER — PROCHLORPERAZINE EDISYLATE 5 MG/ML
5 INJECTION INTRAMUSCULAR; INTRAVENOUS ONCE AS NEEDED
OUTPATIENT
Start: 2024-11-07

## 2024-11-06 RX ORDER — SODIUM CHLORIDE 0.9 % (FLUSH) 0.9 %
10 SYRINGE (ML) INJECTION
Status: DISCONTINUED | OUTPATIENT
Start: 2024-11-06 | End: 2024-11-06 | Stop reason: HOSPADM

## 2024-11-06 RX ORDER — SODIUM CHLORIDE 0.9 % (FLUSH) 0.9 %
10 SYRINGE (ML) INJECTION
Status: CANCELLED | OUTPATIENT
Start: 2024-11-06

## 2024-11-06 RX ORDER — DIPHENHYDRAMINE HYDROCHLORIDE 50 MG/ML
25 INJECTION INTRAMUSCULAR; INTRAVENOUS
Status: COMPLETED | OUTPATIENT
Start: 2024-11-06 | End: 2024-11-06

## 2024-11-06 RX ORDER — PROCHLORPERAZINE EDISYLATE 5 MG/ML
5 INJECTION INTRAMUSCULAR; INTRAVENOUS ONCE AS NEEDED
Status: CANCELLED | OUTPATIENT
Start: 2024-11-06

## 2024-11-06 RX ORDER — DIPHENHYDRAMINE HYDROCHLORIDE 50 MG/ML
50 INJECTION INTRAMUSCULAR; INTRAVENOUS ONCE AS NEEDED
Status: CANCELLED | OUTPATIENT
Start: 2024-11-06

## 2024-11-06 RX ORDER — EPINEPHRINE 0.3 MG/.3ML
0.3 INJECTION SUBCUTANEOUS ONCE AS NEEDED
Status: CANCELLED | OUTPATIENT
Start: 2024-11-06

## 2024-11-06 RX ORDER — SODIUM CHLORIDE 0.9 % (FLUSH) 0.9 %
10 SYRINGE (ML) INJECTION
Status: CANCELLED | OUTPATIENT
Start: 2024-11-07

## 2024-11-06 RX ORDER — DIPHENHYDRAMINE HYDROCHLORIDE 50 MG/ML
25 INJECTION INTRAMUSCULAR; INTRAVENOUS
Status: CANCELLED
Start: 2024-11-06

## 2024-11-06 RX ORDER — HEPARIN 100 UNIT/ML
500 SYRINGE INTRAVENOUS
Status: CANCELLED | OUTPATIENT
Start: 2024-11-06

## 2024-11-06 RX ORDER — HEPARIN 100 UNIT/ML
500 SYRINGE INTRAVENOUS
Status: CANCELLED | OUTPATIENT
Start: 2024-11-07

## 2024-11-06 RX ADMIN — FLUOROURACIL 3840 MG: 50 INJECTION, SOLUTION INTRAVENOUS at 02:11

## 2024-11-06 RX ADMIN — DEXAMETHASONE SODIUM PHOSPHATE 0.25 MG: 4 INJECTION, SOLUTION INTRA-ARTICULAR; INTRALESIONAL; INTRAMUSCULAR; INTRAVENOUS; SOFT TISSUE at 11:11

## 2024-11-06 RX ADMIN — APREPITANT 130 MG: 130 INJECTION, EMULSION INTRAVENOUS at 11:11

## 2024-11-06 RX ADMIN — DIPHENHYDRAMINE HYDROCHLORIDE 25 MG: 50 INJECTION INTRAMUSCULAR; INTRAVENOUS at 11:11

## 2024-11-06 RX ADMIN — SODIUM CHLORIDE: 9 INJECTION, SOLUTION INTRAVENOUS at 12:11

## 2024-11-06 RX ADMIN — CETUXIMAB 800 MG: 2 SOLUTION INTRAVENOUS at 12:11

## 2024-11-06 RX ADMIN — SODIUM CHLORIDE, PRESERVATIVE FREE 10 ML: 5 INJECTION INTRAVENOUS at 02:11

## 2024-11-06 RX ADMIN — LEUCOVORIN CALCIUM 640 MG: 350 INJECTION, POWDER, LYOPHILIZED, FOR SOLUTION INTRAMUSCULAR; INTRAVENOUS at 02:11

## 2024-11-07 RX ORDER — MIRTAZAPINE 7.5 MG/1
7.5 TABLET, FILM COATED ORAL NIGHTLY
Qty: 30 TABLET | Refills: 11 | Status: SHIPPED | OUTPATIENT
Start: 2024-11-07 | End: 2025-11-07

## 2024-11-07 RX ORDER — GABAPENTIN 100 MG/1
100 CAPSULE ORAL 2 TIMES DAILY
Qty: 60 CAPSULE | Refills: 3 | Status: SHIPPED | OUTPATIENT
Start: 2024-11-07 | End: 2025-11-07

## 2024-11-08 ENCOUNTER — INFUSION (OUTPATIENT)
Dept: INFUSION THERAPY | Facility: HOSPITAL | Age: 69
End: 2024-11-08
Payer: MEDICARE

## 2024-11-08 VITALS
TEMPERATURE: 98 F | HEIGHT: 67 IN | DIASTOLIC BLOOD PRESSURE: 82 MMHG | SYSTOLIC BLOOD PRESSURE: 169 MMHG | HEART RATE: 74 BPM | BODY MASS INDEX: 18.63 KG/M2 | RESPIRATION RATE: 20 BRPM

## 2024-11-08 DIAGNOSIS — C18.2 MALIGNANT NEOPLASM OF ASCENDING COLON: Primary | ICD-10-CM

## 2024-11-08 PROCEDURE — A4216 STERILE WATER/SALINE, 10 ML: HCPCS | Performed by: NURSE PRACTITIONER

## 2024-11-08 PROCEDURE — 96377 APPLICATON ON-BODY INJECTOR: CPT

## 2024-11-08 PROCEDURE — 63600175 PHARM REV CODE 636 W HCPCS: Mod: JZ,JG | Performed by: NURSE PRACTITIONER

## 2024-11-08 PROCEDURE — 25000003 PHARM REV CODE 250: Performed by: NURSE PRACTITIONER

## 2024-11-08 RX ORDER — SODIUM CHLORIDE 0.9 % (FLUSH) 0.9 %
10 SYRINGE (ML) INJECTION
Status: DISCONTINUED | OUTPATIENT
Start: 2024-11-08 | End: 2024-11-08 | Stop reason: HOSPADM

## 2024-11-08 RX ORDER — HEPARIN 100 UNIT/ML
500 SYRINGE INTRAVENOUS
Status: DISCONTINUED | OUTPATIENT
Start: 2024-11-08 | End: 2024-11-08 | Stop reason: HOSPADM

## 2024-11-08 RX ADMIN — PEGFILGRASTIM 6 MG: KIT SUBCUTANEOUS at 01:11

## 2024-11-08 RX ADMIN — Medication 500 UNITS: at 01:11

## 2024-11-08 RX ADMIN — Medication 10 ML: at 01:11

## 2024-11-08 NOTE — PLAN OF CARE
Onpro neulasta device connected to right arm, secured. Educated on duration of keeping device on. Verbalizes understanding.

## 2024-11-13 DIAGNOSIS — C18.9 MALIGNANT NEOPLASM OF COLON, UNSPECIFIED PART OF COLON: Primary | ICD-10-CM

## 2024-11-13 NOTE — PROGRESS NOTES
Subjective:       Patient ID: Selene Smallwood is a 69 y.o. female.    Chief Complaint: Follow Up    Diagnosis:   Stage IV Metastatic Colon Adenocarcinoma with mets to peritoneum  2. Pulmonary Embolisum    Current Treatment:   FOLFOX + Cetuximab q2w x 12- 4/2/24 - present  Hold Oxaliplatin for C11 and C12 due to tolerance  Xarelto 15 mg BID x 21 days then 20 mg daily- started 6/25/24    Treatment History: N/A    HPI:   70 yo F presented in March '24 for evaluation of metastatic colon cancer. She initially presented to OSH in early February '24 with 2 days of upper abdominal pain. Imaging on 2/4 in the ER revealed evidence of non perforated acute appendicitis. She underwent Ex lap where a cecal mass was incidentally discovered and resected, as well as omental studding was noted and 1 small mass was taken for biopsy. Her final pathology revealed a large exophytic mass in the cecum measuring 4 x 2 cm extending to the appendiceal orifice and extends into pericolonic adipose tissue. G2, LVI+, PNI negative. 14 LN were resected, with 10 being positive for metastatic adenocarcinoma. She also had an omental mass measuring 2.5 x 1.5cm which was positive for metastatic adenocarcinoma, consistent with a colon primary. Her final pathology staging is consistent with [id number]. Her postop course was complicated by abdominal abscess formation s/p IR drain placement and subsequent removal and antibiotic treatment.      She underwent PET scan in March '24 with evidence of nodular foci within the omentum/peritoneum consistent with peritoneal carcinomatosis. No other evidence of disease. NGS testing was performed which revealed GULSHAN wild type and BRAF negative. Therefore she will proceed with 1st line therapy of FOLFOX + Cetuximab Q2w x 12 cycles.     Interval History:  She returns to the clinic today for a follow-up and treatment clearance for C12 of FOLFOX + Cetuximab with her daughter. She feels well today. She complains of  anxiety/depressive symptoms as well as decreased appetite and fatigue. She would like us to prescribe something for this.  She is taking xarelto as prescribed. She reports peripheral neuropathy to her fingertips and increasing from her feet to mid shin, grade 1. She denies SOB, chest pain, fever, chills. No abnormal bowels. She continues with blisters to left great toe and left thumb, and applying Bactroban to affected areas.     History reviewed. No pertinent past medical history.   Past Surgical History:   Procedure Laterality Date    APPENDECTOMY N/A 2/5/2024    Procedure: APPENDECTOMY;  Surgeon: Anna Murillo MD;  Location: [facility] OR;  Service: General;  Laterality: N/A;    INSERTION OF TUNNELED CENTRAL VENOUS CATHETER (CVC) WITH SUBCUTANEOUS PORT N/A 3/26/2024    Procedure: [id number]-CATH;  Surgeon: Armando Tran MD;  Location: [facility] OR;  Service: Peripheral Vascular;  Laterality: N/A;    LAPAROSCOPIC APPENDECTOMY N/A 2/5/2024    Procedure: APPENDECTOMY, LAPAROSCOPIC;  Surgeon: Anna Murillo MD;  Location: [facility] OR;  Service: General;  Laterality: N/A;    SURGICAL REMOVAL OF ILEUM WITH CECUM N/A 2/5/2024    Procedure: EXCISION, CECUM WITH ILEUM;  Surgeon: Anna Murillo MD;  Location: [facility] OR;  Service: General;  Laterality: N/A;    WASHOUT N/A 2/5/2024    Procedure: WASHOUT;  Surgeon: Anna Murillo MD;  Location: [facility] OR;  Service: General;  Laterality: N/A;     Social History     Socioeconomic History    Marital status:    Tobacco Use    Smoking status: Former     Types: Cigarettes    Smokeless tobacco: Never     Social Drivers of Health     Financial Resource Strain: Low Risk  (2/15/2024)    Overall Financial Resource Strain (CARDIA)     Difficulty of Paying Living Expenses: Not hard at all   Food Insecurity: No Food Insecurity (2/15/2024)    Hunger Vital Sign     Worried About Running Out of Food in the Last Year: Never true     Ran Out of Food in the Last Year: Never true    Transportation Needs: No Transportation Needs (2/15/2024)    PRAPARE - Transportation     Lack of Transportation (Medical): No     Lack of Transportation (Non-Medical): No   Physical Activity: Unknown (2/15/2024)    Exercise Vital Sign     Days of Exercise per Week: 0 days     Minutes of Exercise per Session: Patient unable to answer   Stress: No Stress Concern Present (2/15/2024)    [employer] [place] [employer] - Occupational Stress Questionnaire     Feeling of Stress : Not at all   Housing Stability: Low Risk  (2/15/2024)    Housing Stability Vital Sign     Unable to Pay for Housing in the Last Year: No     Number of Places Lived in the Last Year: 1     Unstable Housing in the Last Year: No      No family history on file.   Review of patient's allergies indicates:   Allergen Reactions    Robaxin [methocarbamol] Other (See Comments)     Altered mental status       Review of Systems   Constitutional:  Positive for appetite change and fatigue. Negative for activity change, fever and unexpected weight change.   HENT:  Negative for sore throat.    Eyes:  Negative for visual disturbance.   Respiratory:  Negative for cough and shortness of breath.    Cardiovascular:  Negative for chest pain.   Gastrointestinal:  Negative for abdominal pain, diarrhea and vomiting.   Endocrine: Negative for polyuria.   Genitourinary:  Negative for dysuria and hot flashes.   Integumentary:  Negative for rash.   Allergic/Immunologic: Negative for immunocompromised state.   Neurological:  Negative for weakness and headaches.   Hematological:  Negative for adenopathy.   Psychiatric/Behavioral:  Positive for depressed mood. Negative for confusion.          Objective:      Vitals:    11/06/24 1031   BP: (!) 144/94   Pulse: 85   Resp: 18   Temp: 97.5 °F (36.4 °C)       Physical Exam  Constitutional:       General: She is not in acute distress.     Appearance: Normal appearance. She is not ill-appearing.   HENT:      Head:  Normocephalic and atraumatic.      Nose: Nose normal.      Mouth/Throat:      Mouth: Mucous membranes are moist.      Pharynx: Oropharynx is clear.   Eyes:      Extraocular Movements: Extraocular movements intact.      Conjunctiva/sclera: Conjunctivae normal.      Pupils: Pupils are equal, round, and reactive to light.   Cardiovascular:      Rate and Rhythm: Normal rate and regular rhythm.      Pulses: Normal pulses.      Heart sounds: Normal heart sounds. No murmur heard.  Pulmonary:      Effort: Pulmonary effort is normal. No respiratory distress.      Breath sounds: Normal breath sounds.   Abdominal:      General: There is no distension.      Palpations: Abdomen is soft.      Tenderness: There is no abdominal tenderness.   Musculoskeletal:         General: Normal range of motion.      Cervical back: Normal range of motion and neck supple.      Right lower leg: No edema.      Left lower leg: No edema.   Feet:      Left foot:      Skin integrity: Blister present.      Comments: Acute paronychia noted to left thumb and left great toe   Lymphadenopathy:      Cervical: No cervical adenopathy.   Skin:     General: Skin is warm and dry.   Neurological:      General: No focal deficit present.      Mental Status: She is alert and oriented to person, place, and time.         LABS AND IMAGING REVIEWED IN EPIC    IMAGIN24 CT C/A/P:  Impression:  1. Small to moderate bilateral pleural effusions.  2. Right upper lobe ground-glass could be infectious/inflammatory or relate to asymmetric edema.  3. Interval appendectomy with fluid collections along the surgical bed suspicious for developing abscesses.  24 Abdominal US:  Impression:  Cyst in the right lobe of the liver.  Elongated gallbladder with no definite gallstones minimal amount of fluid seen adjacent to the fundus.  Nephrolithiasis of the right kidney with a cyst of the right kidney minimal fullness of the collecting system.  Free fluid as described  above  2/4/24 CT Abd/Pelvis:  Impression:  Dilated appendix with periappendiceal inflammatory changes,, compatible with non perforated acute appendicitis.  No fluid collection or abscess.  No free air.  Small amount of free fluid the pelvis.  Findings were communicated to Dr. Chavez at 13:56 02/04/2024  Mild atelectatic changes and ground-glass opacities in the lower lungs suspicious for infectious/inflammatory process.  3/15/24 PET/CT:  1. Nodular foci of hypermetabolic soft tissue thickening within the mesentery most consistent with peritoneal carcinomatosis.  2. Hypermetabolic nodule in the uterine fundus which is indeterminate. Pelvic ultrasound may be beneficial.  6/25/2024 CT CAP:  1. Interim development of a pulmonary embolus at the right mainstem bronchus  2. Suspect small thrombus formation at the tip of the right central line/MediPort at the SVC  3. Interim improved aeration and clearing of the lungs bilaterally since the prior exam  4. 3 mm subpleural nodule lateral left upper lobe  5. 1.5 cm round low-attenuation focus again evident at the liver  7/2/2024 PET:  No evidence of FDG avid disease on this exam.        PATHOLOGY:  2/5/24 Biopsy:  Final Diagnosis  1. Appendix, appendectomy:  - Acute appendicitis with perforation.  - Adenocarcinoma involves lymphatic spaces of the appendiceal wall and peritoneal surface.  2. Cecum, ileocecectomy:  - Colonic adenocarcinoma.  - See synoptic checklist for CAP cancer protocol.  3. Omental mass, biopsy:  - Metastatic adenocarcinoma, consistent with a colon primary.  4. Small bowel, segmental resection:  - Acute peritonitis.  - No evidence of malignancy.    MLH1: PRESERVED (INTACT) EXPRESSION IN TUMOR CELLS      MSH2: PRESERVED (INTACT) EXPRESSION IN TUMOR CELLS      MSH6: PRESERVED (INTACT) EXPRESSION IN TUMOR CELLS      PMS2: PRESERVED (INTACT) EXPRESSION IN TUMOR CELLS  Microscopic Description/Comments   These results show no deficiency of the mismatch repair  proteins tested.    Assessment:   Stage IV Metastatic Colon Adenocarcinoma with mets to peritoneum - Cecal mass, nonobstructive, no perforation. [id number].   Plan for 1st line chemotherapy with FOLFOX + Cetuximab.  5FU bolus was removed in the metastatic setting.   Neulasta added with C3.   Removed Oxaliplatin from C11 and beyond given peripheral neuropathy  Pulmonary Embolus at the right mainstem bronchus   Incidentally found during CT CAP 6/25/24  Now on xarelto, continue at this time.   Anxiety/Depression/ Low appetite             Will start low dose Remeron 7.5mg QHS and slowly increase as needed.  4. Peripheral Neuropathy                Will start Gabapentin 100mg BID and slowly increase as needed.       Plan:   Proceed with C12 5FU + Cetuximab with neulasta as scheduled  11/6/24  Continue xarelto  Start Remeron  Start Gabapentin   CT CAP after C12 (ordered to be done in 2 weeks from today)  Continue Bactroban ointment for acute paronychia of left thumb and great toe    RTC in 2 weeks with MD scan review and labs  Cbc, cmp, cea- 1 hr prior @ [facility]      I spent a total of 40 minutes on the day of the visit.This includes face to face time and non-face to face time preparing to see the patient (eg, review of tests), obtaining and/or reviewing separately obtained history, documenting clinical information in the electronic or other health record, independently interpreting results and communicating results to the patient/family/caregiver, or care coordinator.

## 2024-11-20 ENCOUNTER — HOSPITAL ENCOUNTER (OUTPATIENT)
Dept: RADIOLOGY | Facility: HOSPITAL | Age: 69
Discharge: HOME | End: 2024-11-20
Attending: NURSE PRACTITIONER
Payer: MEDICARE

## 2024-11-20 DIAGNOSIS — C18.9 MALIGNANT NEOPLASM OF COLON, UNSPECIFIED PART OF COLON: ICD-10-CM

## 2024-11-20 PROCEDURE — 71260 CT THORAX DX C+: CPT | Mod: TC

## 2024-11-20 PROCEDURE — 25500020 PHARM REV CODE 255: Performed by: NURSE PRACTITIONER

## 2024-11-20 RX ORDER — IOPAMIDOL 755 MG/ML
100 INJECTION, SOLUTION INTRAVASCULAR
Status: COMPLETED | OUTPATIENT
Start: 2024-11-20 | End: 2024-11-20

## 2024-11-20 RX ORDER — DIATRIZOATE MEGLUMINE AND DIATRIZOATE SODIUM 660; 100 MG/ML; MG/ML
30 SOLUTION ORAL; RECTAL
Status: COMPLETED | OUTPATIENT
Start: 2024-11-20 | End: 2024-11-20

## 2024-11-20 RX ADMIN — DIATRIZOATE MEGLUMINE AND DIATRIZOATE SODIUM 30 ML: 660; 100 SOLUTION ORAL; RECTAL at 10:11

## 2024-11-20 RX ADMIN — IOPAMIDOL 75 ML: 755 INJECTION, SOLUTION INTRAVENOUS at 10:11

## 2024-11-27 NOTE — PROGRESS NOTES
Subjective:       Patient ID: Selene Smallwood is a 69 y.o. female.    Chief Complaint: Follow Up    Diagnosis:   Stage IV Metastatic Colon Adenocarcinoma with mets to peritoneum  2. Pulmonary Embolisum    Current Treatment:   FOLFOX + Cetuximab q2w x 12 cycles- 4/2/24 - 11/6/24  Hold Oxaliplatin for C11 and C12 due to tolerance  Xarelto 15 mg BID x 21 days then 20 mg daily- 6/25/24-present    Treatment History: N/A    HPI:   70 yo F presented in March '24 for evaluation of metastatic colon cancer. She initially presented to OSH in early February '24 with 2 days of upper abdominal pain. Imaging on 2/4 in the ER revealed evidence of non perforated acute appendicitis. She underwent Ex lap where a cecal mass was incidentally discovered and resected, as well as omental studding was noted and 1 small mass was taken for biopsy. Her final pathology revealed a large exophytic mass in the cecum measuring 4 x 2 cm extending to the appendiceal orifice and extends into pericolonic adipose tissue. G2, LVI+, PNI negative. 14 LN were resected, with 10 being positive for metastatic adenocarcinoma. She also had an omental mass measuring 2.5 x 1.5cm which was positive for metastatic adenocarcinoma, consistent with a colon primary. Her final pathology staging is consistent with [id number]. Her postop course was complicated by abdominal abscess formation s/p IR drain placement and subsequent removal and antibiotic treatment.      She underwent PET scan in March '24 with evidence of nodular foci within the omentum/peritoneum consistent with peritoneal carcinomatosis. No other evidence of disease. NGS testing was performed which revealed GULSHAN wild type and BRAF negative. Therefore she will proceed with 1st line therapy of FOLFOX + Cetuximab Q2w x 12 cycles.     Interval History:  Patient returns to the clinic today for a follow-up and scan review after completion of C12/12 of FOLFOX + Cetuximab with her daughter.   Overall feels well  today.   She reports improvement with appetite since starting Remeron.    She is taking xarelto as prescribed.   She reports peripheral neuropathy to her fingertips and increasing from her feet to mid shin, grade 1, taking gabapentin.   Denies SOB, chest pain, fever, chills, abnormal bowels.   She continues with blisters to left great toe and left thumb, and applying Bactroban to affected areas but feels it is not working given the blisters are not improving.   Labs and scans reviewed in detail with patient and daughters, in agreeance with plan.      History reviewed. No pertinent past medical history.   Past Surgical History:   Procedure Laterality Date    APPENDECTOMY N/A 2/5/2024    Procedure: APPENDECTOMY;  Surgeon: Anna Murillo MD;  Location: [facility] OR;  Service: General;  Laterality: N/A;    INSERTION OF TUNNELED CENTRAL VENOUS CATHETER (CVC) WITH SUBCUTANEOUS PORT N/A 3/26/2024    Procedure: [id number];  Surgeon: Armando Tran MD;  Location: [facility] OR;  Service: Peripheral Vascular;  Laterality: N/A;    LAPAROSCOPIC APPENDECTOMY N/A 2/5/2024    Procedure: APPENDECTOMY, LAPAROSCOPIC;  Surgeon: Anna Murillo MD;  Location: [facility] OR;  Service: General;  Laterality: N/A;    SURGICAL REMOVAL OF ILEUM WITH CECUM N/A 2/5/2024    Procedure: EXCISION, CECUM WITH ILEUM;  Surgeon: Anna Murillo MD;  Location: [facility] OR;  Service: General;  Laterality: N/A;    WASHOUT N/A 2/5/2024    Procedure: WASHOUT;  Surgeon: Anna Murillo MD;  Location: [facility] OR;  Service: General;  Laterality: N/A;     Social History     Socioeconomic History    Marital status:    Tobacco Use    Smoking status: Former     Types: Cigarettes    Smokeless tobacco: Never     Social Drivers of Health     Financial Resource Strain: Low Risk  (2/15/2024)    Overall Financial Resource Strain (CARDIA)     Difficulty of Paying Living Expenses: Not hard at all   Food Insecurity: No Food Insecurity (2/15/2024)    Hunger Vital Sign      Worried About Running Out of Food in the Last Year: Never true     Ran Out of Food in the Last Year: Never true   Transportation Needs: No Transportation Needs (2/15/2024)    PRAPARE - Transportation     Lack of Transportation (Medical): No     Lack of Transportation (Non-Medical): No   Physical Activity: Unknown (2/15/2024)    Exercise Vital Sign     Days of Exercise per Week: 0 days     Minutes of Exercise per Session: Patient unable to answer   Stress: No Stress Concern Present (2/15/2024)    [employer] - Occupational Stress Questionnaire     Feeling of Stress : Not at all   Housing Stability: Low Risk  (2/15/2024)    Housing Stability Vital Sign     Unable to Pay for Housing in the Last Year: No     Number of Places Lived in the Last Year: 1     Unstable Housing in the Last Year: No      No family history on file.   Review of patient's allergies indicates:   Allergen Reactions    Robaxin [methocarbamol] Other (See Comments)     Altered mental status       Review of Systems   Constitutional:  Positive for appetite change and fatigue. Negative for activity change, fever and unexpected weight change.   HENT:  Negative for sore throat.    Eyes:  Negative for visual disturbance.   Respiratory:  Negative for cough and shortness of breath.    Cardiovascular:  Negative for chest pain.   Gastrointestinal:  Negative for abdominal pain, diarrhea and vomiting.   Endocrine: Negative for polyuria.   Genitourinary:  Negative for dysuria and hot flashes.   Integumentary:  Negative for rash.   Allergic/Immunologic: Negative for immunocompromised state.   Neurological:  Negative for weakness and headaches.   Hematological:  Negative for adenopathy.   Psychiatric/Behavioral:  Positive for depressed mood. Negative for confusion.          Objective:      Vitals:    11/29/24 0840   BP: (!) 127/91   Pulse: (!) 112   Resp: 16   Temp: 97.5 °F (36.4 °C)         Physical Exam  Constitutional:        General: She is not in acute distress.     Appearance: Normal appearance. She is not ill-appearing.   HENT:      Head: Normocephalic and atraumatic.      Nose: Nose normal.      Mouth/Throat:      Mouth: Mucous membranes are moist.      Pharynx: Oropharynx is clear.   Eyes:      Extraocular Movements: Extraocular movements intact.      Conjunctiva/sclera: Conjunctivae normal.      Pupils: Pupils are equal, round, and reactive to light.   Cardiovascular:      Rate and Rhythm: Normal rate and regular rhythm.      Pulses: Normal pulses.      Heart sounds: Normal heart sounds. No murmur heard.  Pulmonary:      Effort: Pulmonary effort is normal. No respiratory distress.      Breath sounds: Normal breath sounds.   Abdominal:      General: There is no distension.      Palpations: Abdomen is soft.      Tenderness: There is no abdominal tenderness.   Musculoskeletal:         General: Normal range of motion.      Cervical back: Normal range of motion and neck supple.      Right lower leg: No edema.      Left lower leg: No edema.   Feet:      Left foot:      Skin integrity: Blister present.      Comments: Acute paronychia noted to left thumb and left great toe   Lymphadenopathy:      Cervical: No cervical adenopathy.   Skin:     General: Skin is warm and dry.   Neurological:      General: No focal deficit present.      Mental Status: She is alert and oriented to person, place, and time.         LABS AND IMAGING REVIEWED IN EPIC  7/2/2024 PET:  No evidence of FDG avid disease on this exam.   11/19/24 CT CAP:  1. Interim resolution of previous pulmonary embolus at the right main pulmonary artery since pleura exam  2. Interim development of multiple subcentimeter round nodular foci predominantly at the upper lobes measuring up to 5-6 mm in greatest diameter.  A metastatic neoplastic etiology with the considered  3. 1.5 cm low-attenuation focus again evident at the liver  4. Interim development of a linear low-attenuation focus  at the splenic hilum of uncertain etiology.  5. Ill-defined mucosal thickening at loops of large and small bowel in the right lower abdomen  6. Interim development of a cystic focus at the left the adnexa measuring 3.3 cm  7. Heterogeneous uterus suspicious for poorly visualized the leiomyomas.  8. Findings and other details as above       PATHOLOGY:  2/5/24 Biopsy:  1. Appendix, appendectomy:  - Acute appendicitis with perforation.  - Adenocarcinoma involves lymphatic spaces of the appendiceal wall and peritoneal surface.  2. Cecum, ileocecectomy:  - Colonic adenocarcinoma.  - See synoptic checklist for CAP cancer protocol.  3. Omental mass, biopsy:  - Metastatic adenocarcinoma, consistent with a colon primary.  4. Small bowel, segmental resection:  - Acute peritonitis.  - No evidence of malignancy.    MLH1: PRESERVED (INTACT) EXPRESSION IN TUMOR CELLS      MSH2: PRESERVED (INTACT) EXPRESSION IN TUMOR CELLS      MSH6: PRESERVED (INTACT) EXPRESSION IN TUMOR CELLS      PMS2: PRESERVED (INTACT) EXPRESSION IN TUMOR CELLS  Microscopic Description/Comments   These results show no deficiency of the mismatch repair proteins tested.    Assessment:   Stage IV Metastatic Colon Adenocarcinoma with mets to peritoneum - Cecal mass, nonobstructive, no perforation. [id number].   Plan for 1st line chemotherapy with FOLFOX + Cetuximab.  5FU bolus was removed in the metastatic setting.   Neulasta added with C3.   Removed Oxaliplatin from C11 and beyond given peripheral neuropathy  CT CAP 11/20/24 reveals development of multiple subcentimeter round nodular foci predominantly at the upper lobes measuring up to 5-6 mm in greatest diameter and development of a linear low-attenuation focus at the splenic hilum of uncertain etiology. Will order PET/CT to further evaluate.  Pulmonary Embolus at the right mainstem bronchus   Incidentally found during CT CAP 6/25/24  Now on xarelto, continue at this time.   CT CAP 11/20/24 revealed resolution  of previous pulmonary embolus at the right main pulmonary artery  Anxiety/Depression/ Low appetite             Will start low dose Remeron 7.5mg QHS and slowly increase as needed.   Appetite improving, continue remeron  4. Peripheral Neuropathy                Will start Gabapentin 100mg BID and slowly increase as needed.    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 Continue to monitor for any signs of symptoms of infection. No prophylaxis needed at this time. No role for growth factor.      Plan:   Labs and scans reviewed in detail  Order for PET/CT to further evaluate development of multiple subcentimeter round nodular foci  Stop 5FU + Cetuximab with neulasta given PHILL  Plan for Folfirinox+Priscilla q2w x 12 cycles  Repeat PET/CT after completion of C6  Chemo education via telemedicine in [place]  Referral to Podiatry for paronychia of left thumb and great toe  Continue xarelto  Continue Remeron 7.5 mg qhs for depression/appetite  Continue Gabapentin 100 mg BID for peripheral neuropathy   Continue Bactroban ointment for acute paronychia of left thumb and great toe  RTC in 2 weeks with MD F/U/labs/treatment planning/scans  Cbc, cmp, cea, urine protein- 1 hr prior @ [facility]      I spent a total of 40 minutes on the day of the visit.This includes face to face time and non-face to face time preparing to see the patient (eg, review of tests), obtaining and/or reviewing separately obtained history, documenting clinical information in the electronic or other health record, independently interpreting results and communicating results to the patient/family/caregiver, or care coordinator.      Elizabeth Lejeune MD  Hematology /Oncology  [facility]      Professional Services:  IDana LPN, acted solely as a scribe for and in the presence of Dr. Elizabeth Lejeune, who performed these services.

## 2024-11-29 ENCOUNTER — OFFICE VISIT (OUTPATIENT)
Dept: HEMATOLOGY/ONCOLOGY | Facility: CLINIC | Age: 69
End: 2024-11-29
Payer: MEDICARE

## 2024-11-29 ENCOUNTER — LAB VISIT (OUTPATIENT)
Dept: LAB | Facility: HOSPITAL | Age: 69
End: 2024-11-29
Attending: NURSE PRACTITIONER
Payer: MEDICARE

## 2024-11-29 VITALS
DIASTOLIC BLOOD PRESSURE: 91 MMHG | WEIGHT: 123 LBS | HEIGHT: 67 IN | TEMPERATURE: 98 F | OXYGEN SATURATION: 100 % | HEART RATE: 112 BPM | BODY MASS INDEX: 19.3 KG/M2 | SYSTOLIC BLOOD PRESSURE: 127 MMHG | RESPIRATION RATE: 16 BRPM

## 2024-11-29 DIAGNOSIS — Z79.899 IMMUNODEFICIENCY DUE TO DRUG THERAPY: Primary | ICD-10-CM

## 2024-11-29 DIAGNOSIS — D84.821 IMMUNODEFICIENCY DUE TO DRUG THERAPY: Primary | ICD-10-CM

## 2024-11-29 DIAGNOSIS — R91.8 MULTIPLE LUNG NODULES ON CT: ICD-10-CM

## 2024-11-29 DIAGNOSIS — C18.9 MALIGNANT NEOPLASM OF COLON, UNSPECIFIED PART OF COLON: ICD-10-CM

## 2024-11-29 DIAGNOSIS — C78.6 MALIGNANT NEOPLASM METASTATIC TO PERITONEUM: ICD-10-CM

## 2024-11-29 DIAGNOSIS — L03.032 ACUTE PARONYCHIA OF TOE OF LEFT FOOT: Primary | ICD-10-CM

## 2024-11-29 DIAGNOSIS — C18.9 MALIGNANT NEOPLASM OF COLON, UNSPECIFIED PART OF COLON: Primary | ICD-10-CM

## 2024-11-29 LAB
ALBUMIN SERPL-MCNC: 3.1 G/DL (ref 3.4–4.8)
ALBUMIN/GLOB SERPL: 0.6 RATIO (ref 1.1–2)
ALP SERPL-CCNC: 143 UNIT/L (ref 40–150)
ALT SERPL-CCNC: 24 UNIT/L (ref 0–55)
ANION GAP SERPL CALC-SCNC: 7 MEQ/L
AST SERPL-CCNC: 33 UNIT/L (ref 5–34)
BASOPHILS # BLD AUTO: 0.06 X10(3)/MCL
BASOPHILS NFR BLD AUTO: 1 %
BILIRUB SERPL-MCNC: 0.2 MG/DL
BUN SERPL-MCNC: 15 MG/DL (ref 9.8–20.1)
CALCIUM SERPL-MCNC: 9.6 MG/DL (ref 8.4–10.2)
CEA SERPL-MCNC: 141.33 NG/ML (ref 0–3)
CHLORIDE SERPL-SCNC: 112 MMOL/L (ref 98–107)
CO2 SERPL-SCNC: 25 MMOL/L (ref 23–31)
CREAT SERPL-MCNC: 0.77 MG/DL (ref 0.55–1.02)
CREAT/UREA NIT SERPL: 19
EOSINOPHIL # BLD AUTO: 0.14 X10(3)/MCL (ref 0–0.9)
EOSINOPHIL NFR BLD AUTO: 2.4 %
ERYTHROCYTE [DISTWIDTH] IN BLOOD BY AUTOMATED COUNT: 17.2 % (ref 11.5–17)
GFR SERPLBLD CREATININE-BSD FMLA CKD-EPI: >60 ML/MIN/1.73/M2
GLOBULIN SER-MCNC: 5 GM/DL (ref 2.4–3.5)
GLUCOSE SERPL-MCNC: 90 MG/DL (ref 82–115)
HCT VFR BLD AUTO: 34.3 % (ref 37–47)
HGB BLD-MCNC: 11 G/DL (ref 12–16)
IMM GRANULOCYTES # BLD AUTO: 0.02 X10(3)/MCL (ref 0–0.04)
IMM GRANULOCYTES NFR BLD AUTO: 0.3 %
LYMPHOCYTES # BLD AUTO: 2.11 X10(3)/MCL (ref 0.6–4.6)
LYMPHOCYTES NFR BLD AUTO: 35.6 %
MAGNESIUM SERPL-MCNC: 1.9 MG/DL (ref 1.6–2.6)
MCH RBC QN AUTO: 31.6 PG (ref 27–31)
MCHC RBC AUTO-ENTMCNC: 32.1 G/DL (ref 33–36)
MCV RBC AUTO: 98.6 FL (ref 80–94)
MONOCYTES # BLD AUTO: 0.8 X10(3)/MCL (ref 0.1–1.3)
MONOCYTES NFR BLD AUTO: 13.5 %
NEUTROPHILS # BLD AUTO: 2.8 X10(3)/MCL (ref 2.1–9.2)
NEUTROPHILS NFR BLD AUTO: 47.2 %
PLATELET # BLD AUTO: 208 X10(3)/MCL (ref 130–400)
PMV BLD AUTO: 10.3 FL (ref 7.4–10.4)
POTASSIUM SERPL-SCNC: 4 MMOL/L (ref 3.5–5.1)
PROT SERPL-MCNC: 8.1 GM/DL (ref 5.8–7.6)
RBC # BLD AUTO: 3.48 X10(6)/MCL (ref 4.2–5.4)
SODIUM SERPL-SCNC: 144 MMOL/L (ref 136–145)
WBC # BLD AUTO: 5.93 X10(3)/MCL (ref 4.5–11.5)

## 2024-11-29 PROCEDURE — 82378 CARCINOEMBRYONIC ANTIGEN: CPT

## 2024-11-29 PROCEDURE — 80053 COMPREHEN METABOLIC PANEL: CPT

## 2024-11-29 PROCEDURE — 99214 OFFICE O/P EST MOD 30 MIN: CPT | Mod: PBBFAC | Performed by: STUDENT IN AN ORGANIZED HEALTH CARE EDUCATION/TRAINING PROGRAM

## 2024-11-29 PROCEDURE — 36415 COLL VENOUS BLD VENIPUNCTURE: CPT

## 2024-11-29 PROCEDURE — 85025 COMPLETE CBC W/AUTO DIFF WBC: CPT

## 2024-11-29 PROCEDURE — 83735 ASSAY OF MAGNESIUM: CPT

## 2024-11-29 PROCEDURE — 99999 PR PBB SHADOW E&M-EST. PATIENT-LVL IV: CPT | Mod: PBBFAC,,, | Performed by: STUDENT IN AN ORGANIZED HEALTH CARE EDUCATION/TRAINING PROGRAM

## 2024-12-03 ENCOUNTER — HOSPITAL ENCOUNTER (OUTPATIENT)
Dept: RADIOLOGY | Facility: HOSPITAL | Age: 69
Discharge: HOME | End: 2024-12-03
Attending: STUDENT IN AN ORGANIZED HEALTH CARE EDUCATION/TRAINING PROGRAM
Payer: MEDICARE

## 2024-12-03 DIAGNOSIS — C18.9 MALIGNANT NEOPLASM OF COLON, UNSPECIFIED PART OF COLON: ICD-10-CM

## 2024-12-03 DIAGNOSIS — R91.8 MULTIPLE LUNG NODULES ON CT: ICD-10-CM

## 2024-12-03 PROCEDURE — A9552 F18 FDG: HCPCS | Performed by: STUDENT IN AN ORGANIZED HEALTH CARE EDUCATION/TRAINING PROGRAM

## 2024-12-03 PROCEDURE — 78815 PET IMAGE W/CT SKULL-THIGH: CPT | Mod: TC

## 2024-12-03 RX ORDER — FLUDEOXYGLUCOSE F18 500 MCI/ML
10 INJECTION INTRAVENOUS
Status: COMPLETED | OUTPATIENT
Start: 2024-12-03 | End: 2024-12-03

## 2024-12-03 RX ADMIN — FLUDEOXYGLUCOSE F-18 10.2 MILLICURIE: 500 INJECTION INTRAVENOUS at 10:12

## 2024-12-04 ENCOUNTER — PATIENT MESSAGE (OUTPATIENT)
Dept: HEMATOLOGY/ONCOLOGY | Facility: CLINIC | Age: 69
End: 2024-12-04
Payer: MEDICARE

## 2024-12-04 ENCOUNTER — CLINICAL SUPPORT (OUTPATIENT)
Dept: HEMATOLOGY/ONCOLOGY | Facility: CLINIC | Age: 69
End: 2024-12-04
Payer: MEDICARE

## 2024-12-04 ENCOUNTER — OFFICE VISIT (OUTPATIENT)
Dept: HEMATOLOGY/ONCOLOGY | Facility: CLINIC | Age: 69
End: 2024-12-04
Payer: MEDICARE

## 2024-12-04 DIAGNOSIS — C18.9 MALIGNANT NEOPLASM OF COLON, UNSPECIFIED PART OF COLON: Primary | ICD-10-CM

## 2024-12-04 DIAGNOSIS — R45.89 ANXIETY ABOUT HEALTH: ICD-10-CM

## 2024-12-04 DIAGNOSIS — C18.2 MALIGNANT NEOPLASM OF ASCENDING COLON: Primary | ICD-10-CM

## 2024-12-04 RX ORDER — LOPERAMIDE HYDROCHLORIDE 2 MG/1
CAPSULE ORAL
Qty: 30 CAPSULE | Refills: 5 | Status: SHIPPED | OUTPATIENT
Start: 2024-12-04

## 2024-12-04 RX ORDER — PROCHLORPERAZINE MALEATE 5 MG
5 TABLET ORAL EVERY 6 HOURS PRN
Qty: 20 TABLET | Refills: 11 | Status: SHIPPED | OUTPATIENT
Start: 2024-12-04

## 2024-12-04 RX ORDER — OLANZAPINE 5 MG/1
5 TABLET ORAL NIGHTLY
Qty: 6 TABLET | Refills: 11 | Status: SHIPPED | OUTPATIENT
Start: 2024-12-04

## 2024-12-04 NOTE — PROGRESS NOTES
THERAPY EDUCATION: FOLFIRI + BEVACIZUMAB     Audio Only Telehealth Visit     The patient location is: Home  The chief complaint leading to consultation is: Therapy Education   Visit type: Virtual visit with audio only (telephone)  Total time spent with patient: 20 minutes      The reason for the audio only service rather than synchronous audio and video virtual visit was related to technical difficulties or patient preference/necessity.     Each patient to whom I provide medical services by telemedicine is:  (1) informed of the relationship between the physician and patient and the respective role of any other health care provider with respect to management of the patient; and (2) notified that they may decline to receive medical services by telemedicine and may withdraw from such care at any time. Patient verbally consented to receive this service via voice-only telephone call.     Subjective:      Patient ID: Selene Smallwood is a 69 y.o. female.    Chief Complaint: Therapy Education     Diagnosis:   Stage IV Metastatic Colon Adenocarcinoma with mets to peritoneum  2. Pulmonary Embolisum    Current Treatment:   FOLFIRI and Bevacizumab Q3W to start on 12/16/24  Xarelto 15 mg BID x 21 days then 20 mg daily- 6/25/24-present    Treatment History:     FOLFOX + Cetuximab q2w x 12 cycles- 4/2/24 - 11/6/24  Hold Oxaliplatin for C11 and C12 due to tolerance    HPI:   70 yo F presented in March '24 for evaluation of metastatic colon cancer. She initially presented to OSH in early February '24 with 2 days of upper abdominal pain. Imaging on 2/4 in the ER revealed evidence of non perforated acute appendicitis. She underwent Ex lap where a cecal mass was incidentally discovered and resected, as well as omental studding was noted and 1 small mass was taken for biopsy. Her final pathology revealed a large exophytic mass in the cecum measuring 4 x 2 cm extending to the appendiceal orifice and extends into pericolonic adipose  tissue. G2, LVI+, PNI negative. 14 LN were resected, with 10 being positive for metastatic adenocarcinoma. She also had an omental mass measuring 2.5 x 1.5cm which was positive for metastatic adenocarcinoma, consistent with a colon primary. Her final pathology staging is consistent with [id number]. Her postop course was complicated by abdominal abscess formation s/p IR drain placement and subsequent removal and antibiotic treatment.      She underwent PET scan in March '24 with evidence of nodular foci within the omentum/peritoneum consistent with peritoneal carcinomatosis. No other evidence of disease. NGS testing was performed which revealed GULSHAN wild type and BRAF negative. Therefore she will proceed with 1st line therapy of FOLFOX + Cetuximab Q2w x 12 cycles.     Interval History:    12/4/24: Ms. Smallwood presents to the clinic for therapy education via an audio only visit. Patient reports no major complaints at today's visit. Denies fever, chills, SOB, N/V/D, constipation, recent infection, chest pain or unexplained bleeding or bruising.      11/29/24:Patient returns to the clinic today for a follow-up and scan review after completion of C12/12 of FOLFOX + Cetuximab with her daughter.  Overall feels well today.  She reports improvement with appetite since starting Remeron.    She is taking xarelto as prescribed.  She reports peripheral neuropathy to her fingertips and increasing from her feet to mid shin, grade 1, taking gabapentin.  Denies SOB, chest pain, fever, chills, abnormal bowels.   She continues with blisters to left great toe and left thumb, and applying Bactroban to affected areas but feels it is not working given the blisters are not improving. Labs and scans reviewed in detail with patient and daughters, in agreeance with plan.      No past medical history on file.   Past Surgical History:   Procedure Laterality Date    APPENDECTOMY N/A 2/5/2024    Procedure: APPENDECTOMY;  Surgeon: Anna Murillo  MD;  Location: [facility] OR;  Service: General;  Laterality: N/A;    INSERTION OF TUNNELED CENTRAL VENOUS CATHETER (CVC) WITH SUBCUTANEOUS PORT N/A 3/26/2024    Procedure: [id number];  Surgeon: Armando Tran MD;  Location: [facility] OR;  Service: Peripheral Vascular;  Laterality: N/A;    LAPAROSCOPIC APPENDECTOMY N/A 2/5/2024    Procedure: APPENDECTOMY, LAPAROSCOPIC;  Surgeon: Anna Murillo MD;  Location: [facility] OR;  Service: General;  Laterality: N/A;    SURGICAL REMOVAL OF ILEUM WITH CECUM N/A 2/5/2024    Procedure: EXCISION, CECUM WITH ILEUM;  Surgeon: Anna Murillo MD;  Location: [facility] OR;  Service: General;  Laterality: N/A;    WASHOUT N/A 2/5/2024    Procedure: WASHOUT;  Surgeon: Anna Murillo MD;  Location: [facility] OR;  Service: General;  Laterality: N/A;     Social History     Socioeconomic History    Marital status:    Tobacco Use    Smoking status: Former     Types: Cigarettes    Smokeless tobacco: Never     Social Drivers of Health     Financial Resource Strain: Low Risk  (2/15/2024)    Overall Financial Resource Strain (CARDIA)     Difficulty of Paying Living Expenses: Not hard at all   Food Insecurity: No Food Insecurity (2/15/2024)    Hunger Vital Sign     Worried About Running Out of Food in the Last Year: Never true     Ran Out of Food in the Last Year: Never true   Transportation Needs: No Transportation Needs (2/15/2024)    PRAPARE - Transportation     Lack of Transportation (Medical): No     Lack of Transportation (Non-Medical): No   Physical Activity: Unknown (2/15/2024)    Exercise Vital Sign     Days of Exercise per Week: 0 days     Minutes of Exercise per Session: Patient unable to answer   Stress: No Stress Concern Present (2/15/2024)    [employer] [place] [employer] - Occupational Stress Questionnaire     Feeling of Stress : Not at all   Housing Stability: Low Risk  (2/15/2024)    Housing Stability Vital Sign     Unable to Pay for Housing in the Last Year: No      Number of Places Lived in the Last Year: 1     Unstable Housing in the Last Year: No      No family history on file.   Review of patient's allergies indicates:   Allergen Reactions    Robaxin [methocarbamol] Other (See Comments)     Altered mental status       Review of Systems   Constitutional:  Positive for appetite change and fatigue. Negative for activity change, fever and unexpected weight change.   HENT:  Negative for sore throat.    Eyes:  Negative for visual disturbance.   Respiratory:  Negative for cough and shortness of breath.    Cardiovascular:  Negative for chest pain.   Gastrointestinal:  Negative for abdominal pain, diarrhea and vomiting.   Endocrine: Negative for polyuria.   Genitourinary:  Negative for dysuria and hot flashes.   Integumentary:  Negative for rash.   Allergic/Immunologic: Negative for immunocompromised state.   Neurological:  Negative for weakness and headaches.   Hematological:  Negative for adenopathy.   Psychiatric/Behavioral:  Positive for depressed mood. Negative for confusion.        Objective:     There were no vitals filed for this visit.            LABS AND IMAGING REVIEWED IN EPIC  7/2/2024 PET:  No evidence of FDG avid disease on this exam.   11/19/24 CT CAP:  1. Interim resolution of previous pulmonary embolus at the right main pulmonary artery since pleura exam  2. Interim development of multiple subcentimeter round nodular foci predominantly at the upper lobes measuring up to 5-6 mm in greatest diameter.  A metastatic neoplastic etiology with the considered  3. 1.5 cm low-attenuation focus again evident at the liver  4. Interim development of a linear low-attenuation focus at the splenic hilum of uncertain etiology.  5. Ill-defined mucosal thickening at loops of large and small bowel in the right lower abdomen  6. Interim development of a cystic focus at the left the adnexa measuring 3.3 cm  7. Heterogeneous uterus suspicious for poorly visualized the leiomyomas.  8.  Findings and other details as above       PATHOLOGY:  2/5/24 Biopsy:  1. Appendix, appendectomy:  - Acute appendicitis with perforation.  - Adenocarcinoma involves lymphatic spaces of the appendiceal wall and peritoneal surface.  2. Cecum, ileocecectomy:  - Colonic adenocarcinoma.  - See synoptic checklist for CAP cancer protocol.  3. Omental mass, biopsy:  - Metastatic adenocarcinoma, consistent with a colon primary.  4. Small bowel, segmental resection:  - Acute peritonitis.  - No evidence of malignancy.    MLH1: PRESERVED (INTACT) EXPRESSION IN TUMOR CELLS      MSH2: PRESERVED (INTACT) EXPRESSION IN TUMOR CELLS      MSH6: PRESERVED (INTACT) EXPRESSION IN TUMOR CELLS      PMS2: PRESERVED (INTACT) EXPRESSION IN TUMOR CELLS  Microscopic Description/Comments   These results show no deficiency of the mismatch repair proteins tested.    Assessment:   Stage IV Metastatic Colon Adenocarcinoma with mets to peritoneum - Cecal mass, nonobstructive, no perforation. [id number].  Pulmonary Embolus at the right mainstem bronchus   Anxiety/Depression/ Low appetite  4. 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 Made patient aware that she will receive premedications given 30 minutes prior to each treatment to help prevent nausea. Patient understood that she will be going home with a 5FU pump for 2 days and will need to return to the clinic to have pump disconnected. Patient aware the importance of monitoring the pump 3 to 4 times a day to check for leaks or kinks. Patient also aware of the importance of avoiding cold drinks and cold food on the day of and 2 days after treatment with Oxaliplatin.     -Antiemetics regimen schedule made and given with calendar for guidance     Olanzapine- Take 1 tablet by mouth on Days 1-3 of each chemo cycle   -Tima  intact.  -Compazine PRN prescribed for at home with instructions to be taken by mouth every 6 hours as needed for nausea.  -OTC Imodium AD recommended for diarrhea (4-6 BMs a day). Take 2 tablets after the first loose bowel movement, and 1 tablet after each loose bowel movement after the first dose has been taken. No more than 4 tablets should be taken in any 24-hour period. If not working, Lomotil can be prescribed as 2nd choice.    -Mouth sore prevention with 1-quart warm water with 1 tsp of baking soda and salt and alcohol-free mouthwash.   -Emphasized adequate hand-hygiene and limited contact with people who are sick.   -Monitor and notify any bleeding in urine, stool, or sputum.  As well as unusual bleeding or bruising and stomach pain.   - Emphasized hydration with 4 16 oz bottles of water a day.    -Importance of moisturizing with fragrance free lotion to prevent skin rash and/or hand-foot syndrome.  -Referral to Podiatry for paronychia of left thumb and great toe  -Continue Xarelto  -Continue Remeron 7.5 mg qhs for depression/appetite  -Continue Gabapentin 100 mg BID for peripheral neuropathy   -Continue Bactroban ointment for acute paronychia of left thumb and great toe  -Call clinic if fever >100.4, shakes or chills, shortness of breath, chest pain, uncontrolled vomiting or diarrhea, pain and tingling in the chest or arm, or just not feeling well.    -Plans to RTC on 12/12/24 with MD LOPEZ/GADIEL/labs/treatment planning/scans    DISCUSSION:    1.  A total of 60 minutes were spent in counseling today, in which 100% were face-to-face.  At today's therapy teaching session, we discussed the patient's cancer diagnosis as well as planned therapy regimen, protocol, side effects and toxicities.  A handout of each therapeutic agent in the regimen was provided and reviewed in detail.    2.  The following side effects were discussed but not limited to:    Bevacizumab Side Effects:  Allergic  Reactions  Bruising  Chills  Constipation  Cough  Diarrhea  Fever  Infection  Injury  Itching  Low Blood Counts  Nausea  Pain  Rash  Swelling  Taste Changes  Vomiting    Fluorouracil Side Effects:  Allergic Reactions  Breathing Problems  Bruising  Chest Pain  Chills  Cough  Diarrhea  Fever  Hair Loss  Headache  Infection  Itching  Low Blood Counts  Mouth Sores  Nausea  Numbness  Pain  Rash  Stomach Upset  Swelling  Tingling  Vomiting    Irinotecan Side Effects:  Allergic Reactions  Bruising  Chest Pain  Chills  Constipation  Cough  Diarrhea  Fever  Flushing  Hair Loss  Headache  Infection  Itching  Low Blood Counts  Mouth Sores  Nausea  Pain  Rash  Runny Nose  Swelling  Vomiting    Oxaliplatin Side Effects:  Allergic Reactions  Anemia  Breathing Problems  Bruising  Chest Pain  Chills  Cough  Diarrhea  Dizziness  Feeling Faint  Fever  Gas  Hair Loss  Infection  Injury  Itching  Low Blood Counts  Mouth Sores  Muscle Pain  Nausea  Numbness  Pain  Rash  Swelling  Tingling  Vomiting  Cold Sensitivity                a.  Discussed the risk of infection while on therapy related to pancytopenia, specifically a decrease in their white blood cell count.  Instructed to contact our office for temperature >100.4 F, chills, sudden onset cough or shortness of breath, symptoms of a urinary tract infection.                b.  Discussed the risk of anemia. Instructed to contact our office for dizziness, heart palpitations, or extreme or sudden changes in weakness.                c.  Discussed the risk of thrombocytopenia, which increases the risk of bruising or bleeding.  Instructed the patient to contact our office for spontaneous signs of bleeding, including nose bleeds, bleeding from the gums or mouth, blood in sputum, urine or stool and unusual or excessive bruising or rash.                d.  Discussed GI side effects including weight changes, changes in appetite, altered sense of taste, stomatitis, nausea, vomiting, diarrhea,  constipation, and heartburn.                e.  Discussed  side effects including painful urination, changes in the amount of urination, possible urine color changes.  Discussed fertility issues and to prevent  pregnancy if of child bearing age.                f.  Discussed neurological side effects including the risk of peripheral neuropathy, either temporary or permanent.                g.  Discussed the potential for skin, hair, and nail changes.       3.  Instructed to contact our office for discussion of medication changes, the addition of vitamin and/or herbal supplementation as they may interact with some chemotherapy agents.    4.  Discussed dietary modifications and the need to maintain adequate caloric intake and proper oral hydration.  Recommended 64 ounces of fluid per day.    5.  Discussed anti-emetic protocol and bowel regimen protocol.    6.  Office contact information given including after hours number.  Discussed there is an oncologist on call 24/7, 365 days including weekends.  Provided primary nurse's information .    7.  In summary, the patient is in agreement with the plan of care.  Questions appeared to be answered to their satisfaction. Consented the patient to the treatment plan and the patient was educated on the planned duration of the treatment and schedule of the treatment administration. Copy to be scanned into the chart.    All questions answered to the satisfaction of the patient and family.     Follow up appointments given to patient.     ARASH HOLLEY  PATIENT EDUCATOR  [facility]

## 2024-12-04 NOTE — NURSING
"Oncology Navigation   Intake  Cancer Type: GI  Initial Nurse Navigator Contact: 24  Date Worked: 04/10/24     Treatment  Current Status: Active       Medical Oncologist: Lejeune  Chemotherapy: Planned  Chemotherapy Regimen: OP GI bevacizumab + FOLFIRI (irinotecan leucovorin fluororuracil) Q2W          General Referrals: Carlos Hernandez; Home Health          Support Systems: Children; Family members  Barriers of Care: Comorbidities     Acuity  Stage: 2  Systemic Treatment - predicted or initiated: Chemotherapy Regimen with Multiple drugs (+1)  Treatment Tolerability: Has not started treatment yet/treatment fully completed and side effects resolved  ECO  Comorbidities in Medical History: 2  Hospitalization Within the Past Month: 0   Needed: 0  Support: 0  Verbalizes Financial Concerns: 0  Transportation: 0  Mental Health: PHQ Score: 0  Psychological Factors (+1 each): Verbalizes need for emotional support  History of noncompliance/frequent no shows and cancellations: 0  Verbalizes the need for more education: 0  Other Factors (+1 for Each): 0  Navigation Acuity: 8     Follow Up  No follow-ups on file.     Met with patient today via phone call following virtual education visit. Introduced self and role of navigation. Assessed for barriers to care. Patient does not have transportation concerns and feels that she has a good support system. She does report that she is experiencing some depression that "comes and goes." She denies anxiety. She is not suicidal. [facility]. Patient expressed that it is difficult for her to care for wounds on her feet. Home health referral placed and sent by [facility] staff. Patient has no further questions or concerns today. They are aware of how to reach navigation should they need to.   "

## 2024-12-09 ENCOUNTER — PATIENT MESSAGE (OUTPATIENT)
Dept: HEMATOLOGY/ONCOLOGY | Facility: CLINIC | Age: 69
End: 2024-12-09
Payer: MEDICARE

## 2024-12-11 ENCOUNTER — PATIENT MESSAGE (OUTPATIENT)
Dept: HEMATOLOGY/ONCOLOGY | Facility: CLINIC | Age: 69
End: 2024-12-11
Payer: MEDICARE

## 2024-12-19 ENCOUNTER — PATIENT MESSAGE (OUTPATIENT)
Dept: HEMATOLOGY/ONCOLOGY | Facility: CLINIC | Age: 69
End: 2024-12-19
Payer: MEDICARE

## 2024-12-31 NOTE — PROGRESS NOTES
Subjective:       Patient ID: Selene Smallwood is a 69 y.o. female.    Chief Complaint: Follow Up    Diagnosis:   Stage IV Metastatic Colon Adenocarcinoma with mets to peritoneum  2. Pulmonary Embolisum    Current Treatment:   FOLFIRI + Priscilla - Plan to start 1/6/25  Xarelto 15 mg BID x 21 days then 20 mg daily- 6/25/24-present    Treatment History:   FOLFOX + Cetuximab q2w x 12 cycles- 4/2/24 - 11/6/24  Hold Oxaliplatin for C11 and C12 due to tolerance    HPI:   70 yo F presented in March '24 for evaluation of metastatic colon cancer. She initially presented to OSH in early February '24 with 2 days of upper abdominal pain. Imaging on 2/4 in the ER revealed evidence of non perforated acute appendicitis. She underwent Ex lap where a cecal mass was incidentally discovered and resected, as well as omental studding was noted and 1 small mass was taken for biopsy. Her final pathology revealed a large exophytic mass in the cecum measuring 4 x 2 cm extending to the appendiceal orifice and extends into pericolonic adipose tissue. G2, LVI+, PNI negative. 14 LN were resected, with 10 being positive for metastatic adenocarcinoma. She also had an omental mass measuring 2.5 x 1.5cm which was positive for metastatic adenocarcinoma, consistent with a colon primary. Her final pathology staging is consistent with [id number]. Her postop course was complicated by abdominal abscess formation s/p IR drain placement and subsequent removal and antibiotic treatment.      She underwent PET scan in March '24 with evidence of nodular foci within the omentum/peritoneum consistent with peritoneal carcinomatosis. No other evidence of disease. NGS testing was performed which revealed GULSHAN wild type and BRAF negative. Therefore she underwent 1st line therapy of FOLFOX + Cetuximab Q2w x 12 cycles. Despite good response to treatment after 6 cycles of therapy, once she completed 12 cycles of chemotherapy she had evidence of disease progression to  abdominal lymph nodes, peritoneum, and bilateral lungs. She will now proceed with 2nd line FOLFIRI + Bevacizumab.     Interval History:  Patient returns to the clinic today with daughter via phone for follow-up and clearance for C1 FOLFIRI +Priscilla q2w x 12 cycles.  Overall feels well today.    She does report peripheral neuropathy to her fingertips and lower legs.   Her hand neuropathy is grade 2, consistent but not painful. Able to button buttons.   Her LE bilateral neuropathy is mainly at night she gets shooting pains when she lays down.   She is taking xarelto as prescribed. Denies any bleeding or excessive bruising.   Denies weight loss, SOB, chest pain, fever, chills, abnormal bowels.   Labs and scans reviewed in detail with patient and daughter via phone, in agreeance with plan.      Past Medical History:   Diagnosis Date    Hypertension       Past Surgical History:   Procedure Laterality Date    APPENDECTOMY N/A 2/5/2024    Procedure: APPENDECTOMY;  Surgeon: Anna Murillo MD;  Location: [facility] OR;  Service: General;  Laterality: N/A;    INSERTION OF TUNNELED CENTRAL VENOUS CATHETER (CVC) WITH SUBCUTANEOUS PORT N/A 3/26/2024    Procedure: [id number];  Surgeon: Armando Tran MD;  Location: [facility] OR;  Service: Peripheral Vascular;  Laterality: N/A;    LAPAROSCOPIC APPENDECTOMY N/A 2/5/2024    Procedure: APPENDECTOMY, LAPAROSCOPIC;  Surgeon: Anna Murillo MD;  Location: [facility] OR;  Service: General;  Laterality: N/A;    SURGICAL REMOVAL OF ILEUM WITH CECUM N/A 2/5/2024    Procedure: EXCISION, CECUM WITH ILEUM;  Surgeon: Anna Murillo MD;  Location: [facility] OR;  Service: General;  Laterality: N/A;    WASHOUT N/A 2/5/2024    Procedure: WASHOUT;  Surgeon: Anna Murillo MD;  Location: [facility] OR;  Service: General;  Laterality: N/A;     Social History     Socioeconomic History    Marital status:    Tobacco Use    Smoking status: Former     Types: Cigarettes    Smokeless tobacco: Never    Substance and Sexual Activity    Alcohol use: Not Currently    Drug use: Never    Sexual activity: Not Currently     Social Drivers of Health     Financial Resource Strain: Low Risk  (2/15/2024)    Overall Financial Resource Strain (CARDIA)     Difficulty of Paying Living Expenses: Not hard at all   Food Insecurity: No Food Insecurity (2/15/2024)    Hunger Vital Sign     Worried About Running Out of Food in the Last Year: Never true     Ran Out of Food in the Last Year: Never true   Transportation Needs: No Transportation Needs (2/15/2024)    PRAPARE - Transportation     Lack of Transportation (Medical): No     Lack of Transportation (Non-Medical): No   Physical Activity: Unknown (2/15/2024)    Exercise Vital Sign     Days of Exercise per Week: 0 days     Minutes of Exercise per Session: Patient unable to answer   Stress: No Stress Concern Present (2/15/2024)    [place] [employer] - Occupational Stress Questionnaire     Feeling of Stress : Not at all   Housing Stability: Low Risk  (2/15/2024)    Housing Stability Vital Sign     Unable to Pay for Housing in the Last Year: No     Number of Places Lived in the Last Year: 1     Unstable Housing in the Last Year: No      Family History   Family history unknown: Yes      Review of patient's allergies indicates:   Allergen Reactions    Robaxin [methocarbamol] Other (See Comments)     Altered mental status       Review of Systems   Constitutional:  Positive for appetite change and fatigue. Negative for activity change, fever and unexpected weight change.   HENT:  Negative for sore throat.    Eyes:  Negative for visual disturbance.   Respiratory:  Negative for cough and shortness of breath.    Cardiovascular:  Negative for chest pain.   Gastrointestinal:  Negative for abdominal pain, diarrhea and vomiting.   Endocrine: Negative for polyuria.   Genitourinary:  Negative for dysuria and hot flashes.   Integumentary:  Negative for rash.    Allergic/Immunologic: Negative for immunocompromised state.   Neurological:  Negative for weakness and headaches.   Hematological:  Negative for adenopathy.   Psychiatric/Behavioral:  Positive for depressed mood. Negative for confusion.          Objective:      Vitals:    01/02/25 1134   BP: (!) 159/97   Pulse: (!) 112   Resp: 16   Temp: 97.7 °F (36.5 °C)           Physical Exam  Constitutional:       General: She is not in acute distress.     Appearance: Normal appearance. She is not ill-appearing.   HENT:      Head: Normocephalic and atraumatic.      Nose: Nose normal.      Mouth/Throat:      Mouth: Mucous membranes are moist.      Pharynx: Oropharynx is clear.   Eyes:      Extraocular Movements: Extraocular movements intact.      Conjunctiva/sclera: Conjunctivae normal.      Pupils: Pupils are equal, round, and reactive to light.   Cardiovascular:      Rate and Rhythm: Normal rate and regular rhythm.      Pulses: Normal pulses.      Heart sounds: Normal heart sounds. No murmur heard.  Pulmonary:      Effort: Pulmonary effort is normal. No respiratory distress.      Breath sounds: Normal breath sounds.   Abdominal:      General: There is no distension.      Palpations: Abdomen is soft.      Tenderness: There is no abdominal tenderness.   Musculoskeletal:         General: Normal range of motion.      Cervical back: Normal range of motion and neck supple.      Right lower leg: No edema.      Left lower leg: No edema.   Feet:      Left foot:      Skin integrity: Blister present.      Comments: Acute paronychia noted to left thumb and left great toe   Lymphadenopathy:      Cervical: No cervical adenopathy.   Skin:     General: Skin is warm and dry.   Neurological:      General: No focal deficit present.      Mental Status: She is alert and oriented to person, place, and time.         LABS AND IMAGING REVIEWED IN EPIC  12/3/24 PET/CT:  1. Interval increase in size and number pulmonary nodules compared to comparison  PET-CT.  Minimal measurable FDG uptake.  However nodules are small and below the resolution of PET.  2. New hypermetabolic abdominal aortocaval lymph node  3. Nonspecific uptake associated with bowel loops in the right lower quadrant.  Unable to differentiate potential serosal implants from physiologic or inflammatory bowel activity.  4. Enlarging cystic lesion at the left pelvic sidewall.  There is mild uptake at the posteroinferior margin of the lesion at the left adnexa.  Mild uptake at the cul-de-sac/posterior uterine segment.  Uptake is mild and nonspecific.  Peritoneal implants are consideration.       PATHOLOGY:  2/5/24 Biopsy:  1. Appendix, appendectomy:  - Acute appendicitis with perforation.  - Adenocarcinoma involves lymphatic spaces of the appendiceal wall and peritoneal surface.  2. Cecum, ileocecectomy:  - Colonic adenocarcinoma.  - See synoptic checklist for CAP cancer protocol.  3. Omental mass, biopsy:  - Metastatic adenocarcinoma, consistent with a colon primary.  4. Small bowel, segmental resection:  - Acute peritonitis.  - No evidence of malignancy.    MLH1: PRESERVED (INTACT) EXPRESSION IN TUMOR CELLS      MSH2: PRESERVED (INTACT) EXPRESSION IN TUMOR CELLS      MSH6: PRESERVED (INTACT) EXPRESSION IN TUMOR CELLS      PMS2: PRESERVED (INTACT) EXPRESSION IN TUMOR CELLS  Microscopic Description/Comments   These results show no deficiency of the mismatch repair proteins tested.    Assessment:   Stage IV Metastatic Colon Adenocarcinoma with mets to peritoneum - Cecal mass, nonobstructive, no perforation. [id number]. Treatment history as above. Now proceeding with 2nd line FOLFIRI + Priscilla. Repeat PET after C6.   Pulmonary Embolus at the right mainstem bronchus - Incidentally found during CT CAP 6/25/24. Now on xarelto, continue at this time.   4. Peripheral Neuropathy - stable if not mildly worse. Will increase gabapentin to 300mg TID - 1 in AM, 2 in PM.   5. Immunodeficiency due to Drug Therapy -  Precautions discussed with patient. Continue to monitor for any signs of symptoms of infection. No prophylaxis needed at this time. On growth factor due to history of significant neutropenia due to chemotherapy.       Plan:   - Toxicity reviewed, okay to proceed with C1 FOLFIRINOX + Priscilla + GSF on 1/6/24.  Dose reduce Irinotecan to 150mg/m2 due to tolerance concerns. No 5FU bolus in the setting of metastatic disease.   - Continue follow up with Podiatry for paronychia of left thumb and great toe, appt 1/2/2025  - Increase gabapentin to 300mg TID for drug induced peripheral neuropathy  - Referral sent to [facility] for HH and OT at family request      RTC on 1/6 for C1 Folfiri+Priscilla no labs, 1/6/25  RTC in 3 weeks with NP with FU/labs/treatment, 1/21/25  Cbc, cmp, cea, urine protein- 1 hr prior @ [facility]      I spent a total of 40 minutes on the day of the visit.This includes face to face time and non-face to face time preparing to see the patient (eg, review of tests), obtaining and/or reviewing separately obtained history, documenting clinical information in the electronic or other health record, independently interpreting results and communicating results to the patient/family/caregiver, or care coordinator.      Elizabeth Lejeune MD  Hematology /Oncology  [facility]     Professional Services:  Laquita ROJO LPN, acted solely as a scribe for and in the presence of Dr. Elizabeth Lejeune, who performed these services

## 2025-01-02 ENCOUNTER — LAB VISIT (OUTPATIENT)
Dept: LAB | Facility: HOSPITAL | Age: 70
End: 2025-01-02
Payer: MEDICARE

## 2025-01-02 ENCOUNTER — PATIENT MESSAGE (OUTPATIENT)
Dept: HEMATOLOGY/ONCOLOGY | Facility: CLINIC | Age: 70
End: 2025-01-02

## 2025-01-02 ENCOUNTER — OFFICE VISIT (OUTPATIENT)
Dept: HEMATOLOGY/ONCOLOGY | Facility: CLINIC | Age: 70
End: 2025-01-02
Payer: MEDICARE

## 2025-01-02 VITALS
OXYGEN SATURATION: 99 % | BODY MASS INDEX: 19.93 KG/M2 | SYSTOLIC BLOOD PRESSURE: 159 MMHG | DIASTOLIC BLOOD PRESSURE: 97 MMHG | HEIGHT: 67 IN | WEIGHT: 127 LBS | HEART RATE: 112 BPM | RESPIRATION RATE: 16 BRPM | TEMPERATURE: 98 F

## 2025-01-02 DIAGNOSIS — G62.9 NEUROPATHY: Primary | ICD-10-CM

## 2025-01-02 DIAGNOSIS — D84.821 IMMUNODEFICIENCY DUE TO DRUG THERAPY: ICD-10-CM

## 2025-01-02 DIAGNOSIS — G62.0 NEUROPATHY DUE TO DRUG: ICD-10-CM

## 2025-01-02 DIAGNOSIS — C18.9 MALIGNANT NEOPLASM OF COLON, UNSPECIFIED PART OF COLON: ICD-10-CM

## 2025-01-02 DIAGNOSIS — Z79.899 IMMUNODEFICIENCY DUE TO DRUG THERAPY: ICD-10-CM

## 2025-01-02 DIAGNOSIS — C18.2 MALIGNANT NEOPLASM OF ASCENDING COLON: ICD-10-CM

## 2025-01-02 DIAGNOSIS — C78.6 MALIGNANT NEOPLASM METASTATIC TO PERITONEUM: ICD-10-CM

## 2025-01-02 DIAGNOSIS — C78.6 MALIGNANT NEOPLASM METASTATIC TO PERITONEUM: Primary | ICD-10-CM

## 2025-01-02 LAB
ALBUMIN SERPL-MCNC: 3 G/DL (ref 3.4–4.8)
ALBUMIN/GLOB SERPL: 0.6 RATIO (ref 1.1–2)
ALP SERPL-CCNC: 131 UNIT/L (ref 40–150)
ALT SERPL-CCNC: 23 UNIT/L (ref 0–55)
ANION GAP SERPL CALC-SCNC: 7 MEQ/L
AST SERPL-CCNC: 26 UNIT/L (ref 5–34)
BASOPHILS # BLD AUTO: 0.02 X10(3)/MCL
BASOPHILS NFR BLD AUTO: 0.4 %
BILIRUB SERPL-MCNC: 0.4 MG/DL
BUN SERPL-MCNC: 12 MG/DL (ref 9.8–20.1)
CALCIUM SERPL-MCNC: 8.9 MG/DL (ref 8.4–10.2)
CEA SERPL-MCNC: 938.31 NG/ML (ref 0–3)
CHLORIDE SERPL-SCNC: 110 MMOL/L (ref 98–107)
CO2 SERPL-SCNC: 25 MMOL/L (ref 23–31)
CREAT SERPL-MCNC: 0.63 MG/DL (ref 0.55–1.02)
CREAT/UREA NIT SERPL: 19
EOSINOPHIL # BLD AUTO: 0.12 X10(3)/MCL (ref 0–0.9)
EOSINOPHIL NFR BLD AUTO: 2.4 %
ERYTHROCYTE [DISTWIDTH] IN BLOOD BY AUTOMATED COUNT: 15.6 % (ref 11.5–17)
GFR SERPLBLD CREATININE-BSD FMLA CKD-EPI: >60 ML/MIN/1.73/M2
GLOBULIN SER-MCNC: 4.8 GM/DL (ref 2.4–3.5)
GLUCOSE SERPL-MCNC: 90 MG/DL (ref 82–115)
HCT VFR BLD AUTO: 32.4 % (ref 37–47)
HGB BLD-MCNC: 10.2 G/DL (ref 12–16)
IMM GRANULOCYTES # BLD AUTO: 0.01 X10(3)/MCL (ref 0–0.04)
IMM GRANULOCYTES NFR BLD AUTO: 0.2 %
LYMPHOCYTES # BLD AUTO: 1.46 X10(3)/MCL (ref 0.6–4.6)
LYMPHOCYTES NFR BLD AUTO: 29.7 %
MCH RBC QN AUTO: 30.5 PG (ref 27–31)
MCHC RBC AUTO-ENTMCNC: 31.5 G/DL (ref 33–36)
MCV RBC AUTO: 97 FL (ref 80–94)
MONOCYTES # BLD AUTO: 0.44 X10(3)/MCL (ref 0.1–1.3)
MONOCYTES NFR BLD AUTO: 8.9 %
NEUTROPHILS # BLD AUTO: 2.87 X10(3)/MCL (ref 2.1–9.2)
NEUTROPHILS NFR BLD AUTO: 58.4 %
PLATELET # BLD AUTO: 221 X10(3)/MCL (ref 130–400)
PMV BLD AUTO: 10.2 FL (ref 7.4–10.4)
POTASSIUM SERPL-SCNC: 4 MMOL/L (ref 3.5–5.1)
PROT SERPL-MCNC: 7.8 GM/DL (ref 5.8–7.6)
PROT UR STRIP-MCNC: 13.2 MG/DL
RBC # BLD AUTO: 3.34 X10(6)/MCL (ref 4.2–5.4)
SODIUM SERPL-SCNC: 142 MMOL/L (ref 136–145)
WBC # BLD AUTO: 4.92 X10(3)/MCL (ref 4.5–11.5)

## 2025-01-02 PROCEDURE — 85025 COMPLETE CBC W/AUTO DIFF WBC: CPT

## 2025-01-02 PROCEDURE — 99999 PR PBB SHADOW E&M-EST. PATIENT-LVL IV: CPT | Mod: PBBFAC,,, | Performed by: STUDENT IN AN ORGANIZED HEALTH CARE EDUCATION/TRAINING PROGRAM

## 2025-01-02 PROCEDURE — 99214 OFFICE O/P EST MOD 30 MIN: CPT | Mod: PBBFAC | Performed by: STUDENT IN AN ORGANIZED HEALTH CARE EDUCATION/TRAINING PROGRAM

## 2025-01-02 PROCEDURE — 80053 COMPREHEN METABOLIC PANEL: CPT

## 2025-01-02 PROCEDURE — 36415 COLL VENOUS BLD VENIPUNCTURE: CPT

## 2025-01-02 PROCEDURE — 84156 ASSAY OF PROTEIN URINE: CPT

## 2025-01-02 PROCEDURE — 99215 OFFICE O/P EST HI 40 MIN: CPT | Mod: S$PBB,,, | Performed by: STUDENT IN AN ORGANIZED HEALTH CARE EDUCATION/TRAINING PROGRAM

## 2025-01-02 PROCEDURE — 82378 CARCINOEMBRYONIC ANTIGEN: CPT

## 2025-01-02 RX ORDER — GABAPENTIN 300 MG/1
300 CAPSULE ORAL 3 TIMES DAILY
Qty: 90 CAPSULE | Refills: 2 | Status: SHIPPED | OUTPATIENT
Start: 2025-01-02 | End: 2026-01-02

## 2025-01-02 RX ORDER — MUPIROCIN 20 MG/G
2 OINTMENT TOPICAL 2 TIMES DAILY
COMMUNITY
Start: 2024-12-11

## 2025-01-02 RX ORDER — HYDROCODONE BITARTRATE AND ACETAMINOPHEN 5; 325 MG/1; MG/1
1 TABLET ORAL
COMMUNITY
Start: 2024-12-11

## 2025-01-03 RX ORDER — PROCHLORPERAZINE EDISYLATE 5 MG/ML
5 INJECTION INTRAMUSCULAR; INTRAVENOUS ONCE AS NEEDED
OUTPATIENT
Start: 2025-01-03

## 2025-01-03 RX ORDER — DIPHENHYDRAMINE HYDROCHLORIDE 50 MG/ML
50 INJECTION INTRAMUSCULAR; INTRAVENOUS ONCE AS NEEDED
OUTPATIENT
Start: 2025-01-03

## 2025-01-03 RX ORDER — HEPARIN 100 UNIT/ML
500 SYRINGE INTRAVENOUS
OUTPATIENT
Start: 2025-01-03

## 2025-01-03 RX ORDER — EPINEPHRINE 0.3 MG/.3ML
0.3 INJECTION SUBCUTANEOUS ONCE AS NEEDED
OUTPATIENT
Start: 2025-01-03

## 2025-01-03 RX ORDER — ATROPINE SULFATE 0.4 MG/ML
0.4 INJECTION, SOLUTION ENDOTRACHEAL; INTRAMEDULLARY; INTRAMUSCULAR; INTRAVENOUS; SUBCUTANEOUS ONCE AS NEEDED
OUTPATIENT
Start: 2025-01-03

## 2025-01-03 RX ORDER — SODIUM CHLORIDE 0.9 % (FLUSH) 0.9 %
10 SYRINGE (ML) INJECTION
OUTPATIENT
Start: 2025-01-03

## 2025-01-06 ENCOUNTER — INFUSION (OUTPATIENT)
Dept: INFUSION THERAPY | Facility: HOSPITAL | Age: 70
End: 2025-01-06
Payer: MEDICARE

## 2025-01-06 VITALS
SYSTOLIC BLOOD PRESSURE: 167 MMHG | DIASTOLIC BLOOD PRESSURE: 87 MMHG | HEART RATE: 98 BPM | OXYGEN SATURATION: 99 % | HEIGHT: 67 IN | TEMPERATURE: 98 F | BODY MASS INDEX: 19.46 KG/M2 | RESPIRATION RATE: 16 BRPM | WEIGHT: 124 LBS

## 2025-01-06 DIAGNOSIS — C18.2 MALIGNANT NEOPLASM OF ASCENDING COLON: Primary | ICD-10-CM

## 2025-01-06 PROCEDURE — 96416 CHEMO PROLONG INFUSE W/PUMP: CPT

## 2025-01-06 PROCEDURE — 96367 TX/PROPH/DG ADDL SEQ IV INF: CPT

## 2025-01-06 PROCEDURE — 25000003 PHARM REV CODE 250: Performed by: STUDENT IN AN ORGANIZED HEALTH CARE EDUCATION/TRAINING PROGRAM

## 2025-01-06 PROCEDURE — 96413 CHEMO IV INFUSION 1 HR: CPT

## 2025-01-06 PROCEDURE — 96417 CHEMO IV INFUS EACH ADDL SEQ: CPT

## 2025-01-06 PROCEDURE — 96368 THER/DIAG CONCURRENT INF: CPT

## 2025-01-06 PROCEDURE — 63600175 PHARM REV CODE 636 W HCPCS: Performed by: STUDENT IN AN ORGANIZED HEALTH CARE EDUCATION/TRAINING PROGRAM

## 2025-01-06 PROCEDURE — 96375 TX/PRO/DX INJ NEW DRUG ADDON: CPT

## 2025-01-06 RX ORDER — ATROPINE SULFATE 0.4 MG/ML
0.4 INJECTION, SOLUTION ENDOTRACHEAL; INTRAMEDULLARY; INTRAMUSCULAR; INTRAVENOUS; SUBCUTANEOUS ONCE AS NEEDED
Status: COMPLETED | OUTPATIENT
Start: 2025-01-06 | End: 2025-01-06

## 2025-01-06 RX ORDER — SODIUM CHLORIDE 0.9 % (FLUSH) 0.9 %
10 SYRINGE (ML) INJECTION
Status: DISCONTINUED | OUTPATIENT
Start: 2025-01-06 | End: 2025-01-06 | Stop reason: HOSPADM

## 2025-01-06 RX ADMIN — FLUOROURACIL 3890 MG: 50 INJECTION, SOLUTION INTRAVENOUS at 01:01

## 2025-01-06 RX ADMIN — DEXAMETHASONE SODIUM PHOSPHATE 0.25 MG: 4 INJECTION, SOLUTION INTRA-ARTICULAR; INTRALESIONAL; INTRAMUSCULAR; INTRAVENOUS; SOFT TISSUE at 11:01

## 2025-01-06 RX ADMIN — SODIUM CHLORIDE: 9 INJECTION, SOLUTION INTRAVENOUS at 09:01

## 2025-01-06 RX ADMIN — BEVACIZUMAB-AWWB 280 MG: 100 INJECTION, SOLUTION INTRAVENOUS at 10:01

## 2025-01-06 RX ADMIN — LEUCOVORIN CALCIUM 650 MG: 350 INJECTION, POWDER, LYOPHILIZED, FOR SOLUTION INTRAMUSCULAR; INTRAVENOUS at 12:01

## 2025-01-06 RX ADMIN — IRINOTECAN HYDROCHLORIDE 240 MG: 20 INJECTION, SOLUTION INTRAVENOUS at 12:01

## 2025-01-06 RX ADMIN — ATROPINE SULFATE 0.4 MG: 0.4 INJECTION, SOLUTION INTRAVENOUS at 12:01

## 2025-01-07 ENCOUNTER — DOCUMENTATION ONLY (OUTPATIENT)
Dept: HEMATOLOGY/ONCOLOGY | Facility: CLINIC | Age: 70
End: 2025-01-07
Payer: MEDICARE

## 2025-01-08 ENCOUNTER — DOCUMENTATION ONLY (OUTPATIENT)
Dept: HEMATOLOGY/ONCOLOGY | Facility: CLINIC | Age: 70
End: 2025-01-08
Payer: MEDICARE

## 2025-01-08 ENCOUNTER — INFUSION (OUTPATIENT)
Dept: INFUSION THERAPY | Facility: HOSPITAL | Age: 70
End: 2025-01-08
Payer: MEDICARE

## 2025-01-08 VITALS
TEMPERATURE: 98 F | HEART RATE: 68 BPM | RESPIRATION RATE: 18 BRPM | DIASTOLIC BLOOD PRESSURE: 87 MMHG | SYSTOLIC BLOOD PRESSURE: 137 MMHG | OXYGEN SATURATION: 100 %

## 2025-01-08 DIAGNOSIS — C18.2 MALIGNANT NEOPLASM OF ASCENDING COLON: Primary | ICD-10-CM

## 2025-01-08 PROCEDURE — 63600175 PHARM REV CODE 636 W HCPCS: Performed by: STUDENT IN AN ORGANIZED HEALTH CARE EDUCATION/TRAINING PROGRAM

## 2025-01-08 PROCEDURE — 63600175 PHARM REV CODE 636 W HCPCS: Mod: JZ,TB

## 2025-01-08 PROCEDURE — 25000003 PHARM REV CODE 250: Performed by: STUDENT IN AN ORGANIZED HEALTH CARE EDUCATION/TRAINING PROGRAM

## 2025-01-08 PROCEDURE — 96523 IRRIG DRUG DELIVERY DEVICE: CPT

## 2025-01-08 PROCEDURE — A4216 STERILE WATER/SALINE, 10 ML: HCPCS | Performed by: STUDENT IN AN ORGANIZED HEALTH CARE EDUCATION/TRAINING PROGRAM

## 2025-01-08 RX ORDER — SODIUM CHLORIDE 0.9 % (FLUSH) 0.9 %
10 SYRINGE (ML) INJECTION
Status: DISCONTINUED | OUTPATIENT
Start: 2025-01-08 | End: 2025-01-08 | Stop reason: HOSPADM

## 2025-01-08 RX ORDER — HEPARIN 100 UNIT/ML
500 SYRINGE INTRAVENOUS
Status: DISCONTINUED | OUTPATIENT
Start: 2025-01-08 | End: 2025-01-08 | Stop reason: HOSPADM

## 2025-01-08 RX ORDER — PROCHLORPERAZINE EDISYLATE 5 MG/ML
5 INJECTION INTRAMUSCULAR; INTRAVENOUS ONCE AS NEEDED
Status: DISCONTINUED | OUTPATIENT
Start: 2025-01-08 | End: 2025-01-08 | Stop reason: HOSPADM

## 2025-01-08 RX ADMIN — Medication 10 ML: at 11:01

## 2025-01-08 RX ADMIN — PEGFILGRASTIM 6 MG: KIT SUBCUTANEOUS at 11:01

## 2025-01-08 RX ADMIN — HEPARIN SODIUM (PORCINE) LOCK FLUSH IV SOLN 100 UNIT/ML 500 UNITS: 100 SOLUTION at 11:01

## 2025-01-08 NOTE — PROGRESS NOTES
Called and confirmed that [facility] did receive the home health referral on this patient. [facility] reached out to the patient and arranged an admit date with her daughter, April. [facility] will go out to admit the patient under their services on 01/09/25.

## 2025-01-09 PROCEDURE — G0180 MD CERTIFICATION HHA PATIENT: HCPCS | Mod: ,,, | Performed by: STUDENT IN AN ORGANIZED HEALTH CARE EDUCATION/TRAINING PROGRAM

## 2025-01-10 ENCOUNTER — DOCUMENTATION ONLY (OUTPATIENT)
Dept: HEMATOLOGY/ONCOLOGY | Facility: CLINIC | Age: 70
End: 2025-01-10
Payer: MEDICARE

## 2025-01-10 NOTE — PROGRESS NOTES
Spoke with [facility] and confirmed that the patient was admitted to their services on 01/09/25.

## 2025-01-27 NOTE — PROGRESS NOTES
Subjective:       Patient ID: Selene Smallwood is a 69 y.o. female.    Chief Complaint: Follow Up  Malignant neoplasm of ascending colon (Pt reports D and poor appetite for a week after treatment. She started taking Imodium PRN and denies any D today.)    Diagnosis:   Stage IV Metastatic Colon Adenocarcinoma with mets to peritoneum  2. Pulmonary Embolisum    Current Treatment:   FOLFIRI + Priscilla - (1/6/25-present)  Xarelto 15 mg BID x 21 days then 20 mg daily- 6/25/24-present    Treatment History:   FOLFOX + Cetuximab q2w x 12 cycles- 4/2/24 - 11/6/24  Hold Oxaliplatin for C11 and C12 due to tolerance    HPI:   70 yo F presented in March '24 for evaluation of metastatic colon cancer. She initially presented to OSH in early February '24 with 2 days of upper abdominal pain. Imaging on 2/4 in the ER revealed evidence of non perforated acute appendicitis. She underwent Ex lap where a cecal mass was incidentally discovered and resected, as well as omental studding was noted and 1 small mass was taken for biopsy. Her final pathology revealed a large exophytic mass in the cecum measuring 4 x 2 cm extending to the appendiceal orifice and extends into pericolonic adipose tissue. G2, LVI+, PNI negative. 14 LN were resected, with 10 being positive for metastatic adenocarcinoma. She also had an omental mass measuring 2.5 x 1.5cm which was positive for metastatic adenocarcinoma, consistent with a colon primary. Her final pathology staging is consistent with [id number]. Her postop course was complicated by abdominal abscess formation s/p IR drain placement and subsequent removal and antibiotic treatment.      She underwent PET scan in March '24 with evidence of nodular foci within the omentum/peritoneum consistent with peritoneal carcinomatosis. No other evidence of disease. NGS testing was performed which revealed GULSHAN wild type and BRAF negative. Therefore she underwent 1st line therapy of FOLFOX + Cetuximab Q2w x 12 cycles.  Despite good response to treatment after 6 cycles of therapy, once she completed 12 cycles of chemotherapy she had evidence of disease progression to abdominal lymph nodes, peritoneum, and bilateral lungs. She will now proceed with 2nd line FOLFIRI + Bevacizumab.     Interval History:  Patient returns to the clinic today for a follow-up and clearance for C2 FOLFIRI +Priscilla q2w x 12 cycles.  Overall feels well today.    She tolerated C1 well. She did have diarrhea x 1 week that was well controlled with imodium.  She has had decreased appetite and is requesting ensures. She is not taking previously prescribed megace. Instructed to restart.  All neuropathy is stable with gabapentin 300mg TID.  Feet have improved since toenails were removed. Followed by pediatry.   Now has HH.   She is taking xarelto as prescribed. Denies any bleeding or excessive bruising.   Denies SOB, chest pain, fever, chills, abnormal bowels.   Labs reviewed in detail with patient.      Past Medical History:   Diagnosis Date    Hypertension       Past Surgical History:   Procedure Laterality Date    APPENDECTOMY N/A 2/5/2024    Procedure: APPENDECTOMY;  Surgeon: Anna Murillo MD;  Location: [facility];  Service: General;  Laterality: N/A;    INSERTION OF TUNNELED CENTRAL VENOUS CATHETER (CVC) WITH SUBCUTANEOUS PORT N/A 3/26/2024    Procedure: [id number];  Surgeon: Armando Tran MD;  Location: [facility] OR;  Service: Peripheral Vascular;  Laterality: N/A;    LAPAROSCOPIC APPENDECTOMY N/A 2/5/2024    Procedure: APPENDECTOMY, LAPAROSCOPIC;  Surgeon: Anna Murillo MD;  Location: [facility] OR;  Service: General;  Laterality: N/A;    SURGICAL REMOVAL OF ILEUM WITH CECUM N/A 2/5/2024    Procedure: EXCISION, CECUM WITH ILEUM;  Surgeon: Anna Murillo MD;  Location: [facility] OR;  Service: General;  Laterality: N/A;    WASHOUT N/A 2/5/2024    Procedure: WASHOUT;  Surgeon: Anna Murillo MD;  Location: [facility] OR;  Service: General;  Laterality: N/A;      Social History     Socioeconomic History    Marital status:    Tobacco Use    Smoking status: Former     Types: Cigarettes    Smokeless tobacco: Never   Substance and Sexual Activity    Alcohol use: Not Currently    Drug use: Never    Sexual activity: Not Currently     Social Drivers of Health     Financial Resource Strain: Low Risk  (2/15/2024)    Overall Financial Resource Strain (CARDIA)     Difficulty of Paying Living Expenses: Not hard at all   Food Insecurity: No Food Insecurity (2/15/2024)    Hunger Vital Sign     Worried About Running Out of Food in the Last Year: Never true     Ran Out of Food in the Last Year: Never true   Transportation Needs: No Transportation Needs (2/15/2024)    PRAPARE - Transportation     Lack of Transportation (Medical): No     Lack of Transportation (Non-Medical): No   Physical Activity: Unknown (2/15/2024)    Exercise Vital Sign     Days of Exercise per Week: 0 days     Minutes of Exercise per Session: Patient unable to answer   Stress: No Stress Concern Present (2/15/2024)    [place] citizen [place] of [employer]ational [employer] - Occupational Stress Questionnaire     Feeling of Stress : Not at all   Housing Stability: Low Risk  (2/15/2024)    Housing Stability Vital Sign     Unable to Pay for Housing in the Last Year: No     Number of Places Lived in the Last Year: 1     Unstable Housing in the Last Year: No      Family History   Family history unknown: Yes      Review of patient's allergies indicates:   Allergen Reactions    Robaxin [methocarbamol] Other (See Comments)     Altered mental status       Review of Systems   Constitutional:  Positive for appetite change, fatigue and unexpected weight change. Negative for activity change and fever.   HENT:  Negative for sore throat.    Eyes:  Negative for visual disturbance.   Respiratory:  Negative for cough and shortness of breath.    Cardiovascular:  Negative for chest pain.   Gastrointestinal:  Negative for abdominal pain, diarrhea  and vomiting.   Endocrine: Negative for polyuria.   Genitourinary:  Negative for dysuria and hot flashes.   Integumentary:  Negative for rash.   Allergic/Immunologic: Negative for immunocompromised state.   Neurological:  Negative for weakness and headaches.   Hematological:  Negative for adenopathy.   Psychiatric/Behavioral:  Positive for depressed mood. Negative for confusion.          Objective:      Vitals:    01/28/25 0934   BP: 134/88   Pulse: 101   Resp: 16   Temp: 97.5 °F (36.4 °C)             Physical Exam  Constitutional:       General: She is not in acute distress.     Appearance: Normal appearance. She is not ill-appearing.   HENT:      Head: Normocephalic and atraumatic.      Nose: Nose normal.      Mouth/Throat:      Mouth: Mucous membranes are moist.      Pharynx: Oropharynx is clear.   Eyes:      Extraocular Movements: Extraocular movements intact.      Conjunctiva/sclera: Conjunctivae normal.      Pupils: Pupils are equal, round, and reactive to light.   Cardiovascular:      Rate and Rhythm: Normal rate and regular rhythm.      Pulses: Normal pulses.      Heart sounds: Normal heart sounds. No murmur heard.  Pulmonary:      Effort: Pulmonary effort is normal. No respiratory distress.      Breath sounds: Normal breath sounds.   Abdominal:      General: There is no distension.      Palpations: Abdomen is soft.      Tenderness: There is no abdominal tenderness.   Musculoskeletal:         General: Normal range of motion.      Cervical back: Normal range of motion and neck supple.      Right lower leg: No edema.      Left lower leg: No edema.   Feet:      Left foot:      Skin integrity: No blister.   Lymphadenopathy:      Cervical: No cervical adenopathy.   Skin:     General: Skin is warm and dry.   Neurological:      General: No focal deficit present.      Mental Status: She is alert and oriented to person, place, and time.         LABS AND IMAGING REVIEWED IN EPIC  12/3/24 PET/CT:  1. Interval increase  in size and number pulmonary nodules compared to comparison PET-CT.  Minimal measurable FDG uptake.  However nodules are small and below the resolution of PET.  2. New hypermetabolic abdominal aortocaval lymph node  3. Nonspecific uptake associated with bowel loops in the right lower quadrant.  Unable to differentiate potential serosal implants from physiologic or inflammatory bowel activity.  4. Enlarging cystic lesion at the left pelvic sidewall.  There is mild uptake at the posteroinferior margin of the lesion at the left adnexa.  Mild uptake at the cul-de-sac/posterior uterine segment.  Uptake is mild and nonspecific.  Peritoneal implants are consideration.       PATHOLOGY:  2/5/24 Biopsy:  1. Appendix, appendectomy:  - Acute appendicitis with perforation.  - Adenocarcinoma involves lymphatic spaces of the appendiceal wall and peritoneal surface.  2. Cecum, ileocecectomy:  - Colonic adenocarcinoma.  - See synoptic checklist for CAP cancer protocol.  3. Omental mass, biopsy:  - Metastatic adenocarcinoma, consistent with a colon primary.  4. Small bowel, segmental resection:  - Acute peritonitis.  - No evidence of malignancy.    MLH1: PRESERVED (INTACT) EXPRESSION IN TUMOR CELLS      MSH2: PRESERVED (INTACT) EXPRESSION IN TUMOR CELLS      MSH6: PRESERVED (INTACT) EXPRESSION IN TUMOR CELLS      PMS2: PRESERVED (INTACT) EXPRESSION IN TUMOR CELLS  Microscopic Description/Comments   These results show no deficiency of the mismatch repair proteins tested.    Assessment:   Stage IV Metastatic Colon Adenocarcinoma with mets to peritoneum - Cecal mass, nonobstructive, no perforation. [id number]. Treatment history as above. Now proceeding with 2nd line FOLFIRI + Priscilla. Repeat PET after C6.   Pulmonary Embolus at the right mainstem bronchus   Incidentally found during CT CAP 6/25/24. Now on xarelto, continue at this time.   Peripheral Neuropathy   She does report peripheral neuropathy to her fingertips and lower legs.  Her  hand neuropathy is grade 2, consistent but not painful. Able to button buttons.   Her LE bilateral neuropathy is mainly at night she gets shooting pains when she lays down.   Stable today on gabapentin to 300mg TID - 1 in AM, 2 in PM.   Immunodeficiency due to Drug Therapy - Precautions discussed with patient. Continue to monitor for any signs of symptoms of infection. No prophylaxis needed at this time. On growth factor due to history of significant neutropenia due to chemotherapy.     Plan:   Labs stable and adequate for treat.  Toxicity reviewed, okay to proceed with C2 FOLFIRI+ Priscilla + GSF as scheduled with reduced dose of Irinotecan of 150mg/m2 due to tolerance concerns. No 5FU bolus in the setting of metastatic disease.   Continue follow up with Podiatry for paronychia of left thumb and great toe  Continue gabapentin 300mg TID for drug induced peripheral neuropathy  Will get PT/OT started through .   Re-start megace for appetite. Ensures ordered.   RTC in 2 weeks with NP for FU/lab/treat C3  Cbc, cmp, cea, urine protein- 1 hr prior @ [facility]      I spent a total of 40 minutes on the day of the visit.This includes face to face time and non-face to face time preparing to see the patient (eg, review of tests), obtaining and/or reviewing separately obtained history, documenting clinical information in the electronic or other health record, independently interpreting results and communicating results to the patient/family/caregiver, or care coordinator.      Susan Gayle, FNP-C  Oncology/Hematology   Cancer Center [facility]

## 2025-01-28 ENCOUNTER — INFUSION (OUTPATIENT)
Dept: INFUSION THERAPY | Facility: HOSPITAL | Age: 70
End: 2025-01-28
Payer: MEDICARE

## 2025-01-28 ENCOUNTER — OFFICE VISIT (OUTPATIENT)
Dept: HEMATOLOGY/ONCOLOGY | Facility: CLINIC | Age: 70
End: 2025-01-28
Payer: MEDICARE

## 2025-01-28 VITALS
HEART RATE: 101 BPM | TEMPERATURE: 98 F | HEIGHT: 67 IN | SYSTOLIC BLOOD PRESSURE: 134 MMHG | WEIGHT: 117.69 LBS | OXYGEN SATURATION: 100 % | BODY MASS INDEX: 18.47 KG/M2 | RESPIRATION RATE: 16 BRPM | DIASTOLIC BLOOD PRESSURE: 88 MMHG

## 2025-01-28 VITALS
HEIGHT: 67 IN | DIASTOLIC BLOOD PRESSURE: 88 MMHG | TEMPERATURE: 98 F | HEART RATE: 101 BPM | WEIGHT: 117.5 LBS | BODY MASS INDEX: 18.44 KG/M2 | OXYGEN SATURATION: 100 % | RESPIRATION RATE: 16 BRPM | SYSTOLIC BLOOD PRESSURE: 134 MMHG

## 2025-01-28 DIAGNOSIS — C78.6 MALIGNANT NEOPLASM METASTATIC TO PERITONEUM: Primary | ICD-10-CM

## 2025-01-28 DIAGNOSIS — Z79.899 IMMUNODEFICIENCY DUE TO DRUG THERAPY: ICD-10-CM

## 2025-01-28 DIAGNOSIS — D84.821 IMMUNODEFICIENCY DUE TO CHEMOTHERAPY: ICD-10-CM

## 2025-01-28 DIAGNOSIS — L03.032 ACUTE PARONYCHIA OF TOE OF LEFT FOOT: ICD-10-CM

## 2025-01-28 DIAGNOSIS — C18.2 MALIGNANT NEOPLASM OF ASCENDING COLON: ICD-10-CM

## 2025-01-28 DIAGNOSIS — Z79.899 ON ANTINEOPLASTIC CHEMOTHERAPY: ICD-10-CM

## 2025-01-28 DIAGNOSIS — C18.2 MALIGNANT NEOPLASM OF ASCENDING COLON: Primary | ICD-10-CM

## 2025-01-28 DIAGNOSIS — R64 CANCER CACHEXIA: ICD-10-CM

## 2025-01-28 DIAGNOSIS — T45.1X5A IMMUNODEFICIENCY DUE TO CHEMOTHERAPY: ICD-10-CM

## 2025-01-28 DIAGNOSIS — R97.0 ELEVATED CARCINOEMBRYONIC ANTIGEN (CEA): ICD-10-CM

## 2025-01-28 DIAGNOSIS — Z79.899 IMMUNODEFICIENCY DUE TO CHEMOTHERAPY: ICD-10-CM

## 2025-01-28 DIAGNOSIS — G62.0 CHEMOTHERAPY-INDUCED NEUROPATHY: ICD-10-CM

## 2025-01-28 DIAGNOSIS — G62.9 NEUROPATHY: ICD-10-CM

## 2025-01-28 DIAGNOSIS — T45.1X5A CHEMOTHERAPY-INDUCED NEUROPATHY: ICD-10-CM

## 2025-01-28 DIAGNOSIS — C18.9 MALIGNANT NEOPLASM OF COLON, UNSPECIFIED PART OF COLON: ICD-10-CM

## 2025-01-28 DIAGNOSIS — R91.8 MULTIPLE LUNG NODULES ON CT: ICD-10-CM

## 2025-01-28 DIAGNOSIS — R45.89 ANXIETY ABOUT HEALTH: ICD-10-CM

## 2025-01-28 DIAGNOSIS — G62.0 NEUROPATHY DUE TO DRUG: ICD-10-CM

## 2025-01-28 DIAGNOSIS — D84.821 IMMUNODEFICIENCY DUE TO DRUG THERAPY: ICD-10-CM

## 2025-01-28 PROCEDURE — 99215 OFFICE O/P EST HI 40 MIN: CPT | Mod: S$PBB,,,

## 2025-01-28 PROCEDURE — 99999 PR PBB SHADOW E&M-EST. PATIENT-LVL IV: CPT | Mod: PBBFAC,,,

## 2025-01-28 PROCEDURE — 96368 THER/DIAG CONCURRENT INF: CPT

## 2025-01-28 PROCEDURE — 25000003 PHARM REV CODE 250

## 2025-01-28 PROCEDURE — 99214 OFFICE O/P EST MOD 30 MIN: CPT | Mod: PBBFAC

## 2025-01-28 PROCEDURE — 96415 CHEMO IV INFUSION ADDL HR: CPT

## 2025-01-28 PROCEDURE — 96375 TX/PRO/DX INJ NEW DRUG ADDON: CPT

## 2025-01-28 PROCEDURE — 96367 TX/PROPH/DG ADDL SEQ IV INF: CPT

## 2025-01-28 PROCEDURE — 63600175 PHARM REV CODE 636 W HCPCS

## 2025-01-28 PROCEDURE — 96416 CHEMO PROLONG INFUSE W/PUMP: CPT

## 2025-01-28 RX ORDER — HEPARIN 100 UNIT/ML
500 SYRINGE INTRAVENOUS
Status: DISCONTINUED | OUTPATIENT
Start: 2025-01-28 | End: 2025-01-28 | Stop reason: HOSPADM

## 2025-01-28 RX ORDER — SODIUM CHLORIDE 0.9 % (FLUSH) 0.9 %
10 SYRINGE (ML) INJECTION
Status: DISCONTINUED | OUTPATIENT
Start: 2025-01-28 | End: 2025-01-28 | Stop reason: HOSPADM

## 2025-01-28 RX ORDER — SODIUM CHLORIDE 0.9 % (FLUSH) 0.9 %
10 SYRINGE (ML) INJECTION
Status: CANCELLED | OUTPATIENT
Start: 2025-01-28

## 2025-01-28 RX ORDER — EPINEPHRINE 0.3 MG/.3ML
0.3 INJECTION SUBCUTANEOUS ONCE AS NEEDED
Status: CANCELLED | OUTPATIENT
Start: 2025-01-28

## 2025-01-28 RX ORDER — PROCHLORPERAZINE EDISYLATE 5 MG/ML
5 INJECTION INTRAMUSCULAR; INTRAVENOUS ONCE AS NEEDED
Status: CANCELLED | OUTPATIENT
Start: 2025-01-28

## 2025-01-28 RX ORDER — PROCHLORPERAZINE EDISYLATE 5 MG/ML
5 INJECTION INTRAMUSCULAR; INTRAVENOUS ONCE AS NEEDED
OUTPATIENT
Start: 2025-01-29

## 2025-01-28 RX ORDER — DIPHENHYDRAMINE HYDROCHLORIDE 50 MG/ML
50 INJECTION INTRAMUSCULAR; INTRAVENOUS ONCE AS NEEDED
Status: CANCELLED | OUTPATIENT
Start: 2025-01-28

## 2025-01-28 RX ORDER — SODIUM CHLORIDE 0.9 % (FLUSH) 0.9 %
10 SYRINGE (ML) INJECTION
Status: CANCELLED | OUTPATIENT
Start: 2025-01-29

## 2025-01-28 RX ORDER — HEPARIN 100 UNIT/ML
500 SYRINGE INTRAVENOUS
Status: CANCELLED | OUTPATIENT
Start: 2025-01-28

## 2025-01-28 RX ORDER — ATROPINE SULFATE 0.4 MG/ML
0.4 INJECTION, SOLUTION ENDOTRACHEAL; INTRAMEDULLARY; INTRAMUSCULAR; INTRAVENOUS; SUBCUTANEOUS ONCE AS NEEDED
Status: COMPLETED | OUTPATIENT
Start: 2025-01-28 | End: 2025-01-28

## 2025-01-28 RX ORDER — HEPARIN 100 UNIT/ML
500 SYRINGE INTRAVENOUS
Status: CANCELLED | OUTPATIENT
Start: 2025-01-29

## 2025-01-28 RX ORDER — ATROPINE SULFATE 0.4 MG/ML
0.4 INJECTION, SOLUTION ENDOTRACHEAL; INTRAMEDULLARY; INTRAMUSCULAR; INTRAVENOUS; SUBCUTANEOUS ONCE AS NEEDED
Status: CANCELLED | OUTPATIENT
Start: 2025-01-28

## 2025-01-28 RX ADMIN — IRINOTECAN HYDROCHLORIDE 240 MG: 20 INJECTION, SOLUTION INTRAVENOUS at 12:01

## 2025-01-28 RX ADMIN — BEVACIZUMAB-AWWB 280 MG: 100 INJECTION, SOLUTION INTRAVENOUS at 10:01

## 2025-01-28 RX ADMIN — ATROPINE SULFATE 0.4 MG: 0.4 INJECTION, SOLUTION INTRAVENOUS at 11:01

## 2025-01-28 RX ADMIN — DEXAMETHASONE SODIUM PHOSPHATE 0.25 MG: 4 INJECTION, SOLUTION INTRA-ARTICULAR; INTRALESIONAL; INTRAMUSCULAR; INTRAVENOUS; SOFT TISSUE at 11:01

## 2025-01-28 RX ADMIN — FLUOROURACIL 3890 MG: 50 INJECTION, SOLUTION INTRAVENOUS at 01:01

## 2025-01-28 RX ADMIN — DEXTROSE MONOHYDRATE 650 MG: 50 INJECTION, SOLUTION INTRAVENOUS at 12:01

## 2025-01-28 RX ADMIN — SODIUM CHLORIDE: 9 INJECTION, SOLUTION INTRAVENOUS at 10:01

## 2025-01-28 NOTE — PLAN OF CARE
Pt will receive infusion without any incidence or complicationsPt will receive infusion without any incidence or complications

## 2025-01-30 ENCOUNTER — INFUSION (OUTPATIENT)
Dept: INFUSION THERAPY | Facility: HOSPITAL | Age: 70
End: 2025-01-30
Payer: MEDICARE

## 2025-01-30 VITALS
BODY MASS INDEX: 18.52 KG/M2 | HEART RATE: 100 BPM | SYSTOLIC BLOOD PRESSURE: 156 MMHG | HEIGHT: 67 IN | OXYGEN SATURATION: 97 % | DIASTOLIC BLOOD PRESSURE: 81 MMHG | RESPIRATION RATE: 16 BRPM | TEMPERATURE: 98 F | WEIGHT: 118 LBS

## 2025-01-30 DIAGNOSIS — C18.2 MALIGNANT NEOPLASM OF ASCENDING COLON: Primary | ICD-10-CM

## 2025-01-30 PROCEDURE — 96377 APPLICATON ON-BODY INJECTOR: CPT

## 2025-01-30 PROCEDURE — 63600175 PHARM REV CODE 636 W HCPCS: Mod: JZ,TB

## 2025-01-30 RX ORDER — SODIUM CHLORIDE 0.9 % (FLUSH) 0.9 %
10 SYRINGE (ML) INJECTION
Status: DISCONTINUED | OUTPATIENT
Start: 2025-01-30 | End: 2025-01-30 | Stop reason: HOSPADM

## 2025-01-30 RX ORDER — HEPARIN 100 UNIT/ML
500 SYRINGE INTRAVENOUS
Status: DISCONTINUED | OUTPATIENT
Start: 2025-01-30 | End: 2025-01-30 | Stop reason: HOSPADM

## 2025-01-30 RX ADMIN — PEGFILGRASTIM 6 MG: KIT SUBCUTANEOUS at 12:01

## 2025-02-06 ENCOUNTER — DOCUMENTATION ONLY (OUTPATIENT)
Dept: HEMATOLOGY/ONCOLOGY | Facility: CLINIC | Age: 70
End: 2025-02-06
Payer: MEDICARE

## 2025-02-06 NOTE — PROGRESS NOTES
Referral with [facility] was checked. Patient was received physical therapy today with [facility].

## 2025-02-11 ENCOUNTER — OFFICE VISIT (OUTPATIENT)
Dept: HEMATOLOGY/ONCOLOGY | Facility: CLINIC | Age: 70
End: 2025-02-11
Payer: MEDICARE

## 2025-02-11 ENCOUNTER — INFUSION (OUTPATIENT)
Dept: INFUSION THERAPY | Facility: HOSPITAL | Age: 70
End: 2025-02-11
Payer: MEDICARE

## 2025-02-11 ENCOUNTER — LAB VISIT (OUTPATIENT)
Dept: LAB | Facility: HOSPITAL | Age: 70
End: 2025-02-11
Payer: MEDICARE

## 2025-02-11 VITALS
OXYGEN SATURATION: 100 % | SYSTOLIC BLOOD PRESSURE: 158 MMHG | RESPIRATION RATE: 16 BRPM | TEMPERATURE: 98 F | HEIGHT: 67 IN | WEIGHT: 114.81 LBS | HEART RATE: 97 BPM | BODY MASS INDEX: 18.02 KG/M2 | DIASTOLIC BLOOD PRESSURE: 89 MMHG

## 2025-02-11 VITALS
SYSTOLIC BLOOD PRESSURE: 170 MMHG | OXYGEN SATURATION: 100 % | HEIGHT: 67 IN | DIASTOLIC BLOOD PRESSURE: 98 MMHG | TEMPERATURE: 98 F | WEIGHT: 114.81 LBS | RESPIRATION RATE: 16 BRPM | BODY MASS INDEX: 18.02 KG/M2 | HEART RATE: 97 BPM

## 2025-02-11 DIAGNOSIS — C18.2 MALIGNANT NEOPLASM OF ASCENDING COLON: Primary | ICD-10-CM

## 2025-02-11 DIAGNOSIS — Z79.899 ON ANTINEOPLASTIC CHEMOTHERAPY: ICD-10-CM

## 2025-02-11 DIAGNOSIS — R91.8 MULTIPLE LUNG NODULES ON CT: ICD-10-CM

## 2025-02-11 DIAGNOSIS — C78.6 MALIGNANT NEOPLASM METASTATIC TO PERITONEUM: Primary | ICD-10-CM

## 2025-02-11 DIAGNOSIS — T45.1X5A CHEMOTHERAPY-INDUCED NEUROPATHY: ICD-10-CM

## 2025-02-11 DIAGNOSIS — G62.0 CHEMOTHERAPY-INDUCED NEUROPATHY: ICD-10-CM

## 2025-02-11 DIAGNOSIS — Z79.899 IMMUNODEFICIENCY DUE TO DRUG THERAPY: ICD-10-CM

## 2025-02-11 DIAGNOSIS — L03.012 ACUTE PARONYCHIA OF LEFT THUMB: ICD-10-CM

## 2025-02-11 DIAGNOSIS — T45.1X5A IMMUNODEFICIENCY DUE TO CHEMOTHERAPY: ICD-10-CM

## 2025-02-11 DIAGNOSIS — R97.0 ELEVATED CARCINOEMBRYONIC ANTIGEN (CEA): ICD-10-CM

## 2025-02-11 DIAGNOSIS — L03.032 ACUTE PARONYCHIA OF TOE OF LEFT FOOT: ICD-10-CM

## 2025-02-11 DIAGNOSIS — I10 HYPERTENSION, UNSPECIFIED TYPE: ICD-10-CM

## 2025-02-11 DIAGNOSIS — F41.9 ANXIETY: ICD-10-CM

## 2025-02-11 DIAGNOSIS — C18.9 MALIGNANT NEOPLASM OF COLON, UNSPECIFIED PART OF COLON: ICD-10-CM

## 2025-02-11 DIAGNOSIS — R64 CANCER CACHEXIA: ICD-10-CM

## 2025-02-11 DIAGNOSIS — G62.9 NEUROPATHY: ICD-10-CM

## 2025-02-11 DIAGNOSIS — D84.821 IMMUNODEFICIENCY DUE TO DRUG THERAPY: ICD-10-CM

## 2025-02-11 DIAGNOSIS — C18.2 MALIGNANT NEOPLASM OF ASCENDING COLON: ICD-10-CM

## 2025-02-11 DIAGNOSIS — G47.00 INSOMNIA, UNSPECIFIED TYPE: ICD-10-CM

## 2025-02-11 DIAGNOSIS — D84.821 IMMUNODEFICIENCY DUE TO CHEMOTHERAPY: ICD-10-CM

## 2025-02-11 DIAGNOSIS — C78.6 MALIGNANT NEOPLASM METASTATIC TO PERITONEUM: ICD-10-CM

## 2025-02-11 DIAGNOSIS — Z79.899 IMMUNODEFICIENCY DUE TO CHEMOTHERAPY: ICD-10-CM

## 2025-02-11 DIAGNOSIS — R45.89 ANXIETY ABOUT HEALTH: ICD-10-CM

## 2025-02-11 LAB
ALBUMIN SERPL-MCNC: 2.7 G/DL (ref 3.4–4.8)
ALBUMIN/GLOB SERPL: 0.5 RATIO (ref 1.1–2)
ALP SERPL-CCNC: 108 UNIT/L (ref 40–150)
ALT SERPL-CCNC: 9 UNIT/L (ref 0–55)
ANION GAP SERPL CALC-SCNC: 7 MEQ/L
AST SERPL-CCNC: 16 UNIT/L (ref 5–34)
BASOPHILS # BLD AUTO: 0.04 X10(3)/MCL
BASOPHILS NFR BLD AUTO: 0.5 %
BILIRUB SERPL-MCNC: 0.3 MG/DL
BUN SERPL-MCNC: 8 MG/DL (ref 9.8–20.1)
CALCIUM SERPL-MCNC: 9.1 MG/DL (ref 8.4–10.2)
CEA SERPL-MCNC: 947.83 NG/ML (ref 0–3)
CHLORIDE SERPL-SCNC: 111 MMOL/L (ref 98–107)
CO2 SERPL-SCNC: 24 MMOL/L (ref 23–31)
CREAT SERPL-MCNC: 0.72 MG/DL (ref 0.55–1.02)
CREAT/UREA NIT SERPL: 11
EOSINOPHIL # BLD AUTO: 0.39 X10(3)/MCL (ref 0–0.9)
EOSINOPHIL NFR BLD AUTO: 4.4 %
ERYTHROCYTE [DISTWIDTH] IN BLOOD BY AUTOMATED COUNT: 15.2 % (ref 11.5–17)
GFR SERPLBLD CREATININE-BSD FMLA CKD-EPI: >60 ML/MIN/1.73/M2
GLOBULIN SER-MCNC: 5.5 GM/DL (ref 2.4–3.5)
GLUCOSE SERPL-MCNC: 97 MG/DL (ref 82–115)
HCT VFR BLD AUTO: 34 % (ref 37–47)
HGB BLD-MCNC: 10.7 G/DL (ref 12–16)
IMM GRANULOCYTES # BLD AUTO: 0.06 X10(3)/MCL (ref 0–0.04)
IMM GRANULOCYTES NFR BLD AUTO: 0.7 %
LYMPHOCYTES # BLD AUTO: 1.5 X10(3)/MCL (ref 0.6–4.6)
LYMPHOCYTES NFR BLD AUTO: 17.1 %
MAGNESIUM SERPL-MCNC: 1.8 MG/DL (ref 1.6–2.6)
MCH RBC QN AUTO: 29.7 PG (ref 27–31)
MCHC RBC AUTO-ENTMCNC: 31.5 G/DL (ref 33–36)
MCV RBC AUTO: 94.4 FL (ref 80–94)
MONOCYTES # BLD AUTO: 0.93 X10(3)/MCL (ref 0.1–1.3)
MONOCYTES NFR BLD AUTO: 10.6 %
NEUTROPHILS # BLD AUTO: 5.86 X10(3)/MCL (ref 2.1–9.2)
NEUTROPHILS NFR BLD AUTO: 66.7 %
NRBC BLD AUTO-RTO: 0 %
PLATELET # BLD AUTO: 291 X10(3)/MCL (ref 130–400)
PMV BLD AUTO: 10.5 FL (ref 7.4–10.4)
POTASSIUM SERPL-SCNC: 3.9 MMOL/L (ref 3.5–5.1)
PROT SERPL-MCNC: 8.2 GM/DL (ref 5.8–7.6)
PROT UR STRIP-MCNC: 38.3 MG/DL
RBC # BLD AUTO: 3.6 X10(6)/MCL (ref 4.2–5.4)
SODIUM SERPL-SCNC: 142 MMOL/L (ref 136–145)
WBC # BLD AUTO: 8.78 X10(3)/MCL (ref 4.5–11.5)

## 2025-02-11 PROCEDURE — 96375 TX/PRO/DX INJ NEW DRUG ADDON: CPT

## 2025-02-11 PROCEDURE — 99215 OFFICE O/P EST HI 40 MIN: CPT | Mod: S$PBB,,,

## 2025-02-11 PROCEDURE — 96417 CHEMO IV INFUS EACH ADDL SEQ: CPT

## 2025-02-11 PROCEDURE — 63600175 PHARM REV CODE 636 W HCPCS

## 2025-02-11 PROCEDURE — 96368 THER/DIAG CONCURRENT INF: CPT

## 2025-02-11 PROCEDURE — 96413 CHEMO IV INFUSION 1 HR: CPT

## 2025-02-11 PROCEDURE — 99999 PR PBB SHADOW E&M-EST. PATIENT-LVL V: CPT | Mod: PBBFAC,,,

## 2025-02-11 PROCEDURE — 83735 ASSAY OF MAGNESIUM: CPT

## 2025-02-11 PROCEDURE — 96367 TX/PROPH/DG ADDL SEQ IV INF: CPT

## 2025-02-11 PROCEDURE — 96416 CHEMO PROLONG INFUSE W/PUMP: CPT

## 2025-02-11 PROCEDURE — 80053 COMPREHEN METABOLIC PANEL: CPT

## 2025-02-11 PROCEDURE — 25000003 PHARM REV CODE 250

## 2025-02-11 PROCEDURE — 85025 COMPLETE CBC W/AUTO DIFF WBC: CPT

## 2025-02-11 PROCEDURE — A4216 STERILE WATER/SALINE, 10 ML: HCPCS

## 2025-02-11 PROCEDURE — 82378 CARCINOEMBRYONIC ANTIGEN: CPT

## 2025-02-11 PROCEDURE — 36415 COLL VENOUS BLD VENIPUNCTURE: CPT

## 2025-02-11 PROCEDURE — 99215 OFFICE O/P EST HI 40 MIN: CPT | Mod: PBBFAC

## 2025-02-11 PROCEDURE — 84156 ASSAY OF PROTEIN URINE: CPT

## 2025-02-11 RX ORDER — ATROPINE SULFATE 0.4 MG/ML
0.4 INJECTION, SOLUTION ENDOTRACHEAL; INTRAMEDULLARY; INTRAMUSCULAR; INTRAVENOUS; SUBCUTANEOUS ONCE AS NEEDED
Status: CANCELLED | OUTPATIENT
Start: 2025-02-11

## 2025-02-11 RX ORDER — SODIUM CHLORIDE 0.9 % (FLUSH) 0.9 %
10 SYRINGE (ML) INJECTION
Status: CANCELLED | OUTPATIENT
Start: 2025-02-11

## 2025-02-11 RX ORDER — PROCHLORPERAZINE EDISYLATE 5 MG/ML
5 INJECTION INTRAMUSCULAR; INTRAVENOUS ONCE AS NEEDED
Status: CANCELLED | OUTPATIENT
Start: 2025-02-12

## 2025-02-11 RX ORDER — AMLODIPINE BESYLATE 5 MG/1
5 TABLET ORAL DAILY
Qty: 30 TABLET | Refills: 11 | Status: SHIPPED | OUTPATIENT
Start: 2025-02-11 | End: 2025-02-11

## 2025-02-11 RX ORDER — PROCHLORPERAZINE EDISYLATE 5 MG/ML
5 INJECTION INTRAMUSCULAR; INTRAVENOUS ONCE AS NEEDED
Status: DISCONTINUED | OUTPATIENT
Start: 2025-02-11 | End: 2025-02-11 | Stop reason: HOSPADM

## 2025-02-11 RX ORDER — AMLODIPINE BESYLATE 5 MG/1
5 TABLET ORAL ONCE
Status: COMPLETED | OUTPATIENT
Start: 2025-02-11 | End: 2025-02-11

## 2025-02-11 RX ORDER — EPINEPHRINE 0.3 MG/.3ML
0.3 INJECTION SUBCUTANEOUS ONCE AS NEEDED
Status: CANCELLED | OUTPATIENT
Start: 2025-02-11

## 2025-02-11 RX ORDER — PROCHLORPERAZINE EDISYLATE 5 MG/ML
5 INJECTION INTRAMUSCULAR; INTRAVENOUS ONCE AS NEEDED
Status: CANCELLED | OUTPATIENT
Start: 2025-02-11

## 2025-02-11 RX ORDER — SODIUM CHLORIDE 0.9 % (FLUSH) 0.9 %
10 SYRINGE (ML) INJECTION
Status: CANCELLED | OUTPATIENT
Start: 2025-02-12

## 2025-02-11 RX ORDER — AMLODIPINE BESYLATE 5 MG/1
10 TABLET ORAL DAILY
Qty: 180 TABLET | Refills: 3 | Status: SHIPPED | OUTPATIENT
Start: 2025-02-11 | End: 2026-02-11

## 2025-02-11 RX ORDER — HEPARIN 100 UNIT/ML
500 SYRINGE INTRAVENOUS
Status: CANCELLED | OUTPATIENT
Start: 2025-02-11

## 2025-02-11 RX ORDER — DIPHENHYDRAMINE HYDROCHLORIDE 50 MG/ML
50 INJECTION INTRAMUSCULAR; INTRAVENOUS ONCE AS NEEDED
Status: CANCELLED | OUTPATIENT
Start: 2025-02-11

## 2025-02-11 RX ORDER — SODIUM CHLORIDE 0.9 % (FLUSH) 0.9 %
10 SYRINGE (ML) INJECTION
Status: DISCONTINUED | OUTPATIENT
Start: 2025-02-11 | End: 2025-02-11 | Stop reason: HOSPADM

## 2025-02-11 RX ORDER — HEPARIN 100 UNIT/ML
500 SYRINGE INTRAVENOUS
Status: CANCELLED | OUTPATIENT
Start: 2025-02-12

## 2025-02-11 RX ORDER — ATROPINE SULFATE 0.4 MG/ML
0.4 INJECTION, SOLUTION ENDOTRACHEAL; INTRAMEDULLARY; INTRAMUSCULAR; INTRAVENOUS; SUBCUTANEOUS ONCE AS NEEDED
Status: COMPLETED | OUTPATIENT
Start: 2025-02-11 | End: 2025-02-11

## 2025-02-11 RX ADMIN — BEVACIZUMAB-AWWB 280 MG: 100 INJECTION, SOLUTION INTRAVENOUS at 11:02

## 2025-02-11 RX ADMIN — Medication 10 ML: at 02:02

## 2025-02-11 RX ADMIN — DEXTROSE MONOHYDRATE 650 MG: 50 INJECTION, SOLUTION INTRAVENOUS at 12:02

## 2025-02-11 RX ADMIN — IRINOTECAN HYDROCHLORIDE 240 MG: 20 INJECTION, SOLUTION INTRAVENOUS at 12:02

## 2025-02-11 RX ADMIN — AMLODIPINE BESYLATE 5 MG: 5 TABLET ORAL at 10:02

## 2025-02-11 RX ADMIN — ATROPINE SULFATE 0.4 MG: 0.4 INJECTION, SOLUTION INTRAVENOUS at 11:02

## 2025-02-11 RX ADMIN — SODIUM CHLORIDE: 9 INJECTION, SOLUTION INTRAVENOUS at 10:02

## 2025-02-11 RX ADMIN — PROCHLORPERAZINE EDISYLATE 5 MG: 5 INJECTION INTRAMUSCULAR; INTRAVENOUS at 01:02

## 2025-02-11 RX ADMIN — FLUOROURACIL 3890 MG: 50 INJECTION, SOLUTION INTRAVENOUS at 02:02

## 2025-02-11 RX ADMIN — DEXAMETHASONE SODIUM PHOSPHATE 0.25 MG: 4 INJECTION, SOLUTION INTRA-ARTICULAR; INTRALESIONAL; INTRAMUSCULAR; INTRAVENOUS; SOFT TISSUE at 10:02

## 2025-02-11 NOTE — PROGRESS NOTES
Subjective:       Patient ID: Selene Smallwood is a 69 y.o. female.    Chief Complaint: Follow Up  Malignant neoplasm of ascending colon (No complaints. Pt reports she has been out of her Xarelto x 1 week. )    Diagnosis:   Stage IV Metastatic Colon Adenocarcinoma with mets to peritoneum  2. Pulmonary Embolisum    Current Treatment:   FOLFIRI + Priscilla - (1/6/25-present)  Xarelto 15 mg BID x 21 days then 20 mg daily- 6/25/24-present    Treatment History:   FOLFOX + Cetuximab q2w x 12 cycles- 4/2/24 - 11/6/24  Hold Oxaliplatin for C11 and C12 due to tolerance    HPI:   70 yo F presented in March '24 for evaluation of metastatic colon cancer. She initially presented to OSH in early February '24 with 2 days of upper abdominal pain. Imaging on 2/4 in the ER revealed evidence of non perforated acute appendicitis. She underwent Ex lap where a cecal mass was incidentally discovered and resected, as well as omental studding was noted and 1 small mass was taken for biopsy. Her final pathology revealed a large exophytic mass in the cecum measuring 4 x 2 cm extending to the appendiceal orifice and extends into pericolonic adipose tissue. G2, LVI+, PNI negative. 14 LN were resected, with 10 being positive for metastatic adenocarcinoma. She also had an omental mass measuring 2.5 x 1.5cm which was positive for metastatic adenocarcinoma, consistent with a colon primary. Her final pathology staging is consistent with [id number]. Her postop course was complicated by abdominal abscess formation s/p IR drain placement and subsequent removal and antibiotic treatment.      She underwent PET scan in March '24 with evidence of nodular foci within the omentum/peritoneum consistent with peritoneal carcinomatosis. No other evidence of disease. NGS testing was performed which revealed GULSHAN wild type and BRAF negative. Therefore she underwent 1st line therapy of FOLFOX + Cetuximab Q2w x 12 cycles. Despite good response to treatment after 6 cycles  of therapy, once she completed 12 cycles of chemotherapy she had evidence of disease progression to abdominal lymph nodes, peritoneum, and bilateral lungs. She will now proceed with 2nd line FOLFIRI + Bevacizumab.     Interval History:  Patient returns to the clinic today for a follow-up and clearance for C3 FOLFIRI +Priscilla q2w x 12 cycles.  Overall feels well today.    She did not have any diarrhea with this cycle  She continues with decreased appetite, but has not started her daily megace as previously instructed.   All neuropathy is stable with gabapentin 300mg TID.  Feet have improved since toenails were removed. Followed by pediatry.   Now has HH with PT.  She has been out of xarelto x 1 week. Denies any bleeding or excessive bruising.   BP elevated today. She reports taking med right before coming to appt.   Denies SOB, chest pain, fever, chills, abnormal bowels.   Labs reviewed in detail with patient.      Past Medical History:   Diagnosis Date    Hypertension       Past Surgical History:   Procedure Laterality Date    APPENDECTOMY N/A 2/5/2024    Procedure: APPENDECTOMY;  Surgeon: Anna Murillo MD;  Location: [facility];  Service: General;  Laterality: N/A;    INSERTION OF TUNNELED CENTRAL VENOUS CATHETER (CVC) WITH SUBCUTANEOUS PORT N/A 3/26/2024    Procedure: [id number];  Surgeon: Armando Tran MD;  Location: [facility] OR;  Service: Peripheral Vascular;  Laterality: N/A;    LAPAROSCOPIC APPENDECTOMY N/A 2/5/2024    Procedure: APPENDECTOMY, LAPAROSCOPIC;  Surgeon: Anna Murillo MD;  Location: [facility] OR;  Service: General;  Laterality: N/A;    SURGICAL REMOVAL OF ILEUM WITH CECUM N/A 2/5/2024    Procedure: EXCISION, CECUM WITH ILEUM;  Surgeon: Anna Murillo MD;  Location: [facility] OR;  Service: General;  Laterality: N/A;    WASHOUT N/A 2/5/2024    Procedure: WASHOUT;  Surgeon: Anna Murillo MD;  Location: [facility] OR;  Service: General;  Laterality: N/A;     Social History     Socioeconomic History     Marital status:    Tobacco Use    Smoking status: Former     Types: Cigarettes    Smokeless tobacco: Never   Substance and Sexual Activity    Alcohol use: Not Currently    Drug use: Never    Sexual activity: Not Currently     Social Drivers of Health     Financial Resource Strain: Low Risk  (2/15/2024)    Overall Financial Resource Strain (CARDIA)     Difficulty of Paying Living Expenses: Not hard at all   Food Insecurity: No Food Insecurity (2/15/2024)    Hunger Vital Sign     Worried About Running Out of Food in the Last Year: Never true     Ran Out of Food in the Last Year: Never true   Transportation Needs: No Transportation Needs (2/15/2024)    PRAPARE - Transportation     Lack of Transportation (Medical): No     Lack of Transportation (Non-Medical): No   Physical Activity: Unknown (2/15/2024)    Exercise Vital Sign     Days of Exercise per Week: 0 days   Stress: No Stress Concern Present (2/15/2024)    [employer] - Occupational Stress Questionnaire     Feeling of Stress : Not at all   Housing Stability: Low Risk  (2/15/2024)    Housing Stability Vital Sign     Unable to Pay for Housing in the Last Year: No     Number of Places Lived in the Last Year: 1     Unstable Housing in the Last Year: No      Family History   Family history unknown: Yes      Review of patient's allergies indicates:   Allergen Reactions    Robaxin [methocarbamol] Other (See Comments)     Altered mental status       Review of Systems   Constitutional:  Positive for appetite change, fatigue and unexpected weight change. Negative for activity change and fever.   HENT:  Negative for sore throat.    Eyes:  Negative for visual disturbance.   Respiratory:  Negative for cough and shortness of breath.    Cardiovascular:  Negative for chest pain.   Gastrointestinal:  Negative for abdominal pain, diarrhea and vomiting.   Endocrine: Negative for polyuria.   Genitourinary:  Negative for dysuria and hot flashes.    Integumentary:  Negative for rash.   Allergic/Immunologic: Negative for immunocompromised state.   Neurological:  Negative for weakness and headaches.   Hematological:  Negative for adenopathy.   Psychiatric/Behavioral:  Positive for depressed mood. Negative for confusion.          Objective:      Vitals:    02/11/25 0959   BP: (!) 170/98   Pulse:    Resp:    Temp:                Physical Exam  Constitutional:       General: She is not in acute distress.     Appearance: She is underweight. She is not ill-appearing.   HENT:      Head: Normocephalic and atraumatic.      Nose: Nose normal.      Mouth/Throat:      Mouth: Mucous membranes are moist.      Pharynx: Oropharynx is clear.   Eyes:      Extraocular Movements: Extraocular movements intact.      Conjunctiva/sclera: Conjunctivae normal.      Pupils: Pupils are equal, round, and reactive to light.   Cardiovascular:      Rate and Rhythm: Normal rate and regular rhythm.      Pulses: Normal pulses.      Heart sounds: Normal heart sounds. No murmur heard.  Pulmonary:      Effort: Pulmonary effort is normal. No respiratory distress.      Breath sounds: Normal breath sounds.   Abdominal:      General: There is no distension.      Palpations: Abdomen is soft.      Tenderness: There is no abdominal tenderness.   Musculoskeletal:         General: Normal range of motion.      Cervical back: Normal range of motion and neck supple.      Right lower leg: No edema.      Left lower leg: No edema.   Feet:      Left foot:      Skin integrity: No blister.   Lymphadenopathy:      Cervical: No cervical adenopathy.   Skin:     General: Skin is warm and dry.   Neurological:      General: No focal deficit present.      Mental Status: She is alert and oriented to person, place, and time.         LABS AND IMAGING REVIEWED IN EPIC  12/3/24 PET/CT:  1. Interval increase in size and number pulmonary nodules compared to comparison PET-CT.  Minimal measurable FDG uptake.  However nodules are  small and below the resolution of PET.  2. New hypermetabolic abdominal aortocaval lymph node  3. Nonspecific uptake associated with bowel loops in the right lower quadrant.  Unable to differentiate potential serosal implants from physiologic or inflammatory bowel activity.  4. Enlarging cystic lesion at the left pelvic sidewall.  There is mild uptake at the posteroinferior margin of the lesion at the left adnexa.  Mild uptake at the cul-de-sac/posterior uterine segment.  Uptake is mild and nonspecific.  Peritoneal implants are consideration.       PATHOLOGY:  2/5/24 Biopsy:  1. Appendix, appendectomy:  - Acute appendicitis with perforation.  - Adenocarcinoma involves lymphatic spaces of the appendiceal wall and peritoneal surface.  2. Cecum, ileocecectomy:  - Colonic adenocarcinoma.  - See synoptic checklist for CAP cancer protocol.  3. Omental mass, biopsy:  - Metastatic adenocarcinoma, consistent with a colon primary.  4. Small bowel, segmental resection:  - Acute peritonitis.  - No evidence of malignancy.    MLH1: PRESERVED (INTACT) EXPRESSION IN TUMOR CELLS      MSH2: PRESERVED (INTACT) EXPRESSION IN TUMOR CELLS      MSH6: PRESERVED (INTACT) EXPRESSION IN TUMOR CELLS      PMS2: PRESERVED (INTACT) EXPRESSION IN TUMOR CELLS  Microscopic Description/Comments   These results show no deficiency of the mismatch repair proteins tested.    Assessment:   Stage IV Metastatic Colon Adenocarcinoma with mets to peritoneum - Cecal mass, nonobstructive, no perforation. [id number]. Treatment history as above. Now proceeding with 2nd line FOLFIRI + Priscilla. Repeat PET after C6.   Pulmonary Embolus at the right mainstem bronchus   Incidentally found during CT CAP 6/25/24. Now on xarelto, continue at this time.   Peripheral Neuropathy   She does report peripheral neuropathy to her fingertips and lower legs.  Her hand neuropathy is grade 2, consistent but not painful. Able to button buttons.   Her LE bilateral neuropathy is mainly at  night she gets shooting pains when she lays down.   Stable today on gabapentin to 300mg TID - 1 in AM, 2 in PM.   Immunodeficiency due to Drug Therapy - Precautions discussed with patient. Continue to monitor for any signs of symptoms of infection. No prophylaxis needed at this time. On growth factor due to history of significant neutropenia due to chemotherapy.   HTN  Took med prior to arrival, additional dose of amlodipine given today in infusion. BP now 158/89  Spoke with daughter regarding BP meds, she states pt does not take BP med regularly and current provider is out of town. Will send in amlodipine 10mg until she can get in with PCP for further eval. Daughter made aware. She was instructed to monitor BP daily at home as well.     Plan:   Labs stable and adequate for treat.  Toxicity reviewed, okay to proceed with C3 FOLFIRI+ Priscilla + GSF as scheduled with reduced dose of Irinotecan of 150mg/m2 due to tolerance concerns. No 5FU bolus in the setting of metastatic disease.   Continue follow up with Podiatry for paronychia of left thumb and great toe  Continue gabapentin 300mg TID for drug induced peripheral neuropathy  Continue with HH and PT   Re-start megace for appetite. Ensures ordered.   Pharmacy contacted about xarelto, they will have med for her to  tomorrow. Pt notified and instructed to restart.   RTC in 2 weeks with NP for FU/lab/treat C4  Cbc, cmp, cea, urine protein- 1 hr prior @ [facility]      I spent a total of 40 minutes on the day of the visit.This includes face to face time and non-face to face time preparing to see the patient (eg, review of tests), obtaining and/or reviewing separately obtained history, documenting clinical information in the electronic or other health record, independently interpreting results and communicating results to the patient/family/caregiver, or care coordinator.      Susan Gayle, CAROLYNNP-C  Oncology/Hematology   [facility]

## 2025-02-14 ENCOUNTER — INFUSION (OUTPATIENT)
Dept: INFUSION THERAPY | Facility: HOSPITAL | Age: 70
End: 2025-02-14
Payer: MEDICARE

## 2025-02-14 VITALS
BODY MASS INDEX: 18.05 KG/M2 | SYSTOLIC BLOOD PRESSURE: 156 MMHG | DIASTOLIC BLOOD PRESSURE: 99 MMHG | OXYGEN SATURATION: 100 % | RESPIRATION RATE: 16 BRPM | TEMPERATURE: 97 F | WEIGHT: 115 LBS | HEART RATE: 83 BPM | HEIGHT: 67 IN

## 2025-02-14 DIAGNOSIS — C18.2 MALIGNANT NEOPLASM OF ASCENDING COLON: Primary | ICD-10-CM

## 2025-02-14 PROCEDURE — 25000003 PHARM REV CODE 250

## 2025-02-14 PROCEDURE — 96377 APPLICATON ON-BODY INJECTOR: CPT

## 2025-02-14 PROCEDURE — 63600175 PHARM REV CODE 636 W HCPCS

## 2025-02-14 PROCEDURE — A4216 STERILE WATER/SALINE, 10 ML: HCPCS

## 2025-02-14 RX ORDER — SODIUM CHLORIDE 0.9 % (FLUSH) 0.9 %
10 SYRINGE (ML) INJECTION
Status: DISCONTINUED | OUTPATIENT
Start: 2025-02-14 | End: 2025-02-14 | Stop reason: HOSPADM

## 2025-02-14 RX ORDER — HEPARIN 100 UNIT/ML
500 SYRINGE INTRAVENOUS
Status: DISCONTINUED | OUTPATIENT
Start: 2025-02-14 | End: 2025-02-14 | Stop reason: HOSPADM

## 2025-02-14 RX ADMIN — SODIUM CHLORIDE, PRESERVATIVE FREE 10 ML: 5 INJECTION INTRAVENOUS at 08:02

## 2025-02-14 RX ADMIN — PEGFILGRASTIM 6 MG: KIT SUBCUTANEOUS at 08:02

## 2025-02-14 RX ADMIN — HEPARIN SODIUM (PORCINE) LOCK FLUSH IV SOLN 100 UNIT/ML 500 UNITS: 100 SOLUTION at 08:02

## 2025-02-25 ENCOUNTER — DOCUMENTATION ONLY (OUTPATIENT)
Facility: CLINIC | Age: 70
End: 2025-02-25
Payer: MEDICARE

## 2025-02-25 NOTE — PROGRESS NOTES
Patient contacted our clinic this morning stating that she wasn't feeling too well.  Patient stated she is weak, and has diarrhea and would like to stay home today to hydrate.  I asked patient if she would like to talk to a nurse (she sounded very weak), and patient stated that no, it wasn't an emergency, she just didn't feel good.  Patient is asking to be rescheduled to another day sometime next week.  Patient call back  [phone]     Patient was called and offered to come in for fluids. Patient declined. Providers made aware

## 2025-02-28 NOTE — PROGRESS NOTES
Subjective:       Patient ID: Selene Smallwood is a 69 y.o. female.    Chief Complaint: Follow Up  Malignant neoplasm of ascending colon (Pt c/o hematuria starting x1 day ago. )    Diagnosis:   Stage IV Metastatic Colon Adenocarcinoma with mets to peritoneum  2. Pulmonary Embolisum    Current Treatment:   FOLFIRI + Priscilla - (1/6/25-present)  Xarelto 15 mg BID x 21 days then 20 mg daily- 6/25/24-present    Treatment History:   FOLFOX + Cetuximab q2w x 12 cycles- 4/2/24 - 11/6/24  Hold Oxaliplatin for C11 and C12 due to tolerance    HPI:   68 yo F presented in March '24 for evaluation of metastatic colon cancer. She initially presented to OSH in early February '24 with 2 days of upper abdominal pain. Imaging on 2/4 in the ER revealed evidence of non perforated acute appendicitis. She underwent Ex lap where a cecal mass was incidentally discovered and resected, as well as omental studding was noted and 1 small mass was taken for biopsy. Her final pathology revealed a large exophytic mass in the cecum measuring 4 x 2 cm extending to the appendiceal orifice and extends into pericolonic adipose tissue. G2, LVI+, PNI negative. 14 LN were resected, with 10 being positive for metastatic adenocarcinoma. She also had an omental mass measuring 2.5 x 1.5cm which was positive for metastatic adenocarcinoma, consistent with a colon primary. Her final pathology staging is consistent with [id number]. Her postop course was complicated by abdominal abscess formation s/p IR drain placement and subsequent removal and antibiotic treatment.      She underwent PET scan in March '24 with evidence of nodular foci within the omentum/peritoneum consistent with peritoneal carcinomatosis. No other evidence of disease. NGS testing was performed which revealed GULSHAN wild type and BRAF negative. Therefore she underwent 1st line therapy of FOLFOX + Cetuximab Q2w x 12 cycles. Despite good response to treatment after 6 cycles of therapy, once she completed  12 cycles of chemotherapy she had evidence of disease progression to abdominal lymph nodes, peritoneum, and bilateral lungs. She will now proceed with 2nd line FOLFIRI + Bevacizumab.     Interval History:  Patient returns to the clinic today for a follow-up and clearance for C4 FOLFIRI +Priscilla q2w x 12 cycles.  Overall feels well today.    She reports hematuria x 2 days, no fever  Now taking megace as prescribed, weight stable today, appetite improved.   She did not have any diarrhea with this cycle  All neuropathy is stable with gabapentin 300mg TID.  Feet have improved since toenails were removed. Followed by pediatry.   Now has HH with PT.  She did start back on her xarelto.   Denies SOB, chest pain, fever, chills, abnormal bowels.   Labs reviewed in detail with patient.      Past Medical History:   Diagnosis Date    Hypertension       Past Surgical History:   Procedure Laterality Date    APPENDECTOMY N/A 2/5/2024    Procedure: APPENDECTOMY;  Surgeon: Anna Murillo MD;  Location: [facility] OR;  Service: General;  Laterality: N/A;    INSERTION OF TUNNELED CENTRAL VENOUS CATHETER (CVC) WITH SUBCUTANEOUS PORT N/A 3/26/2024    Procedure: [id number];  Surgeon: Armando Tran MD;  Location: [facility] OR;  Service: Peripheral Vascular;  Laterality: N/A;    LAPAROSCOPIC APPENDECTOMY N/A 2/5/2024    Procedure: APPENDECTOMY, LAPAROSCOPIC;  Surgeon: Anna Murillo MD;  Location: [facility] OR;  Service: General;  Laterality: N/A;    SURGICAL REMOVAL OF ILEUM WITH CECUM N/A 2/5/2024    Procedure: EXCISION, CECUM WITH ILEUM;  Surgeon: Anna Murillo MD;  Location: [facility] OR;  Service: General;  Laterality: N/A;    WASHOUT N/A 2/5/2024    Procedure: WASHOUT;  Surgeon: Anna Murillo MD;  Location: [facility] OR;  Service: General;  Laterality: N/A;     Social History     Socioeconomic History    Marital status:    Tobacco Use    Smoking status: Former     Types: Cigarettes    Smokeless tobacco: Never   Substance and  Sexual Activity    Alcohol use: Not Currently    Drug use: Never    Sexual activity: Not Currently     Social Drivers of Health     Financial Resource Strain: Low Risk  (2/15/2024)    Overall Financial Resource Strain (CARDIA)     Difficulty of Paying Living Expenses: Not hard at all   Food Insecurity: No Food Insecurity (2/15/2024)    Hunger Vital Sign     Worried About Running Out of Food in the Last Year: Never true     Ran Out of Food in the Last Year: Never true   Transportation Needs: No Transportation Needs (2/15/2024)    PRAPARE - Transportation     Lack of Transportation (Medical): No     Lack of Transportation (Non-Medical): No   Physical Activity: Unknown (2/15/2024)    Exercise Vital Sign     Days of Exercise per Week: 0 days   Stress: No Stress Concern Present (2/15/2024)    [employer] [place] [employer] - Occupational Stress Questionnaire     Feeling of Stress : Not at all   Housing Stability: Low Risk  (2/15/2024)    Housing Stability Vital Sign     Unable to Pay for Housing in the Last Year: No     Number of Places Lived in the Last Year: 1     Unstable Housing in the Last Year: No      Family History   Family history unknown: Yes      Review of patient's allergies indicates:   Allergen Reactions    Robaxin [methocarbamol] Other (See Comments)     Altered mental status       Review of Systems   Constitutional:  Positive for appetite change, fatigue and unexpected weight change. Negative for activity change and fever.   HENT:  Negative for sore throat.    Eyes:  Negative for visual disturbance.   Respiratory:  Negative for cough and shortness of breath.    Cardiovascular:  Negative for chest pain.   Gastrointestinal:  Negative for abdominal pain, diarrhea and vomiting.   Endocrine: Negative for polyuria.   Genitourinary:  Negative for dysuria and hot flashes.   Integumentary:  Negative for rash.   Allergic/Immunologic: Negative for immunocompromised state.   Neurological:  Negative for  weakness and headaches.   Hematological:  Negative for adenopathy.   Psychiatric/Behavioral:  Positive for depressed mood. Negative for confusion.          Objective:      Vitals:    03/03/25 0846   BP: (!) 147/82   Pulse:    Resp:    Temp:                  Physical Exam  Constitutional:       General: She is not in acute distress.     Appearance: She is underweight. She is not ill-appearing.   HENT:      Head: Normocephalic and atraumatic.      Nose: Nose normal.      Mouth/Throat:      Mouth: Mucous membranes are moist.      Pharynx: Oropharynx is clear.   Eyes:      Extraocular Movements: Extraocular movements intact.      Conjunctiva/sclera: Conjunctivae normal.      Pupils: Pupils are equal, round, and reactive to light.   Cardiovascular:      Rate and Rhythm: Normal rate and regular rhythm.      Pulses: Normal pulses.      Heart sounds: Normal heart sounds. No murmur heard.  Pulmonary:      Effort: Pulmonary effort is normal. No respiratory distress.      Breath sounds: Normal breath sounds.   Abdominal:      General: There is no distension.      Palpations: Abdomen is soft.      Tenderness: There is no abdominal tenderness.   Musculoskeletal:         General: Normal range of motion.      Cervical back: Normal range of motion and neck supple.      Right lower leg: No edema.      Left lower leg: No edema.   Feet:      Left foot:      Skin integrity: No blister.   Lymphadenopathy:      Cervical: No cervical adenopathy.   Skin:     General: Skin is warm and dry.   Neurological:      General: No focal deficit present.      Mental Status: She is alert and oriented to person, place, and time.         LABS AND IMAGING REVIEWED IN EPIC  12/3/24 PET/CT:  1. Interval increase in size and number pulmonary nodules compared to comparison PET-CT.  Minimal measurable FDG uptake.  However nodules are small and below the resolution of PET.  2. New hypermetabolic abdominal aortocaval lymph node  3. Nonspecific uptake associated  with bowel loops in the right lower quadrant.  Unable to differentiate potential serosal implants from physiologic or inflammatory bowel activity.  4. Enlarging cystic lesion at the left pelvic sidewall.  There is mild uptake at the posteroinferior margin of the lesion at the left adnexa.  Mild uptake at the cul-de-sac/posterior uterine segment.  Uptake is mild and nonspecific.  Peritoneal implants are consideration.       PATHOLOGY:  2/5/24 Biopsy:  1. Appendix, appendectomy:  - Acute appendicitis with perforation.  - Adenocarcinoma involves lymphatic spaces of the appendiceal wall and peritoneal surface.  2. Cecum, ileocecectomy:  - Colonic adenocarcinoma.  - See synoptic checklist for CAP cancer protocol.  3. Omental mass, biopsy:  - Metastatic adenocarcinoma, consistent with a colon primary.  4. Small bowel, segmental resection:  - Acute peritonitis.  - No evidence of malignancy.    MLH1: PRESERVED (INTACT) EXPRESSION IN TUMOR CELLS      MSH2: PRESERVED (INTACT) EXPRESSION IN TUMOR CELLS      MSH6: PRESERVED (INTACT) EXPRESSION IN TUMOR CELLS      PMS2: PRESERVED (INTACT) EXPRESSION IN TUMOR CELLS  Microscopic Description/Comments   These results show no deficiency of the mismatch repair proteins tested.    Assessment:   Stage IV Metastatic Colon Adenocarcinoma with mets to peritoneum - Cecal mass, nonobstructive, no perforation. [id number]. Treatment history as above. Now proceeding with 2nd line FOLFIRI + Priscilla. Repeat PET after C6.   Pulmonary Embolus at the right mainstem bronchus   Incidentally found during CT CAP 6/25/24. Now on xarelto, continue at this time.   Peripheral Neuropathy   She does report peripheral neuropathy to her fingertips and lower legs.  Her hand neuropathy is grade 2, consistent but not painful. Able to button buttons.   Her LE bilateral neuropathy is mainly at night she gets shooting pains when she lays down.   Stable today on gabapentin to 300mg TID - 1 in AM, 2 in PM.    Immunodeficiency due to Drug Therapy - Precautions discussed with patient. Continue to monitor for any signs of symptoms of infection. No prophylaxis needed at this time. On growth factor due to history of significant neutropenia due to chemotherapy.   HTN  Took med prior to arrival, additional dose of amlodipine given today in infusion. BP now 158/89  Spoke with daughter regarding BP meds, she states pt does not take BP med regularly and current provider is out of town. Will send in amlodipine 10mg until she can get in with PCP for further eval. Daughter made aware. She was instructed to monitor BP daily at home as well.     Plan:   Hold chemo today due to hematuria.  Will treat for possible UTI as WBC is elevated. Bactrim sent to pharmacy.   Lab (CBC, CMP, Mag) and possible treat on 3/5/2025 if hematuria has resolved and she is feeling well.   Continue follow up with Podiatry for paronychia of left thumb and great toe  Continue gabapentin 300mg TID for drug induced peripheral neuropathy  Continue megace with ensures.   RTC in 2 weeks with NP for FU/lab/treat C5. Order PET at this appt.   Cbc, cmp, cea, urine protein- 1 hr prior @ [facility]      I spent a total of 40 minutes on the day of the visit.This includes face to face time and non-face to face time preparing to see the patient (eg, review of tests), obtaining and/or reviewing separately obtained history, documenting clinical information in the electronic or other health record, independently interpreting results and communicating results to the patient/family/caregiver, or care coordinator.      KISHAN Morales  Oncology/Hematology   [facility]       Visit today included increased complexity associated with the care of the episodic problem Stage IV Metastatic Colon Adenocarcinoma with mets to peritoneum addressed and managing the longitudinal care of the patient due to the serious and/or complex managed problem(s) Stage IV Metastatic Colon  Adenocarcinoma with mets to peritoneum.    Strict ED precautions given    Addendum: tumor markers reviewed with Dr. LeJeune, will plan to scan following C5 for close monitoring

## 2025-03-03 ENCOUNTER — OFFICE VISIT (OUTPATIENT)
Facility: CLINIC | Age: 70
End: 2025-03-03
Payer: MEDICARE

## 2025-03-03 ENCOUNTER — PATIENT MESSAGE (OUTPATIENT)
Dept: HEMATOLOGY/ONCOLOGY | Facility: CLINIC | Age: 70
End: 2025-03-03
Payer: MEDICARE

## 2025-03-03 ENCOUNTER — LAB VISIT (OUTPATIENT)
Dept: LAB | Facility: HOSPITAL | Age: 70
End: 2025-03-03
Payer: MEDICARE

## 2025-03-03 VITALS
RESPIRATION RATE: 18 BRPM | SYSTOLIC BLOOD PRESSURE: 147 MMHG | HEIGHT: 67 IN | TEMPERATURE: 98 F | DIASTOLIC BLOOD PRESSURE: 82 MMHG | WEIGHT: 115 LBS | OXYGEN SATURATION: 97 % | HEART RATE: 118 BPM | BODY MASS INDEX: 18.05 KG/M2

## 2025-03-03 DIAGNOSIS — R31.9 URINARY TRACT INFECTION WITH HEMATURIA, SITE UNSPECIFIED: ICD-10-CM

## 2025-03-03 DIAGNOSIS — Z79.899 ON ANTINEOPLASTIC CHEMOTHERAPY: ICD-10-CM

## 2025-03-03 DIAGNOSIS — Z79.899 IMMUNODEFICIENCY DUE TO CHEMOTHERAPY: ICD-10-CM

## 2025-03-03 DIAGNOSIS — R31.9 HEMATURIA, UNSPECIFIED TYPE: Primary | ICD-10-CM

## 2025-03-03 DIAGNOSIS — D84.821 IMMUNODEFICIENCY DUE TO CHEMOTHERAPY: ICD-10-CM

## 2025-03-03 DIAGNOSIS — R64 CANCER CACHEXIA: ICD-10-CM

## 2025-03-03 DIAGNOSIS — G62.0 CHEMOTHERAPY-INDUCED NEUROPATHY: ICD-10-CM

## 2025-03-03 DIAGNOSIS — Z79.899 IMMUNODEFICIENCY DUE TO DRUG THERAPY: ICD-10-CM

## 2025-03-03 DIAGNOSIS — R45.89 ANXIETY ABOUT HEALTH: ICD-10-CM

## 2025-03-03 DIAGNOSIS — R91.8 MULTIPLE LUNG NODULES ON CT: ICD-10-CM

## 2025-03-03 DIAGNOSIS — I10 HYPERTENSION, UNSPECIFIED TYPE: ICD-10-CM

## 2025-03-03 DIAGNOSIS — C18.9 MALIGNANT NEOPLASM OF COLON, UNSPECIFIED PART OF COLON: ICD-10-CM

## 2025-03-03 DIAGNOSIS — G62.9 NEUROPATHY: ICD-10-CM

## 2025-03-03 DIAGNOSIS — L03.032 ACUTE PARONYCHIA OF TOE OF LEFT FOOT: ICD-10-CM

## 2025-03-03 DIAGNOSIS — R97.0 ELEVATED CARCINOEMBRYONIC ANTIGEN (CEA): ICD-10-CM

## 2025-03-03 DIAGNOSIS — T45.1X5A IMMUNODEFICIENCY DUE TO CHEMOTHERAPY: ICD-10-CM

## 2025-03-03 DIAGNOSIS — T45.1X5A CHEMOTHERAPY-INDUCED NEUROPATHY: ICD-10-CM

## 2025-03-03 DIAGNOSIS — C18.2 MALIGNANT NEOPLASM OF ASCENDING COLON: ICD-10-CM

## 2025-03-03 DIAGNOSIS — C78.6 MALIGNANT NEOPLASM METASTATIC TO PERITONEUM: ICD-10-CM

## 2025-03-03 DIAGNOSIS — N39.0 URINARY TRACT INFECTION WITH HEMATURIA, SITE UNSPECIFIED: ICD-10-CM

## 2025-03-03 DIAGNOSIS — D84.821 IMMUNODEFICIENCY DUE TO DRUG THERAPY: ICD-10-CM

## 2025-03-03 LAB
ALBUMIN SERPL-MCNC: 2.7 G/DL (ref 3.4–4.8)
ALBUMIN/GLOB SERPL: 0.5 RATIO (ref 1.1–2)
ALP SERPL-CCNC: 108 UNIT/L (ref 40–150)
ALT SERPL-CCNC: 11 UNIT/L (ref 0–55)
ANION GAP SERPL CALC-SCNC: 7 MEQ/L
AST SERPL-CCNC: 19 UNIT/L (ref 5–34)
BACTERIA #/AREA URNS AUTO: ABNORMAL /HPF
BASOPHILS # BLD AUTO: 0.1 X10(3)/MCL
BASOPHILS NFR BLD AUTO: 0.8 %
BILIRUB SERPL-MCNC: 0.4 MG/DL
BILIRUB UR QL STRIP.AUTO: ABNORMAL
BUN SERPL-MCNC: 8 MG/DL (ref 9.8–20.1)
CALCIUM SERPL-MCNC: 10.3 MG/DL (ref 8.4–10.2)
CEA SERPL-MCNC: 1262.27 NG/ML (ref 0–3)
CHLORIDE SERPL-SCNC: 110 MMOL/L (ref 98–107)
CLARITY UR: ABNORMAL
CO2 SERPL-SCNC: 23 MMOL/L (ref 23–31)
COLOR UR AUTO: ABNORMAL
CREAT SERPL-MCNC: 0.71 MG/DL (ref 0.55–1.02)
CREAT/UREA NIT SERPL: 11
EOSINOPHIL # BLD AUTO: 0.23 X10(3)/MCL (ref 0–0.9)
EOSINOPHIL NFR BLD AUTO: 1.9 %
ERYTHROCYTE [DISTWIDTH] IN BLOOD BY AUTOMATED COUNT: 16.4 % (ref 11.5–17)
GFR SERPLBLD CREATININE-BSD FMLA CKD-EPI: >60 ML/MIN/1.73/M2
GLOBULIN SER-MCNC: 5.5 GM/DL (ref 2.4–3.5)
GLUCOSE SERPL-MCNC: 100 MG/DL (ref 82–115)
GLUCOSE UR QL STRIP: NEGATIVE
GRAN CASTS URNS QL MICRO: ABNORMAL /LPF
HCT VFR BLD AUTO: 35.1 % (ref 37–47)
HGB BLD-MCNC: 11.1 G/DL (ref 12–16)
HGB UR QL STRIP: ABNORMAL
HYALINE CASTS URNS QL MICRO: ABNORMAL /LPF
IMM GRANULOCYTES # BLD AUTO: 0.07 X10(3)/MCL (ref 0–0.04)
IMM GRANULOCYTES NFR BLD AUTO: 0.6 %
KETONES UR QL STRIP: NEGATIVE
LEUKOCYTE ESTERASE UR QL STRIP: NEGATIVE
LYMPHOCYTES # BLD AUTO: 2.66 X10(3)/MCL (ref 0.6–4.6)
LYMPHOCYTES NFR BLD AUTO: 21.7 %
MAGNESIUM SERPL-MCNC: 1.8 MG/DL (ref 1.6–2.6)
MCH RBC QN AUTO: 29.7 PG (ref 27–31)
MCHC RBC AUTO-ENTMCNC: 31.6 G/DL (ref 33–36)
MCV RBC AUTO: 93.9 FL (ref 80–94)
MONOCYTES # BLD AUTO: 0.82 X10(3)/MCL (ref 0.1–1.3)
MONOCYTES NFR BLD AUTO: 6.7 %
NEUTROPHILS # BLD AUTO: 8.35 X10(3)/MCL (ref 2.1–9.2)
NEUTROPHILS NFR BLD AUTO: 68.3 %
NITRITE UR QL STRIP: NEGATIVE
NRBC BLD AUTO-RTO: 0 %
PH UR STRIP: 7 [PH]
PLATELET # BLD AUTO: 292 X10(3)/MCL (ref 130–400)
PMV BLD AUTO: 11 FL (ref 7.4–10.4)
POTASSIUM SERPL-SCNC: 3.8 MMOL/L (ref 3.5–5.1)
PROT SERPL-MCNC: 8.2 GM/DL (ref 5.8–7.6)
PROT UR QL STRIP: ABNORMAL
PROT UR STRIP-MCNC: 29.7 MG/DL
RBC # BLD AUTO: 3.74 X10(6)/MCL (ref 4.2–5.4)
RBC #/AREA URNS AUTO: ABNORMAL /HPF
SODIUM SERPL-SCNC: 140 MMOL/L (ref 136–145)
SP GR UR STRIP.AUTO: 1.01 (ref 1–1.03)
SQUAMOUS #/AREA URNS AUTO: ABNORMAL /HPF
UROBILINOGEN UR STRIP-ACNC: 0.2
WBC # BLD AUTO: 12.23 X10(3)/MCL (ref 4.5–11.5)
WBC #/AREA URNS AUTO: ABNORMAL /HPF

## 2025-03-03 PROCEDURE — 80053 COMPREHEN METABOLIC PANEL: CPT

## 2025-03-03 PROCEDURE — 82378 CARCINOEMBRYONIC ANTIGEN: CPT

## 2025-03-03 PROCEDURE — 36415 COLL VENOUS BLD VENIPUNCTURE: CPT

## 2025-03-03 PROCEDURE — 85025 COMPLETE CBC W/AUTO DIFF WBC: CPT

## 2025-03-03 PROCEDURE — 81003 URINALYSIS AUTO W/O SCOPE: CPT

## 2025-03-03 PROCEDURE — 83735 ASSAY OF MAGNESIUM: CPT

## 2025-03-03 PROCEDURE — 99215 OFFICE O/P EST HI 40 MIN: CPT | Mod: S$PBB,,,

## 2025-03-03 PROCEDURE — 99999 PR PBB SHADOW E&M-EST. PATIENT-LVL V: CPT | Mod: PBBFAC,,,

## 2025-03-03 PROCEDURE — G2211 COMPLEX E/M VISIT ADD ON: HCPCS | Mod: S$PBB,,,

## 2025-03-03 PROCEDURE — 84156 ASSAY OF PROTEIN URINE: CPT

## 2025-03-03 RX ORDER — SULFAMETHOXAZOLE AND TRIMETHOPRIM 800; 160 MG/1; MG/1
1 TABLET ORAL 2 TIMES DAILY
Qty: 10 TABLET | Refills: 0 | Status: SHIPPED | OUTPATIENT
Start: 2025-03-03 | End: 2025-03-08

## 2025-03-10 ENCOUNTER — EXTERNAL HOME HEALTH (OUTPATIENT)
Dept: HOME HEALTH SERVICES | Facility: HOSPITAL | Age: 70
End: 2025-03-10
Payer: MEDICARE

## 2025-03-11 ENCOUNTER — INFUSION (OUTPATIENT)
Facility: HOSPITAL | Age: 70
End: 2025-03-11
Payer: MEDICARE

## 2025-03-11 ENCOUNTER — LAB VISIT (OUTPATIENT)
Dept: LAB | Facility: HOSPITAL | Age: 70
End: 2025-03-11
Payer: MEDICARE

## 2025-03-11 VITALS
RESPIRATION RATE: 18 BRPM | TEMPERATURE: 97 F | HEIGHT: 67 IN | WEIGHT: 112 LBS | OXYGEN SATURATION: 98 % | HEART RATE: 97 BPM | SYSTOLIC BLOOD PRESSURE: 130 MMHG | DIASTOLIC BLOOD PRESSURE: 88 MMHG | BODY MASS INDEX: 17.58 KG/M2

## 2025-03-11 DIAGNOSIS — C78.6 MALIGNANT NEOPLASM METASTATIC TO PERITONEUM: ICD-10-CM

## 2025-03-11 DIAGNOSIS — C18.2 MALIGNANT NEOPLASM OF ASCENDING COLON: Primary | ICD-10-CM

## 2025-03-11 LAB
ALBUMIN SERPL-MCNC: 2.7 G/DL (ref 3.4–4.8)
ALBUMIN/GLOB SERPL: 0.4 RATIO (ref 1.1–2)
ALP SERPL-CCNC: 115 UNIT/L (ref 40–150)
ALT SERPL-CCNC: 17 UNIT/L (ref 0–55)
ANION GAP SERPL CALC-SCNC: 7 MEQ/L
AST SERPL-CCNC: 25 UNIT/L (ref 5–34)
BASOPHILS # BLD AUTO: 0.07 X10(3)/MCL
BASOPHILS NFR BLD AUTO: 0.7 %
BILIRUB SERPL-MCNC: 0.3 MG/DL
BUN SERPL-MCNC: 13 MG/DL (ref 9.8–20.1)
CALCIUM SERPL-MCNC: 9.5 MG/DL (ref 8.4–10.2)
CHLORIDE SERPL-SCNC: 110 MMOL/L (ref 98–107)
CO2 SERPL-SCNC: 22 MMOL/L (ref 23–31)
CREAT SERPL-MCNC: 0.67 MG/DL (ref 0.55–1.02)
CREAT/UREA NIT SERPL: 19
EOSINOPHIL # BLD AUTO: 0.12 X10(3)/MCL (ref 0–0.9)
EOSINOPHIL NFR BLD AUTO: 1.2 %
ERYTHROCYTE [DISTWIDTH] IN BLOOD BY AUTOMATED COUNT: 16.8 % (ref 11.5–17)
GFR SERPLBLD CREATININE-BSD FMLA CKD-EPI: >60 ML/MIN/1.73/M2
GLOBULIN SER-MCNC: 6.2 GM/DL (ref 2.4–3.5)
GLUCOSE SERPL-MCNC: 74 MG/DL (ref 82–115)
HCT VFR BLD AUTO: 32.3 % (ref 37–47)
HGB BLD-MCNC: 10.2 G/DL (ref 12–16)
IMM GRANULOCYTES # BLD AUTO: 0.04 X10(3)/MCL (ref 0–0.04)
IMM GRANULOCYTES NFR BLD AUTO: 0.4 %
LYMPHOCYTES # BLD AUTO: 2.35 X10(3)/MCL (ref 0.6–4.6)
LYMPHOCYTES NFR BLD AUTO: 23.2 %
MAGNESIUM SERPL-MCNC: 1.8 MG/DL (ref 1.6–2.6)
MCH RBC QN AUTO: 29.3 PG (ref 27–31)
MCHC RBC AUTO-ENTMCNC: 31.6 G/DL (ref 33–36)
MCV RBC AUTO: 92.8 FL (ref 80–94)
MONOCYTES # BLD AUTO: 1.06 X10(3)/MCL (ref 0.1–1.3)
MONOCYTES NFR BLD AUTO: 10.5 %
NEUTROPHILS # BLD AUTO: 6.49 X10(3)/MCL (ref 2.1–9.2)
NEUTROPHILS NFR BLD AUTO: 64 %
NRBC BLD AUTO-RTO: 0 %
PLATELET # BLD AUTO: 337 X10(3)/MCL (ref 130–400)
PMV BLD AUTO: 10.6 FL (ref 7.4–10.4)
POTASSIUM SERPL-SCNC: 4.4 MMOL/L (ref 3.5–5.1)
PROT SERPL-MCNC: 8.9 GM/DL (ref 5.8–7.6)
RBC # BLD AUTO: 3.48 X10(6)/MCL (ref 4.2–5.4)
SODIUM SERPL-SCNC: 139 MMOL/L (ref 136–145)
WBC # BLD AUTO: 10.13 X10(3)/MCL (ref 4.5–11.5)

## 2025-03-11 PROCEDURE — 36415 COLL VENOUS BLD VENIPUNCTURE: CPT

## 2025-03-11 PROCEDURE — 83735 ASSAY OF MAGNESIUM: CPT

## 2025-03-11 PROCEDURE — 25000003 PHARM REV CODE 250

## 2025-03-11 PROCEDURE — 63600175 PHARM REV CODE 636 W HCPCS: Mod: JZ,TB

## 2025-03-11 PROCEDURE — 96413 CHEMO IV INFUSION 1 HR: CPT

## 2025-03-11 PROCEDURE — 96417 CHEMO IV INFUS EACH ADDL SEQ: CPT

## 2025-03-11 PROCEDURE — 80053 COMPREHEN METABOLIC PANEL: CPT

## 2025-03-11 PROCEDURE — 96415 CHEMO IV INFUSION ADDL HR: CPT

## 2025-03-11 PROCEDURE — 96361 HYDRATE IV INFUSION ADD-ON: CPT

## 2025-03-11 PROCEDURE — 96416 CHEMO PROLONG INFUSE W/PUMP: CPT

## 2025-03-11 PROCEDURE — 85025 COMPLETE CBC W/AUTO DIFF WBC: CPT

## 2025-03-11 PROCEDURE — 96367 TX/PROPH/DG ADDL SEQ IV INF: CPT

## 2025-03-11 PROCEDURE — 96368 THER/DIAG CONCURRENT INF: CPT

## 2025-03-11 PROCEDURE — 96375 TX/PRO/DX INJ NEW DRUG ADDON: CPT

## 2025-03-11 RX ORDER — SODIUM CHLORIDE 0.9 % (FLUSH) 0.9 %
10 SYRINGE (ML) INJECTION
Status: CANCELLED | OUTPATIENT
Start: 2025-03-11

## 2025-03-11 RX ORDER — PROCHLORPERAZINE EDISYLATE 5 MG/ML
5 INJECTION INTRAMUSCULAR; INTRAVENOUS ONCE AS NEEDED
Status: CANCELLED | OUTPATIENT
Start: 2025-03-11

## 2025-03-11 RX ORDER — PROCHLORPERAZINE EDISYLATE 5 MG/ML
5 INJECTION INTRAMUSCULAR; INTRAVENOUS ONCE AS NEEDED
Status: CANCELLED | OUTPATIENT
Start: 2025-03-13

## 2025-03-11 RX ORDER — ATROPINE SULFATE 0.4 MG/ML
0.4 INJECTION, SOLUTION ENDOTRACHEAL; INTRAMEDULLARY; INTRAMUSCULAR; INTRAVENOUS; SUBCUTANEOUS ONCE AS NEEDED
Status: COMPLETED | OUTPATIENT
Start: 2025-03-11 | End: 2025-03-11

## 2025-03-11 RX ORDER — HEPARIN 100 UNIT/ML
500 SYRINGE INTRAVENOUS
Status: CANCELLED | OUTPATIENT
Start: 2025-03-13

## 2025-03-11 RX ORDER — HEPARIN 100 UNIT/ML
500 SYRINGE INTRAVENOUS
Status: CANCELLED | OUTPATIENT
Start: 2025-03-11

## 2025-03-11 RX ORDER — SODIUM CHLORIDE 0.9 % (FLUSH) 0.9 %
10 SYRINGE (ML) INJECTION
Status: DISCONTINUED | OUTPATIENT
Start: 2025-03-11 | End: 2025-03-11 | Stop reason: HOSPADM

## 2025-03-11 RX ORDER — DIPHENHYDRAMINE HYDROCHLORIDE 50 MG/ML
50 INJECTION, SOLUTION INTRAMUSCULAR; INTRAVENOUS ONCE AS NEEDED
Status: CANCELLED | OUTPATIENT
Start: 2025-03-11

## 2025-03-11 RX ORDER — EPINEPHRINE 0.3 MG/.3ML
0.3 INJECTION SUBCUTANEOUS ONCE AS NEEDED
Status: CANCELLED | OUTPATIENT
Start: 2025-03-11

## 2025-03-11 RX ORDER — SODIUM CHLORIDE 0.9 % (FLUSH) 0.9 %
10 SYRINGE (ML) INJECTION
Status: CANCELLED | OUTPATIENT
Start: 2025-03-13

## 2025-03-11 RX ORDER — HEPARIN 100 UNIT/ML
500 SYRINGE INTRAVENOUS
Status: DISCONTINUED | OUTPATIENT
Start: 2025-03-11 | End: 2025-03-11 | Stop reason: HOSPADM

## 2025-03-11 RX ORDER — ATROPINE SULFATE 0.4 MG/ML
0.4 INJECTION, SOLUTION ENDOTRACHEAL; INTRAMEDULLARY; INTRAMUSCULAR; INTRAVENOUS; SUBCUTANEOUS ONCE AS NEEDED
Status: CANCELLED | OUTPATIENT
Start: 2025-03-11

## 2025-03-11 RX ORDER — FLUOROURACIL 50 MG/ML
2400 INJECTION, SOLUTION INTRAVENOUS
Status: CANCELLED | OUTPATIENT
Start: 2025-03-11

## 2025-03-11 RX ADMIN — LEUCOVORIN CALCIUM 650 MG: 350 INJECTION, POWDER, LYOPHILIZED, FOR SOLUTION INTRAMUSCULAR; INTRAVENOUS at 11:03

## 2025-03-11 RX ADMIN — SODIUM CHLORIDE: 9 INJECTION, SOLUTION INTRAVENOUS at 10:03

## 2025-03-11 RX ADMIN — IRINOTECAN HYDROCHLOIDE 240 MG: 20 INJECTION INTRAVENOUS at 11:03

## 2025-03-11 RX ADMIN — DEXAMETHASONE SODIUM PHOSPHATE 0.25 MG: 4 INJECTION, SOLUTION INTRA-ARTICULAR; INTRALESIONAL; INTRAMUSCULAR; INTRAVENOUS; SOFT TISSUE at 10:03

## 2025-03-11 RX ADMIN — BEVACIZUMAB-AWWB 280 MG: 100 INJECTION, SOLUTION INTRAVENOUS at 10:03

## 2025-03-11 RX ADMIN — FLUOROURACIL 3890 MG: 50 INJECTION, SOLUTION INTRAVENOUS at 02:03

## 2025-03-11 RX ADMIN — ATROPINE SULFATE 0.4 MG: 0.4 INJECTION, SOLUTION INTRAVENOUS at 11:03

## 2025-03-11 NOTE — PROGRESS NOTES
"Pt seen in infusion. She did not come get her infusion on 3/5 as previously instructed because she "did not feel well". She did complete her abx. She is feeling well today, no current complaints. Labs reviewed with her and are adequate to treat. She does note decreased appetite, but has not been taking her megace daily. Compliance was encouraged. Will also give 1L NS today as she is only drinking 1-2 bottles of water a day.   "

## 2025-03-12 ENCOUNTER — PATIENT MESSAGE (OUTPATIENT)
Facility: CLINIC | Age: 70
End: 2025-03-12
Payer: MEDICARE

## 2025-03-13 ENCOUNTER — INFUSION (OUTPATIENT)
Facility: HOSPITAL | Age: 70
End: 2025-03-13
Payer: MEDICARE

## 2025-03-13 VITALS
HEIGHT: 67 IN | HEART RATE: 116 BPM | BODY MASS INDEX: 17.58 KG/M2 | RESPIRATION RATE: 18 BRPM | SYSTOLIC BLOOD PRESSURE: 103 MMHG | DIASTOLIC BLOOD PRESSURE: 72 MMHG | WEIGHT: 112 LBS | OXYGEN SATURATION: 96 % | TEMPERATURE: 98 F

## 2025-03-13 DIAGNOSIS — C18.2 MALIGNANT NEOPLASM OF ASCENDING COLON: Primary | ICD-10-CM

## 2025-03-13 PROCEDURE — 63600175 PHARM REV CODE 636 W HCPCS: Mod: JZ,TB

## 2025-03-13 PROCEDURE — 96377 APPLICATON ON-BODY INJECTOR: CPT

## 2025-03-13 RX ORDER — PROCHLORPERAZINE EDISYLATE 5 MG/ML
5 INJECTION INTRAMUSCULAR; INTRAVENOUS ONCE AS NEEDED
Status: DISCONTINUED | OUTPATIENT
Start: 2025-03-13 | End: 2025-03-13 | Stop reason: HOSPADM

## 2025-03-13 RX ORDER — HEPARIN 100 UNIT/ML
500 SYRINGE INTRAVENOUS
Status: DISCONTINUED | OUTPATIENT
Start: 2025-03-13 | End: 2025-03-13 | Stop reason: HOSPADM

## 2025-03-13 RX ORDER — SODIUM CHLORIDE 0.9 % (FLUSH) 0.9 %
10 SYRINGE (ML) INJECTION
Status: DISCONTINUED | OUTPATIENT
Start: 2025-03-13 | End: 2025-03-13 | Stop reason: HOSPADM

## 2025-03-13 RX ADMIN — HEPARIN SODIUM (PORCINE) LOCK FLUSH IV SOLN 100 UNIT/ML 500 UNITS: 100 SOLUTION at 11:03

## 2025-03-13 RX ADMIN — PEGFILGRASTIM 6 MG: KIT SUBCUTANEOUS at 11:03

## 2025-03-18 ENCOUNTER — DOCUMENT SCAN (OUTPATIENT)
Dept: HOME HEALTH SERVICES | Facility: HOSPITAL | Age: 70
End: 2025-03-18
Payer: MEDICARE

## 2025-03-21 ENCOUNTER — HOSPITAL ENCOUNTER (OUTPATIENT)
Dept: RADIOLOGY | Facility: HOSPITAL | Age: 70
Discharge: HOME | End: 2025-03-21
Payer: MEDICARE

## 2025-03-21 DIAGNOSIS — C78.6 MALIGNANT NEOPLASM METASTATIC TO PERITONEUM: ICD-10-CM

## 2025-03-21 DIAGNOSIS — C18.2 MALIGNANT NEOPLASM OF ASCENDING COLON: Primary | ICD-10-CM

## 2025-03-21 DIAGNOSIS — C18.2 MALIGNANT NEOPLASM OF ASCENDING COLON: ICD-10-CM

## 2025-03-21 PROCEDURE — A9552 F18 FDG: HCPCS

## 2025-03-21 PROCEDURE — 78815 PET IMAGE W/CT SKULL-THIGH: CPT | Mod: TC

## 2025-03-21 RX ORDER — FLUDEOXYGLUCOSE F18 500 MCI/ML
10 INJECTION INTRAVENOUS
Status: COMPLETED | OUTPATIENT
Start: 2025-03-21 | End: 2025-03-21

## 2025-03-21 RX ADMIN — FLUDEOXYGLUCOSE F-18 10.3 MILLICURIE: 500 INJECTION INTRAVENOUS at 02:03

## 2025-03-24 DIAGNOSIS — N94.89 PELVIC CYST IN FEMALE: ICD-10-CM

## 2025-03-24 DIAGNOSIS — C18.2 MALIGNANT NEOPLASM OF ASCENDING COLON: Primary | ICD-10-CM

## 2025-03-25 ENCOUNTER — DOCUMENTATION ONLY (OUTPATIENT)
Facility: CLINIC | Age: 70
End: 2025-03-25
Payer: MEDICARE

## 2025-03-25 NOTE — PROGRESS NOTES
Patient was a no-show for her NP/chemo appointment today. I called patient to reschedule for tomorrow and she stated that she was unsure if wanted to come in tomorrow or hold her treatment until she sees Dr. LeJeune on 04/03/25. The patient stated she would call me back with her decision. Patient understands this is a delay in her care. Provider notified.

## 2025-03-28 NOTE — PROGRESS NOTES
Subjective:       Patient ID: Selene Smallwood is a 69 y.o. female.    Chief Complaint: Follow Up  Malignant neoplasm metastatic to peritoneum (Pt reports nose bleeds after taking her Xarelto x 1 week. She feels fatigue today. )    Diagnosis:   Stage IV Metastatic Colon Adenocarcinoma with mets to peritoneum  2. Pulmonary Embolisum    Current Treatment:   FOLFIRI + Priscilla - (1/6/25-present)  Xarelto 15 mg BID x 21 days then 20 mg daily- 6/25/24-present    Treatment History:   FOLFOX + Cetuximab q2w x 12 cycles- 4/2/24 - 11/6/24  Hold Oxaliplatin for C11 and C12 due to tolerance    HPI:   70 yo F presented in March '24 for evaluation of metastatic colon cancer. She initially presented to OSH in early February '24 with 2 days of upper abdominal pain. Imaging on 2/4 in the ER revealed evidence of non perforated acute appendicitis. She underwent Ex lap where a cecal mass was incidentally discovered and resected, as well as omental studding was noted and 1 small mass was taken for biopsy. Her final pathology revealed a large exophytic mass in the cecum measuring 4 x 2 cm extending to the appendiceal orifice and extends into pericolonic adipose tissue. G2, LVI+, PNI negative. 14 LN were resected, with 10 being positive for metastatic adenocarcinoma. She also had an omental mass measuring 2.5 x 1.5cm which was positive for metastatic adenocarcinoma, consistent with a colon primary. Her final pathology staging is consistent with [id number]. Her postop course was complicated by abdominal abscess formation s/p IR drain placement and subsequent removal and antibiotic treatment.      She underwent PET scan in March '24 with evidence of nodular foci within the omentum/peritoneum consistent with peritoneal carcinomatosis. No other evidence of disease. NGS testing was performed which revealed GULSHAN wild type and BRAF negative. Therefore she underwent 1st line therapy of FOLFOX + Cetuximab Q2w x 12 cycles. Despite good response to  treatment after 6 cycles of therapy, once she completed 12 cycles of chemotherapy she had evidence of disease progression to abdominal lymph nodes, peritoneum, and bilateral lungs. She will now proceed with 2nd line FOLFIRI + Bevacizumab.     Interval History:  Patient returns to the clinic today for a follow-up with scan results and clearance for C5 FOLFIRI +Priscilla q2w x 12 cycles.  Overall feels well today.  She denies any shortness of breath of abdominal pain.   Discussed her scan results with patient and again with her daughter.   Explained the role for biopsy of mass to concern metastatic disease. Discussed possible 3rd line therapy options.   She denies any fevers, chills, NS.       Past Medical History:   Diagnosis Date    Hypertension       Past Surgical History:   Procedure Laterality Date    APPENDECTOMY N/A 2/5/2024    Procedure: APPENDECTOMY;  Surgeon: Anna Murillo MD;  Location: [facility];  Service: General;  Laterality: N/A;    INSERTION OF TUNNELED CENTRAL VENOUS CATHETER (CVC) WITH SUBCUTANEOUS PORT N/A 3/26/2024    Procedure: [id number];  Surgeon: Armando Tran MD;  Location: [facility] OR;  Service: Peripheral Vascular;  Laterality: N/A;    LAPAROSCOPIC APPENDECTOMY N/A 2/5/2024    Procedure: APPENDECTOMY, LAPAROSCOPIC;  Surgeon: Anna Murillo MD;  Location: [facility] OR;  Service: General;  Laterality: N/A;    SURGICAL REMOVAL OF ILEUM WITH CECUM N/A 2/5/2024    Procedure: EXCISION, CECUM WITH ILEUM;  Surgeon: Anna Murillo MD;  Location: [facility] OR;  Service: General;  Laterality: N/A;    WASHOUT N/A 2/5/2024    Procedure: WASHOUT;  Surgeon: Anna Murillo MD;  Location: [facility] OR;  Service: General;  Laterality: N/A;     Social History     Socioeconomic History    Marital status:    Tobacco Use    Smoking status: Former     Types: Cigarettes    Smokeless tobacco: Never   Substance and Sexual Activity    Alcohol use: Not Currently    Drug use: Never    Sexual activity: Not  Currently     Social Drivers of Health     Financial Resource Strain: Low Risk  (2/15/2024)    Overall Financial Resource Strain (CARDIA)     Difficulty of Paying Living Expenses: Not hard at all   Food Insecurity: No Food Insecurity (2/15/2024)    Hunger Vital Sign     Worried About Running Out of Food in the Last Year: Never true     Ran Out of Food in the Last Year: Never true   Transportation Needs: No Transportation Needs (2/15/2024)    PRAPARE - Transportation     Lack of Transportation (Medical): No     Lack of Transportation (Non-Medical): No   Physical Activity: Unknown (2/15/2024)    Exercise Vital Sign     Days of Exercise per Week: 0 days   Stress: No Stress Concern Present (2/15/2024)    [employer] - Occupational Stress Questionnaire     Feeling of Stress : Not at all   Housing Stability: Low Risk  (2/15/2024)    Housing Stability Vital Sign     Unable to Pay for Housing in the Last Year: No     Number of Places Lived in the Last Year: 1     Unstable Housing in the Last Year: No      Family History   Family history unknown: Yes      Review of patient's allergies indicates:   Allergen Reactions    Robaxin [methocarbamol] Other (See Comments)     Altered mental status       Review of Systems   Constitutional:  Positive for appetite change, fatigue and unexpected weight change. Negative for activity change and fever.   HENT:  Negative for sore throat.    Eyes:  Negative for visual disturbance.   Respiratory:  Negative for cough and shortness of breath.    Cardiovascular:  Negative for chest pain.   Gastrointestinal:  Negative for abdominal pain, diarrhea and vomiting.   Endocrine: Negative for polyuria.   Genitourinary:  Negative for dysuria and hot flashes.   Integumentary:  Negative for rash.   Allergic/Immunologic: Negative for immunocompromised state.   Neurological:  Negative for weakness and headaches.   Hematological:  Negative for adenopathy.   Psychiatric/Behavioral:   Positive for depressed mood. Negative for confusion.          Objective:      Vitals:    04/03/25 1107   BP: (!) 165/87   Pulse: (!) 117   Resp: 16   Temp: 98.3 °F (36.8 °C)       Physical Exam  Constitutional:       General: She is not in acute distress.     Appearance: She is underweight. She is not ill-appearing.   HENT:      Head: Normocephalic and atraumatic.      Nose: Nose normal.      Mouth/Throat:      Mouth: Mucous membranes are moist.      Pharynx: Oropharynx is clear.   Eyes:      Extraocular Movements: Extraocular movements intact.      Conjunctiva/sclera: Conjunctivae normal.      Pupils: Pupils are equal, round, and reactive to light.   Cardiovascular:      Rate and Rhythm: Normal rate and regular rhythm.      Pulses: Normal pulses.      Heart sounds: Normal heart sounds. No murmur heard.  Pulmonary:      Effort: Pulmonary effort is normal. No respiratory distress.      Breath sounds: Normal breath sounds.   Abdominal:      General: There is no distension.      Palpations: Abdomen is soft.      Tenderness: There is no abdominal tenderness.   Musculoskeletal:         General: Normal range of motion.      Cervical back: Normal range of motion and neck supple.      Right lower leg: No edema.      Left lower leg: No edema.   Feet:      Left foot:      Skin integrity: No blister.   Lymphadenopathy:      Cervical: No cervical adenopathy.   Skin:     General: Skin is warm and dry.   Neurological:      General: No focal deficit present.      Mental Status: She is alert and oriented to person, place, and time.         LABS AND IMAGING REVIEWED IN EPIC  3/21/2025 PET/CT:  1. Stable size of bilateral lungs numerous small randomly scattered nodules.  These are non hypermetabolic but not adequately resolved on the PET scan due to the small size.  These are concerning to be metastatic  2.  Stable aortocaval small size lymph node with interval improvement of hypermetabolism.  3. Interval substantial size of pelvic  cystic structure without internal and peripheral hypermetabolism.  This likely is arising from the left ovary and is of indeterminate significance.  Please further assess with ultrasound exam or MRI of the pelvis.  4.  Cul-de-sac/posterior uterine segment hypermetabolism with interval mild improvement.  4/2/25 MRI Pelvis:  Findings are concerning for epithelial type neoplasm with enhancing frondlike appearance in the left aspect which correlates with the hypermetabolic lesion on recent PET/CT scan. Other secondary findings as above including myomatous changes with degenerative fibroids.     PATHOLOGY:  2/5/24 Biopsy:  1. Appendix, appendectomy:  - Acute appendicitis with perforation.  - Adenocarcinoma involves lymphatic spaces of the appendiceal wall and peritoneal surface.  2. Cecum, ileocecectomy:  - Colonic adenocarcinoma.  - See synoptic checklist for CAP cancer protocol.  3. Omental mass, biopsy:  - Metastatic adenocarcinoma, consistent with a colon primary.  4. Small bowel, segmental resection:  - Acute peritonitis.  - No evidence of malignancy.    MLH1: PRESERVED (INTACT) EXPRESSION IN TUMOR CELLS      MSH2: PRESERVED (INTACT) EXPRESSION IN TUMOR CELLS      MSH6: PRESERVED (INTACT) EXPRESSION IN TUMOR CELLS      PMS2: PRESERVED (INTACT) EXPRESSION IN TUMOR CELLS  Microscopic Description/Comments   These results show no deficiency of the mismatch repair proteins tested.    Assessment:   Stage IV Metastatic Colon Adenocarcinoma with mets to peritoneum - Cecal mass, nonobstructive, no perforation. [id number]. Treatment history as above. Now proceeding with 2nd line FOLFIRI + Priscilla.   Pulmonary Embolus at the right mainstem bronchus   Incidentally found during CT CAP 6/25/24. Now on xarelto, continue at this time.   Peripheral Neuropathy   She does report peripheral neuropathy to her fingertips and lower legs.  Her hand neuropathy is grade 2, consistent but not painful. Able to button buttons.   Her LE bilateral  neuropathy is mainly at night she gets shooting pains when she lays down.   Stable today on gabapentin to 300mg TID - 1 in AM, 2 in PM.   Immunodeficiency due to Drug Therapy - Precautions discussed with patient. Continue to monitor for any signs of symptoms of infection. No prophylaxis needed at this time. On growth factor due to history of significant neutropenia due to chemotherapy.   HTN  Took med prior to arrival, additional dose of amlodipine given today in infusion. BP now 158/89  Spoke with daughter regarding BP meds, she states pt does not take BP med regularly and current provider is out of town. Will send in amlodipine 10mg until she can get in with PCP for further eval. Daughter made aware. She was instructed to monitor BP daily at home as well.     Plan:   Labs and exam stable.  Hold chemotherapy pending pelvic mass biopsy  Continue gabapentin 300mg TID for drug induced peripheral neuropathy  Referral to IR for pelvic mass biopsy  RTC in 4 weeks with MD to review pathology results  Cbc, cmp, cea, urine protein- 1 hr prior @ [facility]    I spent a total of 40 minutes on the day of the visit.This includes face to face time and non-face to face time preparing to see the patient (eg, review of tests), obtaining and/or reviewing separately obtained history, documenting clinical information in the electronic or other health record, independently interpreting results and communicating results to the patient/family/caregiver, or care coordinator.    Elizabeth Kennedy LeJeune, MD  Hematology/Oncology   [facility]    Professional Services:  IDana LPN, acted solely as a scribe for and in the presence of Dr. Elizabeth Lejeune, who performed these services.      Visit today included increased complexity associated with the care of the episodic problem Stage IV Metastatic Colon Adenocarcinoma with mets to peritoneum addressed and managing the longitudinal care of the patient due to the serious  and/or complex managed problem(s) Stage IV Metastatic Colon Adenocarcinoma with mets to peritoneum.

## 2025-04-01 ENCOUNTER — HOSPITAL ENCOUNTER (OUTPATIENT)
Dept: RADIOLOGY | Facility: HOSPITAL | Age: 70
Discharge: HOME | End: 2025-04-01
Payer: MEDICARE

## 2025-04-01 DIAGNOSIS — C18.2 MALIGNANT NEOPLASM OF ASCENDING COLON: ICD-10-CM

## 2025-04-01 DIAGNOSIS — N94.89 PELVIC CYST IN FEMALE: ICD-10-CM

## 2025-04-01 PROCEDURE — 25500020 PHARM REV CODE 255

## 2025-04-01 PROCEDURE — A9577 INJ MULTIHANCE: HCPCS

## 2025-04-01 PROCEDURE — 72197 MRI PELVIS W/O & W/DYE: CPT | Mod: TC

## 2025-04-01 RX ADMIN — GADOBENATE DIMEGLUMINE 10 ML: 529 INJECTION, SOLUTION INTRAVENOUS at 03:04

## 2025-04-03 ENCOUNTER — OFFICE VISIT (OUTPATIENT)
Facility: CLINIC | Age: 70
End: 2025-04-03
Payer: MEDICARE

## 2025-04-03 ENCOUNTER — PATIENT MESSAGE (OUTPATIENT)
Facility: CLINIC | Age: 70
End: 2025-04-03

## 2025-04-03 ENCOUNTER — LAB VISIT (OUTPATIENT)
Dept: LAB | Facility: HOSPITAL | Age: 70
End: 2025-04-03
Payer: MEDICARE

## 2025-04-03 VITALS
SYSTOLIC BLOOD PRESSURE: 165 MMHG | HEIGHT: 67 IN | WEIGHT: 115.5 LBS | HEART RATE: 117 BPM | TEMPERATURE: 98 F | OXYGEN SATURATION: 96 % | RESPIRATION RATE: 16 BRPM | DIASTOLIC BLOOD PRESSURE: 87 MMHG | BODY MASS INDEX: 18.13 KG/M2

## 2025-04-03 DIAGNOSIS — Z79.899 IMMUNODEFICIENCY DUE TO DRUG THERAPY: Primary | ICD-10-CM

## 2025-04-03 DIAGNOSIS — C18.2 MALIGNANT NEOPLASM OF ASCENDING COLON: ICD-10-CM

## 2025-04-03 DIAGNOSIS — D84.821 IMMUNODEFICIENCY DUE TO DRUG THERAPY: Primary | ICD-10-CM

## 2025-04-03 DIAGNOSIS — G62.0 NEUROPATHY DUE TO DRUG: ICD-10-CM

## 2025-04-03 LAB
ALBUMIN SERPL-MCNC: 2.5 G/DL (ref 3.4–4.8)
ALBUMIN/GLOB SERPL: 0.4 RATIO (ref 1.1–2)
ALP SERPL-CCNC: 125 UNIT/L (ref 40–150)
ALT SERPL-CCNC: 11 UNIT/L (ref 0–55)
ANION GAP SERPL CALC-SCNC: 6 MEQ/L
AST SERPL-CCNC: 15 UNIT/L (ref 11–45)
BASOPHILS # BLD AUTO: 0.06 X10(3)/MCL
BASOPHILS NFR BLD AUTO: 0.7 %
BILIRUB SERPL-MCNC: 0.4 MG/DL
BUN SERPL-MCNC: 9 MG/DL (ref 9.8–20.1)
CALCIUM SERPL-MCNC: 8.8 MG/DL (ref 8.4–10.2)
CEA SERPL-MCNC: 1492.19 NG/ML (ref 0–3)
CHLORIDE SERPL-SCNC: 111 MMOL/L (ref 98–107)
CO2 SERPL-SCNC: 23 MMOL/L (ref 23–31)
CREAT SERPL-MCNC: 0.73 MG/DL (ref 0.55–1.02)
CREAT/UREA NIT SERPL: 12
EOSINOPHIL # BLD AUTO: 0.1 X10(3)/MCL (ref 0–0.9)
EOSINOPHIL NFR BLD AUTO: 1.2 %
ERYTHROCYTE [DISTWIDTH] IN BLOOD BY AUTOMATED COUNT: 18 % (ref 11.5–17)
GFR SERPLBLD CREATININE-BSD FMLA CKD-EPI: >60 ML/MIN/1.73/M2
GLOBULIN SER-MCNC: 5.7 GM/DL (ref 2.4–3.5)
GLUCOSE SERPL-MCNC: 90 MG/DL (ref 82–115)
HCT VFR BLD AUTO: 31 % (ref 37–47)
HGB BLD-MCNC: 9.5 G/DL (ref 12–16)
IMM GRANULOCYTES # BLD AUTO: 0.03 X10(3)/MCL (ref 0–0.04)
IMM GRANULOCYTES NFR BLD AUTO: 0.3 %
LYMPHOCYTES # BLD AUTO: 2.28 X10(3)/MCL (ref 0.6–4.6)
LYMPHOCYTES NFR BLD AUTO: 26.3 %
MCH RBC QN AUTO: 28.8 PG (ref 27–31)
MCHC RBC AUTO-ENTMCNC: 30.6 G/DL (ref 33–36)
MCV RBC AUTO: 93.9 FL (ref 80–94)
MONOCYTES # BLD AUTO: 0.98 X10(3)/MCL (ref 0.1–1.3)
MONOCYTES NFR BLD AUTO: 11.3 %
NEUTROPHILS # BLD AUTO: 5.21 X10(3)/MCL (ref 2.1–9.2)
NEUTROPHILS NFR BLD AUTO: 60.2 %
NRBC BLD AUTO-RTO: 0 %
PLATELET # BLD AUTO: 320 X10(3)/MCL (ref 130–400)
PMV BLD AUTO: 10.5 FL (ref 7.4–10.4)
POTASSIUM SERPL-SCNC: 4 MMOL/L (ref 3.5–5.1)
PROT SERPL-MCNC: 8.2 GM/DL (ref 5.8–7.6)
RBC # BLD AUTO: 3.3 X10(6)/MCL (ref 4.2–5.4)
SODIUM SERPL-SCNC: 140 MMOL/L (ref 136–145)
WBC # BLD AUTO: 8.66 X10(3)/MCL (ref 4.5–11.5)

## 2025-04-03 PROCEDURE — G2211 COMPLEX E/M VISIT ADD ON: HCPCS | Mod: S$PBB,,, | Performed by: STUDENT IN AN ORGANIZED HEALTH CARE EDUCATION/TRAINING PROGRAM

## 2025-04-03 PROCEDURE — 99999 PR PBB SHADOW E&M-EST. PATIENT-LVL IV: CPT | Mod: PBBFAC,,, | Performed by: STUDENT IN AN ORGANIZED HEALTH CARE EDUCATION/TRAINING PROGRAM

## 2025-04-03 PROCEDURE — 82378 CARCINOEMBRYONIC ANTIGEN: CPT

## 2025-04-03 PROCEDURE — 99214 OFFICE O/P EST MOD 30 MIN: CPT | Mod: PBBFAC | Performed by: STUDENT IN AN ORGANIZED HEALTH CARE EDUCATION/TRAINING PROGRAM

## 2025-04-03 PROCEDURE — 85025 COMPLETE CBC W/AUTO DIFF WBC: CPT

## 2025-04-03 PROCEDURE — 36415 COLL VENOUS BLD VENIPUNCTURE: CPT

## 2025-04-03 PROCEDURE — 80053 COMPREHEN METABOLIC PANEL: CPT

## 2025-04-03 PROCEDURE — 99215 OFFICE O/P EST HI 40 MIN: CPT | Mod: S$PBB,,, | Performed by: STUDENT IN AN ORGANIZED HEALTH CARE EDUCATION/TRAINING PROGRAM

## 2025-04-07 ENCOUNTER — DOCUMENTATION ONLY (OUTPATIENT)
Facility: CLINIC | Age: 70
End: 2025-04-07
Payer: MEDICARE

## 2025-04-07 DIAGNOSIS — C18.2 MALIGNANT NEOPLASM OF ASCENDING COLON: ICD-10-CM

## 2025-04-07 DIAGNOSIS — R19.00 PELVIC MASS: Primary | ICD-10-CM

## 2025-04-10 ENCOUNTER — PATIENT MESSAGE (OUTPATIENT)
Facility: CLINIC | Age: 70
End: 2025-04-10
Payer: MEDICARE

## 2025-04-16 DIAGNOSIS — C78.6 MALIGNANT NEOPLASM METASTATIC TO PERITONEUM: ICD-10-CM

## 2025-04-16 RX ORDER — RIVAROXABAN 20 MG/1
TABLET, FILM COATED ORAL
Qty: 30 TABLET | Refills: 2 | Status: SHIPPED | OUTPATIENT
Start: 2025-04-16

## 2025-04-22 DIAGNOSIS — C78.6 MALIGNANT NEOPLASM METASTATIC TO PERITONEUM: Primary | ICD-10-CM

## 2025-04-22 DIAGNOSIS — R79.89 OTHER SPECIFIED ABNORMAL FINDINGS OF BLOOD CHEMISTRY: ICD-10-CM

## 2025-04-22 DIAGNOSIS — R19.00 PELVIC MASS: ICD-10-CM

## 2025-05-06 ENCOUNTER — HOSPITAL ENCOUNTER (OUTPATIENT)
Dept: INTERVENTIONAL RADIOLOGY/VASCULAR | Facility: HOSPITAL | Age: 70
Discharge: HOME | End: 2025-05-06
Attending: STUDENT IN AN ORGANIZED HEALTH CARE EDUCATION/TRAINING PROGRAM
Payer: MEDICARE

## 2025-05-06 ENCOUNTER — PATIENT MESSAGE (OUTPATIENT)
Facility: CLINIC | Age: 70
End: 2025-05-06
Payer: MEDICARE

## 2025-05-06 VITALS
OXYGEN SATURATION: 100 % | TEMPERATURE: 98 F | WEIGHT: 115.06 LBS | RESPIRATION RATE: 20 BRPM | SYSTOLIC BLOOD PRESSURE: 117 MMHG | DIASTOLIC BLOOD PRESSURE: 82 MMHG | HEIGHT: 66 IN | HEART RATE: 98 BPM | BODY MASS INDEX: 18.49 KG/M2

## 2025-05-06 DIAGNOSIS — R19.00 PELVIC MASS: ICD-10-CM

## 2025-05-06 DIAGNOSIS — C18.2 MALIGNANT NEOPLASM OF ASCENDING COLON: ICD-10-CM

## 2025-05-06 LAB
ALBUMIN SERPL-MCNC: 2.1 G/DL (ref 3.4–4.8)
ALBUMIN/GLOB SERPL: 0.3 RATIO (ref 1.1–2)
ALP SERPL-CCNC: 149 UNIT/L (ref 40–150)
ALT SERPL-CCNC: 17 UNIT/L (ref 0–55)
ANION GAP SERPL CALC-SCNC: 9 MEQ/L
AST SERPL-CCNC: 24 UNIT/L (ref 11–45)
BASOPHILS # BLD AUTO: 0.02 X10(3)/MCL
BASOPHILS NFR BLD AUTO: 0.2 %
BILIRUB SERPL-MCNC: 0.3 MG/DL
BUN SERPL-MCNC: 10.8 MG/DL (ref 9.8–20.1)
CALCIUM SERPL-MCNC: 9 MG/DL (ref 8.4–10.2)
CHLORIDE SERPL-SCNC: 107 MMOL/L (ref 98–107)
CO2 SERPL-SCNC: 22 MMOL/L (ref 23–31)
CREAT SERPL-MCNC: 0.7 MG/DL (ref 0.55–1.02)
CREAT/UREA NIT SERPL: 15
EOSINOPHIL # BLD AUTO: 0.08 X10(3)/MCL (ref 0–0.9)
EOSINOPHIL NFR BLD AUTO: 0.9 %
ERYTHROCYTE [DISTWIDTH] IN BLOOD BY AUTOMATED COUNT: 17.1 % (ref 11.5–17)
GFR SERPLBLD CREATININE-BSD FMLA CKD-EPI: >60 ML/MIN/1.73/M2
GLOBULIN SER-MCNC: 6.6 GM/DL (ref 2.4–3.5)
GLUCOSE SERPL-MCNC: 93 MG/DL (ref 82–115)
HCT VFR BLD AUTO: 26.9 % (ref 37–47)
HGB BLD-MCNC: 8.2 G/DL (ref 12–16)
IMM GRANULOCYTES # BLD AUTO: 0.04 X10(3)/MCL (ref 0–0.04)
IMM GRANULOCYTES NFR BLD AUTO: 0.4 %
INR PPP: 1.4
LYMPHOCYTES # BLD AUTO: 1.1 X10(3)/MCL (ref 0.6–4.6)
LYMPHOCYTES NFR BLD AUTO: 12 %
MCH RBC QN AUTO: 27.7 PG (ref 27–31)
MCHC RBC AUTO-ENTMCNC: 30.5 G/DL (ref 33–36)
MCV RBC AUTO: 90.9 FL (ref 80–94)
MONOCYTES # BLD AUTO: 0.72 X10(3)/MCL (ref 0.1–1.3)
MONOCYTES NFR BLD AUTO: 7.9 %
NEUTROPHILS # BLD AUTO: 7.17 X10(3)/MCL (ref 2.1–9.2)
NEUTROPHILS NFR BLD AUTO: 78.6 %
NRBC BLD AUTO-RTO: 0 %
PLATELET # BLD AUTO: 385 X10(3)/MCL (ref 130–400)
PMV BLD AUTO: 9.7 FL (ref 7.4–10.4)
POTASSIUM SERPL-SCNC: 3.8 MMOL/L (ref 3.5–5.1)
PROT SERPL-MCNC: 8.7 GM/DL (ref 5.8–7.6)
PROTHROMBIN TIME: 16.7 SECONDS (ref 12.5–14.5)
RBC # BLD AUTO: 2.96 X10(6)/MCL (ref 4.2–5.4)
SODIUM SERPL-SCNC: 138 MMOL/L (ref 136–145)
WBC # BLD AUTO: 9.13 X10(3)/MCL (ref 4.5–11.5)

## 2025-05-06 PROCEDURE — 99153 MOD SED SAME PHYS/QHP EA: CPT

## 2025-05-06 PROCEDURE — 36415 COLL VENOUS BLD VENIPUNCTURE: CPT | Performed by: RADIOLOGY

## 2025-05-06 PROCEDURE — 99152 MOD SED SAME PHYS/QHP 5/>YRS: CPT

## 2025-05-06 PROCEDURE — 63600175 PHARM REV CODE 636 W HCPCS: Performed by: RADIOLOGY

## 2025-05-06 PROCEDURE — 80053 COMPREHEN METABOLIC PANEL: CPT | Performed by: RADIOLOGY

## 2025-05-06 PROCEDURE — 85025 COMPLETE CBC W/AUTO DIFF WBC: CPT | Performed by: RADIOLOGY

## 2025-05-06 PROCEDURE — 85610 PROTHROMBIN TIME: CPT | Performed by: RADIOLOGY

## 2025-05-06 RX ORDER — MIDAZOLAM HYDROCHLORIDE 2 MG/2ML
INJECTION, SOLUTION INTRAMUSCULAR; INTRAVENOUS
Status: COMPLETED | OUTPATIENT
Start: 2025-05-06 | End: 2025-05-06

## 2025-05-06 RX ORDER — LIDOCAINE HYDROCHLORIDE 20 MG/ML
INJECTION, SOLUTION INFILTRATION; PERINEURAL
Status: COMPLETED | OUTPATIENT
Start: 2025-05-06 | End: 2025-05-06

## 2025-05-06 RX ORDER — MIDAZOLAM HYDROCHLORIDE 2 MG/2ML
INJECTION, SOLUTION INTRAMUSCULAR; INTRAVENOUS
Status: DISPENSED
Start: 2025-05-06 | End: 2025-05-06

## 2025-05-06 RX ORDER — FENTANYL CITRATE 50 UG/ML
INJECTION, SOLUTION INTRAMUSCULAR; INTRAVENOUS
Status: DISPENSED
Start: 2025-05-06 | End: 2025-05-06

## 2025-05-06 RX ORDER — FENTANYL CITRATE 50 UG/ML
INJECTION, SOLUTION INTRAMUSCULAR; INTRAVENOUS
Status: COMPLETED | OUTPATIENT
Start: 2025-05-06 | End: 2025-05-06

## 2025-05-06 RX ADMIN — FENTANYL CITRATE 25 MCG: 50 INJECTION INTRAMUSCULAR; INTRAVENOUS at 09:05

## 2025-05-06 RX ADMIN — LIDOCAINE HYDROCHLORIDE 5 ML: 20 INJECTION, SOLUTION INFILTRATION; PERINEURAL at 09:05

## 2025-05-06 RX ADMIN — MIDAZOLAM HYDROCHLORIDE 0.5 MG: 1 INJECTION, SOLUTION INTRAMUSCULAR; INTRAVENOUS at 09:05

## 2025-05-06 NOTE — H&P
IR Pre Procedure Note   Subjective:      Selene Smallwood is a 69 y.o. female who presents today with Colon Cancer & Pelvic Mass. This consultation is requested for the specific conditions prompting preoperative evaluation for: Pelvic Mass Biopsy.     Planned anesthesia: IV sedation.     The following portions of the patient's history were reviewed and updated as appropriate: allergies, current medications, past family history, past medical history, past social history, past surgical history, and problem list.    Review of Systems:  Pertinent items are noted in HPI.     Past Medical History:  Past Medical History[1]     Social History:  Social History     Socioeconomic History    Marital status:    Tobacco Use    Smoking status: Former     Types: Cigarettes    Smokeless tobacco: Never   Substance and Sexual Activity    Alcohol use: Not Currently    Drug use: Never    Sexual activity: Not Currently     Social Drivers of Health     Financial Resource Strain: Low Risk  (2/15/2024)    Overall Financial Resource Strain (CARDIA)     Difficulty of Paying Living Expenses: Not hard at all   Food Insecurity: No Food Insecurity (2/15/2024)    Hunger Vital Sign     Worried About Running Out of Food in the Last Year: Never true     Ran Out of Food in the Last Year: Never true   Transportation Needs: No Transportation Needs (2/15/2024)    PRAPARE - Transportation     Lack of Transportation (Medical): No     Lack of Transportation (Non-Medical): No   Physical Activity: Unknown (2/15/2024)    Exercise Vital Sign     Days of Exercise per Week: 0 days   Stress: No Stress Concern Present (2/15/2024)    [employer] - Occupational Stress Questionnaire     Feeling of Stress : Not at all   Housing Stability: Low Risk  (2/15/2024)    Housing Stability Vital Sign     Unable to Pay for Housing in the Last Year: No     Number of Places Lived in the Last Year: 1     Unstable Housing in the Last Year: No         Medication History:  Current Outpatient Medications on File Prior to Encounter    Order #: [id number]: Normal    Order #: [id number]: Normal    Order #: [id number]: Historical Med    Order #: [id number]: Normal    Order #: [id number]: Normal    Order #: [id number]: Normal    Order #: [id number]: Normal    Order #: [id number]: Normal    [DISCONTINUED] Order #: [id number]: Normal    [DISCONTINUED] Order #: [id number]: Historical Med    [DISCONTINUED] Order #: [id number]: Normal    [DISCONTINUED] Order #: [id number]: Normal    [DISCONTINUED] Order #: [id number]: Normal    [DISCONTINUED] Order #: [id number]: Normal    [DISCONTINUED] Order #: [id number]: Historical Med    [DISCONTINUED] Order #: [id number]: Normal        Allergies:  Review of patient's allergies indicates:   Allergen Reactions    Robaxin [methocarbamol] Other (See Comments)     Altered mental status          Objective:     Physical Exam:  Vital Signs: Temp:  [97.7 °F (36.5 °C)]   Pulse:  [118]   Resp:  [18]   BP: (122)/(54)   SpO2:  [100 %]   General: No acute distress; awake, alert, and oriented x 3  HEENT: Normocephalic, atraumatic, extraocular movements intact  Cardiac:Positive S1, S2  Respiratory: Unlabored respirations  ABD: Nontender, soft  Neurological: Grossly intact; moves all 4 extremities without difficulty  Ext: No cyanosis, clubbing, or edema    Predictors of intubation difficulty:  Sedation Risk Screen  Mallampati Scale: Class I  ASA Classification: Class 3  Assessments  Neurological: No  HEENT: No  Cardiac: No  Dentition: No  Lungs: No  Gastrointestinal: No  Liver: No  Renal: No  Musculoskeletal: No  Airway Assessment  Previous problems with anesthesia or sedation?: No  Stridor, snoring or sleep apnea?: No  Dysmorphic Facial Features?: No  Beard?: No  Edentulous?: No  Thick/Abnormal Neck Anatomy?: No  Tumor or mass in airway?: No  Recent trauma or surgery  to airway (<= 1 Month)?: No  Radiation therapy to head, neck?: No  Advanced rheumatoid arthritis with cervical pain?: No  Immobile cervical spine?: No  Complex congenital heart disease?: No  Can patient easily accomodate 2 fingers width into mouth?: Yes  Is the distance from the [place] to the tip of the chin => 3 finger widths?: No  Can mandible (jaw) appropriately move forward?: Yes  Sedation History and Plan  Previous Sedation History: Yes  Sedation Effective: Yes  Family History of Sedation Problems: No  Consent Signed: Yes  Sedation Plan: Nurse Monitoring: Moderate    Imaging  The pertinent imaging was personally reviewed for procedural planning, safety, and feasibility by Dr Kristian Madrid.    Lab Review   Lab Results   Component Value Date     05/06/2025    K 3.8 05/06/2025     05/06/2025    CO2 22 (L) 05/06/2025    BUN 10.8 05/06/2025    CREATININE 0.70 05/06/2025    CALCIUM 9.0 05/06/2025     Lab Results   Component Value Date    WBC 9.13 05/06/2025    HGB 8.2 (L) 05/06/2025    HCT 26.9 (L) 05/06/2025    MCV 90.9 05/06/2025     05/06/2025         Assessment:        69 y.o. female presenting with Colon Cancer & Pelvic Mass.  The patient is being evaluated for Pelvic Mass Biopsy.         Plan:        Proceed with Biopsy.         [1]   Past Medical History:  Diagnosis Date    Hypertension

## 2025-05-06 NOTE — DISCHARGE SUMMARY
Radiology Post-Procedure Note    Pre Op Diagnosis: H/O Colon Cancer with Pelvic Mass    Post Op Diagnosis: Same    Secondary Diagnoses:   Problem List Items Addressed This Visit       Colon cancer    Relevant Orders    IR CT Guidance     Other Visit Diagnoses         Pelvic mass        Relevant Orders    IR CT Guidance             Procedure: CT Guided Pelvic Mass Biopsy/Aspiration    Procedure performed by: Kristian Madrid MD    Assistant: None    Written Informed Consent Obtained: Yes    Specimen Removed: 10 cc clear gelatinous material    Estimated Blood Loss: <10 cc    Condition: Stable    Outcome: The patient tolerated the procedure well and was without complications.    For further details please see the imaging report associated.    Disposition: Home or Self Care    Follow Up: With primary care provider    Discharge Instructions:  No discharge procedures on file.       Time Spent On Discharge: 2 minutes

## 2025-05-07 NOTE — PROGRESS NOTES
Subjective:       Patient ID: Selene Smallwood is a 70 y.o. female.    Chief Complaint: Follow Up      Diagnosis:   Stage IV Metastatic Colon Adenocarcinoma with mets to peritoneum  2. Pulmonary Embolism    Current Treatment:   FOLFIRI + Priscilla - (1/6/25-3/11/25)  Held after C4 d/t pelvic mass on scans  Xarelto 15 mg BID x 21 days then 20 mg daily- 6/25/24-present    Treatment History:   FOLFOX + Cetuximab q2w x 12 cycles- 4/2/24 - 11/6/24  Hold Oxaliplatin for C11 and C12 due to tolerance    HPI:   70 yo F presented in March '24 for evaluation of metastatic colon cancer. She initially presented to OSH in early February '24 with 2 days of upper abdominal pain. Imaging on 2/4 in the ER revealed evidence of non perforated acute appendicitis. She underwent Ex lap where a cecal mass was incidentally discovered and resected, as well as omental studding was noted and 1 small mass was taken for biopsy. Her final pathology revealed a large exophytic mass in the cecum measuring 4 x 2 cm extending to the appendiceal orifice and extends into pericolonic adipose tissue. G2, LVI+, PNI negative. 14 LN were resected, with 10 being positive for metastatic adenocarcinoma. She also had an omental mass measuring 2.5 x 1.5cm which was positive for metastatic adenocarcinoma, consistent with a colon primary. Her final pathology staging is consistent with [id number]. Her postop course was complicated by abdominal abscess formation s/p IR drain placement and subsequent removal and antibiotic treatment.      She underwent PET scan in March '24 with evidence of nodular foci within the omentum/peritoneum consistent with peritoneal carcinomatosis. No other evidence of disease. NGS testing was performed which revealed GULSHAN wild type and BRAF negative. Therefore she underwent 1st line therapy of FOLFOX + Cetuximab Q2w x 12 cycles. Despite good response to treatment after 6 cycles of therapy, once she completed 12 cycles of chemotherapy she had  evidence of disease progression to abdominal lymph nodes, peritoneum, and bilateral lungs. She was started on 2nd line FOLFIRI + Bevacizumab, unfortunately she had rapid progression with mucinous abdominal mass growth. She will now proceed with 3rd line Lonsurf + Avastin.     Interval History:  Patient returns to the clinic today for a follow-up with pathology results and treatment planning, accompanied by daughter and daughter April via telephone.  Today she reports RLQ abdominal pain x 4-5 days and increased fatigue.  She has not resumed Xarelto since pelvic biopsy. Denies SOB and chest pain.  Discussed her scan results with patient and with her daughter. Reviewed the palliative nature of all treatments and discussed the option of symptom treatment only vs treatment of her cancer and symptoms. She would like to proceed with chemotherapy.   She denies any fevers, chills, NS.       Past Medical History:   Diagnosis Date    Hypertension       Past Surgical History:   Procedure Laterality Date    APPENDECTOMY N/A 2/5/2024    Procedure: APPENDECTOMY;  Surgeon: Anna Murillo MD;  Location: [facility];  Service: General;  Laterality: N/A;    INSERTION OF TUNNELED CENTRAL VENOUS CATHETER (CVC) WITH SUBCUTANEOUS PORT N/A 3/26/2024    Procedure: [id number];  Surgeon: Armando Tran MD;  Location: [facility] OR;  Service: Peripheral Vascular;  Laterality: N/A;    LAPAROSCOPIC APPENDECTOMY N/A 2/5/2024    Procedure: APPENDECTOMY, LAPAROSCOPIC;  Surgeon: Anna Murillo MD;  Location: [facility] OR;  Service: General;  Laterality: N/A;    SURGICAL REMOVAL OF ILEUM WITH CECUM N/A 2/5/2024    Procedure: EXCISION, CECUM WITH ILEUM;  Surgeon: Anna Murillo MD;  Location: [facility] OR;  Service: General;  Laterality: N/A;    WASHOUT N/A 2/5/2024    Procedure: WASHOUT;  Surgeon: Anna Murillo MD;  Location: [facility] OR;  Service: General;  Laterality: N/A;     Social History     Socioeconomic History    Marital status:     Tobacco Use    Smoking status: Former     Types: Cigarettes    Smokeless tobacco: Never   Substance and Sexual Activity    Alcohol use: Not Currently    Drug use: Never    Sexual activity: Not Currently     Social Drivers of Health     Financial Resource Strain: Low Risk  (2/15/2024)    Overall Financial Resource Strain (CARDIA)     Difficulty of Paying Living Expenses: Not hard at all   Food Insecurity: No Food Insecurity (2/15/2024)    Hunger Vital Sign     Worried About Running Out of Food in the Last Year: Never true     Ran Out of Food in the Last Year: Never true   Transportation Needs: No Transportation Needs (2/15/2024)    PRAPARE - Transportation     Lack of Transportation (Medical): No     Lack of Transportation (Non-Medical): No   Physical Activity: Unknown (2/15/2024)    Exercise Vital Sign     Days of Exercise per Week: 0 days   Stress: No Stress Concern Present (2/15/2024)    [employer] - Occupational Stress Questionnaire     Feeling of Stress : Not at all   Housing Stability: Low Risk  (2/15/2024)    Housing Stability Vital Sign     Unable to Pay for Housing in the Last Year: No     Number of Places Lived in the Last Year: 1     Unstable Housing in the Last Year: No      Family History   Family history unknown: Yes      Review of patient's allergies indicates:   Allergen Reactions    Robaxin [methocarbamol] Other (See Comments)     Altered mental status       Review of Systems   Constitutional:  Positive for appetite change, fatigue and unexpected weight change. Negative for activity change and fever.   HENT:  Negative for sore throat.    Eyes:  Negative for visual disturbance.   Respiratory:  Negative for cough and shortness of breath.    Cardiovascular:  Negative for chest pain.   Gastrointestinal:  Negative for abdominal pain, diarrhea and vomiting.   Endocrine: Negative for polyuria.   Genitourinary:  Negative for dysuria and hot flashes.   Integumentary:  Negative  for rash.   Allergic/Immunologic: Negative for immunocompromised state.   Neurological:  Negative for weakness and headaches.   Hematological:  Negative for adenopathy.   Psychiatric/Behavioral:  Positive for depressed mood. Negative for confusion.          Objective:      Vitals:    05/15/25 0954   BP: 120/85   Pulse: (!) 124   Resp: 16   Temp: 97.5 °F (36.4 °C)         Physical Exam  Constitutional:       General: She is not in acute distress.     Appearance: She is underweight. She is not ill-appearing.   HENT:      Head: Normocephalic and atraumatic.      Nose: Nose normal.      Mouth/Throat:      Mouth: Mucous membranes are moist.      Pharynx: Oropharynx is clear.   Eyes:      Extraocular Movements: Extraocular movements intact.      Conjunctiva/sclera: Conjunctivae normal.      Pupils: Pupils are equal, round, and reactive to light.   Cardiovascular:      Rate and Rhythm: Normal rate and regular rhythm.      Pulses: Normal pulses.      Heart sounds: Normal heart sounds. No murmur heard.  Pulmonary:      Effort: Pulmonary effort is normal. No respiratory distress.      Breath sounds: Normal breath sounds.   Abdominal:      General: There is no distension.      Palpations: Abdomen is soft.      Tenderness: There is no abdominal tenderness.   Musculoskeletal:         General: Normal range of motion.      Cervical back: Normal range of motion and neck supple.      Right lower leg: No edema.      Left lower leg: No edema.   Feet:      Left foot:      Skin integrity: No blister.   Lymphadenopathy:      Cervical: No cervical adenopathy.   Skin:     General: Skin is warm and dry.   Neurological:      General: No focal deficit present.      Mental Status: She is alert and oriented to person, place, and time.       LABS AND IMAGING REVIEWED IN EPIC  3/21/2025 PET/CT:  1. Stable size of bilateral lungs numerous small randomly scattered nodules.  These are non hypermetabolic but not adequately resolved on the PET scan  due to the small size.  These are concerning to be metastatic  2.  Stable aortocaval small size lymph node with interval improvement of hypermetabolism.  3. Interval substantial size of pelvic cystic structure without internal and peripheral hypermetabolism.  This likely is arising from the left ovary and is of indeterminate significance.  Please further assess with ultrasound exam or MRI of the pelvis.  4.  Cul-de-sac/posterior uterine segment hypermetabolism with interval mild improvement.  4/2/25 MRI Pelvis:  Findings are concerning for epithelial type neoplasm with enhancing frondlike appearance in the left aspect which correlates with the hypermetabolic lesion on recent PET/CT scan. Other secondary findings as above including myomatous changes with degenerative fibroids.     PATHOLOGY:  2/5/24 Biopsy:  1. Appendix, appendectomy:  - Acute appendicitis with perforation.  - Adenocarcinoma involves lymphatic spaces of the appendiceal wall and peritoneal surface.  2. Cecum, ileocecectomy:  - Colonic adenocarcinoma.  - See synoptic checklist for CAP cancer protocol.  3. Omental mass, biopsy:  - Metastatic adenocarcinoma, consistent with a colon primary.  4. Small bowel, segmental resection:  - Acute peritonitis.  - No evidence of malignancy.    MLH1: PRESERVED (INTACT) EXPRESSION IN TUMOR CELLS   MSH2: PRESERVED (INTACT) EXPRESSION IN TUMOR CELLS   MSH6: PRESERVED (INTACT) EXPRESSION IN TUMOR CELLS   PMS2: PRESERVED (INTACT) EXPRESSION IN TUMOR CELLS  Microscopic Description/Comments   These results show no deficiency of the mismatch repair proteins tested.    5/6/25 PELVIC MASS, FINE NEEDLE ASPIRATION:    - MUCINOUS LESION.    Comment   The differential diagnosis is broad and includes appendiceal mucocele, malignant   colorectal tumors, and intestinal type neoplasms of other organs (ovary, etc.).   Given the degree of cytologic atypia, a malignant process should be considered.   Clinical and radiological correlation  is necessary.      Note:  The specimen is remarkably paucicellular and consists predominantly of   mucin.  There is insufficient cellularity for any additional ancillary studies.   Assessment:   Stage IV Metastatic Colon Adenocarcinoma with mets to peritoneum - Cecal mass, nonobstructive, no perforation. [id number]. Treatment history as above. Now proceeding with 3rd line Lonsurf + Priscilla.   Pulmonary Embolus at the right mainstem bronchus   Incidentally found during CT CAP 6/25/24. Now on xarelto, continue at this time.   Peripheral Neuropathy   She does report peripheral neuropathy to her fingertips and lower legs.  Her hand neuropathy is grade 2, consistent but not painful. Able to button buttons.   Her LE bilateral neuropathy is mainly at night she gets shooting pains when she lays down.   Stable today on gabapentin to 300mg TID - 1 in AM, 2 in PM.   Immunodeficiency due to Drug Therapy - Precautions discussed with patient. Continue to monitor for any signs of symptoms of infection. No prophylaxis needed at this time. On growth factor due to history of significant neutropenia due to chemotherapy.     Plan:     - Plan for 3rd line Lonsurf D1-5 and D8-12 + Bevacizumab D1/D15 Q28 day cycle  - Order CT CAP w/ IV and PO contrast to evaluate current disease state prior to new line of therapy  - Will need chemo education in person in [place](will need calendar print out of when to take medications)  Continue gabapentin 300mg TID for drug induced peripheral neuropathy  Instructed to restart Xarelto  RTC on 5/26/25 with NP for FU/labs/initial  Cbc, cmp, cea, mag, urine protein- 2 hrs prior @ [facility]    I spent a total of 40 minutes on the day of the visit.This includes face to face time and non-face to face time preparing to see the patient (eg, review of tests), obtaining and/or reviewing separately obtained history, documenting clinical information in the electronic or other health record, independently interpreting results  and communicating results to the patient/family/caregiver, or care coordinator.     code applied: Patient requires or will require continuous, longitudinal, and active collaborative plan of care related to this patient's health condition, Stage IV Metastatic Colon Adenocarcinoma with mets to peritoneum- the management of which requires the direction of a practitioner with specialized clinical knowledge, skill, and expertise.     Elizabeth Kennedy LeJeune, MD  Hematology/Oncology   [facility]    Professional Services:  Dana ROJO LPN, acted solely as a scribe for and in the presence of Dr. Elizabeth Lejeune, who performed these services.

## 2025-05-15 ENCOUNTER — PATIENT MESSAGE (OUTPATIENT)
Facility: CLINIC | Age: 70
End: 2025-05-15

## 2025-05-15 ENCOUNTER — OFFICE VISIT (OUTPATIENT)
Facility: CLINIC | Age: 70
End: 2025-05-15
Payer: MEDICARE

## 2025-05-15 ENCOUNTER — LAB VISIT (OUTPATIENT)
Dept: LAB | Facility: HOSPITAL | Age: 70
End: 2025-05-15
Payer: MEDICARE

## 2025-05-15 VITALS
TEMPERATURE: 98 F | DIASTOLIC BLOOD PRESSURE: 85 MMHG | HEIGHT: 66 IN | HEART RATE: 124 BPM | RESPIRATION RATE: 16 BRPM | WEIGHT: 118.81 LBS | OXYGEN SATURATION: 99 % | BODY MASS INDEX: 19.09 KG/M2 | SYSTOLIC BLOOD PRESSURE: 120 MMHG

## 2025-05-15 DIAGNOSIS — C78.6 MALIGNANT NEOPLASM METASTATIC TO PERITONEUM: ICD-10-CM

## 2025-05-15 DIAGNOSIS — C18.2 MALIGNANT NEOPLASM OF ASCENDING COLON: Primary | ICD-10-CM

## 2025-05-15 DIAGNOSIS — C18.2 MALIGNANT NEOPLASM OF ASCENDING COLON: ICD-10-CM

## 2025-05-15 DIAGNOSIS — C78.6 MALIGNANT NEOPLASM METASTATIC TO PERITONEUM: Primary | ICD-10-CM

## 2025-05-15 DIAGNOSIS — R19.00 PELVIC MASS: ICD-10-CM

## 2025-05-15 DIAGNOSIS — R79.89 OTHER SPECIFIED ABNORMAL FINDINGS OF BLOOD CHEMISTRY: ICD-10-CM

## 2025-05-15 DIAGNOSIS — Z79.899 IMMUNODEFICIENCY DUE TO DRUG THERAPY: ICD-10-CM

## 2025-05-15 DIAGNOSIS — D84.821 IMMUNODEFICIENCY DUE TO DRUG THERAPY: ICD-10-CM

## 2025-05-15 LAB
ALBUMIN SERPL-MCNC: 2 G/DL (ref 3.4–4.8)
ALBUMIN/GLOB SERPL: 0.3 RATIO (ref 1.1–2)
ALP SERPL-CCNC: 213 UNIT/L (ref 40–150)
ALT SERPL-CCNC: 8 UNIT/L (ref 0–55)
ANION GAP SERPL CALC-SCNC: 9 MEQ/L
AST SERPL-CCNC: 14 UNIT/L (ref 11–45)
BASOPHILS # BLD AUTO: 0.04 X10(3)/MCL
BASOPHILS NFR BLD AUTO: 0.4 %
BILIRUB SERPL-MCNC: 0.4 MG/DL
BUN SERPL-MCNC: 7 MG/DL (ref 9.8–20.1)
CALCIUM SERPL-MCNC: 9.2 MG/DL (ref 8.4–10.2)
CEA SERPL-MCNC: 1303.3 NG/ML (ref 0–3)
CHLORIDE SERPL-SCNC: 105 MMOL/L (ref 98–107)
CO2 SERPL-SCNC: 21 MMOL/L (ref 23–31)
CREAT SERPL-MCNC: 0.68 MG/DL (ref 0.55–1.02)
CREAT/UREA NIT SERPL: 10
EOSINOPHIL # BLD AUTO: 0.07 X10(3)/MCL (ref 0–0.9)
EOSINOPHIL NFR BLD AUTO: 0.7 %
ERYTHROCYTE [DISTWIDTH] IN BLOOD BY AUTOMATED COUNT: 17.1 % (ref 11.5–17)
GFR SERPLBLD CREATININE-BSD FMLA CKD-EPI: >60 ML/MIN/1.73/M2
GLOBULIN SER-MCNC: 7 GM/DL (ref 2.4–3.5)
GLUCOSE SERPL-MCNC: 97 MG/DL (ref 82–115)
HCT VFR BLD AUTO: 28.5 % (ref 37–47)
HGB BLD-MCNC: 8.6 G/DL (ref 12–16)
IMM GRANULOCYTES # BLD AUTO: 0.05 X10(3)/MCL (ref 0–0.04)
IMM GRANULOCYTES NFR BLD AUTO: 0.5 %
LYMPHOCYTES # BLD AUTO: 1.81 X10(3)/MCL (ref 0.6–4.6)
LYMPHOCYTES NFR BLD AUTO: 16.8 %
MCH RBC QN AUTO: 26.5 PG (ref 27–31)
MCHC RBC AUTO-ENTMCNC: 30.2 G/DL (ref 33–36)
MCV RBC AUTO: 88 FL (ref 80–94)
MONOCYTES # BLD AUTO: 0.74 X10(3)/MCL (ref 0.1–1.3)
MONOCYTES NFR BLD AUTO: 6.9 %
NEUTROPHILS # BLD AUTO: 8.04 X10(3)/MCL (ref 2.1–9.2)
NEUTROPHILS NFR BLD AUTO: 74.7 %
NRBC BLD AUTO-RTO: 0 %
PLATELET # BLD AUTO: 522 X10(3)/MCL (ref 130–400)
PMV BLD AUTO: 9.7 FL (ref 7.4–10.4)
POTASSIUM SERPL-SCNC: 3.6 MMOL/L (ref 3.5–5.1)
PROT SERPL-MCNC: 9 GM/DL (ref 5.8–7.6)
RBC # BLD AUTO: 3.24 X10(6)/MCL (ref 4.2–5.4)
SODIUM SERPL-SCNC: 135 MMOL/L (ref 136–145)
WBC # BLD AUTO: 10.75 X10(3)/MCL (ref 4.5–11.5)

## 2025-05-15 PROCEDURE — 36415 COLL VENOUS BLD VENIPUNCTURE: CPT

## 2025-05-15 PROCEDURE — 99215 OFFICE O/P EST HI 40 MIN: CPT | Mod: S$PBB,,, | Performed by: STUDENT IN AN ORGANIZED HEALTH CARE EDUCATION/TRAINING PROGRAM

## 2025-05-15 PROCEDURE — 99214 OFFICE O/P EST MOD 30 MIN: CPT | Mod: PBBFAC | Performed by: STUDENT IN AN ORGANIZED HEALTH CARE EDUCATION/TRAINING PROGRAM

## 2025-05-15 PROCEDURE — 80053 COMPREHEN METABOLIC PANEL: CPT

## 2025-05-15 PROCEDURE — 82378 CARCINOEMBRYONIC ANTIGEN: CPT

## 2025-05-15 PROCEDURE — G2211 COMPLEX E/M VISIT ADD ON: HCPCS | Mod: S$PBB,,, | Performed by: STUDENT IN AN ORGANIZED HEALTH CARE EDUCATION/TRAINING PROGRAM

## 2025-05-15 PROCEDURE — 99999 PR PBB SHADOW E&M-EST. PATIENT-LVL IV: CPT | Mod: PBBFAC,,, | Performed by: STUDENT IN AN ORGANIZED HEALTH CARE EDUCATION/TRAINING PROGRAM

## 2025-05-15 PROCEDURE — 85025 COMPLETE CBC W/AUTO DIFF WBC: CPT

## 2025-05-15 NOTE — PLAN OF CARE
START ON PATHWAY REGIMEN - Colorectal    [id number]        Trifluridine and tipiracil (Lonsurf)       Bevacizumab-xxxx           Additional Orders: Trifluridine/Tipiracil dose is based on trifluridine   component and should be rounded to the nearest 5 mg up to a maximum of 80 mg per   dose. Do not administer bevacizumab for at least 28 days prior to elective   surgery, and for at least 28 days following major surgery and until adequate   wound healing. See PI for details.    **Always confirm dose/schedule in your pharmacy ordering system**    Patient Characteristics:  Distant Metastases, Nonsurgical Candidate, KRAS/NRAS Wild-Type (BRAF V600   Wild-Type/Unknown), Standard Cytotoxic Therapy, Third Line Standard Cytotoxic   Therapy, Prior Anti-EGFR Therapy  Tumor Location: Colon  Therapeutic Status: Distant Metastases  Microsatellite/Mismatch Repair Status: ROBBY/pMMR  BRAF Mutation Status: Wild-Type (no mutation)  KRAS/NRAS Mutation Status: Wild-Type (no mutation)  Preferred Therapy Approach: Standard Cytotoxic Therapy  Standard Cytotoxic Line of Therapy: Third Line Standard Cytotoxic Therapy  Intent of Therapy:  Non-Curative / Palliative Intent, Discussed with Patient

## 2025-05-16 DIAGNOSIS — G89.3 CANCER RELATED PAIN: Primary | ICD-10-CM

## 2025-05-16 RX ORDER — HYDROCODONE BITARTRATE AND ACETAMINOPHEN 5; 325 MG/1; MG/1
1 TABLET ORAL EVERY 6 HOURS PRN
Qty: 30 TABLET | Refills: 0 | Status: SHIPPED | OUTPATIENT
Start: 2025-05-16

## 2025-05-16 NOTE — PROGRESS NOTES
Subjective:       Patient ID: Selene Smallwood is a 70 y.o. female.    Chief Complaint: Chemo education  Malignant neoplasm metastatic to peritoneum (Pt has no complaints.)       Diagnosis:   Stage IV Metastatic Colon Adenocarcinoma with mets to peritoneum  2. Pulmonary Embolism    Current Treatment:   Lonsurf D1-5 and D8-12 + Bevacizumab D1/D15 Q28 day cycle (to start 5/26/2025)  Xarelto 15 mg BID x 21 days then 20 mg daily- 6/25/24-present    Treatment History:   FOLFOX + Cetuximab q2w x 12 cycles- 4/2/24 - 11/6/24  Hold Oxaliplatin for C11 and C12 due to tolerance  FOLFIRI + Priscilla - (1/6/25-3/11/25)  Held after C4 d/t pelvic mass on scans    HPI:   68 yo F presented in March '24 for evaluation of metastatic colon cancer. She initially presented to OSH in early February '24 with 2 days of upper abdominal pain. Imaging on 2/4 in the ER revealed evidence of non perforated acute appendicitis. She underwent Ex lap where a cecal mass was incidentally discovered and resected, as well as omental studding was noted and 1 small mass was taken for biopsy. Her final pathology revealed a large exophytic mass in the cecum measuring 4 x 2 cm extending to the appendiceal orifice and extends into pericolonic adipose tissue. G2, LVI+, PNI negative. 14 LN were resected, with 10 being positive for metastatic adenocarcinoma. She also had an omental mass measuring 2.5 x 1.5cm which was positive for metastatic adenocarcinoma, consistent with a colon primary. Her final pathology staging is consistent with [id number]. Her postop course was complicated by abdominal abscess formation s/p IR drain placement and subsequent removal and antibiotic treatment.      She underwent PET scan in March '24 with evidence of nodular foci within the omentum/peritoneum consistent with peritoneal carcinomatosis. No other evidence of disease. NGS testing was performed which revealed GULSHAN wild type and BRAF negative. Therefore she underwent 1st line therapy  of FOLFOX + Cetuximab Q2w x 12 cycles. Despite good response to treatment after 6 cycles of therapy, once she completed 12 cycles of chemotherapy she had evidence of disease progression to abdominal lymph nodes, peritoneum, and bilateral lungs. She was started on 2nd line FOLFIRI + Bevacizumab, unfortunately she had rapid progression with mucinous abdominal mass growth. She will now proceed with 3rd line Lonsurf + Avastin.     Interval History:  She returns to the clinic for chemotherapy education via telemed (per pt request due to transpiration issues) with her daughter present.   She is awaiting approval for her Lonsurf due to no prescription insurance coverage.   Current plan is to begin C1 5/26/2025 if medication has been received. Will be in close contact with pharmacy regarding this.   She feels well today. Fatigue is stable and unchanged today.   April, daughter, did ask about ivermectin and stated that the patient took it recently and was unable to tolerate it. Discussed with her that this is not an approved treatment and that we do not recommend restarting.   Chemotherapy education provided. All questions and concerns were answered to the satisfaction of both the patient and the daughter.   She denies any fevers, chills, SOB, CP, headache, swelling.       Past Medical History:   Diagnosis Date    Hypertension       Past Surgical History:   Procedure Laterality Date    APPENDECTOMY N/A 2/5/2024    Procedure: APPENDECTOMY;  Surgeon: Anna Murillo MD;  Location: [facility] OR;  Service: General;  Laterality: N/A;    INSERTION OF TUNNELED CENTRAL VENOUS CATHETER (CVC) WITH SUBCUTANEOUS PORT N/A 3/26/2024    Procedure: [id number];  Surgeon: Armando Tran MD;  Location: [facility] OR;  Service: Peripheral Vascular;  Laterality: N/A;    LAPAROSCOPIC APPENDECTOMY N/A 2/5/2024    Procedure: APPENDECTOMY, LAPAROSCOPIC;  Surgeon: Anna Murillo MD;  Location: [facility] OR;  Service: General;  Laterality: N/A;     SURGICAL REMOVAL OF ILEUM WITH CECUM N/A 2/5/2024    Procedure: EXCISION, CECUM WITH ILEUM;  Surgeon: Anna Murillo MD;  Location: [facility] OR;  Service: General;  Laterality: N/A;    WASHOUT N/A 2/5/2024    Procedure: WASHOUT;  Surgeon: Anna Murillo MD;  Location: [facility] OR;  Service: General;  Laterality: N/A;     Social History     Socioeconomic History    Marital status:    Tobacco Use    Smoking status: Former     Types: Cigarettes    Smokeless tobacco: Never   Substance and Sexual Activity    Alcohol use: Not Currently    Drug use: Never    Sexual activity: Not Currently     Social Drivers of Health     Financial Resource Strain: Low Risk  (2/15/2024)    Overall Financial Resource Strain (CARDIA)     Difficulty of Paying Living Expenses: Not hard at all   Food Insecurity: No Food Insecurity (2/15/2024)    Hunger Vital Sign     Worried About Running Out of Food in the Last Year: Never true     Ran Out of Food in the Last Year: Never true   Transportation Needs: No Transportation Needs (2/15/2024)    PRAPARE - Transportation     Lack of Transportation (Medical): No     Lack of Transportation (Non-Medical): No   Physical Activity: Unknown (2/15/2024)    Exercise Vital Sign     Days of Exercise per Week: 0 days   Stress: No Stress Concern Present (2/15/2024)    [employer] - Occupational Stress Questionnaire     Feeling of Stress : Not at all   Housing Stability: Low Risk  (2/15/2024)    Housing Stability Vital Sign     Unable to Pay for Housing in the Last Year: No     Number of Places Lived in the Last Year: 1     Unstable Housing in the Last Year: No      Family History   Family history unknown: Yes      Review of patient's allergies indicates:   Allergen Reactions    Robaxin [methocarbamol] Other (See Comments)     Altered mental status       Review of Systems   Constitutional:  Positive for appetite change, fatigue and unexpected weight change. Negative for activity change  and fever.   HENT:  Negative for sore throat.    Eyes:  Negative for visual disturbance.   Respiratory:  Negative for cough and shortness of breath.    Cardiovascular:  Negative for chest pain.   Gastrointestinal:  Negative for abdominal pain, diarrhea and vomiting.   Endocrine: Negative for polyuria.   Genitourinary:  Negative for dysuria and hot flashes.   Integumentary:  Negative for rash.   Allergic/Immunologic: Negative for immunocompromised state.   Neurological:  Negative for weakness and headaches.   Hematological:  Negative for adenopathy.   Psychiatric/Behavioral:  Positive for depressed mood. Negative for confusion.          Objective:      There were no vitals filed for this visit.        Physical Exam  Constitutional:       General: She is not in acute distress.     Appearance: She is cachectic. She is not ill-appearing.   HENT:      Head: Normocephalic and atraumatic.      Nose: Nose normal.      Mouth/Throat:      Mouth: Mucous membranes are moist.      Pharynx: Oropharynx is clear.   Eyes:      Extraocular Movements: Extraocular movements intact.      Conjunctiva/sclera: Conjunctivae normal.      Pupils: Pupils are equal, round, and reactive to light.   Cardiovascular:      Rate and Rhythm: Normal rate and regular rhythm.      Pulses: Normal pulses.      Heart sounds: Normal heart sounds. No murmur heard.  Pulmonary:      Effort: Pulmonary effort is normal. No respiratory distress.      Breath sounds: Normal breath sounds.   Abdominal:      General: There is no distension.      Palpations: Abdomen is soft.      Tenderness: There is no abdominal tenderness.   Musculoskeletal:         General: Normal range of motion.      Cervical back: Normal range of motion and neck supple.      Right lower leg: No edema.      Left lower leg: No edema.   Feet:      Left foot:      Skin integrity: No blister.   Lymphadenopathy:      Cervical: No cervical adenopathy.   Skin:     General: Skin is warm and dry.    Neurological:      General: No focal deficit present.      Mental Status: She is alert and oriented to person, place, and time.         LABS AND IMAGING REVIEWED IN EPIC  3/21/2025 PET/CT:  1. Stable size of bilateral lungs numerous small randomly scattered nodules.  These are non hypermetabolic but not adequately resolved on the PET scan due to the small size.  These are concerning to be metastatic  2.  Stable aortocaval small size lymph node with interval improvement of hypermetabolism.  3. Interval substantial size of pelvic cystic structure without internal and peripheral hypermetabolism.  This likely is arising from the left ovary and is of indeterminate significance.  Please further assess with ultrasound exam or MRI of the pelvis.  4.  Cul-de-sac/posterior uterine segment hypermetabolism with interval mild improvement.  4/2/25 MRI Pelvis:  Findings are concerning for epithelial type neoplasm with enhancing frondlike appearance in the left aspect which correlates with the hypermetabolic lesion on recent PET/CT scan. Other secondary findings as above including myomatous changes with degenerative fibroids.     PATHOLOGY:  2/5/24 Biopsy:  1. Appendix, appendectomy:  - Acute appendicitis with perforation.  - Adenocarcinoma involves lymphatic spaces of the appendiceal wall and peritoneal surface.  2. Cecum, ileocecectomy:  - Colonic adenocarcinoma.  - See synoptic checklist for CAP cancer protocol.  3. Omental mass, biopsy:  - Metastatic adenocarcinoma, consistent with a colon primary.  4. Small bowel, segmental resection:  - Acute peritonitis.  - No evidence of malignancy.    MLH1: PRESERVED (INTACT) EXPRESSION IN TUMOR CELLS   MSH2: PRESERVED (INTACT) EXPRESSION IN TUMOR CELLS   MSH6: PRESERVED (INTACT) EXPRESSION IN TUMOR CELLS   PMS2: PRESERVED (INTACT) EXPRESSION IN TUMOR CELLS  Microscopic Description/Comments   These results show no deficiency of the mismatch repair proteins tested.    5/6/25 PELVIC MASS,  FINE NEEDLE ASPIRATION:    - MUCINOUS LESION.    Comment   The differential diagnosis is broad and includes appendiceal mucocele, malignant   colorectal tumors, and intestinal type neoplasms of other organs (ovary, etc.).   Given the degree of cytologic atypia, a malignant process should be considered.   Clinical and radiological correlation is necessary.      Note:  The specimen is remarkably paucicellular and consists predominantly of   mucin.  There is insufficient cellularity for any additional ancillary studies.   Assessment:   Stage IV Metastatic Colon Adenocarcinoma with mets to peritoneum - Cecal mass, nonobstructive, no perforation. [id number]. Treatment history as above. Now proceeding with 3rd line Lonsurf + Priscilla.   Pulmonary Embolus at the right mainstem bronchus   Incidentally found during CT CAP 6/25/24. Now on xarelto, continue at this time.   Peripheral Neuropathy   She does report peripheral neuropathy to her fingertips and lower legs.  Her hand neuropathy is grade 2, consistent but not painful. Able to button buttons.   Her LE bilateral neuropathy is mainly at night she gets shooting pains when she lays down.   Stable today on gabapentin to 300mg TID - 1 in AM, 2 in PM.   Immunodeficiency due to Drug Therapy - Precautions discussed with patient. Continue to monitor for any signs of symptoms of infection. No prophylaxis needed at this time. On growth factor due to history of significant neutropenia due to chemotherapy.     Plan:     - Plan for 3rd line Lonsurf D1-5 and D8-12 + Bevacizumab D1/D15 Q28 day cycle  - CT CAP w/ IV and PO contrast to evaluate current disease state prior to new line of therapy scheduled for 5/20/2025  -Continue gabapentin 300mg TID for drug induced peripheral neuropathy  - Continue Xarelto  - Will follow with pharmacy closely for approval and delivery on lonsurf.   - RTC in one week as schedule for FU/lab/C1   Cbc, cmp, cea, mag, urine protein- 2 hrs prior @ [facility]        code applied: Patient requires or will require continuous, longitudinal, and active collaborative plan of care related to this patient's health condition, Stage IV Metastatic Colon Adenocarcinoma with mets to peritoneum- the management of which requires the direction of a practitioner with specialized clinical knowledge, skill, and expertise.     KISHAN Morales  Oncology/Hematology   [facility]     Chemotherapy teaching was provided to patient. The plan of care including chemotherapy regimen, schedule expectations, potential side effects, as well as prevention/management of side effects were discussed in detail. Office contact information was provided along with instructions on symptoms that warrant a call to the office, for example a temperature of > 100.5, uncontrolled side effects, severe fatigue etc. Time was given for patient/ family members to ask questions. All questions answered to the satisfaction of the patient and they verbalized understanding. They were instructed to call with any concerns or additional questions.     I spent 20 minutes preparing for education session reviewing clinic note, labs, scans, pathology, and treatment plan. I spent 40 minutes face-to-face with patient.     This is a telemedicine note.  Patient was treated using telemedicine, real-time audio according to [facility] protocols.  Susan ROJO distant provider, conducted the visit from the location identified below.  The patient participated in the visit at a non-[facility] location select about the patient (or patient's representative), identified below.  I am license in the state where the patient stated they were located.  The patient (or patient's representative) stated that they understood and accepted the privacy and security wrist to the information at their location.  Patient was located in [place] .  Susan ROJO distant provider, was located in [facility].   Total time spent in medical discussion  with the patient: 40 mins

## 2025-05-19 ENCOUNTER — OFFICE VISIT (OUTPATIENT)
Facility: CLINIC | Age: 70
End: 2025-05-19
Payer: MEDICARE

## 2025-05-19 DIAGNOSIS — G62.0 NEUROPATHY DUE TO DRUG: ICD-10-CM

## 2025-05-19 DIAGNOSIS — Z79.69 IMMUNODEFICIENCY DUE TO CHEMOTHERAPY: ICD-10-CM

## 2025-05-19 DIAGNOSIS — Z79.899 IMMUNODEFICIENCY DUE TO DRUG THERAPY: ICD-10-CM

## 2025-05-19 DIAGNOSIS — T45.1X5A CHEMOTHERAPY-INDUCED NEUROPATHY: ICD-10-CM

## 2025-05-19 DIAGNOSIS — R97.0 ELEVATED CARCINOEMBRYONIC ANTIGEN (CEA): ICD-10-CM

## 2025-05-19 DIAGNOSIS — E87.6 HYPOKALEMIA: ICD-10-CM

## 2025-05-19 DIAGNOSIS — I26.99 PULMONARY EMBOLISM, UNSPECIFIED CHRONICITY, UNSPECIFIED PULMONARY EMBOLISM TYPE, UNSPECIFIED WHETHER ACUTE COR PULMONALE PRESENT: ICD-10-CM

## 2025-05-19 DIAGNOSIS — R11.0 CHEMOTHERAPY-INDUCED NAUSEA: ICD-10-CM

## 2025-05-19 DIAGNOSIS — C18.2 MALIGNANT NEOPLASM OF ASCENDING COLON: Primary | ICD-10-CM

## 2025-05-19 DIAGNOSIS — T45.1X5A IMMUNODEFICIENCY DUE TO CHEMOTHERAPY: ICD-10-CM

## 2025-05-19 DIAGNOSIS — Z79.69 ON ANTINEOPLASTIC CHEMOTHERAPY: ICD-10-CM

## 2025-05-19 DIAGNOSIS — D84.821 IMMUNODEFICIENCY DUE TO DRUG THERAPY: ICD-10-CM

## 2025-05-19 DIAGNOSIS — G89.3 CANCER RELATED PAIN: ICD-10-CM

## 2025-05-19 DIAGNOSIS — D84.821 IMMUNODEFICIENCY DUE TO CHEMOTHERAPY: ICD-10-CM

## 2025-05-19 DIAGNOSIS — T45.1X5A CHEMOTHERAPY-INDUCED NAUSEA: ICD-10-CM

## 2025-05-19 DIAGNOSIS — R64 CANCER CACHEXIA: ICD-10-CM

## 2025-05-19 DIAGNOSIS — C18.9 MALIGNANT NEOPLASM OF COLON, UNSPECIFIED PART OF COLON: ICD-10-CM

## 2025-05-19 DIAGNOSIS — R19.00 PELVIC MASS: ICD-10-CM

## 2025-05-19 DIAGNOSIS — C78.6 MALIGNANT NEOPLASM METASTATIC TO PERITONEUM: ICD-10-CM

## 2025-05-19 DIAGNOSIS — G62.0 CHEMOTHERAPY-INDUCED NEUROPATHY: ICD-10-CM

## 2025-05-19 RX ORDER — PROCHLORPERAZINE MALEATE 10 MG
10 TABLET ORAL EVERY 6 HOURS PRN
Qty: 30 TABLET | Refills: 1 | Status: SHIPPED | OUTPATIENT
Start: 2025-05-19 | End: 2026-05-19

## 2025-05-19 RX ORDER — ONDANSETRON 8 MG/1
8 TABLET, ORALLY DISINTEGRATING ORAL EVERY 6 HOURS PRN
Qty: 60 TABLET | Refills: 3 | Status: SHIPPED | OUTPATIENT
Start: 2025-05-19

## 2025-05-19 RX ORDER — POTASSIUM CHLORIDE 20 MEQ/1
20 TABLET, EXTENDED RELEASE ORAL DAILY
Qty: 30 TABLET | Refills: 3 | Status: SHIPPED | OUTPATIENT
Start: 2025-05-19 | End: 2026-05-19

## 2025-05-20 ENCOUNTER — HOSPITAL ENCOUNTER (OUTPATIENT)
Dept: RADIOLOGY | Facility: HOSPITAL | Age: 70
Discharge: HOME | End: 2025-05-20
Attending: STUDENT IN AN ORGANIZED HEALTH CARE EDUCATION/TRAINING PROGRAM
Payer: MEDICARE

## 2025-05-20 DIAGNOSIS — C18.2 MALIGNANT NEOPLASM OF ASCENDING COLON: ICD-10-CM

## 2025-05-20 DIAGNOSIS — C78.6 MALIGNANT NEOPLASM METASTATIC TO PERITONEUM: ICD-10-CM

## 2025-05-20 PROCEDURE — 25500020 PHARM REV CODE 255: Performed by: STUDENT IN AN ORGANIZED HEALTH CARE EDUCATION/TRAINING PROGRAM

## 2025-05-20 PROCEDURE — 74177 CT ABD & PELVIS W/CONTRAST: CPT | Mod: TC

## 2025-05-20 RX ORDER — IOPAMIDOL 755 MG/ML
100 INJECTION, SOLUTION INTRAVASCULAR
Status: COMPLETED | OUTPATIENT
Start: 2025-05-20 | End: 2025-05-20

## 2025-05-20 RX ORDER — DIATRIZOATE MEGLUMINE AND DIATRIZOATE SODIUM 660; 100 MG/ML; MG/ML
30 SOLUTION ORAL; RECTAL
Status: COMPLETED | OUTPATIENT
Start: 2025-05-20 | End: 2025-05-20

## 2025-05-20 RX ADMIN — IOPAMIDOL 60 ML: 755 INJECTION, SOLUTION INTRAVENOUS at 10:05

## 2025-05-20 RX ADMIN — DIATRIZOATE MEGLUMINE AND DIATRIZOATE SODIUM 30 ML: 660; 100 LIQUID ORAL; RECTAL at 10:05

## 2025-05-26 ENCOUNTER — INFUSION (OUTPATIENT)
Facility: HOSPITAL | Age: 70
End: 2025-05-26
Payer: MEDICARE

## 2025-05-26 ENCOUNTER — LAB VISIT (OUTPATIENT)
Dept: LAB | Facility: HOSPITAL | Age: 70
End: 2025-05-26
Payer: MEDICARE

## 2025-05-26 ENCOUNTER — OFFICE VISIT (OUTPATIENT)
Facility: CLINIC | Age: 70
End: 2025-05-26
Payer: MEDICARE

## 2025-05-26 VITALS
HEART RATE: 123 BPM | RESPIRATION RATE: 16 BRPM | WEIGHT: 111.5 LBS | TEMPERATURE: 98 F | OXYGEN SATURATION: 98 % | DIASTOLIC BLOOD PRESSURE: 73 MMHG | BODY MASS INDEX: 17.92 KG/M2 | SYSTOLIC BLOOD PRESSURE: 105 MMHG | HEIGHT: 66 IN

## 2025-05-26 VITALS — DIASTOLIC BLOOD PRESSURE: 67 MMHG | HEART RATE: 90 BPM | SYSTOLIC BLOOD PRESSURE: 93 MMHG

## 2025-05-26 DIAGNOSIS — D64.9 ANEMIA, UNSPECIFIED TYPE: ICD-10-CM

## 2025-05-26 DIAGNOSIS — C18.2 MALIGNANT NEOPLASM OF ASCENDING COLON: ICD-10-CM

## 2025-05-26 DIAGNOSIS — C78.6 MALIGNANT NEOPLASM METASTATIC TO PERITONEUM: ICD-10-CM

## 2025-05-26 DIAGNOSIS — C18.2 MALIGNANT NEOPLASM OF ASCENDING COLON: Primary | ICD-10-CM

## 2025-05-26 DIAGNOSIS — R64 CANCER CACHEXIA: ICD-10-CM

## 2025-05-26 DIAGNOSIS — D64.9 ANEMIA, UNSPECIFIED TYPE: Primary | ICD-10-CM

## 2025-05-26 DIAGNOSIS — C78.6 MALIGNANT NEOPLASM METASTATIC TO PERITONEUM: Primary | ICD-10-CM

## 2025-05-26 LAB
ALBUMIN SERPL-MCNC: 1.8 G/DL (ref 3.4–4.8)
ALBUMIN/GLOB SERPL: 0.3 RATIO (ref 1.1–2)
ALP SERPL-CCNC: 148 UNIT/L (ref 40–150)
ALT SERPL-CCNC: 10 UNIT/L (ref 0–55)
ANION GAP SERPL CALC-SCNC: 7 MEQ/L
AST SERPL-CCNC: 15 UNIT/L (ref 11–45)
BASOPHILS # BLD AUTO: 0.05 X10(3)/MCL
BASOPHILS NFR BLD AUTO: 0.5 %
BILIRUB SERPL-MCNC: 0.3 MG/DL
BUN SERPL-MCNC: 7 MG/DL (ref 9.8–20.1)
CALCIUM SERPL-MCNC: 8.8 MG/DL (ref 8.4–10.2)
CEA SERPL-MCNC: 1365.32 NG/ML (ref 0–3)
CHLORIDE SERPL-SCNC: 109 MMOL/L (ref 98–107)
CO2 SERPL-SCNC: 23 MMOL/L (ref 23–31)
CREAT SERPL-MCNC: 0.67 MG/DL (ref 0.55–1.02)
CREAT/UREA NIT SERPL: 10
EOSINOPHIL # BLD AUTO: 0.04 X10(3)/MCL (ref 0–0.9)
EOSINOPHIL NFR BLD AUTO: 0.4 %
ERYTHROCYTE [DISTWIDTH] IN BLOOD BY AUTOMATED COUNT: 17.4 % (ref 11.5–17)
GFR SERPLBLD CREATININE-BSD FMLA CKD-EPI: >60 ML/MIN/1.73/M2
GLOBULIN SER-MCNC: 6.6 GM/DL (ref 2.4–3.5)
GLUCOSE SERPL-MCNC: 80 MG/DL (ref 82–115)
HCT VFR BLD AUTO: 26.3 % (ref 37–47)
HGB BLD-MCNC: 7.9 G/DL (ref 12–16)
IMM GRANULOCYTES # BLD AUTO: 0.04 X10(3)/MCL (ref 0–0.04)
IMM GRANULOCYTES NFR BLD AUTO: 0.4 %
LYMPHOCYTES # BLD AUTO: 1.73 X10(3)/MCL (ref 0.6–4.6)
LYMPHOCYTES NFR BLD AUTO: 18.2 %
MAGNESIUM SERPL-MCNC: 1.8 MG/DL (ref 1.6–2.6)
MCH RBC QN AUTO: 27.1 PG (ref 27–31)
MCHC RBC AUTO-ENTMCNC: 30 G/DL (ref 33–36)
MCV RBC AUTO: 90.1 FL (ref 80–94)
MONOCYTES # BLD AUTO: 0.8 X10(3)/MCL (ref 0.1–1.3)
MONOCYTES NFR BLD AUTO: 8.4 %
NEUTROPHILS # BLD AUTO: 6.82 X10(3)/MCL (ref 2.1–9.2)
NEUTROPHILS NFR BLD AUTO: 72.1 %
NRBC BLD AUTO-RTO: 0 %
PLATELET # BLD AUTO: 537 X10(3)/MCL (ref 130–400)
PMV BLD AUTO: 9.7 FL (ref 7.4–10.4)
POTASSIUM SERPL-SCNC: 4.1 MMOL/L (ref 3.5–5.1)
PROT SERPL-MCNC: 8.4 GM/DL (ref 5.8–7.6)
PROT UR STRIP-MCNC: 66.1 MG/DL
RBC # BLD AUTO: 2.92 X10(6)/MCL (ref 4.2–5.4)
SODIUM SERPL-SCNC: 139 MMOL/L (ref 136–145)
WBC # BLD AUTO: 9.48 X10(3)/MCL (ref 4.5–11.5)

## 2025-05-26 PROCEDURE — G2211 COMPLEX E/M VISIT ADD ON: HCPCS | Mod: S$PBB,,, | Performed by: NURSE PRACTITIONER

## 2025-05-26 PROCEDURE — 99999 PR PBB SHADOW E&M-EST. PATIENT-LVL IV: CPT | Mod: PBBFAC,,, | Performed by: NURSE PRACTITIONER

## 2025-05-26 PROCEDURE — 82378 CARCINOEMBRYONIC ANTIGEN: CPT

## 2025-05-26 PROCEDURE — 25000003 PHARM REV CODE 250: Performed by: NURSE PRACTITIONER

## 2025-05-26 PROCEDURE — 80053 COMPREHEN METABOLIC PANEL: CPT

## 2025-05-26 PROCEDURE — 63600175 PHARM REV CODE 636 W HCPCS: Performed by: NURSE PRACTITIONER

## 2025-05-26 PROCEDURE — 84156 ASSAY OF PROTEIN URINE: CPT

## 2025-05-26 PROCEDURE — 36415 COLL VENOUS BLD VENIPUNCTURE: CPT

## 2025-05-26 PROCEDURE — 99214 OFFICE O/P EST MOD 30 MIN: CPT | Mod: PBBFAC | Performed by: NURSE PRACTITIONER

## 2025-05-26 PROCEDURE — 63600175 PHARM REV CODE 636 W HCPCS: Mod: JW,TB | Performed by: STUDENT IN AN ORGANIZED HEALTH CARE EDUCATION/TRAINING PROGRAM

## 2025-05-26 PROCEDURE — 25000003 PHARM REV CODE 250: Performed by: STUDENT IN AN ORGANIZED HEALTH CARE EDUCATION/TRAINING PROGRAM

## 2025-05-26 PROCEDURE — 83735 ASSAY OF MAGNESIUM: CPT

## 2025-05-26 PROCEDURE — 85025 COMPLETE CBC W/AUTO DIFF WBC: CPT

## 2025-05-26 PROCEDURE — 99215 OFFICE O/P EST HI 40 MIN: CPT | Mod: S$PBB,,, | Performed by: NURSE PRACTITIONER

## 2025-05-26 PROCEDURE — 96413 CHEMO IV INFUSION 1 HR: CPT

## 2025-05-26 RX ORDER — EPINEPHRINE 0.3 MG/.3ML
0.3 INJECTION SUBCUTANEOUS ONCE AS NEEDED
Status: DISCONTINUED | OUTPATIENT
Start: 2025-05-26 | End: 2025-05-26 | Stop reason: HOSPADM

## 2025-05-26 RX ORDER — HEPARIN 100 UNIT/ML
500 SYRINGE INTRAVENOUS
Status: CANCELLED | OUTPATIENT
Start: 2025-05-27

## 2025-05-26 RX ORDER — EPINEPHRINE 0.3 MG/.3ML
0.3 INJECTION SUBCUTANEOUS ONCE AS NEEDED
Status: CANCELLED | OUTPATIENT
Start: 2025-05-27

## 2025-05-26 RX ORDER — DIPHENHYDRAMINE HCL 25 MG
25 CAPSULE ORAL ONCE
Status: CANCELLED | OUTPATIENT
Start: 2025-05-30

## 2025-05-26 RX ORDER — DIPHENHYDRAMINE HYDROCHLORIDE 50 MG/ML
50 INJECTION, SOLUTION INTRAMUSCULAR; INTRAVENOUS ONCE AS NEEDED
Status: CANCELLED | OUTPATIENT
Start: 2025-05-27

## 2025-05-26 RX ORDER — MEGESTROL ACETATE 40 MG/ML
200 SUSPENSION ORAL DAILY
Qty: 150 ML | Refills: 3 | Status: SHIPPED | OUTPATIENT
Start: 2025-05-26 | End: 2026-05-26

## 2025-05-26 RX ORDER — PROCHLORPERAZINE MALEATE 5 MG
5 TABLET ORAL EVERY 6 HOURS PRN
Qty: 20 TABLET | Refills: 11 | Status: SHIPPED | OUTPATIENT
Start: 2025-05-26

## 2025-05-26 RX ORDER — SODIUM CHLORIDE 0.9 % (FLUSH) 0.9 %
10 SYRINGE (ML) INJECTION
OUTPATIENT
Start: 2025-06-10

## 2025-05-26 RX ORDER — SODIUM CHLORIDE 0.9 % (FLUSH) 0.9 %
10 SYRINGE (ML) INJECTION
Status: DISCONTINUED | OUTPATIENT
Start: 2025-05-26 | End: 2025-05-26 | Stop reason: HOSPADM

## 2025-05-26 RX ORDER — PROCHLORPERAZINE EDISYLATE 5 MG/ML
5 INJECTION INTRAMUSCULAR; INTRAVENOUS ONCE AS NEEDED
Status: DISCONTINUED | OUTPATIENT
Start: 2025-05-26 | End: 2025-05-26 | Stop reason: HOSPADM

## 2025-05-26 RX ORDER — DIPHENHYDRAMINE HYDROCHLORIDE 50 MG/ML
50 INJECTION, SOLUTION INTRAMUSCULAR; INTRAVENOUS ONCE AS NEEDED
Status: DISCONTINUED | OUTPATIENT
Start: 2025-05-26 | End: 2025-05-26 | Stop reason: HOSPADM

## 2025-05-26 RX ORDER — ACETAMINOPHEN 325 MG/1
650 TABLET ORAL ONCE
Status: CANCELLED | OUTPATIENT
Start: 2025-05-30

## 2025-05-26 RX ORDER — SODIUM CHLORIDE 0.9 % (FLUSH) 0.9 %
10 SYRINGE (ML) INJECTION
Status: CANCELLED | OUTPATIENT
Start: 2025-05-27

## 2025-05-26 RX ORDER — PROCHLORPERAZINE EDISYLATE 5 MG/ML
5 INJECTION INTRAMUSCULAR; INTRAVENOUS ONCE AS NEEDED
OUTPATIENT
Start: 2025-06-10

## 2025-05-26 RX ORDER — HYDROCODONE BITARTRATE AND ACETAMINOPHEN 500; 5 MG/1; MG/1
TABLET ORAL ONCE
Status: CANCELLED | OUTPATIENT
Start: 2025-05-30

## 2025-05-26 RX ORDER — HEPARIN 100 UNIT/ML
500 SYRINGE INTRAVENOUS
OUTPATIENT
Start: 2025-06-10

## 2025-05-26 RX ORDER — DIPHENHYDRAMINE HYDROCHLORIDE 50 MG/ML
50 INJECTION, SOLUTION INTRAMUSCULAR; INTRAVENOUS ONCE AS NEEDED
OUTPATIENT
Start: 2025-06-10

## 2025-05-26 RX ORDER — EPINEPHRINE 0.3 MG/.3ML
0.3 INJECTION SUBCUTANEOUS ONCE AS NEEDED
OUTPATIENT
Start: 2025-06-10

## 2025-05-26 RX ORDER — HEPARIN 100 UNIT/ML
500 SYRINGE INTRAVENOUS
Status: DISCONTINUED | OUTPATIENT
Start: 2025-05-26 | End: 2025-05-26 | Stop reason: HOSPADM

## 2025-05-26 RX ORDER — PROCHLORPERAZINE EDISYLATE 5 MG/ML
5 INJECTION INTRAMUSCULAR; INTRAVENOUS ONCE AS NEEDED
Status: CANCELLED | OUTPATIENT
Start: 2025-05-27

## 2025-05-26 RX ADMIN — Medication 500 UNITS: at 12:05

## 2025-05-26 RX ADMIN — SODIUM CHLORIDE 275 MG: 9 INJECTION, SOLUTION INTRAVENOUS at 12:05

## 2025-05-26 RX ADMIN — SODIUM CHLORIDE: 9 INJECTION, SOLUTION INTRAVENOUS at 11:05

## 2025-05-26 NOTE — PROGRESS NOTES
Subjective:       Patient ID: Selene Smallwood is a 70 y.o. female.    Chief Complaint: Chemo education  No chief complaint on file.       Diagnosis:   Stage IV Metastatic Colon Adenocarcinoma with mets to peritoneum  2. Pulmonary Embolism    Current Treatment:   Lonsurf D1-5 and D8-12 + Bevacizumab D1/D15 Q28 day cycle (to start 5/26/2025)  Xarelto 15 mg BID x 21 days then 20 mg daily- 6/25/24-present    Treatment History:   FOLFOX + Cetuximab q2w x 12 cycles- 4/2/24 - 11/6/24  Hold Oxaliplatin for C11 and C12 due to tolerance  FOLFIRI + Priscilla - (1/6/25-3/11/25)  Held after C4 d/t pelvic mass on scans    HPI:   68 yo F presented in March '24 for evaluation of metastatic colon cancer. She initially presented to OSH in early February '24 with 2 days of upper abdominal pain. Imaging on 2/4 in the ER revealed evidence of non perforated acute appendicitis. She underwent Ex lap where a cecal mass was incidentally discovered and resected, as well as omental studding was noted and 1 small mass was taken for biopsy. Her final pathology revealed a large exophytic mass in the cecum measuring 4 x 2 cm extending to the appendiceal orifice and extends into pericolonic adipose tissue. G2, LVI+, PNI negative. 14 LN were resected, with 10 being positive for metastatic adenocarcinoma. She also had an omental mass measuring 2.5 x 1.5cm which was positive for metastatic adenocarcinoma, consistent with a colon primary. Her final pathology staging is consistent with [id number]. Her postop course was complicated by abdominal abscess formation s/p IR drain placement and subsequent removal and antibiotic treatment.      She underwent PET scan in March '24 with evidence of nodular foci within the omentum/peritoneum consistent with peritoneal carcinomatosis. No other evidence of disease. NGS testing was performed which revealed GULSHAN wild type and BRAF negative. Therefore she underwent 1st line therapy of FOLFOX + Cetuximab Q2w x 12 cycles.  Despite good response to treatment after 6 cycles of therapy, once she completed 12 cycles of chemotherapy she had evidence of disease progression to abdominal lymph nodes, peritoneum, and bilateral lungs. She was started on 2nd line FOLFIRI + Bevacizumab, unfortunately she had rapid progression with mucinous abdominal mass growth. She will now proceed with 3rd line Lonsurf + Avastin.     Interval History:  She returns to the clinic for chemotherapy education via telemed (per pt request due to transpiration issues) with her daughter present.   She is awaiting approval for her Lonsurf due to no prescription insurance coverage.   Current plan is to begin C1 5/26/2025 if medication has been received. Will be in close contact with pharmacy regarding this.   She feels well today. Fatigue is stable and unchanged today.   April, daughter, did ask about ivermectin and stated that the patient took it recently and was unable to tolerate it. Discussed with her that this is not an approved treatment and that we do not recommend restarting.   Chemotherapy education provided. All questions and concerns were answered to the satisfaction of both the patient and the daughter.   She denies any fevers, chills, SOB, CP, headache, swelling.     Interval history  5/26/2025:  Selene Smallwood presents for scheduled infusion therapy (Avastin) and oral medication ( Lonsurf) initiation for cancer treatment.    Currently experiencing progressive disease with significantly elevated tumor marker at 1,300 (normal <4). Hemoglobin low at 7.9.    Reports intermittent appetite. Experiencing gas but denies abdominal pain. Energy levels on the low side. Denies active bleeding, black stools, shortness of breath, or dizziness.    Taking Megace for appetite stimulation. Pain medication providing adequate relief.       Past Medical History:   Diagnosis Date    Hypertension       Past Surgical History:   Procedure Laterality Date    APPENDECTOMY N/A  2/5/2024    Procedure: APPENDECTOMY;  Surgeon: Anna Murillo MD;  Location: [facility] OR;  Service: General;  Laterality: N/A;    INSERTION OF TUNNELED CENTRAL VENOUS CATHETER (CVC) WITH SUBCUTANEOUS PORT N/A 3/26/2024    Procedure: [id number];  Surgeon: Armando Tran MD;  Location: [facility] OR;  Service: Peripheral Vascular;  Laterality: N/A;    LAPAROSCOPIC APPENDECTOMY N/A 2/5/2024    Procedure: APPENDECTOMY, LAPAROSCOPIC;  Surgeon: Anna Murillo MD;  Location: [facility] OR;  Service: General;  Laterality: N/A;    SURGICAL REMOVAL OF ILEUM WITH CECUM N/A 2/5/2024    Procedure: EXCISION, CECUM WITH ILEUM;  Surgeon: Anna Murillo MD;  Location: [facility] OR;  Service: General;  Laterality: N/A;    WASHOUT N/A 2/5/2024    Procedure: WASHOUT;  Surgeon: Anna Murillo MD;  Location: [facility] OR;  Service: General;  Laterality: N/A;     Social History     Socioeconomic History    Marital status:    Tobacco Use    Smoking status: Former     Types: Cigarettes    Smokeless tobacco: Never   Substance and Sexual Activity    Alcohol use: Not Currently    Drug use: Never    Sexual activity: Not Currently     Social Drivers of Health     Financial Resource Strain: Low Risk  (2/15/2024)    Overall Financial Resource Strain (CARDIA)     Difficulty of Paying Living Expenses: Not hard at all   Food Insecurity: No Food Insecurity (2/15/2024)    Hunger Vital Sign     Worried About Running Out of Food in the Last Year: Never true     Ran Out of Food in the Last Year: Never true   Transportation Needs: No Transportation Needs (2/15/2024)    PRAPARE - Transportation     Lack of Transportation (Medical): No     Lack of Transportation (Non-Medical): No   Physical Activity: Unknown (2/15/2024)    Exercise Vital Sign     Days of Exercise per Week: 0 days   Stress: No Stress Concern Present (2/15/2024)    [employer] - Occupational Stress Questionnaire     Feeling of Stress : Not at all   Housing  Stability: Low Risk  (2/15/2024)    Housing Stability Vital Sign     Unable to Pay for Housing in the Last Year: No     Number of Places Lived in the Last Year: 1     Unstable Housing in the Last Year: No      Family History   Family history unknown: Yes      Review of patient's allergies indicates:   Allergen Reactions    Robaxin [methocarbamol] Other (See Comments)     Altered mental status       Review of Systems   Constitutional:  Positive for appetite change, fatigue and unexpected weight change. Negative for activity change and fever.   HENT:  Negative for sore throat.    Eyes:  Negative for visual disturbance.   Respiratory:  Negative for cough and shortness of breath.    Cardiovascular:  Negative for chest pain.   Gastrointestinal:  Negative for abdominal pain, diarrhea and vomiting.   Endocrine: Negative for polyuria.   Genitourinary:  Negative for dysuria and hot flashes.   Integumentary:  Negative for rash.   Allergic/Immunologic: Negative for immunocompromised state.   Neurological:  Negative for weakness and headaches.   Hematological:  Negative for adenopathy.   Psychiatric/Behavioral:  Positive for depressed mood. Negative for confusion.          Objective:      There were no vitals filed for this visit.        Physical Exam  Constitutional:       General: She is not in acute distress.     Appearance: She is cachectic. She is not ill-appearing.   HENT:      Head: Normocephalic and atraumatic.      Nose: Nose normal.      Mouth/Throat:      Mouth: Mucous membranes are moist.      Pharynx: Oropharynx is clear.   Eyes:      Extraocular Movements: Extraocular movements intact.      Conjunctiva/sclera: Conjunctivae normal.      Pupils: Pupils are equal, round, and reactive to light.   Cardiovascular:      Rate and Rhythm: Normal rate and regular rhythm.      Pulses: Normal pulses.      Heart sounds: Normal heart sounds. No murmur heard.  Pulmonary:      Effort: Pulmonary effort is normal. No respiratory  distress.      Breath sounds: Normal breath sounds.   Abdominal:      General: There is no distension.      Palpations: Abdomen is soft.      Tenderness: There is no abdominal tenderness.   Musculoskeletal:         General: Normal range of motion.      Cervical back: Normal range of motion and neck supple.      Right lower leg: No edema.      Left lower leg: No edema.   Feet:      Left foot:      Skin integrity: No blister.   Lymphadenopathy:      Cervical: No cervical adenopathy.   Skin:     General: Skin is warm and dry.   Neurological:      General: No focal deficit present.      Mental Status: She is alert and oriented to person, place, and time.       LABS AND IMAGING REVIEWED IN EPIC  3/21/2025 PET/CT:  1. Stable size of bilateral lungs numerous small randomly scattered nodules.  These are non hypermetabolic but not adequately resolved on the PET scan due to the small size.  These are concerning to be metastatic  2.  Stable aortocaval small size lymph node with interval improvement of hypermetabolism.  3. Interval substantial size of pelvic cystic structure without internal and peripheral hypermetabolism.  This likely is arising from the left ovary and is of indeterminate significance.  Please further assess with ultrasound exam or MRI of the pelvis.  4.  Cul-de-sac/posterior uterine segment hypermetabolism with interval mild improvement.  4/2/25 MRI Pelvis:  Findings are concerning for epithelial type neoplasm with enhancing frondlike appearance in the left aspect which correlates with the hypermetabolic lesion on recent PET/CT scan. Other secondary findings as above including myomatous changes with degenerative fibroids.     PATHOLOGY:  2/5/24 Biopsy:  1. Appendix, appendectomy:  - Acute appendicitis with perforation.  - Adenocarcinoma involves lymphatic spaces of the appendiceal wall and peritoneal surface.  2. Cecum, ileocecectomy:  - Colonic adenocarcinoma.  - See synoptic checklist for CAP cancer  protocol.  3. Omental mass, biopsy:  - Metastatic adenocarcinoma, consistent with a colon primary.  4. Small bowel, segmental resection:  - Acute peritonitis.  - No evidence of malignancy.    MLH1: PRESERVED (INTACT) EXPRESSION IN TUMOR CELLS   MSH2: PRESERVED (INTACT) EXPRESSION IN TUMOR CELLS   MSH6: PRESERVED (INTACT) EXPRESSION IN TUMOR CELLS   PMS2: PRESERVED (INTACT) EXPRESSION IN TUMOR CELLS  Microscopic Description/Comments   These results show no deficiency of the mismatch repair proteins tested.    5/6/25 PELVIC MASS, FINE NEEDLE ASPIRATION:    - MUCINOUS LESION.    Comment   The differential diagnosis is broad and includes appendiceal mucocele, malignant   colorectal tumors, and intestinal type neoplasms of other organs (ovary, etc.).   Given the degree of cytologic atypia, a malignant process should be considered.   Clinical and radiological correlation is necessary.      Note:  The specimen is remarkably paucicellular and consists predominantly of   mucin.  There is insufficient cellularity for any additional ancillary studies.   Assessment:   Stage IV Metastatic Colon Adenocarcinoma with mets to peritoneum - Cecal mass, nonobstructive, no perforation. [id number]. Treatment history as above. Now proceeding with 3rd line Lonsurf + Priscilla.   Pulmonary Embolus at the right mainstem bronchus   Incidentally found during CT CAP 6/25/24. Now on xarelto, continue at this time.   Peripheral Neuropathy   She does report peripheral neuropathy to her fingertips and lower legs.  Her hand neuropathy is grade 2, consistent but not painful. Able to button buttons.   Her LE bilateral neuropathy is mainly at night she gets shooting pains when she lays down.   Stable today on gabapentin to 300mg TID - 1 in AM, 2 in PM.   Immunodeficiency due to Drug Therapy - Precautions discussed with patient. Continue to monitor for any signs of symptoms of infection. No prophylaxis needed at this time. On growth factor due to history of  significant neutropenia due to chemotherapy.     Plan:     Dx: Stage IV Metastatic Colon Adenocarcinoma with mets to peritoneum - Cecal mass, nonobstructive, no perforation. [id number].   Status: Progressive disease with elevated tumor markers  Tumor marker significantly elevated at 1,300 (normal <4).   Hemoglobin 7.9, platelets elevated as compensatory mechanism for anemia.   Starting combination therapy today with oral medication (55mg twice daily - two 20mg tablets plus one 15mg tablet) on days 1-5 and 8-12 of 28-day cycle, plus infusion therapy every 15 days.   First dose administered during visit.   Medication calendar provided and pill organizer prepared.   Treatment goal is to halt disease progression and potentially achieve regression.   Blood transfusion scheduled for 05/30/2025 due to low hemoglobin that may further decrease with new medications.   Dr. Lejeune will order interval scans to monitor treatment response.   Megace prescription refilled.   Continuing current pain management regimen.  Continue Xarelto, denied black stools or bleeding   Continue gabapentin 300mg TID for drug induced peripheral neuropathy    Patient instructions and visit orders.       -Follow-up:  F/U with NP 6/9/2025  -Labs:  CBC, CMP, Mg, CEA, Iron, ferritin - 6/9/2025  -Imaging:    -Other orders:  Treatment, Avastin C1D15- 6/9/2025    40 were spent on this encounter    Tate Katz, MSN, FNP-BC, APRN, AOCNP   Department of Hematology/Oncology.      Patient instructions and visit orders.       -Follow-up:  F/U with NP- 2 weeks (6/9/2025)  -Labs:  CBC, CMP,   -Imaging:    -Other orders:      46 were spent on this encounter     code applied: Patient requires or will require continuous, longitudinal, and active collaborative plan of care related to this patient's health condition, Stage IV Metastatic Colon Adenocarcinoma with mets to peritoneum- the management of which requires the direction of a practitioner with specialized  clinical knowledge, skill, and expertise.     Tate Katz, MSN, FNP-BC, APRN, AOCNP   Department of Hematology/Oncology.

## 2025-05-27 RX ORDER — MEGESTROL ACETATE 40 MG/ML
200 SUSPENSION ORAL DAILY
Qty: 150 ML | Refills: 3 | Status: SHIPPED | OUTPATIENT
Start: 2025-05-27 | End: 2026-05-27

## 2025-05-29 ENCOUNTER — LAB VISIT (OUTPATIENT)
Dept: LAB | Facility: HOSPITAL | Age: 70
End: 2025-05-29
Attending: NURSE PRACTITIONER
Payer: MEDICARE

## 2025-05-29 DIAGNOSIS — D64.9 ANEMIA, UNSPECIFIED TYPE: ICD-10-CM

## 2025-05-29 LAB
ALBUMIN SERPL-MCNC: 1.8 G/DL (ref 3.4–4.8)
ALBUMIN/GLOB SERPL: 0.3 RATIO (ref 1.1–2)
ALP SERPL-CCNC: 149 UNIT/L (ref 40–150)
ALT SERPL-CCNC: 10 UNIT/L (ref 0–55)
ANION GAP SERPL CALC-SCNC: 6 MEQ/L
AST SERPL-CCNC: 14 UNIT/L (ref 11–45)
BASOPHILS # BLD AUTO: 0.03 X10(3)/MCL
BASOPHILS NFR BLD AUTO: 0.4 %
BILIRUB SERPL-MCNC: 0.2 MG/DL
BUN SERPL-MCNC: 7 MG/DL (ref 9.8–20.1)
CALCIUM SERPL-MCNC: 8.7 MG/DL (ref 8.4–10.2)
CHLORIDE SERPL-SCNC: 108 MMOL/L (ref 98–107)
CO2 SERPL-SCNC: 24 MMOL/L (ref 23–31)
CREAT SERPL-MCNC: 0.65 MG/DL (ref 0.55–1.02)
CREAT/UREA NIT SERPL: 11
EOSINOPHIL # BLD AUTO: 0.03 X10(3)/MCL (ref 0–0.9)
EOSINOPHIL NFR BLD AUTO: 0.4 %
ERYTHROCYTE [DISTWIDTH] IN BLOOD BY AUTOMATED COUNT: 17.4 % (ref 11.5–17)
GFR SERPLBLD CREATININE-BSD FMLA CKD-EPI: >60 ML/MIN/1.73/M2
GLOBULIN SER-MCNC: 6.8 GM/DL (ref 2.4–3.5)
GLUCOSE SERPL-MCNC: 93 MG/DL (ref 82–115)
GROUP & RH: NORMAL
HCT VFR BLD AUTO: 27.9 % (ref 37–47)
HGB BLD-MCNC: 8.1 G/DL (ref 12–16)
IMM GRANULOCYTES # BLD AUTO: 0.03 X10(3)/MCL (ref 0–0.04)
IMM GRANULOCYTES NFR BLD AUTO: 0.4 %
INDIRECT COOMBS: NORMAL
LYMPHOCYTES # BLD AUTO: 1.73 X10(3)/MCL (ref 0.6–4.6)
LYMPHOCYTES NFR BLD AUTO: 22 %
MCH RBC QN AUTO: 26 PG (ref 27–31)
MCHC RBC AUTO-ENTMCNC: 29 G/DL (ref 33–36)
MCV RBC AUTO: 89.7 FL (ref 80–94)
MONOCYTES # BLD AUTO: 0.55 X10(3)/MCL (ref 0.1–1.3)
MONOCYTES NFR BLD AUTO: 7 %
NEUTROPHILS # BLD AUTO: 5.48 X10(3)/MCL (ref 2.1–9.2)
NEUTROPHILS NFR BLD AUTO: 69.8 %
NRBC BLD AUTO-RTO: 0 %
PLATELET # BLD AUTO: 526 X10(3)/MCL (ref 130–400)
PMV BLD AUTO: 10.1 FL (ref 7.4–10.4)
POTASSIUM SERPL-SCNC: 3.8 MMOL/L (ref 3.5–5.1)
PROT SERPL-MCNC: 8.6 GM/DL (ref 5.8–7.6)
RBC # BLD AUTO: 3.11 X10(6)/MCL (ref 4.2–5.4)
SODIUM SERPL-SCNC: 138 MMOL/L (ref 136–145)
SPECIMEN OUTDATE: NORMAL
WBC # BLD AUTO: 7.85 X10(3)/MCL (ref 4.5–11.5)

## 2025-05-29 PROCEDURE — 85025 COMPLETE CBC W/AUTO DIFF WBC: CPT

## 2025-05-29 PROCEDURE — 80053 COMPREHEN METABOLIC PANEL: CPT

## 2025-05-29 PROCEDURE — 36415 COLL VENOUS BLD VENIPUNCTURE: CPT

## 2025-05-29 PROCEDURE — 86850 RBC ANTIBODY SCREEN: CPT | Performed by: NURSE PRACTITIONER

## 2025-05-30 ENCOUNTER — INFUSION (OUTPATIENT)
Facility: HOSPITAL | Age: 70
End: 2025-05-30
Payer: MEDICARE

## 2025-05-30 VITALS
SYSTOLIC BLOOD PRESSURE: 135 MMHG | HEART RATE: 87 BPM | TEMPERATURE: 98 F | RESPIRATION RATE: 16 BRPM | DIASTOLIC BLOOD PRESSURE: 82 MMHG | HEIGHT: 65 IN | BODY MASS INDEX: 18.33 KG/M2 | OXYGEN SATURATION: 96 % | WEIGHT: 110 LBS

## 2025-05-30 DIAGNOSIS — D64.9 ANEMIA, UNSPECIFIED TYPE: ICD-10-CM

## 2025-05-30 DIAGNOSIS — D64.9 ANEMIA, UNSPECIFIED TYPE: Primary | ICD-10-CM

## 2025-05-30 LAB
ABO + RH BLD: NORMAL
ABO + RH BLD: NORMAL
BLD PROD TYP BPU: NORMAL
BLD PROD TYP BPU: NORMAL
BLOOD UNIT EXPIRATION DATE: NORMAL
BLOOD UNIT EXPIRATION DATE: NORMAL
BLOOD UNIT TYPE CODE: 7300
BLOOD UNIT TYPE CODE: 7300
CROSSMATCH INTERPRETATION: NORMAL
CROSSMATCH INTERPRETATION: NORMAL
DISPENSE STATUS: NORMAL
DISPENSE STATUS: NORMAL
UNIT NUMBER: NORMAL
UNIT NUMBER: NORMAL

## 2025-05-30 PROCEDURE — 25000003 PHARM REV CODE 250: Performed by: NURSE PRACTITIONER

## 2025-05-30 PROCEDURE — 36430 TRANSFUSION BLD/BLD COMPNT: CPT

## 2025-05-30 RX ORDER — HYDROCODONE BITARTRATE AND ACETAMINOPHEN 500; 5 MG/1; MG/1
TABLET ORAL ONCE
Status: COMPLETED | OUTPATIENT
Start: 2025-05-30 | End: 2025-05-30

## 2025-05-30 RX ORDER — HYDROCODONE BITARTRATE AND ACETAMINOPHEN 500; 5 MG/1; MG/1
TABLET ORAL ONCE
OUTPATIENT
Start: 2025-05-30 | End: 2025-05-30

## 2025-05-30 RX ORDER — DIPHENHYDRAMINE HCL 25 MG
25 CAPSULE ORAL ONCE
Status: COMPLETED | OUTPATIENT
Start: 2025-05-30 | End: 2025-05-30

## 2025-05-30 RX ORDER — ACETAMINOPHEN 325 MG/1
650 TABLET ORAL ONCE
Status: COMPLETED | OUTPATIENT
Start: 2025-05-30 | End: 2025-05-30

## 2025-05-30 RX ADMIN — ACETAMINOPHEN 650 MG: 325 TABLET ORAL at 08:05

## 2025-05-30 RX ADMIN — DIPHENHYDRAMINE HYDROCHLORIDE 25 MG: 25 CAPSULE ORAL at 08:05

## 2025-05-30 RX ADMIN — SODIUM CHLORIDE: 9 INJECTION, SOLUTION INTRAVENOUS at 08:05

## 2025-06-06 NOTE — PROGRESS NOTES
Subjective:       Patient ID: Selene Smallwood is a 70 y.o. female.    Chief Complaint:  Malignant neoplasm of ascending colon (Pt reports mild N controlled with meds. )       Diagnosis:   Stage IV Metastatic Colon Adenocarcinoma with mets to peritoneum  2. Pulmonary Embolism    Current Treatment:   Lonsurf D1-5 and D8-12 + Bevacizumab D1/D15 Q28 day cycle (to start 5/26/2025)  Xarelto 15 mg BID x 21 days then 20 mg daily- 6/25/24-present    Treatment History:   FOLFOX + Cetuximab q2w x 12 cycles- 4/2/24 - 11/6/24  Hold Oxaliplatin for C11 and C12 due to tolerance  FOLFIRI + Priscilla - (1/6/25-3/11/25)  Held after C4 d/t pelvic mass on scans    HPI:   68 yo F presented in March '24 for evaluation of metastatic colon cancer. She initially presented to OSH in early February '24 with 2 days of upper abdominal pain. Imaging on 2/4 in the ER revealed evidence of non perforated acute appendicitis. She underwent Ex lap where a cecal mass was incidentally discovered and resected, as well as omental studding was noted and 1 small mass was taken for biopsy. Her final pathology revealed a large exophytic mass in the cecum measuring 4 x 2 cm extending to the appendiceal orifice and extends into pericolonic adipose tissue. G2, LVI+, PNI negative. 14 LN were resected, with 10 being positive for metastatic adenocarcinoma. She also had an omental mass measuring 2.5 x 1.5cm which was positive for metastatic adenocarcinoma, consistent with a colon primary. Her final pathology staging is consistent with [id number]. Her postop course was complicated by abdominal abscess formation s/p IR drain placement and subsequent removal and antibiotic treatment.      She underwent PET scan in March '24 with evidence of nodular foci within the omentum/peritoneum consistent with peritoneal carcinomatosis. No other evidence of disease. NGS testing was performed which revealed GULSHAN wild type and BRAF negative. Therefore she underwent 1st line therapy of  FOLFOX + Cetuximab Q2w x 12 cycles. Despite good response to treatment after 6 cycles of therapy, once she completed 12 cycles of chemotherapy she had evidence of disease progression to abdominal lymph nodes, peritoneum, and bilateral lungs. She was started on 2nd line FOLFIRI + Bevacizumab, unfortunately she had rapid progression with mucinous abdominal mass growth. She will now proceed with 3rd line Lonsurf + Avastin.     Interval History:  She returns to the clinic today for a two week follow-up for treatment clearance.   She reports tolerating treatment well.   She did note improvement in fatigue following blood transfusion.   She completed her lonsurf last week. She is due for avastin only today.   She noted mild nausea controlled with meds as needed.   She continues to reports decreased appetite, but has been out of her megace. She will refill today.   Pain and neuropathy stable today.   She denies any fevers, chills, SOB, CP, headache, swelling.     Past Medical History:   Diagnosis Date    Hypertension       Past Surgical History:   Procedure Laterality Date    APPENDECTOMY N/A 2/5/2024    Procedure: APPENDECTOMY;  Surgeon: Anna Murillo MD;  Location: [facility];  Service: General;  Laterality: N/A;    INSERTION OF TUNNELED CENTRAL VENOUS CATHETER (CVC) WITH SUBCUTANEOUS PORT N/A 3/26/2024    Procedure: [id number];  Surgeon: Armando Tran MD;  Location: [facility] OR;  Service: Peripheral Vascular;  Laterality: N/A;    LAPAROSCOPIC APPENDECTOMY N/A 2/5/2024    Procedure: APPENDECTOMY, LAPAROSCOPIC;  Surgeon: Anna Murillo MD;  Location: [facility] OR;  Service: General;  Laterality: N/A;    SURGICAL REMOVAL OF ILEUM WITH CECUM N/A 2/5/2024    Procedure: EXCISION, CECUM WITH ILEUM;  Surgeon: Anna Murillo MD;  Location: [facility] OR;  Service: General;  Laterality: N/A;    WASHOUT N/A 2/5/2024    Procedure: WASHOUT;  Surgeon: Anna Murillo MD;  Location: [facility] OR;  Service: General;  Laterality: N/A;      Social History     Socioeconomic History    Marital status:    Tobacco Use    Smoking status: Former     Types: Cigarettes    Smokeless tobacco: Never   Substance and Sexual Activity    Alcohol use: Not Currently    Drug use: Never    Sexual activity: Not Currently     Social Drivers of Health     Financial Resource Strain: Low Risk  (2/15/2024)    Overall Financial Resource Strain (CARDIA)     Difficulty of Paying Living Expenses: Not hard at all   Food Insecurity: No Food Insecurity (2/15/2024)    Hunger Vital Sign     Worried About Running Out of Food in the Last Year: Never true     Ran Out of Food in the Last Year: Never true   Transportation Needs: No Transportation Needs (2/15/2024)    PRAPARE - Transportation     Lack of Transportation (Medical): No     Lack of Transportation (Non-Medical): No   Physical Activity: Unknown (2/15/2024)    Exercise Vital Sign     Days of Exercise per Week: 0 days   Stress: No Stress Concern Present (2/15/2024)    [employer] - Occupational Stress Questionnaire     Feeling of Stress : Not at all   Housing Stability: Low Risk  (2/15/2024)    Housing Stability Vital Sign     Unable to Pay for Housing in the Last Year: No     Number of Places Lived in the Last Year: 1     Unstable Housing in the Last Year: No      Family History   Family history unknown: Yes      Review of patient's allergies indicates:   Allergen Reactions    Robaxin [methocarbamol] Other (See Comments)     Altered mental status       Review of Systems   Constitutional:  Positive for appetite change, fatigue and unexpected weight change. Negative for activity change and fever.   HENT:  Negative for sore throat.    Eyes:  Negative for visual disturbance.   Respiratory:  Negative for cough and shortness of breath.    Cardiovascular:  Negative for chest pain.   Gastrointestinal:  Negative for abdominal pain, diarrhea and vomiting.   Endocrine: Negative for polyuria.    Genitourinary:  Negative for dysuria and hot flashes.   Integumentary:  Negative for rash.   Allergic/Immunologic: Negative for immunocompromised state.   Neurological:  Negative for weakness and headaches.   Hematological:  Negative for adenopathy.   Psychiatric/Behavioral:  Positive for depressed mood. Negative for confusion.          Objective:      Vitals:    06/09/25 0918   BP: 130/87   Pulse: 109   Resp: 16   Temp: 97.5 °F (36.4 °C)           Physical Exam  Constitutional:       General: She is not in acute distress.     Appearance: She is cachectic. She is not ill-appearing.   HENT:      Head: Normocephalic and atraumatic.      Nose: Nose normal.      Mouth/Throat:      Mouth: Mucous membranes are moist.      Pharynx: Oropharynx is clear.   Eyes:      Extraocular Movements: Extraocular movements intact.      Conjunctiva/sclera: Conjunctivae normal.      Pupils: Pupils are equal, round, and reactive to light.   Cardiovascular:      Rate and Rhythm: Normal rate and regular rhythm.      Pulses: Normal pulses.      Heart sounds: Normal heart sounds. No murmur heard.  Pulmonary:      Effort: Pulmonary effort is normal. No respiratory distress.      Breath sounds: Normal breath sounds.   Abdominal:      General: There is no distension.      Palpations: Abdomen is soft.      Tenderness: There is no abdominal tenderness.   Musculoskeletal:         General: Normal range of motion.      Cervical back: Normal range of motion and neck supple.      Right lower leg: No edema.      Left lower leg: No edema.   Feet:      Left foot:      Skin integrity: No blister.   Lymphadenopathy:      Cervical: No cervical adenopathy.   Skin:     General: Skin is warm and dry.   Neurological:      General: No focal deficit present.      Mental Status: She is alert and oriented to person, place, and time.         LABS AND IMAGING REVIEWED IN EPIC  3/21/2025 PET/CT:  1. Stable size of bilateral lungs numerous small randomly scattered  nodules.  These are non hypermetabolic but not adequately resolved on the PET scan due to the small size.  These are concerning to be metastatic  2.  Stable aortocaval small size lymph node with interval improvement of hypermetabolism.  3. Interval substantial size of pelvic cystic structure without internal and peripheral hypermetabolism.  This likely is arising from the left ovary and is of indeterminate significance.  Please further assess with ultrasound exam or MRI of the pelvis.  4.  Cul-de-sac/posterior uterine segment hypermetabolism with interval mild improvement.  4/2/25 MRI Pelvis:  Findings are concerning for epithelial type neoplasm with enhancing frondlike appearance in the left aspect which correlates with the hypermetabolic lesion on recent PET/CT scan. Other secondary findings as above including myomatous changes with degenerative fibroids.     PATHOLOGY:  2/5/24 Biopsy:  1. Appendix, appendectomy:  - Acute appendicitis with perforation.  - Adenocarcinoma involves lymphatic spaces of the appendiceal wall and peritoneal surface.  2. Cecum, ileocecectomy:  - Colonic adenocarcinoma.  - See synoptic checklist for CAP cancer protocol.  3. Omental mass, biopsy:  - Metastatic adenocarcinoma, consistent with a colon primary.  4. Small bowel, segmental resection:  - Acute peritonitis.  - No evidence of malignancy.    MLH1: PRESERVED (INTACT) EXPRESSION IN TUMOR CELLS   MSH2: PRESERVED (INTACT) EXPRESSION IN TUMOR CELLS   MSH6: PRESERVED (INTACT) EXPRESSION IN TUMOR CELLS   PMS2: PRESERVED (INTACT) EXPRESSION IN TUMOR CELLS  Microscopic Description/Comments   These results show no deficiency of the mismatch repair proteins tested.    5/6/25 PELVIC MASS, FINE NEEDLE ASPIRATION:    - MUCINOUS LESION.    Comment   The differential diagnosis is broad and includes appendiceal mucocele, malignant   colorectal tumors, and intestinal type neoplasms of other organs (ovary, etc.).   Given the degree of cytologic  atypia, a malignant process should be considered.   Clinical and radiological correlation is necessary.      Note:  The specimen is remarkably paucicellular and consists predominantly of   mucin.  There is insufficient cellularity for any additional ancillary studies.   Assessment:   Stage IV Metastatic Colon Adenocarcinoma with mets to peritoneum - Cecal mass, nonobstructive, no perforation. [id number]. Treatment history as above. Now proceeding with 3rd line Lonsurf + Priscilla.   Pulmonary Embolus at the right mainstem bronchus   Incidentally found during CT CAP 6/25/24. Now on xarelto, continue at this time.   Peripheral Neuropathy   She does report peripheral neuropathy to her fingertips and lower legs.  Her hand neuropathy is grade 2, consistent but not painful. Able to button buttons.   Her LE bilateral neuropathy is mainly at night she gets shooting pains when she lays down.   Stable today on gabapentin to 300mg TID - 1 in AM, 2 in PM.   Immunodeficiency due to Drug Therapy - Precautions discussed with patient. Continue to monitor for any signs of symptoms of infection. No prophylaxis needed at this time. On growth factor due to history of significant neutropenia due to chemotherapy.     Plan:   Labs and exam stable.  Continue with C1D15 Lonsurf + Bevacizumab today as scheduled.   Urine protein 162 today. Will get 24 hour urine protein one week prior to next cycle for further eval.   Continue gabapentin 300mg AM, 600mg in PM   Restart megace.   Continue Xarelto  RTC in 2 week as schedule with NP for FU/lab/C2D1  Cbc, cmp, cea, mag, urine protein- 2 hrs prior @ [facility]     code applied: Patient requires or will require continuous, longitudinal, and active collaborative plan of care related to this patient's health condition, Stage IV Metastatic Colon Adenocarcinoma with mets to peritoneum- the management of which requires the direction of a practitioner with specialized clinical knowledge, skill, and expertise.      I personally reviewed all pertinent imaging, lab results, explained to the patient the pertinent information in detail including radiographic imaging, abnormal lab results. All questions were answered thoroughly. Total time spent on this visit was >40 minutes.    GIFTY Morales-ROXIE  Oncology/Hematology   [facility]

## 2025-06-09 ENCOUNTER — OFFICE VISIT (OUTPATIENT)
Facility: CLINIC | Age: 70
End: 2025-06-09
Payer: MEDICARE

## 2025-06-09 ENCOUNTER — INFUSION (OUTPATIENT)
Facility: HOSPITAL | Age: 70
End: 2025-06-09
Payer: MEDICARE

## 2025-06-09 ENCOUNTER — LAB VISIT (OUTPATIENT)
Dept: LAB | Facility: HOSPITAL | Age: 70
End: 2025-06-09
Payer: MEDICARE

## 2025-06-09 VITALS
DIASTOLIC BLOOD PRESSURE: 87 MMHG | WEIGHT: 105.81 LBS | HEIGHT: 65 IN | BODY MASS INDEX: 17.63 KG/M2 | RESPIRATION RATE: 16 BRPM | HEART RATE: 109 BPM | OXYGEN SATURATION: 98 % | SYSTOLIC BLOOD PRESSURE: 130 MMHG | TEMPERATURE: 98 F

## 2025-06-09 VITALS
HEIGHT: 65 IN | TEMPERATURE: 98 F | OXYGEN SATURATION: 98 % | HEART RATE: 109 BPM | RESPIRATION RATE: 16 BRPM | BODY MASS INDEX: 17.88 KG/M2 | WEIGHT: 107.31 LBS | SYSTOLIC BLOOD PRESSURE: 130 MMHG | DIASTOLIC BLOOD PRESSURE: 87 MMHG

## 2025-06-09 DIAGNOSIS — D84.821 IMMUNODEFICIENCY DUE TO DRUG THERAPY: ICD-10-CM

## 2025-06-09 DIAGNOSIS — I26.99 PULMONARY EMBOLISM, UNSPECIFIED CHRONICITY, UNSPECIFIED PULMONARY EMBOLISM TYPE, UNSPECIFIED WHETHER ACUTE COR PULMONALE PRESENT: ICD-10-CM

## 2025-06-09 DIAGNOSIS — R64 CANCER CACHEXIA: ICD-10-CM

## 2025-06-09 DIAGNOSIS — R19.00 PELVIC MASS: ICD-10-CM

## 2025-06-09 DIAGNOSIS — C78.6 MALIGNANT NEOPLASM METASTATIC TO PERITONEUM: ICD-10-CM

## 2025-06-09 DIAGNOSIS — G89.3 CANCER RELATED PAIN: ICD-10-CM

## 2025-06-09 DIAGNOSIS — R11.0 CHEMOTHERAPY-INDUCED NAUSEA: ICD-10-CM

## 2025-06-09 DIAGNOSIS — T45.1X5A CHEMOTHERAPY-INDUCED NAUSEA: ICD-10-CM

## 2025-06-09 DIAGNOSIS — C18.2 MALIGNANT NEOPLASM OF ASCENDING COLON: Primary | ICD-10-CM

## 2025-06-09 DIAGNOSIS — G47.00 INSOMNIA, UNSPECIFIED TYPE: ICD-10-CM

## 2025-06-09 DIAGNOSIS — Z79.899 IMMUNODEFICIENCY DUE TO DRUG THERAPY: ICD-10-CM

## 2025-06-09 DIAGNOSIS — D84.821 IMMUNODEFICIENCY DUE TO CHEMOTHERAPY: ICD-10-CM

## 2025-06-09 DIAGNOSIS — C18.9 MALIGNANT NEOPLASM OF COLON, UNSPECIFIED PART OF COLON: ICD-10-CM

## 2025-06-09 DIAGNOSIS — D64.9 ANEMIA, UNSPECIFIED TYPE: ICD-10-CM

## 2025-06-09 DIAGNOSIS — Z79.69 IMMUNODEFICIENCY DUE TO CHEMOTHERAPY: ICD-10-CM

## 2025-06-09 DIAGNOSIS — G62.0 CHEMOTHERAPY-INDUCED NEUROPATHY: ICD-10-CM

## 2025-06-09 DIAGNOSIS — T45.1X5A CHEMOTHERAPY-INDUCED NEUROPATHY: ICD-10-CM

## 2025-06-09 DIAGNOSIS — T45.1X5A IMMUNODEFICIENCY DUE TO CHEMOTHERAPY: ICD-10-CM

## 2025-06-09 DIAGNOSIS — R97.0 ELEVATED CARCINOEMBRYONIC ANTIGEN (CEA): ICD-10-CM

## 2025-06-09 DIAGNOSIS — G62.0 NEUROPATHY DUE TO DRUG: ICD-10-CM

## 2025-06-09 DIAGNOSIS — C18.2 MALIGNANT NEOPLASM OF ASCENDING COLON: ICD-10-CM

## 2025-06-09 DIAGNOSIS — Z79.69 ON ANTINEOPLASTIC CHEMOTHERAPY: ICD-10-CM

## 2025-06-09 LAB
ALBUMIN SERPL-MCNC: 2 G/DL (ref 3.4–4.8)
ALBUMIN/GLOB SERPL: 0.3 RATIO (ref 1.1–2)
ALP SERPL-CCNC: 136 UNIT/L (ref 40–150)
ALT SERPL-CCNC: 8 UNIT/L (ref 0–55)
ANION GAP SERPL CALC-SCNC: 10 MEQ/L
AST SERPL-CCNC: 15 UNIT/L (ref 11–45)
BACTERIA #/AREA URNS AUTO: ABNORMAL /HPF
BASOPHILS # BLD AUTO: 0.02 X10(3)/MCL
BASOPHILS NFR BLD AUTO: 0.4 %
BILIRUB SERPL-MCNC: 0.5 MG/DL
BILIRUB UR QL STRIP.AUTO: ABNORMAL
BUN SERPL-MCNC: 9 MG/DL (ref 9.8–20.1)
CALCIUM SERPL-MCNC: 8.9 MG/DL (ref 8.4–10.2)
CEA SERPL-MCNC: 1946.02 NG/ML (ref 0–3)
CHLORIDE SERPL-SCNC: 105 MMOL/L (ref 98–107)
CLARITY UR: CLEAR
CO2 SERPL-SCNC: 23 MMOL/L (ref 23–31)
COLOR UR AUTO: ABNORMAL
CREAT SERPL-MCNC: 0.7 MG/DL (ref 0.55–1.02)
CREAT/UREA NIT SERPL: 13
EOSINOPHIL # BLD AUTO: 0.03 X10(3)/MCL (ref 0–0.9)
EOSINOPHIL NFR BLD AUTO: 0.6 %
ERYTHROCYTE [DISTWIDTH] IN BLOOD BY AUTOMATED COUNT: 17.5 % (ref 11.5–17)
GFR SERPLBLD CREATININE-BSD FMLA CKD-EPI: >60 ML/MIN/1.73/M2
GLOBULIN SER-MCNC: 7.2 GM/DL (ref 2.4–3.5)
GLUCOSE SERPL-MCNC: 89 MG/DL (ref 82–115)
GLUCOSE UR QL STRIP: ABNORMAL
HCT VFR BLD AUTO: 37.5 % (ref 37–47)
HGB BLD-MCNC: 11.8 G/DL (ref 12–16)
HGB UR QL STRIP: ABNORMAL
IMM GRANULOCYTES # BLD AUTO: 0.03 X10(3)/MCL (ref 0–0.04)
IMM GRANULOCYTES NFR BLD AUTO: 0.6 %
KETONES UR QL STRIP: ABNORMAL
LEUKOCYTE ESTERASE UR QL STRIP: ABNORMAL
LYMPHOCYTES # BLD AUTO: 1.13 X10(3)/MCL (ref 0.6–4.6)
LYMPHOCYTES NFR BLD AUTO: 24.4 %
MAGNESIUM SERPL-MCNC: 1.7 MG/DL (ref 1.6–2.6)
MCH RBC QN AUTO: 28.4 PG (ref 27–31)
MCHC RBC AUTO-ENTMCNC: 31.5 G/DL (ref 33–36)
MCV RBC AUTO: 90.1 FL (ref 80–94)
MONOCYTES # BLD AUTO: 0.09 X10(3)/MCL (ref 0.1–1.3)
MONOCYTES NFR BLD AUTO: 1.9 %
MUCOUS THREADS URNS QL MICRO: ABNORMAL /LPF
NEUTROPHILS # BLD AUTO: 3.33 X10(3)/MCL (ref 2.1–9.2)
NEUTROPHILS NFR BLD AUTO: 72.1 %
NITRITE UR QL STRIP: NEGATIVE
NRBC BLD AUTO-RTO: 0 %
PH UR STRIP: 6 [PH]
PLATELET # BLD AUTO: 230 X10(3)/MCL (ref 130–400)
PMV BLD AUTO: 10 FL (ref 7.4–10.4)
POTASSIUM SERPL-SCNC: 3.9 MMOL/L (ref 3.5–5.1)
PROT SERPL-MCNC: 9.2 GM/DL (ref 5.8–7.6)
PROT UR QL STRIP: ABNORMAL
PROT UR STRIP-MCNC: 162.7 MG/DL
RBC # BLD AUTO: 4.16 X10(6)/MCL (ref 4.2–5.4)
RBC #/AREA URNS AUTO: ABNORMAL /HPF
SODIUM SERPL-SCNC: 138 MMOL/L (ref 136–145)
SP GR UR STRIP.AUTO: 1.02 (ref 1–1.03)
SQUAMOUS #/AREA URNS AUTO: ABNORMAL /HPF
UROBILINOGEN UR STRIP-ACNC: >=8
WBC # BLD AUTO: 4.63 X10(3)/MCL (ref 4.5–11.5)
WBC #/AREA URNS AUTO: ABNORMAL /HPF

## 2025-06-09 PROCEDURE — 84156 ASSAY OF PROTEIN URINE: CPT

## 2025-06-09 PROCEDURE — 25000003 PHARM REV CODE 250: Performed by: STUDENT IN AN ORGANIZED HEALTH CARE EDUCATION/TRAINING PROGRAM

## 2025-06-09 PROCEDURE — G2211 COMPLEX E/M VISIT ADD ON: HCPCS | Mod: ,,,

## 2025-06-09 PROCEDURE — 83735 ASSAY OF MAGNESIUM: CPT

## 2025-06-09 PROCEDURE — 63600175 PHARM REV CODE 636 W HCPCS: Mod: TB | Performed by: STUDENT IN AN ORGANIZED HEALTH CARE EDUCATION/TRAINING PROGRAM

## 2025-06-09 PROCEDURE — A4216 STERILE WATER/SALINE, 10 ML: HCPCS | Performed by: NURSE PRACTITIONER

## 2025-06-09 PROCEDURE — 96413 CHEMO IV INFUSION 1 HR: CPT

## 2025-06-09 PROCEDURE — 25000003 PHARM REV CODE 250: Performed by: NURSE PRACTITIONER

## 2025-06-09 PROCEDURE — 36415 COLL VENOUS BLD VENIPUNCTURE: CPT

## 2025-06-09 PROCEDURE — 99215 OFFICE O/P EST HI 40 MIN: CPT | Mod: S$PBB,,,

## 2025-06-09 PROCEDURE — 81003 URINALYSIS AUTO W/O SCOPE: CPT

## 2025-06-09 PROCEDURE — 80053 COMPREHEN METABOLIC PANEL: CPT

## 2025-06-09 PROCEDURE — 99214 OFFICE O/P EST MOD 30 MIN: CPT | Mod: PBBFAC,25

## 2025-06-09 PROCEDURE — 85025 COMPLETE CBC W/AUTO DIFF WBC: CPT

## 2025-06-09 PROCEDURE — 63600175 PHARM REV CODE 636 W HCPCS: Performed by: NURSE PRACTITIONER

## 2025-06-09 PROCEDURE — 99999 PR PBB SHADOW E&M-EST. PATIENT-LVL IV: CPT | Mod: PBBFAC,,,

## 2025-06-09 PROCEDURE — 87086 URINE CULTURE/COLONY COUNT: CPT

## 2025-06-09 PROCEDURE — 82378 CARCINOEMBRYONIC ANTIGEN: CPT

## 2025-06-09 RX ORDER — PROCHLORPERAZINE EDISYLATE 5 MG/ML
5 INJECTION INTRAMUSCULAR; INTRAVENOUS ONCE AS NEEDED
Status: CANCELLED | OUTPATIENT
Start: 2025-06-10

## 2025-06-09 RX ORDER — PROCHLORPERAZINE EDISYLATE 5 MG/ML
5 INJECTION INTRAMUSCULAR; INTRAVENOUS ONCE AS NEEDED
Status: DISCONTINUED | OUTPATIENT
Start: 2025-06-09 | End: 2025-06-09 | Stop reason: HOSPADM

## 2025-06-09 RX ORDER — HEPARIN 100 UNIT/ML
500 SYRINGE INTRAVENOUS
Status: DISCONTINUED | OUTPATIENT
Start: 2025-06-09 | End: 2025-06-09 | Stop reason: HOSPADM

## 2025-06-09 RX ORDER — SODIUM CHLORIDE 0.9 % (FLUSH) 0.9 %
10 SYRINGE (ML) INJECTION
Status: DISCONTINUED | OUTPATIENT
Start: 2025-06-09 | End: 2025-06-09 | Stop reason: HOSPADM

## 2025-06-09 RX ORDER — EPINEPHRINE 0.3 MG/.3ML
0.3 INJECTION SUBCUTANEOUS ONCE AS NEEDED
Status: DISCONTINUED | OUTPATIENT
Start: 2025-06-09 | End: 2025-06-09 | Stop reason: HOSPADM

## 2025-06-09 RX ORDER — DIPHENHYDRAMINE HYDROCHLORIDE 50 MG/ML
50 INJECTION, SOLUTION INTRAMUSCULAR; INTRAVENOUS ONCE AS NEEDED
Status: DISCONTINUED | OUTPATIENT
Start: 2025-06-09 | End: 2025-06-09 | Stop reason: HOSPADM

## 2025-06-09 RX ADMIN — HEPARIN SODIUM (PORCINE) LOCK FLUSH IV SOLN 100 UNIT/ML 500 UNITS: 100 SOLUTION at 11:06

## 2025-06-09 RX ADMIN — SODIUM CHLORIDE 250 MG: 9 INJECTION, SOLUTION INTRAVENOUS at 11:06

## 2025-06-09 RX ADMIN — Medication 10 ML: at 11:06

## 2025-06-09 RX ADMIN — SODIUM CHLORIDE: 9 INJECTION, SOLUTION INTRAVENOUS at 11:06

## 2025-06-11 ENCOUNTER — RESULTS FOLLOW-UP (OUTPATIENT)
Dept: HEMATOLOGY/ONCOLOGY | Facility: CLINIC | Age: 70
End: 2025-06-11

## 2025-06-11 LAB — BACTERIA UR CULT: NO GROWTH

## 2025-06-23 ENCOUNTER — OFFICE VISIT (OUTPATIENT)
Facility: CLINIC | Age: 70
End: 2025-06-23
Payer: MEDICARE

## 2025-06-23 ENCOUNTER — LAB VISIT (OUTPATIENT)
Dept: LAB | Facility: HOSPITAL | Age: 70
End: 2025-06-23
Attending: NURSE PRACTITIONER
Payer: MEDICARE

## 2025-06-23 ENCOUNTER — INFUSION (OUTPATIENT)
Facility: HOSPITAL | Age: 70
End: 2025-06-23
Payer: MEDICARE

## 2025-06-23 VITALS
BODY MASS INDEX: 18.72 KG/M2 | OXYGEN SATURATION: 99 % | TEMPERATURE: 98 F | HEIGHT: 65 IN | RESPIRATION RATE: 16 BRPM | WEIGHT: 112.38 LBS | HEART RATE: 107 BPM | SYSTOLIC BLOOD PRESSURE: 147 MMHG | DIASTOLIC BLOOD PRESSURE: 93 MMHG

## 2025-06-23 VITALS
HEART RATE: 107 BPM | SYSTOLIC BLOOD PRESSURE: 147 MMHG | OXYGEN SATURATION: 99 % | RESPIRATION RATE: 16 BRPM | TEMPERATURE: 98 F | WEIGHT: 112.38 LBS | BODY MASS INDEX: 18.72 KG/M2 | HEIGHT: 65 IN | DIASTOLIC BLOOD PRESSURE: 93 MMHG

## 2025-06-23 DIAGNOSIS — D84.821 IMMUNODEFICIENCY DUE TO CHEMOTHERAPY: ICD-10-CM

## 2025-06-23 DIAGNOSIS — C78.6 MALIGNANT NEOPLASM METASTATIC TO PERITONEUM: Primary | ICD-10-CM

## 2025-06-23 DIAGNOSIS — C18.2 MALIGNANT NEOPLASM OF ASCENDING COLON: ICD-10-CM

## 2025-06-23 DIAGNOSIS — C18.2 MALIGNANT NEOPLASM OF ASCENDING COLON: Primary | ICD-10-CM

## 2025-06-23 DIAGNOSIS — G89.3 CANCER RELATED PAIN: ICD-10-CM

## 2025-06-23 DIAGNOSIS — Z79.69 IMMUNODEFICIENCY DUE TO CHEMOTHERAPY: ICD-10-CM

## 2025-06-23 DIAGNOSIS — C78.6 MALIGNANT NEOPLASM METASTATIC TO PERITONEUM: ICD-10-CM

## 2025-06-23 DIAGNOSIS — T45.1X5A IMMUNODEFICIENCY DUE TO CHEMOTHERAPY: ICD-10-CM

## 2025-06-23 LAB
ALBUMIN SERPL-MCNC: 2.1 G/DL (ref 3.4–4.8)
ALBUMIN/GLOB SERPL: 0.3 RATIO (ref 1.1–2)
ALP SERPL-CCNC: 126 UNIT/L (ref 40–150)
ALT SERPL-CCNC: 17 UNIT/L (ref 0–55)
ANION GAP SERPL CALC-SCNC: 7 MEQ/L
AST SERPL-CCNC: 21 UNIT/L (ref 11–45)
BACTERIA #/AREA URNS AUTO: ABNORMAL /HPF
BASOPHILS # BLD AUTO: 0.02 X10(3)/MCL
BASOPHILS NFR BLD AUTO: 0.4 %
BILIRUB SERPL-MCNC: 0.2 MG/DL
BILIRUB UR QL STRIP.AUTO: ABNORMAL
BUN SERPL-MCNC: 14 MG/DL (ref 9.8–20.1)
CALCIUM SERPL-MCNC: 9.1 MG/DL (ref 8.4–10.2)
CEA SERPL-MCNC: 2460.77 NG/ML (ref 0–3)
CHLORIDE SERPL-SCNC: 113 MMOL/L (ref 98–107)
CLARITY UR: CLEAR
CO2 SERPL-SCNC: 20 MMOL/L (ref 23–31)
COLOR UR AUTO: YELLOW
CREAT SERPL-MCNC: 0.7 MG/DL (ref 0.55–1.02)
CREAT/UREA NIT SERPL: 20
EOSINOPHIL # BLD AUTO: 0.02 X10(3)/MCL (ref 0–0.9)
EOSINOPHIL NFR BLD AUTO: 0.4 %
ERYTHROCYTE [DISTWIDTH] IN BLOOD BY AUTOMATED COUNT: 20 % (ref 11.5–17)
GFR SERPLBLD CREATININE-BSD FMLA CKD-EPI: >60 ML/MIN/1.73/M2
GLOBULIN SER-MCNC: 7 GM/DL (ref 2.4–3.5)
GLUCOSE SERPL-MCNC: 88 MG/DL (ref 82–115)
GLUCOSE UR QL STRIP: NEGATIVE
HCT VFR BLD AUTO: 33 % (ref 37–47)
HGB BLD-MCNC: 10.3 G/DL (ref 12–16)
HGB UR QL STRIP: ABNORMAL
IMM GRANULOCYTES # BLD AUTO: 0.01 X10(3)/MCL (ref 0–0.04)
IMM GRANULOCYTES NFR BLD AUTO: 0.2 %
KETONES UR QL STRIP: ABNORMAL
LEUKOCYTE ESTERASE UR QL STRIP: ABNORMAL
LYMPHOCYTES # BLD AUTO: 2.55 X10(3)/MCL (ref 0.6–4.6)
LYMPHOCYTES NFR BLD AUTO: 55.6 %
MAGNESIUM SERPL-MCNC: 1.7 MG/DL (ref 1.6–2.6)
MCH RBC QN AUTO: 28.1 PG (ref 27–31)
MCHC RBC AUTO-ENTMCNC: 31.2 G/DL (ref 33–36)
MCV RBC AUTO: 89.9 FL (ref 80–94)
MONOCYTES # BLD AUTO: 0.64 X10(3)/MCL (ref 0.1–1.3)
MONOCYTES NFR BLD AUTO: 13.9 %
NEUTROPHILS # BLD AUTO: 1.35 X10(3)/MCL (ref 2.1–9.2)
NEUTROPHILS NFR BLD AUTO: 29.5 %
NITRITE UR QL STRIP: NEGATIVE
NRBC BLD AUTO-RTO: 0 %
PH UR STRIP: 6.5 [PH]
PLATELET # BLD AUTO: 368 X10(3)/MCL (ref 130–400)
PMV BLD AUTO: 9.3 FL (ref 7.4–10.4)
POTASSIUM SERPL-SCNC: 3.9 MMOL/L (ref 3.5–5.1)
PROT SERPL-MCNC: 9.1 GM/DL (ref 5.8–7.6)
PROT UR QL STRIP: ABNORMAL
PROT UR STRIP-MCNC: 33.9 MG/DL
RBC # BLD AUTO: 3.67 X10(6)/MCL (ref 4.2–5.4)
RBC #/AREA URNS AUTO: ABNORMAL /HPF
SODIUM SERPL-SCNC: 140 MMOL/L (ref 136–145)
SP GR UR STRIP.AUTO: 1.01 (ref 1–1.03)
SQUAMOUS #/AREA URNS AUTO: ABNORMAL /HPF
UROBILINOGEN UR STRIP-ACNC: 4
WBC # BLD AUTO: 4.59 X10(3)/MCL (ref 4.5–11.5)
WBC #/AREA URNS AUTO: ABNORMAL /HPF

## 2025-06-23 PROCEDURE — 84156 ASSAY OF PROTEIN URINE: CPT

## 2025-06-23 PROCEDURE — 81003 URINALYSIS AUTO W/O SCOPE: CPT

## 2025-06-23 PROCEDURE — 80053 COMPREHEN METABOLIC PANEL: CPT

## 2025-06-23 PROCEDURE — 99214 OFFICE O/P EST MOD 30 MIN: CPT | Mod: PBBFAC,25 | Performed by: NURSE PRACTITIONER

## 2025-06-23 PROCEDURE — A4216 STERILE WATER/SALINE, 10 ML: HCPCS | Performed by: NURSE PRACTITIONER

## 2025-06-23 PROCEDURE — 99999 PR PBB SHADOW E&M-EST. PATIENT-LVL IV: CPT | Mod: PBBFAC,,, | Performed by: NURSE PRACTITIONER

## 2025-06-23 PROCEDURE — 85025 COMPLETE CBC W/AUTO DIFF WBC: CPT

## 2025-06-23 PROCEDURE — 25000003 PHARM REV CODE 250: Performed by: NURSE PRACTITIONER

## 2025-06-23 PROCEDURE — 99215 OFFICE O/P EST HI 40 MIN: CPT | Mod: S$PBB,,, | Performed by: NURSE PRACTITIONER

## 2025-06-23 PROCEDURE — 82378 CARCINOEMBRYONIC ANTIGEN: CPT

## 2025-06-23 PROCEDURE — 36415 COLL VENOUS BLD VENIPUNCTURE: CPT

## 2025-06-23 PROCEDURE — 83735 ASSAY OF MAGNESIUM: CPT

## 2025-06-23 PROCEDURE — 63600175 PHARM REV CODE 636 W HCPCS: Mod: TB | Performed by: NURSE PRACTITIONER

## 2025-06-23 PROCEDURE — 96413 CHEMO IV INFUSION 1 HR: CPT

## 2025-06-23 RX ORDER — HEPARIN 100 UNIT/ML
500 SYRINGE INTRAVENOUS
OUTPATIENT
Start: 2025-07-08

## 2025-06-23 RX ORDER — SODIUM CHLORIDE 0.9 % (FLUSH) 0.9 %
10 SYRINGE (ML) INJECTION
Status: DISCONTINUED | OUTPATIENT
Start: 2025-06-23 | End: 2025-06-23 | Stop reason: HOSPADM

## 2025-06-23 RX ORDER — EPINEPHRINE 0.3 MG/.3ML
0.3 INJECTION SUBCUTANEOUS ONCE AS NEEDED
OUTPATIENT
Start: 2025-07-08

## 2025-06-23 RX ORDER — PROCHLORPERAZINE EDISYLATE 5 MG/ML
5 INJECTION INTRAMUSCULAR; INTRAVENOUS ONCE AS NEEDED
Status: CANCELLED | OUTPATIENT
Start: 2025-06-24

## 2025-06-23 RX ORDER — DIPHENHYDRAMINE HYDROCHLORIDE 50 MG/ML
50 INJECTION, SOLUTION INTRAMUSCULAR; INTRAVENOUS ONCE AS NEEDED
OUTPATIENT
Start: 2025-07-08

## 2025-06-23 RX ORDER — HYDROCODONE BITARTRATE AND ACETAMINOPHEN 5; 325 MG/1; MG/1
1 TABLET ORAL EVERY 6 HOURS PRN
Qty: 30 TABLET | Refills: 0 | Status: SHIPPED | OUTPATIENT
Start: 2025-06-23

## 2025-06-23 RX ORDER — HEPARIN 100 UNIT/ML
500 SYRINGE INTRAVENOUS
Status: DISCONTINUED | OUTPATIENT
Start: 2025-06-23 | End: 2025-06-23 | Stop reason: HOSPADM

## 2025-06-23 RX ORDER — HEPARIN 100 UNIT/ML
500 SYRINGE INTRAVENOUS
Status: CANCELLED | OUTPATIENT
Start: 2025-06-24

## 2025-06-23 RX ORDER — DIPHENHYDRAMINE HYDROCHLORIDE 50 MG/ML
50 INJECTION, SOLUTION INTRAMUSCULAR; INTRAVENOUS ONCE AS NEEDED
Status: CANCELLED | OUTPATIENT
Start: 2025-06-24

## 2025-06-23 RX ORDER — PROCHLORPERAZINE EDISYLATE 5 MG/ML
5 INJECTION INTRAMUSCULAR; INTRAVENOUS ONCE AS NEEDED
OUTPATIENT
Start: 2025-07-08

## 2025-06-23 RX ORDER — SODIUM CHLORIDE 0.9 % (FLUSH) 0.9 %
10 SYRINGE (ML) INJECTION
Status: CANCELLED | OUTPATIENT
Start: 2025-06-24

## 2025-06-23 RX ORDER — SODIUM CHLORIDE 0.9 % (FLUSH) 0.9 %
10 SYRINGE (ML) INJECTION
OUTPATIENT
Start: 2025-07-08

## 2025-06-23 RX ORDER — EPINEPHRINE 0.3 MG/.3ML
0.3 INJECTION SUBCUTANEOUS ONCE AS NEEDED
Status: CANCELLED | OUTPATIENT
Start: 2025-06-24

## 2025-06-23 RX ADMIN — Medication 500 UNITS: at 01:06

## 2025-06-23 RX ADMIN — Medication 10 ML: at 01:06

## 2025-06-23 RX ADMIN — SODIUM CHLORIDE 250 MG: 9 INJECTION, SOLUTION INTRAVENOUS at 12:06

## 2025-06-23 RX ADMIN — SODIUM CHLORIDE: 9 INJECTION, SOLUTION INTRAVENOUS at 12:06

## 2025-06-23 NOTE — PROGRESS NOTES
Subjective:       Patient ID: Selene Smallwood is a 70 y.o. female.    Chief Complaint:  No chief complaint on file.       Diagnosis:   Stage IV Metastatic Colon Adenocarcinoma with mets to peritoneum  2. Pulmonary Embolism    Current Treatment:   Lonsurf D1-5 and D8-12 + Bevacizumab D1/D15 Q28 day cycle (to start 5/26/2025)  Xarelto 15 mg BID x 21 days then 20 mg daily- 6/25/24-present    Treatment History:   FOLFOX + Cetuximab q2w x 12 cycles- 4/2/24 - 11/6/24  Hold Oxaliplatin for C11 and C12 due to tolerance  FOLFIRI + Priscilla - (1/6/25-3/11/25)  Held after C4 d/t pelvic mass on scans    HPI:   70 yo F presented in March '24 for evaluation of metastatic colon cancer. She initially presented to OSH in early February '24 with 2 days of upper abdominal pain. Imaging on 2/4 in the ER revealed evidence of non perforated acute appendicitis. She underwent Ex lap where a cecal mass was incidentally discovered and resected, as well as omental studding was noted and 1 small mass was taken for biopsy. Her final pathology revealed a large exophytic mass in the cecum measuring 4 x 2 cm extending to the appendiceal orifice and extends into pericolonic adipose tissue. G2, LVI+, PNI negative. 14 LN were resected, with 10 being positive for metastatic adenocarcinoma. She also had an omental mass measuring 2.5 x 1.5cm which was positive for metastatic adenocarcinoma, consistent with a colon primary. Her final pathology staging is consistent with [id number]. Her postop course was complicated by abdominal abscess formation s/p IR drain placement and subsequent removal and antibiotic treatment.      She underwent PET scan in March '24 with evidence of nodular foci within the omentum/peritoneum consistent with peritoneal carcinomatosis. No other evidence of disease. NGS testing was performed which revealed GULSHAN wild type and BRAF negative. Therefore she underwent 1st line therapy of FOLFOX + Cetuximab Q2w x 12 cycles. Despite good  response to treatment after 6 cycles of therapy, once she completed 12 cycles of chemotherapy she had evidence of disease progression to abdominal lymph nodes, peritoneum, and bilateral lungs. She was started on 2nd line FOLFIRI + Bevacizumab, unfortunately she had rapid progression with mucinous abdominal mass growth. She will now proceed with 3rd line Lonsurf + Avastin.     Interval History 06/23/25:  She returns to the clinic today for a two week follow-up for treatment clearance, due to begin cycle 2 day 1 today.  She reports tolerating treatment well. Denies nausea/vomiting. States megace is  helping a lot with appetite. Denies any SOB/chest pain/fatigue. Due to start Lonsurf cycle 2 day 1 today along with Avastin.  neuropathy stable today to feet she takes Gabapentin when needed. She does complain of abdominal pain she is requesting refill of Norco. She denies any fevers, chills, SOB, CP, headache, swelling. No changes in bowel patterns, no blood in stool or urine.     Past Medical History:   Diagnosis Date    Hypertension       Past Surgical History:   Procedure Laterality Date    APPENDECTOMY N/A 2/5/2024    Procedure: APPENDECTOMY;  Surgeon: Anna Murillo MD;  Location: [facility];  Service: General;  Laterality: N/A;    INSERTION OF TUNNELED CENTRAL VENOUS CATHETER (CVC) WITH SUBCUTANEOUS PORT N/A 3/26/2024    Procedure: [id number];  Surgeon: Armando Tran MD;  Location: [facility] OR;  Service: Peripheral Vascular;  Laterality: N/A;    LAPAROSCOPIC APPENDECTOMY N/A 2/5/2024    Procedure: APPENDECTOMY, LAPAROSCOPIC;  Surgeon: Anna Murillo MD;  Location: [facility] OR;  Service: General;  Laterality: N/A;    SURGICAL REMOVAL OF ILEUM WITH CECUM N/A 2/5/2024    Procedure: EXCISION, CECUM WITH ILEUM;  Surgeon: Anna Murillo MD;  Location: [facility] OR;  Service: General;  Laterality: N/A;    WASHOUT N/A 2/5/2024    Procedure: WASHOUT;  Surgeon: Anna Murillo MD;  Location: [facility] OR;  Service:  General;  Laterality: N/A;     Social History     Socioeconomic History    Marital status:    Tobacco Use    Smoking status: Former     Types: Cigarettes    Smokeless tobacco: Never   Substance and Sexual Activity    Alcohol use: Not Currently    Drug use: Never    Sexual activity: Not Currently     Social Drivers of Health     Financial Resource Strain: Low Risk  (2/15/2024)    Overall Financial Resource Strain (CARDIA)     Difficulty of Paying Living Expenses: Not hard at all   Food Insecurity: No Food Insecurity (2/15/2024)    Hunger Vital Sign     Worried About Running Out of Food in the Last Year: Never true     Ran Out of Food in the Last Year: Never true   Transportation Needs: No Transportation Needs (2/15/2024)    PRAPARE - Transportation     Lack of Transportation (Medical): No     Lack of Transportation (Non-Medical): No   Physical Activity: Unknown (2/15/2024)    Exercise Vital Sign     Days of Exercise per Week: 0 days   Stress: No Stress Concern Present (2/15/2024)    [employer] - Occupational Stress Questionnaire     Feeling of Stress : Not at all   Housing Stability: Low Risk  (2/15/2024)    Housing Stability Vital Sign     Unable to Pay for Housing in the Last Year: No     Number of Places Lived in the Last Year: 1     Unstable Housing in the Last Year: No      Family History   Family history unknown: Yes      Review of patient's allergies indicates:   Allergen Reactions    Robaxin [methocarbamol] Other (See Comments)     Altered mental status       Review of Systems   Constitutional:  Positive for appetite change, fatigue and unexpected weight change. Negative for activity change and fever.   HENT:  Negative for sore throat.    Eyes:  Negative for visual disturbance.   Respiratory:  Negative for cough and shortness of breath.    Cardiovascular:  Negative for chest pain.   Gastrointestinal:  Negative for abdominal pain, diarrhea and vomiting.   Endocrine:  Negative for polyuria.   Genitourinary:  Negative for dysuria and hot flashes.   Integumentary:  Negative for rash.   Allergic/Immunologic: Negative for immunocompromised state.   Neurological:  Negative for weakness and headaches.   Hematological:  Negative for adenopathy.   Psychiatric/Behavioral:  Positive for depressed mood. Negative for confusion.          Objective:      There were no vitals filed for this visit.          Physical Exam  Constitutional:       General: She is not in acute distress.     Appearance: She is cachectic. She is not ill-appearing.   HENT:      Head: Normocephalic and atraumatic.      Nose: Nose normal.      Mouth/Throat:      Mouth: Mucous membranes are moist.      Pharynx: Oropharynx is clear.   Eyes:      Extraocular Movements: Extraocular movements intact.      Conjunctiva/sclera: Conjunctivae normal.      Pupils: Pupils are equal, round, and reactive to light.   Cardiovascular:      Rate and Rhythm: Normal rate and regular rhythm.      Pulses: Normal pulses.      Heart sounds: Normal heart sounds. No murmur heard.  Pulmonary:      Effort: Pulmonary effort is normal. No respiratory distress.      Breath sounds: Normal breath sounds.   Abdominal:      General: There is no distension.      Palpations: Abdomen is soft.      Tenderness: There is no abdominal tenderness.   Musculoskeletal:         General: Normal range of motion.      Cervical back: Normal range of motion and neck supple.      Right lower leg: No edema.      Left lower leg: No edema.   Feet:      Left foot:      Skin integrity: No blister.   Lymphadenopathy:      Cervical: No cervical adenopathy.   Skin:     General: Skin is warm and dry.   Neurological:      General: No focal deficit present.      Mental Status: She is alert and oriented to person, place, and time.         LABS AND IMAGING REVIEWED IN EPIC  3/21/2025 PET/CT:  1. Stable size of bilateral lungs numerous small randomly scattered nodules.  These are non  hypermetabolic but not adequately resolved on the PET scan due to the small size.  These are concerning to be metastatic  2.  Stable aortocaval small size lymph node with interval improvement of hypermetabolism.  3. Interval substantial size of pelvic cystic structure without internal and peripheral hypermetabolism.  This likely is arising from the left ovary and is of indeterminate significance.  Please further assess with ultrasound exam or MRI of the pelvis.  4.  Cul-de-sac/posterior uterine segment hypermetabolism with interval mild improvement.  4/2/25 MRI Pelvis:  Findings are concerning for epithelial type neoplasm with enhancing frondlike appearance in the left aspect which correlates with the hypermetabolic lesion on recent PET/CT scan. Other secondary findings as above including myomatous changes with degenerative fibroids.     PATHOLOGY:  2/5/24 Biopsy:  1. Appendix, appendectomy:  - Acute appendicitis with perforation.  - Adenocarcinoma involves lymphatic spaces of the appendiceal wall and peritoneal surface.  2. Cecum, ileocecectomy:  - Colonic adenocarcinoma.  - See synoptic checklist for CAP cancer protocol.  3. Omental mass, biopsy:  - Metastatic adenocarcinoma, consistent with a colon primary.  4. Small bowel, segmental resection:  - Acute peritonitis.  - No evidence of malignancy.    MLH1: PRESERVED (INTACT) EXPRESSION IN TUMOR CELLS   MSH2: PRESERVED (INTACT) EXPRESSION IN TUMOR CELLS   MSH6: PRESERVED (INTACT) EXPRESSION IN TUMOR CELLS   PMS2: PRESERVED (INTACT) EXPRESSION IN TUMOR CELLS  Microscopic Description/Comments   These results show no deficiency of the mismatch repair proteins tested.    5/6/25 PELVIC MASS, FINE NEEDLE ASPIRATION:    - MUCINOUS LESION.    Comment   The differential diagnosis is broad and includes appendiceal mucocele, malignant   colorectal tumors, and intestinal type neoplasms of other organs (ovary, etc.).   Given the degree of cytologic atypia, a malignant process  should be considered.   Clinical and radiological correlation is necessary.      Note:  The specimen is remarkably paucicellular and consists predominantly of   mucin.  There is insufficient cellularity for any additional ancillary studies.   Assessment:   Stage IV Metastatic Colon Adenocarcinoma with mets to peritoneum - Cecal mass, nonobstructive, no perforation. [id number]. Treatment history as above. Now proceeding with 3rd line Lonsurf + Priscilla.   Pulmonary Embolus at the right mainstem bronchus   Incidentally found during CT CAP 6/25/24. Now on xarelto, continue at this time.   Peripheral Neuropathy   She does report peripheral neuropathy to her fingertips and lower legs.  Her hand neuropathy is grade 2, consistent but not painful. Able to button buttons.   Her LE bilateral neuropathy is mainly at night she gets shooting pains when she lays down.   Stable today on gabapentin to 300mg TID - 1 in AM, 2 in PM takes prn.  Immunodeficiency due to Drug Therapy - Precautions discussed with patient. Continue to monitor for any signs of symptoms of infection. No prophylaxis needed at this time. Current ANC 1350.  Plan:   Labs and exam stable. ANC 1350 discussed with Dr. Lejeune will proceed with treatment.  Continue with C2D1 Lonsurf + Bevacizumab today as scheduled.   Urine protein 1+. Will get 24 hour urine protein for further evaluation.    Continue gabapentin 300mg AM, 600mg in PM   Refill Norco  Continue megace.   Continue Xarelto  RTC in 2 week as schedule with NP for FU/lab/C2D15  Cbc, cmp, cea, mag, urine protein- 2 hrs prior @ [facility]      45 were spent on this encounter    Lata PARADA, FNP   Department of Hematology/Oncology.     This visit was conducted by Lata Ramsey NP as part of her orientation training. I, Tate Katz NP, collaborated with her and the visit reflects our discussion and plan.     Tate Katz, MSN, FNP-BC, APRN, AOCNP   Department of Hematology/Oncology.

## 2025-06-25 DIAGNOSIS — C78.6 MALIGNANT NEOPLASM METASTATIC TO PERITONEUM: ICD-10-CM

## 2025-06-25 RX ORDER — RIVAROXABAN 20 MG/1
TABLET, FILM COATED ORAL
Qty: 30 TABLET | Refills: 2 | Status: SHIPPED | OUTPATIENT
Start: 2025-06-25

## 2025-07-08 ENCOUNTER — LAB VISIT (OUTPATIENT)
Dept: LAB | Facility: HOSPITAL | Age: 70
End: 2025-07-08
Payer: MEDICARE

## 2025-07-08 ENCOUNTER — OFFICE VISIT (OUTPATIENT)
Facility: CLINIC | Age: 70
End: 2025-07-08
Payer: MEDICARE

## 2025-07-08 ENCOUNTER — INFUSION (OUTPATIENT)
Facility: HOSPITAL | Age: 70
End: 2025-07-08
Payer: MEDICARE

## 2025-07-08 VITALS
SYSTOLIC BLOOD PRESSURE: 130 MMHG | BODY MASS INDEX: 18.21 KG/M2 | TEMPERATURE: 98 F | OXYGEN SATURATION: 97 % | HEIGHT: 65 IN | DIASTOLIC BLOOD PRESSURE: 81 MMHG | RESPIRATION RATE: 16 BRPM | HEART RATE: 107 BPM | WEIGHT: 109.31 LBS

## 2025-07-08 VITALS
DIASTOLIC BLOOD PRESSURE: 81 MMHG | BODY MASS INDEX: 17.99 KG/M2 | TEMPERATURE: 98 F | SYSTOLIC BLOOD PRESSURE: 130 MMHG | HEIGHT: 65 IN | OXYGEN SATURATION: 97 % | HEART RATE: 107 BPM | RESPIRATION RATE: 16 BRPM | WEIGHT: 108 LBS

## 2025-07-08 DIAGNOSIS — R64 CANCER CACHEXIA: ICD-10-CM

## 2025-07-08 DIAGNOSIS — C78.6 MALIGNANT NEOPLASM METASTATIC TO PERITONEUM: ICD-10-CM

## 2025-07-08 DIAGNOSIS — G62.0 NEUROPATHY DUE TO DRUG: ICD-10-CM

## 2025-07-08 DIAGNOSIS — C18.2 MALIGNANT NEOPLASM OF ASCENDING COLON: ICD-10-CM

## 2025-07-08 DIAGNOSIS — T45.1X5A CHEMOTHERAPY-INDUCED NEUROPATHY: ICD-10-CM

## 2025-07-08 DIAGNOSIS — G89.3 CANCER RELATED PAIN: ICD-10-CM

## 2025-07-08 DIAGNOSIS — C18.2 MALIGNANT NEOPLASM OF ASCENDING COLON: Primary | ICD-10-CM

## 2025-07-08 DIAGNOSIS — G62.0 CHEMOTHERAPY-INDUCED NEUROPATHY: ICD-10-CM

## 2025-07-08 DIAGNOSIS — D64.9 ANEMIA, UNSPECIFIED TYPE: ICD-10-CM

## 2025-07-08 LAB
ABS NEUT CALC (OHS): 0.81 X10(3)/MCL (ref 2.1–9.2)
ALBUMIN SERPL-MCNC: 2.1 G/DL (ref 3.4–4.8)
ALBUMIN/GLOB SERPL: 0.3 RATIO (ref 1.1–2)
ALP SERPL-CCNC: 140 UNIT/L (ref 40–150)
ALT SERPL-CCNC: 13 UNIT/L (ref 0–55)
ANION GAP SERPL CALC-SCNC: 6 MEQ/L
AST SERPL-CCNC: 13 UNIT/L (ref 11–45)
BILIRUB SERPL-MCNC: 0.5 MG/DL
BUN SERPL-MCNC: 9 MG/DL (ref 9.8–20.1)
CALCIUM SERPL-MCNC: 8.7 MG/DL (ref 8.4–10.2)
CEA SERPL-MCNC: 2028.01 NG/ML (ref 0–3)
CHLORIDE SERPL-SCNC: 111 MMOL/L (ref 98–107)
CO2 SERPL-SCNC: 20 MMOL/L (ref 23–31)
CREAT SERPL-MCNC: 0.61 MG/DL (ref 0.55–1.02)
CREAT/UREA NIT SERPL: 15
ERYTHROCYTE [DISTWIDTH] IN BLOOD BY AUTOMATED COUNT: 21.3 % (ref 11.5–17)
GFR SERPLBLD CREATININE-BSD FMLA CKD-EPI: >60 ML/MIN/1.73/M2
GLOBULIN SER-MCNC: 6.5 GM/DL (ref 2.4–3.5)
GLUCOSE SERPL-MCNC: 83 MG/DL (ref 82–115)
HCT VFR BLD AUTO: 27.5 % (ref 37–47)
HGB BLD-MCNC: 8.6 G/DL (ref 12–16)
LYMPHOCYTES NFR BLD MANUAL: 1.98 X10(3)/MCL (ref 0.6–4.6)
LYMPHOCYTES NFR BLD MANUAL: 71 % (ref 13–40)
MAGNESIUM SERPL-MCNC: 1.8 MG/DL (ref 1.6–2.6)
MCH RBC QN AUTO: 27.7 PG (ref 27–31)
MCHC RBC AUTO-ENTMCNC: 31.3 G/DL (ref 33–36)
MCV RBC AUTO: 88.4 FL (ref 80–94)
NEUTROPHILS NFR BLD MANUAL: 29 % (ref 47–80)
NRBC BLD AUTO-RTO: 0 %
PLATELET # BLD AUTO: 104 X10(3)/MCL (ref 130–400)
PLATELET # BLD EST: ABNORMAL 10*3/UL
PMV BLD AUTO: 10.7 FL (ref 7.4–10.4)
POTASSIUM SERPL-SCNC: 4.1 MMOL/L (ref 3.5–5.1)
PROT 24H UR-MCNC: 65.7 MG/24 H (ref 0–165)
PROT SERPL-MCNC: 8.6 GM/DL (ref 5.8–7.6)
PROT UR STRIP-MCNC: 14.6 MG/DL
RBC # BLD AUTO: 3.11 X10(6)/MCL (ref 4.2–5.4)
SODIUM SERPL-SCNC: 137 MMOL/L (ref 136–145)
TOTAL VOLUME  (OHS): 450 ML
WBC # BLD AUTO: 2.79 X10(3)/MCL (ref 4.5–11.5)

## 2025-07-08 PROCEDURE — 36415 COLL VENOUS BLD VENIPUNCTURE: CPT

## 2025-07-08 PROCEDURE — 82378 CARCINOEMBRYONIC ANTIGEN: CPT

## 2025-07-08 PROCEDURE — 99215 OFFICE O/P EST HI 40 MIN: CPT | Mod: S$PBB,,, | Performed by: NURSE PRACTITIONER

## 2025-07-08 PROCEDURE — 80053 COMPREHEN METABOLIC PANEL: CPT

## 2025-07-08 PROCEDURE — G2211 COMPLEX E/M VISIT ADD ON: HCPCS | Mod: S$PBB,,, | Performed by: NURSE PRACTITIONER

## 2025-07-08 PROCEDURE — 63600175 PHARM REV CODE 636 W HCPCS: Performed by: NURSE PRACTITIONER

## 2025-07-08 PROCEDURE — 84156 ASSAY OF PROTEIN URINE: CPT

## 2025-07-08 PROCEDURE — 25000003 PHARM REV CODE 250: Performed by: NURSE PRACTITIONER

## 2025-07-08 PROCEDURE — A4216 STERILE WATER/SALINE, 10 ML: HCPCS | Performed by: NURSE PRACTITIONER

## 2025-07-08 PROCEDURE — 99999 PR PBB SHADOW E&M-EST. PATIENT-LVL IV: CPT | Mod: PBBFAC,,, | Performed by: NURSE PRACTITIONER

## 2025-07-08 PROCEDURE — 83735 ASSAY OF MAGNESIUM: CPT

## 2025-07-08 PROCEDURE — 96413 CHEMO IV INFUSION 1 HR: CPT

## 2025-07-08 PROCEDURE — 99214 OFFICE O/P EST MOD 30 MIN: CPT | Mod: PBBFAC | Performed by: NURSE PRACTITIONER

## 2025-07-08 PROCEDURE — 85025 COMPLETE CBC W/AUTO DIFF WBC: CPT

## 2025-07-08 RX ORDER — SODIUM CHLORIDE 0.9 % (FLUSH) 0.9 %
10 SYRINGE (ML) INJECTION
Status: DISCONTINUED | OUTPATIENT
Start: 2025-07-08 | End: 2025-07-08 | Stop reason: HOSPADM

## 2025-07-08 RX ORDER — HEPARIN 100 UNIT/ML
500 SYRINGE INTRAVENOUS
Status: DISCONTINUED | OUTPATIENT
Start: 2025-07-08 | End: 2025-07-08 | Stop reason: HOSPADM

## 2025-07-08 RX ORDER — PROCHLORPERAZINE EDISYLATE 5 MG/ML
5 INJECTION INTRAMUSCULAR; INTRAVENOUS ONCE AS NEEDED
Status: DISCONTINUED | OUTPATIENT
Start: 2025-07-08 | End: 2025-07-08 | Stop reason: HOSPADM

## 2025-07-08 RX ADMIN — SODIUM CHLORIDE: 9 INJECTION, SOLUTION INTRAVENOUS at 09:07

## 2025-07-08 RX ADMIN — Medication 10 ML: at 10:07

## 2025-07-08 RX ADMIN — Medication 500 UNITS: at 10:07

## 2025-07-08 RX ADMIN — SODIUM CHLORIDE 250 MG: 9 INJECTION, SOLUTION INTRAVENOUS at 09:07

## 2025-07-08 NOTE — PROGRESS NOTES
Subjective:       Patient ID: Selene Smallwood is a 70 y.o. female.    Chief Complaint:  No chief complaint on file.       Diagnosis:   Stage IV Metastatic Colon Adenocarcinoma with mets to peritoneum  2. Pulmonary Embolism    Current Treatment:   Lonsurf D1-5 and D8-12 + Bevacizumab D1/D15 Q28 day cycle (to start 5/26/2025)  Xarelto 15 mg BID x 21 days then 20 mg daily- 6/25/24-present    Treatment History:   FOLFOX + Cetuximab q2w x 12 cycles- 4/2/24 - 11/6/24  Hold Oxaliplatin for C11 and C12 due to tolerance  FOLFIRI + Priscilla - (1/6/25-3/11/25)  Held after C4 d/t pelvic mass on scans    HPI:   68 yo F presented in March '24 for evaluation of metastatic colon cancer. She initially presented to OSH in early February '24 with 2 days of upper abdominal pain. Imaging on 2/4 in the ER revealed evidence of non perforated acute appendicitis. She underwent Ex lap where a cecal mass was incidentally discovered and resected, as well as omental studding was noted and 1 small mass was taken for biopsy. Her final pathology revealed a large exophytic mass in the cecum measuring 4 x 2 cm extending to the appendiceal orifice and extends into pericolonic adipose tissue. G2, LVI+, PNI negative. 14 LN were resected, with 10 being positive for metastatic adenocarcinoma. She also had an omental mass measuring 2.5 x 1.5cm which was positive for metastatic adenocarcinoma, consistent with a colon primary. Her final pathology staging is consistent with [id number]. Her postop course was complicated by abdominal abscess formation s/p IR drain placement and subsequent removal and antibiotic treatment.      She underwent PET scan in March '24 with evidence of nodular foci within the omentum/peritoneum consistent with peritoneal carcinomatosis. No other evidence of disease. NGS testing was performed which revealed GULSHAN wild type and BRAF negative. Therefore she underwent 1st line therapy of FOLFOX + Cetuximab Q2w x 12 cycles. Despite good  response to treatment after 6 cycles of therapy, once she completed 12 cycles of chemotherapy she had evidence of disease progression to abdominal lymph nodes, peritoneum, and bilateral lungs. She was started on 2nd line FOLFIRI + Bevacizumab, unfortunately she had rapid progression with mucinous abdominal mass growth. She will now proceed with 3rd line Lonsurf + Avastin.     Interval History:  Patient presents to the visit today for follow-up and scheduled bevacizumab infusion.    Health status is stable. Treatment is ongoing, administered every two weeks. The oral treatment regimen consists of Lonsurf two weeks on, followed by two weeks off. Patient just completed the two-week oral medication cycle last Friday, 07/04/2025, and is currently in the first week of the two-week break. The next cycle of oral medication is scheduled to begin on 07/14/2025. Energy levels have slightly improved. Appetite fluctuates with the chemotherapy cycle, but has improved slightly. Weight is stable.  Reports intermittent abdominal pain, managed with Norco as needed every other day. Reports occasional nausea, managed by spitting, and dry mouth, for which rinses are used. Reports dark urine, with a sample provided to the lab for analysis.    Denies vomiting, bleeding, or issues with urination. Stool color is normal brown, not black. Denies significant issues with neuropathy, though reports inconsistent use of gabapentin.    Medications include Norco for pain, Megace for appetite, Xarelto as a blood thinner, and gabapentin for neuropathy. Patient was counseled on the importance of not missing Xarelto doses due to the risk of blood clots associated with Megace.     Past Medical History:   Diagnosis Date    Hypertension       Past Surgical History:   Procedure Laterality Date    APPENDECTOMY N/A 2/5/2024    Procedure: APPENDECTOMY;  Surgeon: Anna Murillo MD;  Location: [facility];  Service: General;  Laterality: N/A;    INSERTION OF  TUNNELED CENTRAL VENOUS CATHETER (CVC) WITH SUBCUTANEOUS PORT N/A 3/26/2024    Procedure: [id number];  Surgeon: Armando Tran MD;  Location: [facility] OR;  Service: Peripheral Vascular;  Laterality: N/A;    LAPAROSCOPIC APPENDECTOMY N/A 2/5/2024    Procedure: APPENDECTOMY, LAPAROSCOPIC;  Surgeon: Anna Murillo MD;  Location: [facility] OR;  Service: General;  Laterality: N/A;    SURGICAL REMOVAL OF ILEUM WITH CECUM N/A 2/5/2024    Procedure: EXCISION, CECUM WITH ILEUM;  Surgeon: Anna Murillo MD;  Location: [facility] OR;  Service: General;  Laterality: N/A;    WASHOUT N/A 2/5/2024    Procedure: WASHOUT;  Surgeon: Anna Murillo MD;  Location: [facility] OR;  Service: General;  Laterality: N/A;     Social History     Socioeconomic History    Marital status:    Tobacco Use    Smoking status: Former     Types: Cigarettes    Smokeless tobacco: Never   Substance and Sexual Activity    Alcohol use: Not Currently    Drug use: Never    Sexual activity: Not Currently     Social Drivers of Health     Financial Resource Strain: Low Risk  (2/15/2024)    Overall Financial Resource Strain (CARDIA)     Difficulty of Paying Living Expenses: Not hard at all   Food Insecurity: No Food Insecurity (2/15/2024)    Hunger Vital Sign     Worried About Running Out of Food in the Last Year: Never true     Ran Out of Food in the Last Year: Never true   Transportation Needs: No Transportation Needs (2/15/2024)    PRAPARE - Transportation     Lack of Transportation (Medical): No     Lack of Transportation (Non-Medical): No   Physical Activity: Unknown (2/15/2024)    Exercise Vital Sign     Days of Exercise per Week: 0 days   Stress: No Stress Concern Present (2/15/2024)    [employer] - Occupational Stress Questionnaire     Feeling of Stress : Not at all   Housing Stability: Low Risk  (2/15/2024)    Housing Stability Vital Sign     Unable to Pay for Housing in the Last Year: No     Number of Places Lived  in the Last Year: 1     Unstable Housing in the Last Year: No      Family History   Family history unknown: Yes      Review of patient's allergies indicates:   Allergen Reactions    Robaxin [methocarbamol] Other (See Comments)     Altered mental status       Review of Systems   Constitutional:  Positive for appetite change, fatigue and unexpected weight change. Negative for activity change and fever.   HENT:  Negative for sore throat.    Eyes:  Negative for visual disturbance.   Respiratory:  Negative for cough and shortness of breath.    Cardiovascular:  Negative for chest pain.   Gastrointestinal:  Negative for abdominal pain, diarrhea and vomiting.   Endocrine: Negative for polyuria.   Genitourinary:  Negative for dysuria and hot flashes.   Integumentary:  Negative for rash.   Allergic/Immunologic: Negative for immunocompromised state.   Neurological:  Negative for weakness and headaches.   Hematological:  Negative for adenopathy.   Psychiatric/Behavioral:  Positive for depressed mood. Negative for confusion.          Objective:      There were no vitals filed for this visit.          Physical Exam  Constitutional:       General: She is not in acute distress.     Appearance: She is cachectic. She is not ill-appearing.   HENT:      Head: Normocephalic and atraumatic.      Nose: Nose normal.      Mouth/Throat:      Mouth: Mucous membranes are moist.      Pharynx: Oropharynx is clear.   Eyes:      Extraocular Movements: Extraocular movements intact.      Conjunctiva/sclera: Conjunctivae normal.      Pupils: Pupils are equal, round, and reactive to light.   Cardiovascular:      Rate and Rhythm: Normal rate and regular rhythm.      Pulses: Normal pulses.      Heart sounds: Normal heart sounds. No murmur heard.  Pulmonary:      Effort: Pulmonary effort is normal. No respiratory distress.      Breath sounds: Normal breath sounds.   Abdominal:      General: There is no distension.      Palpations: Abdomen is soft.       Tenderness: There is no abdominal tenderness.   Musculoskeletal:         General: Normal range of motion.      Cervical back: Normal range of motion and neck supple.      Right lower leg: No edema.      Left lower leg: No edema.   Feet:      Left foot:      Skin integrity: No blister.   Lymphadenopathy:      Cervical: No cervical adenopathy.   Skin:     General: Skin is warm and dry.   Neurological:      General: No focal deficit present.      Mental Status: She is alert and oriented to person, place, and time.       LABS AND IMAGING REVIEWED IN EPIC  3/21/2025 PET/CT:  1. Stable size of bilateral lungs numerous small randomly scattered nodules.  These are non hypermetabolic but not adequately resolved on the PET scan due to the small size.  These are concerning to be metastatic  2.  Stable aortocaval small size lymph node with interval improvement of hypermetabolism.  3. Interval substantial size of pelvic cystic structure without internal and peripheral hypermetabolism.  This likely is arising from the left ovary and is of indeterminate significance.  Please further assess with ultrasound exam or MRI of the pelvis.  4.  Cul-de-sac/posterior uterine segment hypermetabolism with interval mild improvement.  4/2/25 MRI Pelvis:  Findings are concerning for epithelial type neoplasm with enhancing frondlike appearance in the left aspect which correlates with the hypermetabolic lesion on recent PET/CT scan. Other secondary findings as above including myomatous changes with degenerative fibroids.     PATHOLOGY:  2/5/24 Biopsy:  1. Appendix, appendectomy:  - Acute appendicitis with perforation.  - Adenocarcinoma involves lymphatic spaces of the appendiceal wall and peritoneal surface.  2. Cecum, ileocecectomy:  - Colonic adenocarcinoma.  - See synoptic checklist for CAP cancer protocol.  3. Omental mass, biopsy:  - Metastatic adenocarcinoma, consistent with a colon primary.  4. Small bowel, segmental resection:  - Acute  peritonitis.  - No evidence of malignancy.    MLH1: PRESERVED (INTACT) EXPRESSION IN TUMOR CELLS   MSH2: PRESERVED (INTACT) EXPRESSION IN TUMOR CELLS   MSH6: PRESERVED (INTACT) EXPRESSION IN TUMOR CELLS   PMS2: PRESERVED (INTACT) EXPRESSION IN TUMOR CELLS  Microscopic Description/Comments   These results show no deficiency of the mismatch repair proteins tested.    5/6/25 PELVIC MASS, FINE NEEDLE ASPIRATION:    - MUCINOUS LESION.    Comment   The differential diagnosis is broad and includes appendiceal mucocele, malignant   colorectal tumors, and intestinal type neoplasms of other organs (ovary, etc.).   Given the degree of cytologic atypia, a malignant process should be considered.   Clinical and radiological correlation is necessary.      Note:  The specimen is remarkably paucicellular and consists predominantly of   mucin.  There is insufficient cellularity for any additional ancillary studies.   Assessment:   Stage IV Metastatic Colon Adenocarcinoma with mets to peritoneum - Cecal mass, nonobstructive, no perforation. [id number]. Treatment history as above. Now proceeding with 3rd line Lonsurf + Priscilla.   Pulmonary Embolus at the right mainstem bronchus   Incidentally found during CT CAP 6/25/24. Now on xarelto, continue at this time.   Peripheral Neuropathy   She does report peripheral neuropathy to her fingertips and lower legs.  Her hand neuropathy is grade 2, consistent but not painful. Able to button buttons.   Her LE bilateral neuropathy is mainly at night she gets shooting pains when she lays down.   Stable today on gabapentin to 300mg TID - 1 in AM, 2 in PM takes prn.  Immunodeficiency due to Drug Therapy - Precautions discussed with patient. Continue to monitor for any signs of symptoms of infection. No prophylaxis needed at this time. Current ANC 1350.  Plan:   - Proceed with scheduled for bevacizumab infusion today.  She will proceed with treatment  - Recheck labs in one week due to anemia and  leukopenia. An order will be placed in the system for the patient to complete at a convenient lab location next Tuesday.  - Continue oral Lonsurf as scheduled, starting the next cycle ( #3) on 07/14/2025.  - Patient counseled on the importance of hand hygiene and avoiding sick contacts due to low white blood cell count.  - Patient advised to call immediately for fever or any signs of infection.  - Follow-up in the office in two weeks.  - No obvious sings of bleeding. Will recommend PRBC for hgb <7 if continues to be asymptomatic  -There is minimal Thrombocytopenia but Ptl remains above 100k      Patient instructions and visit orders.       -Follow-up:  F/U with NP- July 22, 2025  -Labs:  CBC, CMP, CEA, magnesium, urine for protein.-July 22, 2025  CBC-  7/15/2025 ( Can be done here or at [facility])  -Imaging:    -Other orders:  Treatment-bevacizumab cycle 3 day 1-July 22, 2025  Treatment-bevacizumab cycle 3 day 15-August 5, 2025    40 were spent on this encounter     code applied: Patient requires or will require continuous, longitudinal, and active collaborative plan of care related to this patient's health condition, Stage IV Metastatic Colon Adenocarcinoma with mets to peritoneum- the management of which requires the direction of a practitioner with specialized clinical knowledge, skill, and expertise.     Tate Katz MSN, FNP-BC, AOCNP  Department of Hematology/Oncology.

## 2025-07-14 ENCOUNTER — PATIENT MESSAGE (OUTPATIENT)
Facility: CLINIC | Age: 70
End: 2025-07-14
Payer: MEDICARE

## 2025-07-15 DIAGNOSIS — R53.81 PHYSICAL DECONDITIONING: ICD-10-CM

## 2025-07-15 DIAGNOSIS — C78.6 MALIGNANT NEOPLASM METASTATIC TO PERITONEUM: Primary | ICD-10-CM

## 2025-07-16 ENCOUNTER — LAB VISIT (OUTPATIENT)
Dept: LAB | Facility: HOSPITAL | Age: 70
End: 2025-07-16
Attending: NURSE PRACTITIONER
Payer: MEDICARE

## 2025-07-16 ENCOUNTER — PATIENT MESSAGE (OUTPATIENT)
Facility: CLINIC | Age: 70
End: 2025-07-16
Payer: MEDICARE

## 2025-07-16 DIAGNOSIS — C18.2 MALIGNANT NEOPLASM OF ASCENDING COLON: ICD-10-CM

## 2025-07-16 DIAGNOSIS — C78.6 MALIGNANT NEOPLASM METASTATIC TO PERITONEUM: ICD-10-CM

## 2025-07-16 LAB
ABS NEUT (OLG): 0.1 X10(3)/MCL (ref 2.1–9.2)
ANISOCYTOSIS BLD QL SMEAR: ABNORMAL
ERYTHROCYTE [DISTWIDTH] IN BLOOD BY AUTOMATED COUNT: 23.3 % (ref 11.5–17)
HCT VFR BLD AUTO: 25.9 % (ref 37–47)
HGB BLD-MCNC: 8.1 G/DL (ref 12–16)
INSTRUMENT WBC (OLG): 2.01 X10(3)/MCL
LYMPHOCYTES NFR BLD MANUAL: 1.81 X10(3)/MCL (ref 0.6–4.6)
LYMPHOCYTES NFR BLD MANUAL: 90 %
MACROCYTES BLD QL SMEAR: ABNORMAL
MCH RBC QN AUTO: 28 PG (ref 27–31)
MCHC RBC AUTO-ENTMCNC: 31.3 G/DL (ref 33–36)
MCV RBC AUTO: 89.6 FL (ref 80–94)
MONOCYTES NFR BLD MANUAL: 0.1 X10(3)/MCL (ref 0.1–1.3)
MONOCYTES NFR BLD MANUAL: 5 %
NEUTROPHILS NFR BLD MANUAL: 5 %
PLATELET # BLD AUTO: 178 X10(3)/MCL (ref 130–400)
PLATELET # BLD EST: NORMAL 10*3/UL
PMV BLD AUTO: 11.1 FL (ref 7.4–10.4)
RBC # BLD AUTO: 2.89 X10(6)/MCL (ref 4.2–5.4)
RBC MORPH BLD: ABNORMAL
WBC # BLD AUTO: 2.01 X10(3)/MCL (ref 4.5–11.5)

## 2025-07-16 PROCEDURE — 85025 COMPLETE CBC W/AUTO DIFF WBC: CPT

## 2025-07-16 PROCEDURE — 36415 COLL VENOUS BLD VENIPUNCTURE: CPT

## 2025-07-17 DIAGNOSIS — R53.81 PHYSICAL DECONDITIONING: Primary | ICD-10-CM

## 2025-07-17 DIAGNOSIS — C78.6 MALIGNANT NEOPLASM METASTATIC TO PERITONEUM: ICD-10-CM

## 2025-07-17 DIAGNOSIS — G89.3 CANCER RELATED PAIN: ICD-10-CM

## 2025-07-17 DIAGNOSIS — C18.2 MALIGNANT NEOPLASM OF ASCENDING COLON: ICD-10-CM

## 2025-07-17 RX ORDER — HYDROCODONE BITARTRATE AND ACETAMINOPHEN 5; 325 MG/1; MG/1
1 TABLET ORAL EVERY 6 HOURS PRN
Qty: 30 TABLET | Refills: 0 | Status: SHIPPED | OUTPATIENT
Start: 2025-07-17

## 2025-07-22 ENCOUNTER — OFFICE VISIT (OUTPATIENT)
Facility: CLINIC | Age: 70
End: 2025-07-22
Payer: MEDICARE

## 2025-07-22 ENCOUNTER — LAB VISIT (OUTPATIENT)
Dept: LAB | Facility: HOSPITAL | Age: 70
End: 2025-07-22
Attending: NURSE PRACTITIONER
Payer: MEDICARE

## 2025-07-22 VITALS
BODY MASS INDEX: 18.41 KG/M2 | SYSTOLIC BLOOD PRESSURE: 116 MMHG | HEART RATE: 50 BPM | DIASTOLIC BLOOD PRESSURE: 80 MMHG | TEMPERATURE: 98 F | WEIGHT: 110.5 LBS | RESPIRATION RATE: 18 BRPM | OXYGEN SATURATION: 100 % | HEIGHT: 65 IN

## 2025-07-22 DIAGNOSIS — C78.6 MALIGNANT NEOPLASM METASTATIC TO PERITONEUM: ICD-10-CM

## 2025-07-22 DIAGNOSIS — C18.2 MALIGNANT NEOPLASM OF ASCENDING COLON: ICD-10-CM

## 2025-07-22 LAB
ALBUMIN SERPL-MCNC: 2.2 G/DL (ref 3.4–4.8)
ALBUMIN/GLOB SERPL: 0.4 RATIO (ref 1.1–2)
ALP SERPL-CCNC: 97 UNIT/L (ref 40–150)
ALT SERPL-CCNC: 8 UNIT/L (ref 0–55)
ANION GAP SERPL CALC-SCNC: 4 MEQ/L
AST SERPL-CCNC: 15 UNIT/L (ref 11–45)
BASOPHILS # BLD AUTO: 0.01 X10(3)/MCL
BASOPHILS NFR BLD AUTO: 0.3 %
BILIRUB SERPL-MCNC: 0.2 MG/DL
BUN SERPL-MCNC: 9 MG/DL (ref 9.8–20.1)
CALCIUM SERPL-MCNC: 8.7 MG/DL (ref 8.4–10.2)
CEA SERPL-MCNC: 2143.01 NG/ML (ref 0–3)
CHLORIDE SERPL-SCNC: 112 MMOL/L (ref 98–107)
CO2 SERPL-SCNC: 23 MMOL/L (ref 23–31)
CREAT SERPL-MCNC: 0.7 MG/DL (ref 0.55–1.02)
CREAT/UREA NIT SERPL: 13
EOSINOPHIL # BLD AUTO: 0 X10(3)/MCL (ref 0–0.9)
EOSINOPHIL NFR BLD AUTO: 0 %
ERYTHROCYTE [DISTWIDTH] IN BLOOD BY AUTOMATED COUNT: 27.1 % (ref 11.5–17)
GFR SERPLBLD CREATININE-BSD FMLA CKD-EPI: >60 ML/MIN/1.73/M2
GLOBULIN SER-MCNC: 6.2 GM/DL (ref 2.4–3.5)
GLUCOSE SERPL-MCNC: 81 MG/DL (ref 82–115)
HCT VFR BLD AUTO: 27.2 % (ref 37–47)
HGB BLD-MCNC: 8.6 G/DL (ref 12–16)
IMM GRANULOCYTES # BLD AUTO: 0.01 X10(3)/MCL (ref 0–0.04)
IMM GRANULOCYTES NFR BLD AUTO: 0.3 %
LYMPHOCYTES # BLD AUTO: 2.07 X10(3)/MCL (ref 0.6–4.6)
LYMPHOCYTES NFR BLD AUTO: 57 %
MAGNESIUM SERPL-MCNC: 1.9 MG/DL (ref 1.6–2.6)
MCH RBC QN AUTO: 29.2 PG (ref 27–31)
MCHC RBC AUTO-ENTMCNC: 31.6 G/DL (ref 33–36)
MCV RBC AUTO: 92.2 FL (ref 80–94)
MONOCYTES # BLD AUTO: 0.75 X10(3)/MCL (ref 0.1–1.3)
MONOCYTES NFR BLD AUTO: 20.7 %
NEUTROPHILS # BLD AUTO: 0.79 X10(3)/MCL (ref 2.1–9.2)
NEUTROPHILS NFR BLD AUTO: 21.7 %
NRBC BLD AUTO-RTO: 0 %
PLATELET # BLD AUTO: 296 X10(3)/MCL (ref 130–400)
PMV BLD AUTO: 9.7 FL (ref 7.4–10.4)
POTASSIUM SERPL-SCNC: 4.1 MMOL/L (ref 3.5–5.1)
PROT SERPL-MCNC: 8.4 GM/DL (ref 5.8–7.6)
RBC # BLD AUTO: 2.95 X10(6)/MCL (ref 4.2–5.4)
SODIUM SERPL-SCNC: 139 MMOL/L (ref 136–145)
WBC # BLD AUTO: 3.63 X10(3)/MCL (ref 4.5–11.5)

## 2025-07-22 PROCEDURE — 80053 COMPREHEN METABOLIC PANEL: CPT

## 2025-07-22 PROCEDURE — 83735 ASSAY OF MAGNESIUM: CPT

## 2025-07-22 PROCEDURE — G2211 COMPLEX E/M VISIT ADD ON: HCPCS | Mod: ,,, | Performed by: NURSE PRACTITIONER

## 2025-07-22 PROCEDURE — 82378 CARCINOEMBRYONIC ANTIGEN: CPT

## 2025-07-22 PROCEDURE — 99999 PR PBB SHADOW E&M-EST. PATIENT-LVL IV: CPT | Mod: PBBFAC,,, | Performed by: NURSE PRACTITIONER

## 2025-07-22 PROCEDURE — 99214 OFFICE O/P EST MOD 30 MIN: CPT | Mod: PBBFAC | Performed by: NURSE PRACTITIONER

## 2025-07-22 PROCEDURE — 36415 COLL VENOUS BLD VENIPUNCTURE: CPT

## 2025-07-22 PROCEDURE — 99215 OFFICE O/P EST HI 40 MIN: CPT | Mod: S$PBB,,, | Performed by: NURSE PRACTITIONER

## 2025-07-22 PROCEDURE — 85025 COMPLETE CBC W/AUTO DIFF WBC: CPT

## 2025-07-22 NOTE — PROGRESS NOTES
Subjective:       Patient ID: Selene Smallwodo is a 70 y.o. female.    Chief Complaint:  Colon Cancer (No complaints. )       Diagnosis:   Stage IV Metastatic Colon Adenocarcinoma with mets to peritoneum  2. Pulmonary Embolism    Current Treatment:   Lonsurf D1-5 and D8-12 + Bevacizumab D1/D15 Q28 day cycle (to start 5/26/2025)  Xarelto 15 mg BID x 21 days then 20 mg daily- 6/25/24-present    Treatment History:   FOLFOX + Cetuximab q2w x 12 cycles- 4/2/24 - 11/6/24  Hold Oxaliplatin for C11 and C12 due to tolerance  FOLFIRI + Priscilla - (1/6/25-3/11/25)  Held after C4 d/t pelvic mass on scans    HPI:   68 yo F presented in March '24 for evaluation of metastatic colon cancer. She initially presented to OSH in early February '24 with 2 days of upper abdominal pain. Imaging on 2/4 in the ER revealed evidence of non perforated acute appendicitis. She underwent Ex lap where a cecal mass was incidentally discovered and resected, as well as omental studding was noted and 1 small mass was taken for biopsy. Her final pathology revealed a large exophytic mass in the cecum measuring 4 x 2 cm extending to the appendiceal orifice and extends into pericolonic adipose tissue. G2, LVI+, PNI negative. 14 LN were resected, with 10 being positive for metastatic adenocarcinoma. She also had an omental mass measuring 2.5 x 1.5cm which was positive for metastatic adenocarcinoma, consistent with a colon primary. Her final pathology staging is consistent with [id number]. Her postop course was complicated by abdominal abscess formation s/p IR drain placement and subsequent removal and antibiotic treatment.      She underwent PET scan in March '24 with evidence of nodular foci within the omentum/peritoneum consistent with peritoneal carcinomatosis. No other evidence of disease. NGS testing was performed which revealed GULSHAN wild type and BRAF negative. Therefore she underwent 1st line therapy of FOLFOX + Cetuximab Q2w x 12 cycles. Despite good  response to treatment after 6 cycles of therapy, once she completed 12 cycles of chemotherapy she had evidence of disease progression to abdominal lymph nodes, peritoneum, and bilateral lungs. She was started on 2nd line FOLFIRI + Bevacizumab, unfortunately she had rapid progression with mucinous abdominal mass growth. She will now proceed with 3rd line Lonsurf + Avastin.     Interval History:  Patient presents to the visit today for a scheduled chemotherapy cycle.    Reports intermittent fatigue. Appetite is noted to be improving slightly. Treatment is working as the tumor marker has decreased from 2,400 to 2,028. Overall, well-tolerated.    Reports fatigue that comes and goes.    Denies bleeding, shortness of breath, cough, nausea, vomiting, diarrhea, constipation, fever, drenching night sweats, or chills. Denies blood in stool or burning with urination.    Current medications include chemotherapy infusion and oral chemotherapy pills.    Past Medical History:   Diagnosis Date    Hypertension       Past Surgical History:   Procedure Laterality Date    APPENDECTOMY N/A 2/5/2024    Procedure: APPENDECTOMY;  Surgeon: Anna Murillo MD;  Location: [facility];  Service: General;  Laterality: N/A;    INSERTION OF TUNNELED CENTRAL VENOUS CATHETER (CVC) WITH SUBCUTANEOUS PORT N/A 3/26/2024    Procedure: [id number];  Surgeon: Armando Tran MD;  Location: [facility] OR;  Service: Peripheral Vascular;  Laterality: N/A;    LAPAROSCOPIC APPENDECTOMY N/A 2/5/2024    Procedure: APPENDECTOMY, LAPAROSCOPIC;  Surgeon: Anna Murillo MD;  Location: [facility] OR;  Service: General;  Laterality: N/A;    SURGICAL REMOVAL OF ILEUM WITH CECUM N/A 2/5/2024    Procedure: EXCISION, CECUM WITH ILEUM;  Surgeon: Anna Murillo MD;  Location: [facility] OR;  Service: General;  Laterality: N/A;    WASHOUT N/A 2/5/2024    Procedure: WASHOUT;  Surgeon: Anna Murillo MD;  Location: [facility] OR;  Service: General;  Laterality: N/A;     Social  History     Socioeconomic History    Marital status:    Tobacco Use    Smoking status: Former     Types: Cigarettes    Smokeless tobacco: Never   Substance and Sexual Activity    Alcohol use: Not Currently    Drug use: Never    Sexual activity: Not Currently     Social Drivers of Health     Financial Resource Strain: Low Risk  (2/15/2024)    Overall Financial Resource Strain (CARDIA)     Difficulty of Paying Living Expenses: Not hard at all   Food Insecurity: No Food Insecurity (2/15/2024)    Hunger Vital Sign     Worried About Running Out of Food in the Last Year: Never true     Ran Out of Food in the Last Year: Never true   Transportation Needs: No Transportation Needs (2/15/2024)    PRAPARE - Transportation     Lack of Transportation (Medical): No     Lack of Transportation (Non-Medical): No   Physical Activity: Unknown (2/15/2024)    Exercise Vital Sign     Days of Exercise per Week: 0 days   Stress: No Stress Concern Present (2/15/2024)    [employer] - Occupational Stress Questionnaire     Feeling of Stress : Not at all   Housing Stability: Low Risk  (2/15/2024)    Housing Stability Vital Sign     Unable to Pay for Housing in the Last Year: No     Number of Places Lived in the Last Year: 1     Unstable Housing in the Last Year: No      Family History   Family history unknown: Yes      Review of patient's allergies indicates:   Allergen Reactions    Robaxin [methocarbamol] Other (See Comments)     Altered mental status       Review of Systems   Constitutional:  Positive for appetite change, fatigue and unexpected weight change. Negative for activity change and fever.   HENT:  Negative for sore throat.    Eyes:  Negative for visual disturbance.   Respiratory:  Negative for cough and shortness of breath.    Cardiovascular:  Negative for chest pain.   Gastrointestinal:  Negative for abdominal pain, diarrhea and vomiting.   Endocrine: Negative for polyuria.   Genitourinary:   Negative for dysuria and hot flashes.   Integumentary:  Negative for rash.   Allergic/Immunologic: Negative for immunocompromised state.   Neurological:  Negative for weakness and headaches.   Hematological:  Negative for adenopathy.   Psychiatric/Behavioral:  Positive for depressed mood. Negative for confusion.          Objective:      Vitals:    07/22/25 0925   BP: 116/80   Pulse: (!) 50   Resp: 18   Temp: 97.6 °F (36.4 °C)             Physical Exam  Constitutional:       General: She is not in acute distress.     Appearance: She is cachectic. She is not ill-appearing.   HENT:      Head: Normocephalic and atraumatic.      Nose: Nose normal.      Mouth/Throat:      Mouth: Mucous membranes are moist.      Pharynx: Oropharynx is clear.   Eyes:      Extraocular Movements: Extraocular movements intact.      Conjunctiva/sclera: Conjunctivae normal.      Pupils: Pupils are equal, round, and reactive to light.   Cardiovascular:      Rate and Rhythm: Normal rate and regular rhythm.      Pulses: Normal pulses.      Heart sounds: Normal heart sounds. No murmur heard.  Pulmonary:      Effort: Pulmonary effort is normal. No respiratory distress.      Breath sounds: Normal breath sounds.   Abdominal:      General: There is no distension.      Palpations: Abdomen is soft.      Tenderness: There is no abdominal tenderness.   Musculoskeletal:         General: Normal range of motion.      Cervical back: Normal range of motion and neck supple.      Right lower leg: No edema.      Left lower leg: No edema.   Feet:      Left foot:      Skin integrity: No blister.   Lymphadenopathy:      Cervical: No cervical adenopathy.   Skin:     General: Skin is warm and dry.   Neurological:      General: No focal deficit present.      Mental Status: She is alert and oriented to person, place, and time.       LABS AND IMAGING REVIEWED IN EPIC  3/21/2025 PET/CT:  1. Stable size of bilateral lungs numerous small randomly scattered nodules.  These are  non hypermetabolic but not adequately resolved on the PET scan due to the small size.  These are concerning to be metastatic  2.  Stable aortocaval small size lymph node with interval improvement of hypermetabolism.  3. Interval substantial size of pelvic cystic structure without internal and peripheral hypermetabolism.  This likely is arising from the left ovary and is of indeterminate significance.  Please further assess with ultrasound exam or MRI of the pelvis.  4.  Cul-de-sac/posterior uterine segment hypermetabolism with interval mild improvement.  4/2/25 MRI Pelvis:  Findings are concerning for epithelial type neoplasm with enhancing frondlike appearance in the left aspect which correlates with the hypermetabolic lesion on recent PET/CT scan. Other secondary findings as above including myomatous changes with degenerative fibroids.     PATHOLOGY:  2/5/24 Biopsy:  1. Appendix, appendectomy:  - Acute appendicitis with perforation.  - Adenocarcinoma involves lymphatic spaces of the appendiceal wall and peritoneal surface.  2. Cecum, ileocecectomy:  - Colonic adenocarcinoma.  - See synoptic checklist for CAP cancer protocol.  3. Omental mass, biopsy:  - Metastatic adenocarcinoma, consistent with a colon primary.  4. Small bowel, segmental resection:  - Acute peritonitis.  - No evidence of malignancy.    MLH1: PRESERVED (INTACT) EXPRESSION IN TUMOR CELLS   MSH2: PRESERVED (INTACT) EXPRESSION IN TUMOR CELLS   MSH6: PRESERVED (INTACT) EXPRESSION IN TUMOR CELLS   PMS2: PRESERVED (INTACT) EXPRESSION IN TUMOR CELLS  Microscopic Description/Comments   These results show no deficiency of the mismatch repair proteins tested.    5/6/25 PELVIC MASS, FINE NEEDLE ASPIRATION:    - MUCINOUS LESION.    Comment   The differential diagnosis is broad and includes appendiceal mucocele, malignant   colorectal tumors, and intestinal type neoplasms of other organs (ovary, etc.).   Given the degree of cytologic atypia, a malignant  process should be considered.   Clinical and radiological correlation is necessary.      Note:  The specimen is remarkably paucicellular and consists predominantly of   mucin.  There is insufficient cellularity for any additional ancillary studies.   Assessment:   Stage IV Metastatic Colon Adenocarcinoma with mets to peritoneum - Cecal mass, nonobstructive, no perforation. [id number]. Treatment history as above. Now proceeding with 3rd line Lonsurf + Priscilla.   Pulmonary Embolus at the right mainstem bronchus   Incidentally found during CT CAP 6/25/24. Now on xarelto, continue at this time.   Peripheral Neuropathy   She does report peripheral neuropathy to her fingertips and lower legs.  Her hand neuropathy is grade 2, consistent but not painful. Able to button buttons.   Her LE bilateral neuropathy is mainly at night she gets shooting pains when she lays down.   Stable today on gabapentin to 300mg TID - 1 in AM, 2 in PM takes prn.  Immunodeficiency due to Drug Therapy - Precautions discussed with patient. Continue to monitor for any signs of symptoms of infection. No prophylaxis needed at this time. Current ANC 1350.  Plan:   - Reviewed recent laboratory results. Hemoglobin is stable. Platelets are normal. Kidney and liver function are normal.  - Neutrophil count is low at 0.79.  - Discussed neutropenic precautions including frequent hand washing, avoiding sick contacts, ensuring all food is well-cooked, avoiding raw foods like sushi or salads, daily showers, and avoiding gardening.  - Patient was advised to report any signs of infection, such as fever, chills, drenching night sweats, blood in stool, or burning with urination.  - Discussed the importance of caloric intake.   - Reviewed medication administration for the next cycle.  - Confirmed that home rehab services have been initiated.  - Hold chemotherapy infusion and oral pills for one week due to neutropenia. ( Discussed with and approved by Dr. Lejeune)  -  Patient to return in one week for repeat blood count to assess for treatment appropriateness.  - Will proceed with the next cycle if the neutrophil count is >1.0.      Patient instructions and visit orders.       -Follow-up:  F/U with NP- July 29, 2025  -Labs:  CBC, CMP, CEA, magnesium, urine for protein.-July 29, 2025    -Imaging:    -Other orders:  Treatment-bevacizumab cycle 3 day 1-July 29, 2025  Treatment-bevacizumab cycle 3 day 15-August 12, 2025    44 were spent on this encounter     code applied: Patient requires or will require continuous, longitudinal, and active collaborative plan of care related to this patient's health condition, Stage IV Metastatic Colon Adenocarcinoma with mets to peritoneum- the management of which requires the direction of a practitioner with specialized clinical knowledge, skill, and expertise.     Tate Katz MSN, FNP-BC, AOCNP  Department of Hematology/Oncology.

## 2025-07-23 DIAGNOSIS — C18.2 MALIGNANT NEOPLASM OF ASCENDING COLON: Primary | ICD-10-CM

## 2025-07-29 ENCOUNTER — INFUSION (OUTPATIENT)
Facility: HOSPITAL | Age: 70
End: 2025-07-29
Payer: MEDICARE

## 2025-07-29 ENCOUNTER — OFFICE VISIT (OUTPATIENT)
Facility: CLINIC | Age: 70
End: 2025-07-29
Payer: MEDICARE

## 2025-07-29 ENCOUNTER — LAB VISIT (OUTPATIENT)
Dept: LAB | Facility: HOSPITAL | Age: 70
End: 2025-07-29
Payer: MEDICARE

## 2025-07-29 VITALS
WEIGHT: 110.5 LBS | SYSTOLIC BLOOD PRESSURE: 130 MMHG | HEIGHT: 65 IN | RESPIRATION RATE: 16 BRPM | OXYGEN SATURATION: 97 % | BODY MASS INDEX: 18.41 KG/M2 | DIASTOLIC BLOOD PRESSURE: 84 MMHG | TEMPERATURE: 98 F | HEART RATE: 105 BPM

## 2025-07-29 VITALS
RESPIRATION RATE: 16 BRPM | DIASTOLIC BLOOD PRESSURE: 84 MMHG | TEMPERATURE: 98 F | HEIGHT: 65 IN | WEIGHT: 110.25 LBS | SYSTOLIC BLOOD PRESSURE: 130 MMHG | HEART RATE: 105 BPM | BODY MASS INDEX: 18.37 KG/M2 | OXYGEN SATURATION: 97 %

## 2025-07-29 DIAGNOSIS — C78.6 MALIGNANT NEOPLASM METASTATIC TO PERITONEUM: ICD-10-CM

## 2025-07-29 DIAGNOSIS — C18.2 MALIGNANT NEOPLASM OF ASCENDING COLON: Primary | ICD-10-CM

## 2025-07-29 DIAGNOSIS — C18.2 MALIGNANT NEOPLASM OF ASCENDING COLON: ICD-10-CM

## 2025-07-29 LAB
ALBUMIN SERPL-MCNC: 2.2 G/DL (ref 3.4–4.8)
ALBUMIN/GLOB SERPL: 0.4 RATIO (ref 1.1–2)
ALP SERPL-CCNC: 85 UNIT/L (ref 40–150)
ALT SERPL-CCNC: 7 UNIT/L (ref 0–55)
ANION GAP SERPL CALC-SCNC: 4 MEQ/L
ANISOCYTOSIS BLD QL SMEAR: ABNORMAL
AST SERPL-CCNC: 17 UNIT/L (ref 11–45)
BACTERIA #/AREA URNS AUTO: ABNORMAL /HPF
BASOPHILS # BLD AUTO: 0.03 X10(3)/MCL
BASOPHILS NFR BLD AUTO: 0.6 %
BILIRUB SERPL-MCNC: 0.2 MG/DL
BILIRUB UR QL STRIP.AUTO: NEGATIVE
BUN SERPL-MCNC: 8 MG/DL (ref 9.8–20.1)
CALCIUM SERPL-MCNC: 8.5 MG/DL (ref 8.4–10.2)
CEA SERPL-MCNC: 2422.3 NG/ML (ref 0–3)
CHLORIDE SERPL-SCNC: 114 MMOL/L (ref 98–107)
CLARITY UR: CLEAR
CO2 SERPL-SCNC: 23 MMOL/L (ref 23–31)
COLOR UR AUTO: YELLOW
CREAT SERPL-MCNC: 0.67 MG/DL (ref 0.55–1.02)
CREAT/UREA NIT SERPL: 12
EOSINOPHIL # BLD AUTO: 0 X10(3)/MCL (ref 0–0.9)
EOSINOPHIL NFR BLD AUTO: 0 %
ERYTHROCYTE [DISTWIDTH] IN BLOOD BY AUTOMATED COUNT: 29.8 % (ref 11.5–17)
GFR SERPLBLD CREATININE-BSD FMLA CKD-EPI: >60 ML/MIN/1.73/M2
GLOBULIN SER-MCNC: 5.9 GM/DL (ref 2.4–3.5)
GLUCOSE SERPL-MCNC: 90 MG/DL (ref 82–115)
GLUCOSE UR QL STRIP: NEGATIVE
HCT VFR BLD AUTO: 25.5 % (ref 37–47)
HGB BLD-MCNC: 8 G/DL (ref 12–16)
HGB UR QL STRIP: ABNORMAL
IMM GRANULOCYTES # BLD AUTO: 0.01 X10(3)/MCL (ref 0–0.04)
IMM GRANULOCYTES NFR BLD AUTO: 0.2 %
KETONES UR QL STRIP: ABNORMAL
LEUKOCYTE ESTERASE UR QL STRIP: ABNORMAL
LYMPHOCYTES # BLD AUTO: 2.01 X10(3)/MCL (ref 0.6–4.6)
LYMPHOCYTES NFR BLD AUTO: 40.9 %
MAGNESIUM SERPL-MCNC: 1.8 MG/DL (ref 1.6–2.6)
MCH RBC QN AUTO: 30.1 PG (ref 27–31)
MCHC RBC AUTO-ENTMCNC: 31.4 G/DL (ref 33–36)
MCV RBC AUTO: 95.9 FL (ref 80–94)
MONOCYTES # BLD AUTO: 0.44 X10(3)/MCL (ref 0.1–1.3)
MONOCYTES NFR BLD AUTO: 9 %
MUCOUS THREADS URNS QL MICRO: ABNORMAL /LPF
NEUTROPHILS # BLD AUTO: 2.42 X10(3)/MCL (ref 2.1–9.2)
NEUTROPHILS NFR BLD AUTO: 49.3 %
NITRITE UR QL STRIP: NEGATIVE
NRBC BLD AUTO-RTO: 0 %
PH UR STRIP: 6 [PH]
PLATELET # BLD AUTO: 362 X10(3)/MCL (ref 130–400)
PLATELET # BLD EST: ADEQUATE 10*3/UL
PMV BLD AUTO: 9.5 FL (ref 7.4–10.4)
POTASSIUM SERPL-SCNC: 4.1 MMOL/L (ref 3.5–5.1)
PROT SERPL-MCNC: 8.1 GM/DL (ref 5.8–7.6)
PROT UR QL STRIP: ABNORMAL
PROT UR STRIP-MCNC: 26.5 MG/DL
RBC # BLD AUTO: 2.66 X10(6)/MCL (ref 4.2–5.4)
RBC #/AREA URNS AUTO: ABNORMAL /HPF
RBC MORPH BLD: ABNORMAL
SODIUM SERPL-SCNC: 141 MMOL/L (ref 136–145)
SP GR UR STRIP.AUTO: 1.02 (ref 1–1.03)
SQUAMOUS #/AREA URNS AUTO: ABNORMAL /HPF
UROBILINOGEN UR STRIP-ACNC: 0.2
WBC # BLD AUTO: 4.91 X10(3)/MCL (ref 4.5–11.5)
WBC #/AREA URNS AUTO: ABNORMAL /HPF

## 2025-07-29 PROCEDURE — 82378 CARCINOEMBRYONIC ANTIGEN: CPT

## 2025-07-29 PROCEDURE — 84156 ASSAY OF PROTEIN URINE: CPT

## 2025-07-29 PROCEDURE — 80053 COMPREHEN METABOLIC PANEL: CPT

## 2025-07-29 PROCEDURE — A4216 STERILE WATER/SALINE, 10 ML: HCPCS | Performed by: NURSE PRACTITIONER

## 2025-07-29 PROCEDURE — 85025 COMPLETE CBC W/AUTO DIFF WBC: CPT

## 2025-07-29 PROCEDURE — 96413 CHEMO IV INFUSION 1 HR: CPT

## 2025-07-29 PROCEDURE — 63600175 PHARM REV CODE 636 W HCPCS: Mod: TB | Performed by: NURSE PRACTITIONER

## 2025-07-29 PROCEDURE — 83735 ASSAY OF MAGNESIUM: CPT

## 2025-07-29 PROCEDURE — 81003 URINALYSIS AUTO W/O SCOPE: CPT

## 2025-07-29 PROCEDURE — 99999 PR PBB SHADOW E&M-EST. PATIENT-LVL IV: CPT | Mod: PBBFAC,,, | Performed by: NURSE PRACTITIONER

## 2025-07-29 PROCEDURE — 25000003 PHARM REV CODE 250: Performed by: NURSE PRACTITIONER

## 2025-07-29 PROCEDURE — 36415 COLL VENOUS BLD VENIPUNCTURE: CPT

## 2025-07-29 PROCEDURE — 99214 OFFICE O/P EST MOD 30 MIN: CPT | Mod: PBBFAC,25 | Performed by: NURSE PRACTITIONER

## 2025-07-29 RX ORDER — SODIUM CHLORIDE 0.9 % (FLUSH) 0.9 %
10 SYRINGE (ML) INJECTION
Status: CANCELLED | OUTPATIENT
Start: 2025-08-05

## 2025-07-29 RX ORDER — HEPARIN 100 UNIT/ML
500 SYRINGE INTRAVENOUS
OUTPATIENT
Start: 2025-08-13

## 2025-07-29 RX ORDER — DIPHENHYDRAMINE HYDROCHLORIDE 50 MG/ML
50 INJECTION, SOLUTION INTRAMUSCULAR; INTRAVENOUS ONCE AS NEEDED
Status: CANCELLED | OUTPATIENT
Start: 2025-08-05

## 2025-07-29 RX ORDER — EPINEPHRINE 0.3 MG/.3ML
0.3 INJECTION SUBCUTANEOUS ONCE AS NEEDED
Status: CANCELLED | OUTPATIENT
Start: 2025-08-05

## 2025-07-29 RX ORDER — EPINEPHRINE 0.3 MG/.3ML
0.3 INJECTION SUBCUTANEOUS ONCE AS NEEDED
OUTPATIENT
Start: 2025-08-13

## 2025-07-29 RX ORDER — HEPARIN 100 UNIT/ML
500 SYRINGE INTRAVENOUS
Status: CANCELLED | OUTPATIENT
Start: 2025-08-05

## 2025-07-29 RX ORDER — DIPHENHYDRAMINE HYDROCHLORIDE 50 MG/ML
50 INJECTION, SOLUTION INTRAMUSCULAR; INTRAVENOUS ONCE AS NEEDED
Status: DISCONTINUED | OUTPATIENT
Start: 2025-07-29 | End: 2025-07-29 | Stop reason: HOSPADM

## 2025-07-29 RX ORDER — EPINEPHRINE 0.3 MG/.3ML
0.3 INJECTION SUBCUTANEOUS ONCE AS NEEDED
Status: DISCONTINUED | OUTPATIENT
Start: 2025-07-29 | End: 2025-07-29 | Stop reason: HOSPADM

## 2025-07-29 RX ORDER — SODIUM CHLORIDE 0.9 % (FLUSH) 0.9 %
10 SYRINGE (ML) INJECTION
Status: DISCONTINUED | OUTPATIENT
Start: 2025-07-29 | End: 2025-07-29 | Stop reason: HOSPADM

## 2025-07-29 RX ORDER — PROCHLORPERAZINE EDISYLATE 5 MG/ML
5 INJECTION INTRAMUSCULAR; INTRAVENOUS ONCE AS NEEDED
Status: DISCONTINUED | OUTPATIENT
Start: 2025-07-29 | End: 2025-07-29 | Stop reason: HOSPADM

## 2025-07-29 RX ORDER — SODIUM CHLORIDE 0.9 % (FLUSH) 0.9 %
10 SYRINGE (ML) INJECTION
OUTPATIENT
Start: 2025-08-13

## 2025-07-29 RX ORDER — PROCHLORPERAZINE EDISYLATE 5 MG/ML
5 INJECTION INTRAMUSCULAR; INTRAVENOUS ONCE AS NEEDED
OUTPATIENT
Start: 2025-08-13

## 2025-07-29 RX ORDER — PROCHLORPERAZINE EDISYLATE 5 MG/ML
5 INJECTION INTRAMUSCULAR; INTRAVENOUS ONCE AS NEEDED
Status: CANCELLED | OUTPATIENT
Start: 2025-08-05

## 2025-07-29 RX ORDER — DIPHENHYDRAMINE HYDROCHLORIDE 50 MG/ML
50 INJECTION, SOLUTION INTRAMUSCULAR; INTRAVENOUS ONCE AS NEEDED
OUTPATIENT
Start: 2025-08-13

## 2025-07-29 RX ORDER — HEPARIN 100 UNIT/ML
500 SYRINGE INTRAVENOUS
Status: DISCONTINUED | OUTPATIENT
Start: 2025-07-29 | End: 2025-07-29 | Stop reason: HOSPADM

## 2025-07-29 RX ADMIN — Medication 10 ML: at 12:07

## 2025-07-29 RX ADMIN — HEPARIN SODIUM (PORCINE) LOCK FLUSH IV SOLN 100 UNIT/ML 500 UNITS: 100 SOLUTION at 12:07

## 2025-07-29 RX ADMIN — SODIUM CHLORIDE 250 MG: 9 INJECTION, SOLUTION INTRAVENOUS at 11:07

## 2025-07-29 RX ADMIN — SODIUM CHLORIDE: 9 INJECTION, SOLUTION INTRAVENOUS at 11:07

## 2025-07-29 NOTE — PROGRESS NOTES
Subjective:       Patient ID: Selene Smallwood is a 70 y.o. female.    Chief Complaint:  Malignant neoplasm of ascending colon (Pt reports intermittent blood in urine that is  light pink x 3-4 days. She also reports skin irritation in between her fingers x 4 days. )       Diagnosis:   Stage IV Metastatic Colon Adenocarcinoma with mets to peritoneum  2. Pulmonary Embolism    Current Treatment:   Lonsurf D1-5 and D8-12 + Bevacizumab D1/D15 Q28 day cycle (to start 5/26/2025)  Xarelto 15 mg BID x 21 days then 20 mg daily- 6/25/24-present    Treatment History:   FOLFOX + Cetuximab q2w x 12 cycles- 4/2/24 - 11/6/24  Hold Oxaliplatin for C11 and C12 due to tolerance  FOLFIRI + Priscilla - (1/6/25-3/11/25)  Held after C4 d/t pelvic mass on scans    HPI:   70 yo F presented in March '24 for evaluation of metastatic colon cancer. She initially presented to OSH in early February '24 with 2 days of upper abdominal pain. Imaging on 2/4 in the ER revealed evidence of non perforated acute appendicitis. She underwent Ex lap where a cecal mass was incidentally discovered and resected, as well as omental studding was noted and 1 small mass was taken for biopsy. Her final pathology revealed a large exophytic mass in the cecum measuring 4 x 2 cm extending to the appendiceal orifice and extends into pericolonic adipose tissue. G2, LVI+, PNI negative. 14 LN were resected, with 10 being positive for metastatic adenocarcinoma. She also had an omental mass measuring 2.5 x 1.5cm which was positive for metastatic adenocarcinoma, consistent with a colon primary. Her final pathology staging is consistent with [id number]. Her postop course was complicated by abdominal abscess formation s/p IR drain placement and subsequent removal and antibiotic treatment.      She underwent PET scan in March '24 with evidence of nodular foci within the omentum/peritoneum consistent with peritoneal carcinomatosis. No other evidence of disease. NGS testing was  performed which revealed GULSHAN wild type and BRAF negative. Therefore she underwent 1st line therapy of FOLFOX + Cetuximab Q2w x 12 cycles. Despite good response to treatment after 6 cycles of therapy, once she completed 12 cycles of chemotherapy she had evidence of disease progression to abdominal lymph nodes, peritoneum, and bilateral lungs. She was started on 2nd line FOLFIRI + Bevacizumab, unfortunately she had rapid progression with mucinous abdominal mass growth. She will now proceed with 3rd line Lonsurf + Avastin.     Interval History:  Patient presents to the visit today for follow-up, discussion of recent symptoms, and to proceed with treatment for her metastatic colon cancer.   Recent lab work shows a hemoglobin of 8.0, which is borderline for requiring a blood transfusion. The white blood cell count is perfect. Kidney function, liver function tests, and electrolytes are all good.    The patient reports skin irritation on the hands, managed with moisturizers. Over the last three days, the patient has noticed light pink blood in the urine. The patient also expressed a desire to switch anticoagulants from Xarelto to Eliquis based on online reading, believing it to be milder.    The patient denies any major bleeding.    Current medications include Xarelto for anticoagulation. The patient has not yet started the oral treatment ( Lonsurf) prescribed at the last visit until she is cleared today.    Past Medical History:   Diagnosis Date    Hypertension       Past Surgical History:   Procedure Laterality Date    APPENDECTOMY N/A 2/5/2024    Procedure: APPENDECTOMY;  Surgeon: Anna Murillo MD;  Location: [facility] OR;  Service: General;  Laterality: N/A;    INSERTION OF TUNNELED CENTRAL VENOUS CATHETER (CVC) WITH SUBCUTANEOUS PORT N/A 3/26/2024    Procedure: [id number];  Surgeon: Armando Tran MD;  Location: [facility] OR;  Service: Peripheral Vascular;  Laterality: N/A;    LAPAROSCOPIC APPENDECTOMY N/A  2/5/2024    Procedure: APPENDECTOMY, LAPAROSCOPIC;  Surgeon: Anna Murillo MD;  Location: [facility] OR;  Service: General;  Laterality: N/A;    SURGICAL REMOVAL OF ILEUM WITH CECUM N/A 2/5/2024    Procedure: EXCISION, CECUM WITH ILEUM;  Surgeon: Anna Murillo MD;  Location: [facility] OR;  Service: General;  Laterality: N/A;    WASHOUT N/A 2/5/2024    Procedure: WASHOUT;  Surgeon: Anna Murillo MD;  Location: [facility] OR;  Service: General;  Laterality: N/A;     Social History     Socioeconomic History    Marital status:    Tobacco Use    Smoking status: Former     Types: Cigarettes    Smokeless tobacco: Never   Substance and Sexual Activity    Alcohol use: Not Currently    Drug use: Never    Sexual activity: Not Currently     Social Drivers of Health     Financial Resource Strain: Low Risk  (2/15/2024)    Overall Financial Resource Strain (CARDIA)     Difficulty of Paying Living Expenses: Not hard at all   Food Insecurity: No Food Insecurity (2/15/2024)    Hunger Vital Sign     Worried About Running Out of Food in the Last Year: Never true     Ran Out of Food in the Last Year: Never true   Transportation Needs: No Transportation Needs (2/15/2024)    PRAPARE - Transportation     Lack of Transportation (Medical): No     Lack of Transportation (Non-Medical): No   Physical Activity: Unknown (2/15/2024)    Exercise Vital Sign     Days of Exercise per Week: 0 days   Stress: No Stress Concern Present (2/15/2024)    [employer] - Occupational Stress Questionnaire     Feeling of Stress : Not at all   Housing Stability: Low Risk  (2/15/2024)    Housing Stability Vital Sign     Unable to Pay for Housing in the Last Year: No     Number of Places Lived in the Last Year: 1     Unstable Housing in the Last Year: No      Family History   Family history unknown: Yes      Review of patient's allergies indicates:   Allergen Reactions    Robaxin [methocarbamol] Other (See Comments)     Altered mental  status       Review of Systems   Constitutional:  Positive for appetite change, fatigue and unexpected weight change. Negative for activity change and fever.   HENT:  Negative for sore throat.    Eyes:  Negative for visual disturbance.   Respiratory:  Negative for cough and shortness of breath.    Cardiovascular:  Negative for chest pain.   Gastrointestinal:  Negative for abdominal pain, diarrhea and vomiting.   Endocrine: Negative for polyuria.   Genitourinary:  Negative for dysuria and hot flashes.   Integumentary:  Negative for rash.   Allergic/Immunologic: Negative for immunocompromised state.   Neurological:  Negative for weakness and headaches.   Hematological:  Negative for adenopathy.   Psychiatric/Behavioral:  Positive for depressed mood. Negative for confusion.          Objective:      There were no vitals filed for this visit.            Physical Exam  Constitutional:       General: She is not in acute distress.     Appearance: She is cachectic. She is not ill-appearing.   HENT:      Head: Normocephalic and atraumatic.      Nose: Nose normal.      Mouth/Throat:      Mouth: Mucous membranes are moist.      Pharynx: Oropharynx is clear.   Eyes:      Extraocular Movements: Extraocular movements intact.      Conjunctiva/sclera: Conjunctivae normal.      Pupils: Pupils are equal, round, and reactive to light.   Cardiovascular:      Rate and Rhythm: Normal rate and regular rhythm.      Pulses: Normal pulses.      Heart sounds: Normal heart sounds. No murmur heard.  Pulmonary:      Effort: Pulmonary effort is normal. No respiratory distress.      Breath sounds: Normal breath sounds.   Abdominal:      General: There is no distension.      Palpations: Abdomen is soft.      Tenderness: There is no abdominal tenderness.   Musculoskeletal:         General: Normal range of motion.      Cervical back: Normal range of motion and neck supple.      Right lower leg: No edema.      Left lower leg: No edema.   Feet:       Left foot:      Skin integrity: No blister.   Lymphadenopathy:      Cervical: No cervical adenopathy.   Skin:     General: Skin is warm and dry.   Neurological:      General: No focal deficit present.      Mental Status: She is alert and oriented to person, place, and time.       LABS AND IMAGING REVIEWED IN EPIC  3/21/2025 PET/CT:  1. Stable size of bilateral lungs numerous small randomly scattered nodules.  These are non hypermetabolic but not adequately resolved on the PET scan due to the small size.  These are concerning to be metastatic  2.  Stable aortocaval small size lymph node with interval improvement of hypermetabolism.  3. Interval substantial size of pelvic cystic structure without internal and peripheral hypermetabolism.  This likely is arising from the left ovary and is of indeterminate significance.  Please further assess with ultrasound exam or MRI of the pelvis.  4.  Cul-de-sac/posterior uterine segment hypermetabolism with interval mild improvement.  4/2/25 MRI Pelvis:  Findings are concerning for epithelial type neoplasm with enhancing frondlike appearance in the left aspect which correlates with the hypermetabolic lesion on recent PET/CT scan. Other secondary findings as above including myomatous changes with degenerative fibroids.     PATHOLOGY:  2/5/24 Biopsy:  1. Appendix, appendectomy:  - Acute appendicitis with perforation.  - Adenocarcinoma involves lymphatic spaces of the appendiceal wall and peritoneal surface.  2. Cecum, ileocecectomy:  - Colonic adenocarcinoma.  - See synoptic checklist for CAP cancer protocol.  3. Omental mass, biopsy:  - Metastatic adenocarcinoma, consistent with a colon primary.  4. Small bowel, segmental resection:  - Acute peritonitis.  - No evidence of malignancy.    MLH1: PRESERVED (INTACT) EXPRESSION IN TUMOR CELLS   MSH2: PRESERVED (INTACT) EXPRESSION IN TUMOR CELLS   MSH6: PRESERVED (INTACT) EXPRESSION IN TUMOR CELLS   PMS2: PRESERVED (INTACT) EXPRESSION IN  TUMOR CELLS  Microscopic Description/Comments   These results show no deficiency of the mismatch repair proteins tested.    5/6/25 PELVIC MASS, FINE NEEDLE ASPIRATION:    - MUCINOUS LESION.    Comment   The differential diagnosis is broad and includes appendiceal mucocele, malignant   colorectal tumors, and intestinal type neoplasms of other organs (ovary, etc.).   Given the degree of cytologic atypia, a malignant process should be considered.   Clinical and radiological correlation is necessary.      Note:  The specimen is remarkably paucicellular and consists predominantly of   mucin.  There is insufficient cellularity for any additional ancillary studies.   Assessment:   Stage IV Metastatic Colon Adenocarcinoma with mets to peritoneum - Cecal mass, nonobstructive, no perforation. [id number]. Treatment history as above. Now proceeding with 3rd line Lonsurf + Priscilla.   Pulmonary Embolus at the right mainstem bronchus   Incidentally found during CT CAP 6/25/24. Now on xarelto, continue at this time.   Peripheral Neuropathy   She does report peripheral neuropathy to her fingertips and lower legs.  Her hand neuropathy is grade 2, consistent but not painful. Able to button buttons.   Her LE bilateral neuropathy is mainly at night she gets shooting pains when she lays down.   Stable today on gabapentin to 300mg TID - 1 in AM, 2 in PM takes prn.  Immunodeficiency due to Drug Therapy - Precautions discussed with patient. Continue to monitor for any signs of symptoms of infection. No prophylaxis needed at this time. Current ANC 1350.  Plan:   - Reviewed recent lab results. Hemoglobin is 8.0.  - Discussed the report of hematuria. Advised that if the urinalysis confirms blood, a urology consultation is necessary to rule out significant pathology, including malignancy.  - Discussed the patient's request to switch from Xarelto to Eliquis. Explained that both medications have the same mechanism of action and the primary difference  is dosing frequency and convenience. Advised that a switch is not medically necessary but can be considered based on insurance coverage and patient preference.  - treatment is approved to proceed today, pending the results of the urinalysis. If blood is confirmed in the urine, the infusion will be held.  - The patient will provide a urine sample today before the infusion.  - The patient will start oral treatment  at home after today's visit.  - The patient will have a blood count check in one week at a lab closer to home ([facility]).  - Follow-up appointment scheduled for 09/02/2025. The patient should bring their oral chemotherapy pills to this visit.      Patient instructions and visit orders.       -Follow-up:  F/U with NP- August 25th, 2025  -Labs:  CBC, CMP, CEA, magnesium, urine for protein.-August 25th, 2025    -Imaging:    -Other orders:  Treatment-bevacizumab cycle 4 day 15-August 25, 2025    48 were spent on this encounter     code applied: Patient requires or will require continuous, longitudinal, and active collaborative plan of care related to this patient's health condition, Stage IV Metastatic Colon Adenocarcinoma with mets to peritoneum- the management of which requires the direction of a practitioner with specialized clinical knowledge, skill, and expertise.     Tate Katz MSN, FNP-BC, AOCNP  Department of Hematology/Oncology.

## 2025-08-05 ENCOUNTER — LAB VISIT (OUTPATIENT)
Dept: LAB | Facility: HOSPITAL | Age: 70
End: 2025-08-05
Attending: NURSE PRACTITIONER
Payer: MEDICARE

## 2025-08-05 DIAGNOSIS — C78.6 MALIGNANT NEOPLASM METASTATIC TO PERITONEUM: ICD-10-CM

## 2025-08-05 DIAGNOSIS — C18.2 MALIGNANT NEOPLASM OF ASCENDING COLON: ICD-10-CM

## 2025-08-05 LAB
BASOPHILS # BLD AUTO: 0.02 X10(3)/MCL
BASOPHILS NFR BLD AUTO: 0.4 %
EOSINOPHIL # BLD AUTO: 0.02 X10(3)/MCL (ref 0–0.9)
EOSINOPHIL NFR BLD AUTO: 0.4 %
ERYTHROCYTE [DISTWIDTH] IN BLOOD BY AUTOMATED COUNT: 29.8 % (ref 11.5–17)
HCT VFR BLD AUTO: 27.5 % (ref 37–47)
HGB BLD-MCNC: 8.6 G/DL (ref 12–16)
IMM GRANULOCYTES # BLD AUTO: 0.02 X10(3)/MCL (ref 0–0.04)
IMM GRANULOCYTES NFR BLD AUTO: 0.4 %
LYMPHOCYTES # BLD AUTO: 1.6 X10(3)/MCL (ref 0.6–4.6)
LYMPHOCYTES NFR BLD AUTO: 34.7 %
MCH RBC QN AUTO: 30.5 PG (ref 27–31)
MCHC RBC AUTO-ENTMCNC: 31.3 G/DL (ref 33–36)
MCV RBC AUTO: 97.5 FL (ref 80–94)
MONOCYTES # BLD AUTO: 0.29 X10(3)/MCL (ref 0.1–1.3)
MONOCYTES NFR BLD AUTO: 6.3 %
NEUTROPHILS # BLD AUTO: 2.66 X10(3)/MCL (ref 2.1–9.2)
NEUTROPHILS NFR BLD AUTO: 57.8 %
NRBC BLD AUTO-RTO: 0 %
PLATELET # BLD AUTO: 316 X10(3)/MCL (ref 130–400)
PMV BLD AUTO: 10.1 FL (ref 7.4–10.4)
RBC # BLD AUTO: 2.82 X10(6)/MCL (ref 4.2–5.4)
WBC # BLD AUTO: 4.61 X10(3)/MCL (ref 4.5–11.5)

## 2025-08-05 PROCEDURE — 85025 COMPLETE CBC W/AUTO DIFF WBC: CPT

## 2025-08-05 PROCEDURE — 36415 COLL VENOUS BLD VENIPUNCTURE: CPT

## 2025-08-07 ENCOUNTER — PATIENT MESSAGE (OUTPATIENT)
Facility: CLINIC | Age: 70
End: 2025-08-07
Payer: MEDICARE

## 2025-08-12 ENCOUNTER — INFUSION (OUTPATIENT)
Facility: HOSPITAL | Age: 70
End: 2025-08-12
Payer: MEDICARE

## 2025-08-12 ENCOUNTER — OFFICE VISIT (OUTPATIENT)
Facility: CLINIC | Age: 70
End: 2025-08-12
Payer: MEDICARE

## 2025-08-12 ENCOUNTER — LAB VISIT (OUTPATIENT)
Dept: LAB | Facility: HOSPITAL | Age: 70
End: 2025-08-12
Payer: MEDICARE

## 2025-08-12 VITALS
RESPIRATION RATE: 18 BRPM | HEIGHT: 65 IN | HEART RATE: 103 BPM | BODY MASS INDEX: 18.37 KG/M2 | SYSTOLIC BLOOD PRESSURE: 127 MMHG | TEMPERATURE: 97 F | WEIGHT: 110.25 LBS | DIASTOLIC BLOOD PRESSURE: 87 MMHG | OXYGEN SATURATION: 100 %

## 2025-08-12 VITALS
BODY MASS INDEX: 18.33 KG/M2 | SYSTOLIC BLOOD PRESSURE: 127 MMHG | RESPIRATION RATE: 18 BRPM | OXYGEN SATURATION: 100 % | WEIGHT: 110 LBS | DIASTOLIC BLOOD PRESSURE: 87 MMHG | HEIGHT: 65 IN | TEMPERATURE: 97 F | HEART RATE: 103 BPM

## 2025-08-12 DIAGNOSIS — R11.0 CHEMOTHERAPY-INDUCED NAUSEA: ICD-10-CM

## 2025-08-12 DIAGNOSIS — C18.2 MALIGNANT NEOPLASM OF ASCENDING COLON: Primary | ICD-10-CM

## 2025-08-12 DIAGNOSIS — D84.821 IMMUNODEFICIENCY DUE TO CHEMOTHERAPY: ICD-10-CM

## 2025-08-12 DIAGNOSIS — C18.2 MALIGNANT NEOPLASM OF ASCENDING COLON: ICD-10-CM

## 2025-08-12 DIAGNOSIS — C78.6 MALIGNANT NEOPLASM METASTATIC TO PERITONEUM: ICD-10-CM

## 2025-08-12 DIAGNOSIS — D64.9 ANEMIA, UNSPECIFIED TYPE: ICD-10-CM

## 2025-08-12 DIAGNOSIS — C78.6 MALIGNANT NEOPLASM METASTATIC TO PERITONEUM: Primary | ICD-10-CM

## 2025-08-12 DIAGNOSIS — Z79.69 ON ANTINEOPLASTIC CHEMOTHERAPY: ICD-10-CM

## 2025-08-12 DIAGNOSIS — T45.1X5A IMMUNODEFICIENCY DUE TO CHEMOTHERAPY: ICD-10-CM

## 2025-08-12 DIAGNOSIS — Z79.69 IMMUNODEFICIENCY DUE TO CHEMOTHERAPY: ICD-10-CM

## 2025-08-12 DIAGNOSIS — T45.1X5A CHEMOTHERAPY-INDUCED NAUSEA: ICD-10-CM

## 2025-08-12 DIAGNOSIS — I26.99 PULMONARY EMBOLISM, UNSPECIFIED CHRONICITY, UNSPECIFIED PULMONARY EMBOLISM TYPE, UNSPECIFIED WHETHER ACUTE COR PULMONALE PRESENT: ICD-10-CM

## 2025-08-12 DIAGNOSIS — R53.81 PHYSICAL DECONDITIONING: ICD-10-CM

## 2025-08-12 LAB
ALBUMIN SERPL-MCNC: 2.4 G/DL (ref 3.4–4.8)
ALBUMIN/GLOB SERPL: 0.4 RATIO (ref 1.1–2)
ALP SERPL-CCNC: 99 UNIT/L (ref 40–150)
ALT SERPL-CCNC: <5 UNIT/L (ref 0–55)
ANION GAP SERPL CALC-SCNC: 9 MEQ/L
AST SERPL-CCNC: 17 UNIT/L (ref 11–45)
BASOPHILS # BLD AUTO: 0.01 X10(3)/MCL
BASOPHILS NFR BLD AUTO: 0.4 %
BILIRUB SERPL-MCNC: 0.4 MG/DL
BUN SERPL-MCNC: 6 MG/DL (ref 9.8–20.1)
CALCIUM SERPL-MCNC: 8.7 MG/DL (ref 8.4–10.2)
CEA SERPL-MCNC: 1960.83 NG/ML (ref 0–3)
CHLORIDE SERPL-SCNC: 109 MMOL/L (ref 98–107)
CO2 SERPL-SCNC: 23 MMOL/L (ref 23–31)
CREAT SERPL-MCNC: 0.61 MG/DL (ref 0.55–1.02)
CREAT/UREA NIT SERPL: 10
EOSINOPHIL # BLD AUTO: 0.05 X10(3)/MCL (ref 0–0.9)
EOSINOPHIL NFR BLD AUTO: 1.9 %
ERYTHROCYTE [DISTWIDTH] IN BLOOD BY AUTOMATED COUNT: 29.8 % (ref 11.5–17)
GFR SERPLBLD CREATININE-BSD FMLA CKD-EPI: >60 ML/MIN/1.73/M2
GLOBULIN SER-MCNC: 6 GM/DL (ref 2.4–3.5)
GLUCOSE SERPL-MCNC: 84 MG/DL (ref 82–115)
HCT VFR BLD AUTO: 26.5 % (ref 37–47)
HGB BLD-MCNC: 8.3 G/DL (ref 12–16)
IMM GRANULOCYTES # BLD AUTO: 0 X10(3)/MCL (ref 0–0.04)
IMM GRANULOCYTES NFR BLD AUTO: 0 %
LYMPHOCYTES # BLD AUTO: 1.32 X10(3)/MCL (ref 0.6–4.6)
LYMPHOCYTES NFR BLD AUTO: 49.3 %
MCH RBC QN AUTO: 30.2 PG (ref 27–31)
MCHC RBC AUTO-ENTMCNC: 31.3 G/DL (ref 33–36)
MCV RBC AUTO: 96.4 FL (ref 80–94)
MONOCYTES # BLD AUTO: 0.1 X10(3)/MCL (ref 0.1–1.3)
MONOCYTES NFR BLD AUTO: 3.7 %
NEUTROPHILS # BLD AUTO: 1.2 X10(3)/MCL (ref 2.1–9.2)
NEUTROPHILS NFR BLD AUTO: 44.7 %
NRBC BLD AUTO-RTO: 1.1 %
PLATELET # BLD AUTO: 220 X10(3)/MCL (ref 130–400)
PMV BLD AUTO: 10 FL (ref 7.4–10.4)
POTASSIUM SERPL-SCNC: 3.6 MMOL/L (ref 3.5–5.1)
PROT SERPL-MCNC: 8.4 GM/DL (ref 5.8–7.6)
PROT UR STRIP-MCNC: 53.9 MG/DL
RBC # BLD AUTO: 2.75 X10(6)/MCL (ref 4.2–5.4)
SODIUM SERPL-SCNC: 141 MMOL/L (ref 136–145)
WBC # BLD AUTO: 2.68 X10(3)/MCL (ref 4.5–11.5)

## 2025-08-12 PROCEDURE — 63600175 PHARM REV CODE 636 W HCPCS: Mod: JW,TB | Performed by: NURSE PRACTITIONER

## 2025-08-12 PROCEDURE — 82378 CARCINOEMBRYONIC ANTIGEN: CPT

## 2025-08-12 PROCEDURE — 99214 OFFICE O/P EST MOD 30 MIN: CPT | Mod: PBBFAC,25

## 2025-08-12 PROCEDURE — 99215 OFFICE O/P EST HI 40 MIN: CPT | Mod: S$PBB,,,

## 2025-08-12 PROCEDURE — 25000003 PHARM REV CODE 250: Performed by: NURSE PRACTITIONER

## 2025-08-12 PROCEDURE — 85025 COMPLETE CBC W/AUTO DIFF WBC: CPT

## 2025-08-12 PROCEDURE — 96413 CHEMO IV INFUSION 1 HR: CPT

## 2025-08-12 PROCEDURE — G2211 COMPLEX E/M VISIT ADD ON: HCPCS | Mod: ,,,

## 2025-08-12 PROCEDURE — 99999 PR PBB SHADOW E&M-EST. PATIENT-LVL IV: CPT | Mod: PBBFAC,,,

## 2025-08-12 PROCEDURE — 36415 COLL VENOUS BLD VENIPUNCTURE: CPT

## 2025-08-12 PROCEDURE — 80053 COMPREHEN METABOLIC PANEL: CPT

## 2025-08-12 PROCEDURE — 84156 ASSAY OF PROTEIN URINE: CPT

## 2025-08-12 RX ORDER — EPINEPHRINE 0.3 MG/.3ML
0.3 INJECTION SUBCUTANEOUS ONCE AS NEEDED
Status: DISCONTINUED | OUTPATIENT
Start: 2025-08-12 | End: 2025-08-12 | Stop reason: HOSPADM

## 2025-08-12 RX ORDER — DIPHENHYDRAMINE HYDROCHLORIDE 50 MG/ML
50 INJECTION, SOLUTION INTRAMUSCULAR; INTRAVENOUS ONCE AS NEEDED
Status: DISCONTINUED | OUTPATIENT
Start: 2025-08-12 | End: 2025-08-12 | Stop reason: HOSPADM

## 2025-08-12 RX ORDER — HEPARIN 100 UNIT/ML
500 SYRINGE INTRAVENOUS
Status: DISCONTINUED | OUTPATIENT
Start: 2025-08-12 | End: 2025-08-12 | Stop reason: HOSPADM

## 2025-08-12 RX ORDER — SODIUM CHLORIDE 0.9 % (FLUSH) 0.9 %
10 SYRINGE (ML) INJECTION
Status: DISCONTINUED | OUTPATIENT
Start: 2025-08-12 | End: 2025-08-12 | Stop reason: HOSPADM

## 2025-08-12 RX ORDER — PROCHLORPERAZINE EDISYLATE 5 MG/ML
5 INJECTION INTRAMUSCULAR; INTRAVENOUS ONCE AS NEEDED
Status: DISCONTINUED | OUTPATIENT
Start: 2025-08-12 | End: 2025-08-12 | Stop reason: HOSPADM

## 2025-08-12 RX ADMIN — SODIUM CHLORIDE 250 MG: 9 INJECTION, SOLUTION INTRAVENOUS at 10:08

## 2025-08-12 RX ADMIN — SODIUM CHLORIDE: 9 INJECTION, SOLUTION INTRAVENOUS at 10:08

## 2025-08-12 RX ADMIN — HEPARIN SODIUM (PORCINE) LOCK FLUSH IV SOLN 100 UNIT/ML 500 UNITS: 100 SOLUTION at 11:08

## 2025-08-13 DIAGNOSIS — G89.3 CANCER RELATED PAIN: ICD-10-CM

## 2025-08-13 RX ORDER — HYDROCODONE BITARTRATE AND ACETAMINOPHEN 5; 325 MG/1; MG/1
1 TABLET ORAL EVERY 6 HOURS PRN
Qty: 30 TABLET | Refills: 0 | Status: SHIPPED | OUTPATIENT
Start: 2025-08-13

## 2025-08-16 PROBLEM — K56.600 PARTIAL SMALL BOWEL OBSTRUCTION: Status: ACTIVE | Noted: 2025-08-16

## 2025-08-17 PROBLEM — I26.09 CHRONIC PULMONARY EMBOLISM WITH ACUTE COR PULMONALE: Status: ACTIVE | Noted: 2025-08-17

## 2025-08-17 PROBLEM — I27.82 CHRONIC PULMONARY EMBOLISM WITH ACUTE COR PULMONALE: Status: ACTIVE | Noted: 2025-08-17

## 2025-08-18 ENCOUNTER — PATIENT MESSAGE (OUTPATIENT)
Facility: CLINIC | Age: 70
End: 2025-08-18
Payer: MEDICARE

## 2025-08-21 PROBLEM — K35.32 PERFORATED APPENDICITIS: Status: RESOLVED | Noted: 2024-02-06 | Resolved: 2025-08-21

## 2025-08-21 PROBLEM — R10.10 UPPER ABDOMINAL PAIN: Status: ACTIVE | Noted: 2025-08-21

## 2025-09-03 ENCOUNTER — PATIENT OUTREACH (OUTPATIENT)
Facility: HOSPITAL | Age: 70
End: 2025-09-03
Payer: MEDICARE

## 2025-09-05 ENCOUNTER — DOCUMENTATION ONLY (OUTPATIENT)
Dept: SURGICAL ONCOLOGY | Facility: CLINIC | Age: 70
End: 2025-09-05
Payer: MEDICARE

## (undated) DEVICE — ELECTRODE EXTENDED BLADE

## (undated) DEVICE — GLOVE SIGNATURE MICRO LTX 6.5

## (undated) DEVICE — SOL NORMAL USPCA 0.9%

## (undated) DEVICE — TROCAR LAPSCP XCEL 12MM 10CM

## (undated) DEVICE — NDL PNEUMO INSUFFLATI 120MM

## (undated) DEVICE — SPONGE LAP 18X18 PREWASHED

## (undated) DEVICE — CART STAPLE RELD 45MM WHT

## (undated) DEVICE — CART STAPLE FLEX ETX 3.5MM BLU

## (undated) DEVICE — MANIFOLD 4 PORT

## (undated) DEVICE — GLOVE SIGNATURE MICRO LTX 6

## (undated) DEVICE — SOL IRRI STRL WATER 1000ML

## (undated) DEVICE — COVER PROBE US 5.5X58L NON LTX

## (undated) DEVICE — TUBING MEDI-VAC 20FT .25IN

## (undated) DEVICE — YANKAUER FLEX NO VENT REG CAP

## (undated) DEVICE — COVER C-ARM STRAP BAND 44X80IN

## (undated) DEVICE — SLEEVE KII ADV FIX 5X100MM

## (undated) DEVICE — STAPLER SKIN COUNT 35 W STPL

## (undated) DEVICE — NDL HYPO REG 25G X 1 1/2

## (undated) DEVICE — DEVICE ENSEAL X1 LARGE JAW

## (undated) DEVICE — SPONGE LAP XRAY STERILE 18X18

## (undated) DEVICE — GLOVE SENSICARE PI GRN 7

## (undated) DEVICE — ELECTRODE PATIENT RETURN DISP

## (undated) DEVICE — GLOVE SENSICARE PI GRN 6

## (undated) DEVICE — GLOVE SENSICARE PI GRN 6.5

## (undated) DEVICE — TROCAR KII FIOS 5MM X 100MM

## (undated) DEVICE — SPONGE GAUZE 16PLY 4X4

## (undated) DEVICE — SUT MCRYL PLUS 4-0 PS2 27IN

## (undated) DEVICE — KIT SURGICAL TURNOVER

## (undated) DEVICE — SOL NACL IRR 1000ML BTL

## (undated) DEVICE — APPLICATOR CHLORAPREP ORN 26ML

## (undated) DEVICE — SUT VICRYL+ 2-0 SH 18IN

## (undated) DEVICE — SYR 10CC LUER LOCK

## (undated) DEVICE — ELECTRODE BLADE INSULATED 1 IN

## (undated) DEVICE — BAG MEDI-PLAST DECANTER C-FLOW

## (undated) DEVICE — GLOVE PROTEXIS PI SYN SURG 7.5

## (undated) DEVICE — SUT VICRYL PLUS 3-0 SH 18IN

## (undated) DEVICE — SUT Z338H PDSII 3-0

## (undated) DEVICE — TUBING INSUFFLATOR W/ROT CONCT

## (undated) DEVICE — DRAPE INCISE IOBAN 2 23X23IN

## (undated) DEVICE — KIT LAPAROSCOPY UNIVERSITY

## (undated) DEVICE — Device

## (undated) DEVICE — GOWN POLY REINF BRTH SLV XL

## (undated) DEVICE — PAD CURAD NONADH 3X4IN

## (undated) DEVICE — ADHESIVE DERMABOND ADVANCED

## (undated) DEVICE — BAG TISS RETRV MONARCH 10MM

## (undated) DEVICE — BLADE SURG STAINLESS STEEL #11

## (undated) DEVICE — RELOAD PROXIMATE CUT BLUE 75MM

## (undated) DEVICE — IRRIGATOR SUCTION W/TIP

## (undated) DEVICE — STAPLER INT LINEAR ARTC 3.5-45

## (undated) DEVICE — TAPE ADH MEDIPORE 4 X 10YDS

## (undated) DEVICE — BOWL STERILE LARGE 32OZ

## (undated) DEVICE — TRAY CATH FOL SIL URIMTR 16FR

## (undated) DEVICE — SYR IRRIGATION BULB STER 60ML

## (undated) DEVICE — SOL NACL IRR 3000ML

## (undated) DEVICE — CUTTER PROXIMATE BLUE 75MM

## (undated) DEVICE — APPLICATOR STRL COT 2INNR 6IN

## (undated) DEVICE — SUT 1 48IN PDS II VIO MONO

## (undated) DEVICE — SUT 3/0 27IN PDS II VIO MO

## (undated) DEVICE — TOWEL OR DISP STRL BLUE 4/PK

## (undated) DEVICE — GLOVE SIGNATURE MICRO LTX 7

## (undated) DEVICE — CONTAINER SPECIMEN OR STER 4OZ